# Patient Record
Sex: FEMALE | Race: BLACK OR AFRICAN AMERICAN | NOT HISPANIC OR LATINO | Employment: FULL TIME | URBAN - METROPOLITAN AREA
[De-identification: names, ages, dates, MRNs, and addresses within clinical notes are randomized per-mention and may not be internally consistent; named-entity substitution may affect disease eponyms.]

---

## 2017-03-29 ENCOUNTER — TELEPHONE (OUTPATIENT)
Dept: FAMILY MEDICINE | Facility: CLINIC | Age: 57
End: 2017-03-29

## 2017-03-29 NOTE — TELEPHONE ENCOUNTER
"Zuly Wheeler is a 56 year old female who calls with chest pain.     PRESENTING PROBLEM:  CP    NURSING ASSESSMENT:  Patient complains of chest pain.  Onset:  3/24/17  Pain is characterized as constant pressure with ntermittent burning (with after meals  Severity moderate and middle of chest  Located middle of chest   Radiates to left arm.  Duration constant.  Associated symptoms stress/anxiety and feeling tired, intermittent \"distortion\" w/vision, left arm and hand feels tingly and tight  Exacerbated by no known provoking events.  Relieved by none.  Cardiac risk factors: .  Associated Medical History:   Allergies:   Allergies   Allergen Reactions     Depakote      groggy     Lamotrigine      Intolerance, numb (Lamictal)     Olanzapine      Ineffective (only generic)     Seroquel [Quetiapine Fumarate] Visual Disturbance     Blurred vision; jumpy     Sulfa Drugs Hives     Trileptal Visual Disturbance     Blurred vision     Zoloft Other (See Comments)     jumpy     Citalopram Anxiety     Intolerance (Celexa)       MEDICATIONS:  Taking medication(s) as prescribed? N/A  Taking over the counter medication(s) ?N/A  Any medication side effects? Not Applicable    Any barriers to taking medication(s) as prescribed?  N/A  Medication(s) improving/managing symptoms?  N/A  Medication reconciliation completed: N/A    Last exam/Treatment:  12/7/16    NURSING PLAN: Nursing advice to patient to go to ED, someone else to drive or call 911    RECOMMENDED DISPOSITION:  To ED, another person to drive - or call 911  Will comply with recommendation: Yes  If further questions/concerns or if symptoms do not improve, worsen or new symptoms develop, call your PCP or [facility] as soon as possible.      Guideline used:  Telephone Triage Protocols for Nurses, Fourth Edition, Zohra Fitzpatrick RN    "

## 2017-03-29 NOTE — TELEPHONE ENCOUNTER
Reason for call: Patient states chest pressure,pain/left arm tingling/anxiety    Phone Number Pt can be reached at: Home number on file [phone] (home)  Best Time: Triaged Red Flag  Can we leave a detailed message on this number? YES

## 2017-04-28 ENCOUNTER — TELEPHONE (OUTPATIENT)
Dept: FAMILY MEDICINE | Facility: CLINIC | Age: 57
End: 2017-04-28

## 2017-04-28 NOTE — TELEPHONE ENCOUNTER
Zuly Wheeler is a 57 year old female who calls with bed bugs.    NURSING ASSESSMENT:  Description: Little itchy red bed bug bites on side of neck, back of neck, arms, wrists, hands and thumb. Skin intact.   Onset/duration: New bites on Tuesday.   Precip. factors: Stayed at a hotel in [facility]  Associated symptoms: None   Improves/worsens symptoms:  None   Pain scale (0-10)   0/10  LMP/preg/breast feeding:  NA   Last exam/Treatment:  12/7/16   Allergies:   Allergies   Allergen Reactions     Depakote      groggy     Lamotrigine      Intolerance, numb (Lamictal)     Olanzapine      Ineffective (only generic)     Seroquel [Quetiapine Fumarate] Visual Disturbance     Blurred vision; jumpy     Sulfa Drugs Hives     Trileptal Visual Disturbance     Blurred vision     Zoloft Other (See Comments)     jumpy     Citalopram Anxiety     Intolerance (Celexa)       NURSING PLAN: Nursing advice to patient given    RECOMMENDED DISPOSITION:  Home care advice -     Wash clothing in the hottest water, hire a professional to treat home if you find these, freeze items for several days that can not be washed, vacuum all things and all cracks.  Signs of bedbugs  Bites can be the first sign of a bedbug infestation. When inspecting for the bugs, look in crevices of mattresses and box springs, behind the headboard, and in and on objects near or under the bed. You may see the bugs themselves. Or, you may see tiny dark stains on fabric or carpets. Smears of blood on sheets and nightclothes upon awakening are another sign. In some cases, the bugs are so well hidden they can t be found unless items are taken apart.  Bedbug bites  Bedbugs look for food at night. They bite while people or animals are sleeping. The bites are most often painless. Many people never know they ve been bitten. However, some people develop an itchy red welt or swelling. And if a person has an allergic reaction, severe itching, blisters, or hives can develop. Bites are  often on areas that are exposed, such as the head, neck, arms, and hands. Bedbug bites are not dangerous and don t spread illness. But if the bite is scratched and the skin is broken and irritated, there is a chance that a skin infection can develop.  Treating bites  Bite symptoms usually go away on their own within a week or two. During this time, over-the-counter (OTC) hydrocortisone ointment or cream can help relieve itching and swelling. If itching is bad, an OTC oral antihistamine (such as Benadryl) can help. If an infection develops from scratching the bites, the health care provider can prescribe an antibiotic.  If you were bitten by bedbugs in your home, talk to a licensed pest-control professional or company. They can inspect your home and help you get rid of the bugs safely.  When to call your health care provider   If you have bites, call your health care provider if you develop any of the following:    A fever of 100.4 F (38 C) or higher    Signs of infection of the bites, such as increased swelling and pain, warmth, or oozing    Signs of allergic reaction, such as hives, spreading rash, throat itching or swelling, or wheezing   Avoiding bedbugs    Avoid buying used beds, but if you do buy used bed frames, mattresses, box springs, or other furniture, be sure to check them carefully for bedbugs before bringing them into your home.    If bed bugs are found or suspected in the bed, using bedbug excluding mattress and box spring encasement covers can seal them in where they will eventually die.    When traveling, remove linens from the top of the bed and inspect the mattress and headboard for signs of the bugs. Place luggage on a hard surface such as a table or on a luggage rack and not on the floor.    If you suspect you were exposed to bedbugs while traveling, wash all clothing in hot water directly upon your return. Washing alone will not kill the bugs; it must be in high temperatures, at least 113  F  (45  C) for one hour.     Never  items discarded on the street--such as bed frames, mattresses, box springs, or upholstered furniture--for use in your home. These items may carry bedbugs.    Will comply with recommendation: Yes  If further questions/concerns or if symptoms do not improve, worsen or new symptoms develop, call your PCP or [facility] as soon as possible.      Guideline used:  Telephone Triage Protocols for Nurses, Fifth Edition, Zohra Lawrence RN

## 2017-10-25 ENCOUNTER — OFFICE VISIT (OUTPATIENT)
Dept: FAMILY MEDICINE | Facility: CLINIC | Age: 57
End: 2017-10-25
Payer: COMMERCIAL

## 2017-10-25 VITALS
TEMPERATURE: 97.5 F | WEIGHT: 151 LBS | OXYGEN SATURATION: 100 % | RESPIRATION RATE: 16 BRPM | HEART RATE: 77 BPM | BODY MASS INDEX: 23.3 KG/M2 | DIASTOLIC BLOOD PRESSURE: 72 MMHG | SYSTOLIC BLOOD PRESSURE: 120 MMHG

## 2017-10-25 DIAGNOSIS — F34.1 DYSTHYMIC DISORDER: ICD-10-CM

## 2017-10-25 DIAGNOSIS — Z11.3 SCREEN FOR STD (SEXUALLY TRANSMITTED DISEASE): Primary | ICD-10-CM

## 2017-10-25 DIAGNOSIS — Z12.31 VISIT FOR SCREENING MAMMOGRAM: ICD-10-CM

## 2017-10-25 DIAGNOSIS — F31.76 BIPOLAR DISORDER, IN FULL REMISSION, MOST RECENT EPISODE DEPRESSED (H): ICD-10-CM

## 2017-10-25 LAB
ERYTHROCYTE [DISTWIDTH] IN BLOOD BY AUTOMATED COUNT: 12.8 % (ref 10–15)
HCT VFR BLD AUTO: 39 % (ref 35–47)
HGB BLD-MCNC: 12.9 G/DL (ref 11.7–15.7)
MCH RBC QN AUTO: 29.5 PG (ref 26.5–33)
MCHC RBC AUTO-ENTMCNC: 33.1 G/DL (ref 31.5–36.5)
MCV RBC AUTO: 89 FL (ref 78–100)
PLATELET # BLD AUTO: 258 10E9/L (ref 150–450)
RBC # BLD AUTO: 4.38 10E12/L (ref 3.8–5.2)
WBC # BLD AUTO: 5.4 10E9/L (ref 4–11)

## 2017-10-25 PROCEDURE — 86695 HERPES SIMPLEX TYPE 1 TEST: CPT | Performed by: FAMILY MEDICINE

## 2017-10-25 PROCEDURE — 87389 HIV-1 AG W/HIV-1&-2 AB AG IA: CPT | Performed by: FAMILY MEDICINE

## 2017-10-25 PROCEDURE — 84443 ASSAY THYROID STIM HORMONE: CPT | Performed by: FAMILY MEDICINE

## 2017-10-25 PROCEDURE — 86694 HERPES SIMPLEX NES ANTBDY: CPT | Performed by: FAMILY MEDICINE

## 2017-10-25 PROCEDURE — 36415 COLL VENOUS BLD VENIPUNCTURE: CPT | Performed by: FAMILY MEDICINE

## 2017-10-25 PROCEDURE — 87591 N.GONORRHOEAE DNA AMP PROB: CPT | Performed by: FAMILY MEDICINE

## 2017-10-25 PROCEDURE — 99214 OFFICE O/P EST MOD 30 MIN: CPT | Performed by: FAMILY MEDICINE

## 2017-10-25 PROCEDURE — 80053 COMPREHEN METABOLIC PANEL: CPT | Performed by: FAMILY MEDICINE

## 2017-10-25 PROCEDURE — 87491 CHLMYD TRACH DNA AMP PROBE: CPT | Performed by: FAMILY MEDICINE

## 2017-10-25 PROCEDURE — 85027 COMPLETE CBC AUTOMATED: CPT | Performed by: FAMILY MEDICINE

## 2017-10-25 PROCEDURE — 86780 TREPONEMA PALLIDUM: CPT | Performed by: FAMILY MEDICINE

## 2017-10-25 PROCEDURE — 86696 HERPES SIMPLEX TYPE 2 TEST: CPT | Performed by: FAMILY MEDICINE

## 2017-10-25 RX ORDER — LITHIUM CARBONATE 300 MG
300 TABLET ORAL DAILY
Qty: 90 TABLET | Refills: 1 | Status: SHIPPED | OUTPATIENT
Start: 2017-10-25 | End: 2018-04-27

## 2017-10-25 RX ORDER — OLANZAPINE 2.5 MG/1
2.5 TABLET, FILM COATED ORAL AT BEDTIME
Qty: 90 TABLET | Refills: 1 | Status: SHIPPED | OUTPATIENT
Start: 2017-10-25 | End: 2018-04-27

## 2017-10-25 ASSESSMENT — ANXIETY QUESTIONNAIRES
2. NOT BEING ABLE TO STOP OR CONTROL WORRYING: SEVERAL DAYS
3. WORRYING TOO MUCH ABOUT DIFFERENT THINGS: SEVERAL DAYS
GAD7 TOTAL SCORE: 2
GAD7 TOTAL SCORE: 2
1. FEELING NERVOUS, ANXIOUS, OR ON EDGE: NOT AT ALL
6. BECOMING EASILY ANNOYED OR IRRITABLE: NOT AT ALL
7. FEELING AFRAID AS IF SOMETHING AWFUL MIGHT HAPPEN: NOT AT ALL
5. BEING SO RESTLESS THAT IT IS HARD TO SIT STILL: NOT AT ALL
GAD7 TOTAL SCORE: 2
4. TROUBLE RELAXING: NOT AT ALL
7. FEELING AFRAID AS IF SOMETHING AWFUL MIGHT HAPPEN: NOT AT ALL

## 2017-10-25 ASSESSMENT — PATIENT HEALTH QUESTIONNAIRE - PHQ9
SUM OF ALL RESPONSES TO PHQ QUESTIONS 1-9: 1
SUM OF ALL RESPONSES TO PHQ QUESTIONS 1-9: 1
10. IF YOU CHECKED OFF ANY PROBLEMS, HOW DIFFICULT HAVE THESE PROBLEMS MADE IT FOR YOU TO DO YOUR WORK, TAKE CARE OF THINGS AT HOME, OR GET ALONG WITH OTHER PEOPLE: NOT DIFFICULT AT ALL

## 2017-10-25 NOTE — LETTER
November 4, 2017      Zluy Wheeler  [address]  [place] [address]        Dear ,    Dear Zuly,     It was nice to see you recently!  I wanted to let you know your recent test results - your results are negative for any active STIs but DO show that you have antibodies to both the HSV or Herpes Simplex Virus infections.  This means that you have been exposed to both of these viruses.     Pleas make an appointment to come in and let me know if you have any questions about this, or other concerns.     Best,   Shasta Pardo MD   Family Medicine   [facility]   [phone]     Resulted Orders   TSH WITH FREE T4 REFLEX   Result Value Ref Range    TSH 0.87 0.40 - 4.00 mU/L   CBC with platelets   Result Value Ref Range    WBC 5.4 4.0 - 11.0 10e9/L    RBC Count 4.38 3.8 - 5.2 10e12/L    Hemoglobin 12.9 11.7 - 15.7 g/dL    Hematocrit 39.0 35.0 - 47.0 %    MCV 89 78 - 100 fl    MCH 29.5 26.5 - 33.0 pg    MCHC 33.1 31.5 - 36.5 g/dL    RDW 12.8 10.0 - 15.0 %    Platelet Count 258 150 - 450 10e9/L   Comprehensive metabolic panel (BMP + Alb, Alk Phos, ALT, AST, Total. Bili, TP)   Result Value Ref Range    Sodium 140 133 - 144 mmol/L    Potassium 3.7 3.4 - 5.3 mmol/L    Chloride 104 94 - 109 mmol/L    Carbon Dioxide 26 20 - 32 mmol/L    Anion Gap 10 3 - 14 mmol/L    Glucose 85 70 - 99 mg/dL    Urea Nitrogen 11 7 - 30 mg/dL    Creatinine 0.76 0.52 - 1.04 mg/dL    GFR Estimate 79 >60 mL/min/1.7m2      Comment:      Non  GFR Calc    GFR Estimate If Black >90 >60 mL/min/1.7m2      Comment:       GFR Calc    Calcium 9.2 8.5 - 10.1 mg/dL    Bilirubin Total 0.3 0.2 - 1.3 mg/dL    Albumin 4.3 3.4 - 5.0 g/dL    Protein Total 7.9 6.8 - 8.8 g/dL    Alkaline Phosphatase 59 40 - 150 U/L    ALT 22 0 - 50 U/L    AST 13 0 - 45 U/L   NEISSERIA GONORRHOEA PCR   Result Value Ref Range    Specimen Descrip Urine     N Gonorrhea PCR Negative NEG^Negative       Comment:      Negative for N. gonorrhoeae rRNA by transcription mediated amplification.  A negative result by transcription mediated amplification does not preclude   the presence of N. gonorrhoeae infection because results are dependent on   proper and adequate collection, absence of inhibitors, and sufficient rRNA to   be detected.     CHLAMYDIA TRACHOMATIS PCR   Result Value Ref Range    Specimen Description Urine     Chlamydia Trachomatis PCR Negative NEG^Negative      Comment:      Negative for C. trachomatis rRNA by transcription mediated amplification.  A negative result by transcription mediated amplification does not preclude   the presence of C. trachomatis infection because results are dependent on   proper and adequate collection, absence of inhibitors, and sufficient rRNA to   be detected.     Anti Treponema   Result Value Ref Range    Treponema pallidum Antibody Negative NEG^Negative   HIV Antigen Antibody Combo   Result Value Ref Range    HIV Antigen Antibody Combo Nonreactive NR^Nonreactive          Comment:      HIV-1 p24 Ag & HIV-1/HIV-2 Ab Not Detected   Herpes Simplex Virus 1 and 2 IgG   Result Value Ref Range    Herpes Simplex Virus Type 1 IgG 7.5 (H) 0.0 - 0.8 AI      Comment:      Positive.  IgG antibody to HSV-1 detected.  Antibody index (AI) values reflect qualitative changes in antibody   concentration that cannot be directly associated with clinical condition or   disease state.      Herpes Simplex Virus Type 2 IgG >8.0 (H) 0.0 - 0.8 AI      Comment:      Positive.  IgG antibody to HSV-2 detected.  Antibody index (AI) values reflect qualitative changes in antibody   concentration that cannot be directly associated with clinical condition or   disease state.     Herpes: HSV IgM antibody   Result Value Ref Range    Herpes Simplex Virus IgM Antibody 0.41 0.00 - 0.89 Index Value      Comment:      No detectable antibody.  A negative test result does not rule out a primary or reactivated  infection.

## 2017-10-25 NOTE — LETTER
My Depression Action Plan  Name: Zuly Wheeler   Date of Birth 1960  Date: 10/25/2017    My doctor: Shasta Pardo   My clinic: [address]  [facility] [address] [phone]          GREEN    ZONE   Good Control    What it looks like:     Things are going generally well. You have normal up s and down s. You may even feel depressed from time to time, but bad moods usually last less than a day.   What you need to do:  1. Continue to care for yourself (see self care plan)  2. Check your depression survival kit and update it as needed  3. Follow your physician s recommendations including any medication.  4. Do not stop taking medication unless you consult with your physician first.           YELLOW         ZONE Getting Worse    What it looks like:     Depression is starting to interfere with your life.     It may be hard to get out of bed; you may be starting to isolate yourself from others.    Symptoms of depression are starting to last most all day and this has happened for several days.     You may have suicidal thoughts but they are not constant.   What you need to do:     1. Call your care team, your response to treatment will improve if you keep your care team informed of your progress. Yellow periods are signs an adjustment may need to be made.     2. Continue your self-care, even if you have to fake it!    3. Talk to someone in your support network    4. Open up your depression survival kit           RED    ZONE Medical Alert - Get Help    What it looks like:     Depression is seriously interfering with your life.     You may experience these or other symptoms: You can t get out of bed most days, can t work or engage in other necessary activities, you have trouble taking care of basic hygiene, or basic responsibilities, thoughts of suicide or death that will not go away, self-injurious behavior.     What you need  to do:  1. Call your care team and request a same-day appointment. If they are not available (weekends or after hours) call your local crisis line, emergency room or 911.      Electronically signed by: Shasta Pardo, October 25, 2017    Depression Self Care Plan / Survival Kit    Self-Care for Depression  Here s the deal. Your body and mind are really not as separate as most people think.  What you do and think affects how you feel and how you feel influences what you do and think. This means if you do things that people who feel good do, it will help you feel better.  Sometimes this is all it takes.  There is also a place for medication and therapy depending on how severe your depression is, so be sure to consult with your medical provider and/ or Behavioral Health Consultant if your symptoms are worsening or not improving.     In order to better manage my stress, I will:    Exercise  Get some form of exercise, every day. This will help reduce pain and release endorphins, the  feel good  chemicals in your brain. This is almost as good as taking antidepressants!  This is not the same as joining a gym and then never going! (they count on that by the way ) It can be as simple as just going for a walk or doing some gardening, anything that will get you moving.      Hygiene   Maintain good hygiene (Get out of bed in the morning, Make your bed, Brush your teeth, Take a shower, and Get dressed like you were going to work, even if you are unemployed).  If your clothes don't fit try to get ones that do.    Diet  I will strive to eat foods that are good for me, drink plenty of water, and avoid excessive sugar, caffeine, alcohol, and other mood-altering substances.  Some foods that are helpful in depression are: complex carbohydrates, B vitamins, flaxseed, fish or fish oil, fresh fruits and vegetables.    Psychotherapy  I agree to participate in Individual Therapy (if recommended).    Medication  If prescribed  medications, I agree to take them.  Missing doses can result in serious side effects.  I understand that drinking alcohol, or other illicit drug use, may cause potential side effects.  I will not stop my medication abruptly without first discussing it with my provider.    Staying Connected With Others  I will stay in touch with my friends, family members, and my primary care provider/team.    Use your imagination  Be creative.  We all have a creative side; it doesn t matter if it s oil painting, sand castles, or mud pies! This will also kick up the endorphins.    Witness Beauty  (AKA stop and smell the roses) Take a look outside, even in mid-winter. Notice colors, textures. Watch the squirrels and birds.     Service to others  Be of service to others.  There is always someone else in need.  By helping others we can  get out of ourselves  and remember the really important things.  This also provides opportunities for practicing all the other parts of the program.    Humor  Laugh and be silly!  Adjust your TV habits for less news and crime-drama and more comedy.    Control your stress  Try breathing deep, massage therapy, biofeedback, and meditation. Find time to relax each day.     My support system    Clinic Contact:  Phone number:    Contact 1:  Phone number:    Contact 2:  Phone number:    [employer]/:  Phone number:    Therapist:  Phone number:    [facility] crisis center:    Phone number:    Other community support:  Phone number:

## 2017-10-25 NOTE — MR AVS SNAPSHOT
"              After Visit Summary   10/25/2017    Zuly Wheeler    MRN: [id number]           Patient Information     Date Of Birth          1960        Visit Information        Provider Department      10/25/2017 1:00 PM Shasta Pardo MD [facility]        Today's Diagnoses     Screen for STD (sexually transmitted disease)    -  1    Bipolar disorder, in full remission, most recent episode depressed (H)        Dysthymic disorder        Visit for screening mammogram           Follow-ups after your visit        Future tests that were ordered for you today     Open Future Orders        Priority Expected Expires Ordered    MA SCREENING DIGITAL BILAT - Future  (s+30) Routine  10/25/2018 10/25/2017            Who to contact     If you have questions or need follow up information about today's clinic visit or your schedule please contact [facility] directly at [phone].  Normal or non-critical lab and imaging results will be communicated to you by MyChart, letter or phone within 4 business days after the clinic has received the results. If you do not hear from us within 7 days, please contact the clinic through MyChart or phone. If you have a critical or abnormal lab result, we will notify you by phone as soon as possible.  Submit refill requests through [employer] or call your pharmacy and they will forward the refill request to us. Please allow 3 business days for your refill to be completed.          Additional Information About Your Visit        MyChart Information     [employer] lets you send messages to your doctor, view your test results, renew your prescriptions, schedule appointments and more. To sign up, go to [email or web address] . Click on \"Log in\" on the left side of the screen, which will take you to the Welcome page. Then click on \"Sign up Now\" on the right side of the page.     You will be asked to enter the access code " listed below, as well as some personal information. Please follow the directions to create your username and password.     Your access code is: [email or web address]  Expires: 2018  2:01 PM     Your access code will  in 90 days. If you need help or a new code, please call your [facility] or [phone].        Care EveryWhere ID     This is your Care EveryWhere ID. This could be used by other organizations to access your [facility] medical records  [id number]        Your Vitals Were     Pulse Temperature Respirations Last Period Pulse Oximetry BMI (Body Mass Index)    77 97.5  F (36.4  C) (Tympanic) 16 2014 (Exact Date) 100% 23.3 kg/m2       Blood Pressure from Last 3 Encounters:   10/25/17 120/72   16 126/74   16 124/74    Weight from Last 3 Encounters:   10/25/17 151 lb (68.5 kg)   16 149 lb (67.6 kg)   16 149 lb (67.6 kg)              We Performed the Following     Anti Treponema     CBC with platelets     CHLAMYDIA TRACHOMATIS PCR     Comprehensive metabolic panel (BMP + Alb, Alk Phos, ALT, AST, Total. Bili, TP)     DEPRESSION ACTION PLAN (DAP)     Herpes Simplex Virus 1 and 2 IgG     Herpes: HSV IgM antibody     HIV Antigen Antibody Combo     NEISSERIA GONORRHOEA PCR     TSH WITH FREE T4 REFLEX          Today's Medication Changes          These changes are accurate as of: 10/25/17  2:01 PM.  If you have any questions, ask your nurse or doctor.               These medicines have changed or have updated prescriptions.        Dose/Directions    lithium 300 MG tablet   This may have changed:  Another medication with the same name was removed. Continue taking this medication, and follow the directions you see here.   Changed by:  Shasta Pardo MD        Dose:  300 mg   Take 1 tablet (300 mg) by mouth daily   Quantity:  90 tablet   Refills:  1         Stop taking these medicines if you haven't already. Please contact your care team if you have questions.     cetirizine 10 MG  tablet   Commonly known as:  zyrTEC   Stopped by:  Shasta Pardo MD           diphenhydrAMINE 25 MG tablet   Commonly known as:  BENADRYL   Stopped by:  Shasta Pardo MD           fluticasone 50 MCG/ACT spray   Commonly known as:  FLONASE   Stopped by:  Shasta Pardo MD           ibuprofen 600 MG tablet   Commonly known as:  ADVIL/MOTRIN   Stopped by:  Shasta Pardo MD                Where to get your medicines      [facility] [address] [facility] [address] [place] [address] AT [facility] & [place]  [address] [facility] [address] [facility] [address]     Phone:  [phone]     lithium 300 MG tablet    nefazodone 150 MG tablet    OLANZapine 2.5 MG tablet                Primary Care Provider Office Phone # Fax #    Shasta Pardo [phone]       [address] [address]        Equal Access to Services     MYLA DORADO AH: Hadii rivera ku hadasho Soomaali, waaxda luqadaha, qaybta kaalmada adeegyada, waxay adielin haykeyshan nadia carver . So [facility] [phone].    ATENCIÓN: Si jenniferla espalice, tiene a ayala disposición servicios gratuitos de asistencia lingüística. Llame al [phone].    We comply with applicable federal civil rights laws and [place] laws. We do not discriminate on the basis of race, color, national origin, age, disability, sex, sexual orientation, or gender identity.            Thank you!     Thank you for choosing [facility]  for your care. Our goal is always to provide you with excellent care. Hearing back from our patients is one way we can continue to improve our services. Please take a few minutes to complete the written survey that you may receive in the mail after your visit with us. Thank you!             Your Updated Medication List - Protect others around you: Learn how to safely use, store and throw away your medicines at www.disposemymeds.org.          This  list is accurate as of: 10/25/17  2:01 PM.  Always use your most recent med list.                   Brand Name Dispense Instructions for use Diagnosis    lithium 300 MG tablet     90 tablet    Take 1 tablet (300 mg) by mouth daily        nefazodone 150 MG tablet    SERZONE    90 tablet    Take 1 tablet (150 mg) by mouth daily    Dysthymic disorder       OLANZapine 2.5 MG tablet    zyPREXA    90 tablet    Take 1 tablet (2.5 mg) by mouth At Bedtime    Bipolar disorder, in full remission, most recent episode depressed (H)       vitamin D 1000 UNITS capsule      Take 1 capsule by mouth daily

## 2017-10-25 NOTE — NURSING NOTE
"Chief Complaint   Patient presents with     STD     Recheck Medication       Initial /72  Pulse 77  Temp 97.5  F (36.4  C) (Tympanic)  Resp 16  Wt 151 lb (68.5 kg)  LMP 01/30/2014 (Exact Date)  SpO2 100%  BMI 23.3 kg/m2 Estimated body mass index is 23.3 kg/(m^2) as calculated from the following:    Height as of 12/7/16: 5' 7.5\" (1.715 m).    Weight as of this encounter: 151 lb (68.5 kg).  Medication Reconciliation: complete   .Lavell MARTINEZ      "

## 2017-10-25 NOTE — PROGRESS NOTES
SUBJECTIVE:   Zuly Wheeler is a 57 year old female who presents to clinic today for the following health issues:      Medication Followup of ZYPREXA, SERZONE, lithium     Taking Medication as prescribed: yes    Side Effects:  None    Medication Helping Symptoms:  yes     PHQ-9 SCORE 4/14/2016 12/7/2016 10/25/2017   Total Score - - -   Total Score MyChart - - 1 (Minimal depression)   Total Score 0 4 1     57 year old here today for med refills.  Mental health stable for last several year, no concerns.  Willing to do lab tests today.    Pt IS quite concerned partner has std - wants to be checked. She doesn't have any symptoms.  They were tested before sexual activity; but not for herpes.  Now worried he had herpes because she heard from someone else that he had it.    Denies cold sores or personal history of herpes.      Problem list and histories reviewed & adjusted, as indicated.  Additional history: as documented    Patient Active Problem List   Diagnosis     Bipolar disorder, in full remission, most recent episode depressed (H)     Anxiety state     Dysthymic disorder     Chronic maxillary sinusitis     Hyperlipidemia with target LDL less than 130     Anemia Hgb= 11.8 in 11-13     Vitamin D deficiency disease     Family history of diabetes mellitus     ACP (advance care planning)     Severe episode of recurrent major depressive disorder (H)     Past Surgical History:   Procedure Laterality Date     DILATION AND CURETTAGE  12/6/2013    Procedure: DILATION AND CURETTAGE;  Dilation And Curettage;  Surgeon: Edel Chew MD;  Location: [facility]     EYE SURGERY  8/2013    cataract surgery     LAPAROSCOPIC HYSTERECTOMY SUPRACERVICAL, BILATERAL SALPINGO-OOPHORECTOMY, COMBINED  1/30/2014    Procedure: COMBINED LAPAROSCOPIC HYSTERECTOMY SUPRACERVICAL, SALPINGO-OOPHORECTOMY;  Laparoscopic Supracervical Hysterectomy With Bilateral Salingectomy;  Surgeon: Edel Chew MD;  Location: [facility] OR     TUBAL LIGATION   1990       Social History   Substance Use Topics     Smoking status: Former Smoker     Years: 30.00     Types: Cigarettes     Quit date: 11/21/1990     Smokeless tobacco: Never Used     Alcohol use No     Family History   Problem Relation Age of Onset     DIABETES Mother      Hypertension Mother      Psychotic Disorder Mother      Hearing Loss Father      Coronary Artery Disease No family hx of      Hyperlipidemia No family hx of      CEREBROVASCULAR DISEASE No family hx of      Breast Cancer No family hx of      Colon Cancer No family hx of      Prostate Cancer No family hx of      Other Cancer No family hx of      Depression No family hx of      Anxiety Disorder No family hx of      MENTAL ILLNESS No family hx of      Substance Abuse No family hx of      Anesthesia Reaction No family hx of      Asthma No family hx of      OSTEOPOROSIS No family hx of      Genetic Disorder No family hx of      Thyroid Disease No family hx of      Obesity No family hx of      Unknown/Adopted No family hx of              Reviewed and updated as needed this visit by clinical staffTobacco  Allergies  Meds  Problems  Med Hx  Surg Hx  Fam Hx  Soc Hx        Reviewed and updated as needed this visit by Provider  Allergies  Meds  Problems         ROS:  Constitutional, HEENT, cardiovascular, pulmonary, gi and gu systems are negative, except as otherwise noted.      OBJECTIVE:   /72  Pulse 77  Temp 97.5  F (36.4  C) (Tympanic)  Resp 16  Wt 151 lb (68.5 kg)  LMP 01/30/2014 (Exact Date)  SpO2 100%  BMI 23.3 kg/m2  Body mass index is 23.3 kg/(m^2).  GENERAL: healthy, alert and no distress  RESP: lungs clear to auscultation - no rales, rhonchi or wheezes  CV: regular rate and rhythm, normal S1 S2, no S3 or S4, no murmur, click or rub, no peripheral edema and peripheral pulses strong  MS: no gross musculoskeletal defects noted, no edema  SKIN: no suspicious lesions or rashes  NEURO: Normal strength and tone, mentation intact  and speech normal  PSYCH: mentation appears normal, affect normal/bright    Diagnostic Test Results:  Unresulted Labs Ordered in the Past 30 Days of this Admission     Date and Time Order Name Status Description    10/25/2017 1325 HSV IGM ANTIBODY In process     10/25/2017 1325 HERPES SIMPLEX VIRUS TYPE 1 AND 2 IGG In process     10/25/2017 1325 HIV ANTIGEN ANTIBODY COMBO In process     10/25/2017 1325 ANTI TREPONEMA In process     10/25/2017 1325 CHLAMYDIA TRACHOMATIS PCR In process     10/25/2017 1325 NEISSERIA GONORRHOEAE PCR In process     10/25/2017 1325 COMPREHENSIVE METABOLIC PANEL In process     10/25/2017 1325 CBC WITH PLATELETS In process     10/25/2017 1325 TSH WITH FREE T4 REFLEX In process             ASSESSMENT/PLAN:     Zuly was seen today for std and recheck medication.    Diagnoses and all orders for this visit:    Screen for STD (sexually transmitted disease)  Comments:  Will check today, including herpes.  Discussed complex testing and results of this.  Will call with results unless all negative, then will send letter  Orders:  -     NEISSERIA GONORRHOEA PCR  -     CHLAMYDIA TRACHOMATIS PCR  -     Anti Treponema  -     HIV Antigen Antibody Combo  -     Herpes Simplex Virus 1 and 2 IgG  -     Herpes: HSV IgM antibody    Bipolar disorder, in full remission, most recent episode depressed (H)  Comments:  Quite stable.  Doing well.  PHQ9=1.  No manic symptoms.  Refilled meds.  Updated labs today  Orders:  -     TSH WITH FREE T4 REFLEX  -     CBC with platelets  -     Comprehensive metabolic panel (BMP + Alb, Alk Phos, ALT, AST, Total. Bili, TP)  -     OLANZapine (ZYPREXA) 2.5 MG tablet; Take 1 tablet (2.5 mg) by mouth At Bedtime    Dysthymic disorder  Comments:  As above for BPD.  Orders:  -     nefazodone (SERZONE) 150 MG tablet; Take 1 tablet (150 mg) by mouth daily    Visit for screening mammogram  Comments:  Overdue for screening by a few months (last 2015).  Will do - ordered, she'll  schedule.  Orders:  -     MA SCREENING DIGITAL BILAT - Future  (s+30); Future    Other orders  -     Cancel: BASIC METABOLIC PANEL  -     lithium 300 MG tablet; Take 1 tablet (300 mg) by mouth daily  -     DEPRESSION ACTION PLAN (DAP)        FUTURE APPOINTMENTS:       - Follow-up for annual visit or as needed    Shasta Pardo MD  [facility]    Answers for HPI/ROS submitted by the patient on 10/25/2017   GABRIELE 7 TOTAL SCORE: 2  If you checked off any problems, how difficult have these problems made it for you to do your work, take care of things at home, or get along with other people?: Not difficult at all  PHQ9 TOTAL SCORE: 1

## 2017-10-26 LAB
ALBUMIN SERPL-MCNC: 4.3 G/DL (ref 3.4–5)
ALP SERPL-CCNC: 59 U/L (ref 40–150)
ALT SERPL W P-5'-P-CCNC: 22 U/L (ref 0–50)
ANION GAP SERPL CALCULATED.3IONS-SCNC: 10 MMOL/L (ref 3–14)
AST SERPL W P-5'-P-CCNC: 13 U/L (ref 0–45)
BILIRUB SERPL-MCNC: 0.3 MG/DL (ref 0.2–1.3)
BUN SERPL-MCNC: 11 MG/DL (ref 7–30)
C TRACH DNA SPEC QL NAA+PROBE: NEGATIVE
CALCIUM SERPL-MCNC: 9.2 MG/DL (ref 8.5–10.1)
CHLORIDE SERPL-SCNC: 104 MMOL/L (ref 94–109)
CO2 SERPL-SCNC: 26 MMOL/L (ref 20–32)
CREAT SERPL-MCNC: 0.76 MG/DL (ref 0.52–1.04)
GFR SERPL CREATININE-BSD FRML MDRD: 79 ML/MIN/1.7M2
GLUCOSE SERPL-MCNC: 85 MG/DL (ref 70–99)
HIV 1+2 AB+HIV1 P24 AG SERPL QL IA: NONREACTIVE
HSV1 IGG SERPL QL IA: 7.5 AI (ref 0–0.8)
HSV2 IGG SERPL QL IA: >8 AI (ref 0–0.8)
N GONORRHOEA DNA SPEC QL NAA+PROBE: NEGATIVE
POTASSIUM SERPL-SCNC: 3.7 MMOL/L (ref 3.4–5.3)
PROT SERPL-MCNC: 7.9 G/DL (ref 6.8–8.8)
SODIUM SERPL-SCNC: 140 MMOL/L (ref 133–144)
SPECIMEN SOURCE: NORMAL
SPECIMEN SOURCE: NORMAL
T PALLIDUM IGG+IGM SER QL: NEGATIVE
TSH SERPL DL<=0.005 MIU/L-ACNC: 0.87 MU/L (ref 0.4–4)

## 2017-10-26 ASSESSMENT — ANXIETY QUESTIONNAIRES: GAD7 TOTAL SCORE: 2

## 2017-10-31 LAB — HSV 1+2 IGM SER-IMP: 0.41 INDEX VALUE (ref 0–0.89)

## 2017-11-03 NOTE — PROGRESS NOTES
Hi Team 3:  Please sent a lab result with the following note.  Thank you!    Dear Zuly,    It was nice to see you recently!  I wanted to let you know your recent test results - your results are negative for any active STIs but DO show that you have antibodies to both the HSV or Herpes Simplex Virus infections.  This means that you have been exposed to both of these viruses.     Pleas make an appointment to come in and let me know if you have any questions about this, or other concerns.    Best,  Shasta Pardo MD  Family Medicine  [facility]  [phone]         Results for orders placed or performed in visit on 10/25/17  -TSH WITH FREE T4 REFLEX       Result                                            Value                         Ref Range                       TSH                                               0.87                          0.40 - 4.00 mU/L           -CBC with platelets       Result                                            Value                         Ref Range                       WBC                                               5.4                           4.0 - 11.0 10e9/L               RBC Count                                         4.38                          3.8 - 5.2 10e12/L               Hemoglobin                                        12.9                          11.7 - 15.7 g/dL                Hematocrit                                        39.0                          35.0 - 47.0 %                   MCV                                               89                            78 - 100 fl                     MCH                                               29.5                          26.5 - 33.0 pg                  MCHC                                              33.1                          31.5 - 36.5 g/dL                RDW                                               12.8                          10.0 - 15.0 %                    Platelet Count                                    258                           150 - 450 10e9/L           -Comprehensive metabolic panel (BMP + Alb, Alk Phos, ALT, AST, Total. Bili, TP)       Result                                            Value                         Ref Range                       Sodium                                            140                           133 - 144 mmol/L                Potassium                                         3.7                           3.4 - 5.3 mmol/L                Chloride                                          104                           94 - 109 mmol/L                 Carbon Dioxide                                    26                            20 - 32 mmol/L                  Anion Gap                                         10                            3 - 14 mmol/L                   Glucose                                           85                            70 - 99 mg/dL                   Urea Nitrogen                                     11                            7 - 30 mg/dL                    Creatinine                                        0.76                          0.52 - 1.04 mg/dL               GFR Estimate                                      79                            >60 mL/min/1.7m2                GFR Estimate If Black                             >90                           >60 mL/min/1.7m2                Calcium                                           9.2                           8.5 - 10.1 mg/dL                Bilirubin Total                                   0.3                           0.2 - 1.3 mg/dL                 Albumin                                           4.3                           3.4 - 5.0 g/dL                  Protein Total                                     7.9                           6.8 - 8.8 g/dL                  Alkaline Phosphatase                              59                             40 - 150 U/L                    ALT                                               22                            0 - 50 U/L                      AST                                               13                            0 - 45 U/L                 -Anti Treponema       Result                                            Value                         Ref Range                       Treponema pallidum Antibody                       Negative                      NEG^Negative               -HIV Antigen Antibody Combo       Result                                            Value                         Ref Range                       HIV Antigen Antibody Combo                        Nonreactive                   NR^Nonreactive             -Herpes Simplex Virus 1 and 2 IgG       Result                                            Value                         Ref Range                       Herpes Simplex Virus Type 1 IgG                   7.5 (H)                       0.0 - 0.8 AI                    Herpes Simplex Virus Type 2 IgG                   >8.0 (H)                      0.0 - 0.8 AI               -Herpes: HSV IgM antibody       Result                                            Value                         Ref Range                       Herpes Simplex Virus IgM Antibody                 0.41                          0.00 - 0.89 Index Value    -NEISSERIA GONORRHOEA PCR       Result                                            Value                         Ref Range                       Specimen Descrip                                  Urine                                                         N Gonorrhea PCR                                   Negative                      NEG^Negative               -CHLAMYDIA TRACHOMATIS PCR       Result                                            Value                         Ref Range                       Specimen Description                              Urine                                                          Chlamydia Trachomatis PCR                         Negative                      NEG^Negative

## 2017-11-14 ENCOUNTER — TELEPHONE (OUTPATIENT)
Dept: FAMILY MEDICINE | Facility: CLINIC | Age: 57
End: 2017-11-14

## 2017-11-14 NOTE — TELEPHONE ENCOUNTER
Reason for Call:  Request for results:    Name of test or procedure:    STD testing    Date of test of procedure:   About 3 weeks ago    Location of the test or procedure:   [facility]    OK to leave the result message on voice mail or with a family member? YES    Phone number Patient can be reached at:  Home number on file [phone] (home)    Additional comments:      Please call patient    Call taken on 11/14/2017 at 2:37 PM by JASPREET OMER

## 2017-11-29 ENCOUNTER — OFFICE VISIT (OUTPATIENT)
Dept: FAMILY MEDICINE | Facility: CLINIC | Age: 57
End: 2017-11-29
Payer: COMMERCIAL

## 2017-11-29 VITALS
DIASTOLIC BLOOD PRESSURE: 76 MMHG | TEMPERATURE: 97.4 F | HEART RATE: 74 BPM | OXYGEN SATURATION: 100 % | BODY MASS INDEX: 23.46 KG/M2 | SYSTOLIC BLOOD PRESSURE: 128 MMHG | WEIGHT: 152 LBS | RESPIRATION RATE: 16 BRPM

## 2017-11-29 DIAGNOSIS — Z23 NEED FOR PROPHYLACTIC VACCINATION AND INOCULATION AGAINST INFLUENZA: ICD-10-CM

## 2017-11-29 DIAGNOSIS — B00.9 HERPES SIMPLEX VIRUS INFECTION: Primary | ICD-10-CM

## 2017-11-29 DIAGNOSIS — Z12.31 VISIT FOR SCREENING MAMMOGRAM: ICD-10-CM

## 2017-11-29 DIAGNOSIS — R06.83 SNORING: ICD-10-CM

## 2017-11-29 DIAGNOSIS — Z12.11 SCREEN FOR COLON CANCER: ICD-10-CM

## 2017-11-29 PROCEDURE — 99214 OFFICE O/P EST MOD 30 MIN: CPT | Performed by: FAMILY MEDICINE

## 2017-11-29 NOTE — PATIENT INSTRUCTIONS
You need a mammogram!  Please call to schedule this.  [facility] Mammogram every Friday morning at our clinic [phone]  or  [facility] [place]   Appointment line [phone]  or  [facility] Appointment line [phone]    Herpes  If you have herpes, you re not alone. Millions of Americans have it. Herpes has no cure. But you can control it and learn how to protect yourself and others from outbreaks.  What is herpes?  Herpes is a chronic (lifelong) virus. It can cause sores and discomfort. You get it from contact with someone who carries the virus. If sores occur on the lips, you have oral herpes. If sores occur on the penis or around the vagina, you have genital herpes.  Herpes outbreaks    The first outbreak of herpes sores is usually the most severe. Then, the soldiers of the body s immune system, white blood cells, produce antibodies. These antibodies help neutralize the herpes virus and may help make future attacks less severe.    Some people have only one outbreak of sores. Some people have periods of frequent outbreaks (every few weeks). Outbreaks of herpes sores usually happen less often over time.    Herpes sores may appear without a cause. Outbreaks are more likely when the immune system is weak. Other viral infections (such as a cold) can cause outbreaks. Stress from a poor diet, fatigue, or emotional upset can lead to outbreaks of sores. Exposure to strong sunlight often causes herpes sores to reappear.   To help prevent outbreaks    To prevent oral herpes outbreaks, avoid overexposure to wind, sun, and extreme temperatures. Use sunscreen and lip balm on affected areas.    If you are having frequent outbreaks, ask your healthcare provider about medicines that can help prevent outbreaks.  How herpes spreads to others  Herpes can be spread during an outbreak. But even without sores present, you can still  shed  the virus and infect others. You can take steps to  prevent this.  To protect yourself and others    If you have an oral sore, avoid kissing and oral-genital contact.    If you have a genital sore, avoid intercourse. Also avoid oral-genital contact.    Wash your hands after touching a sore.    Use a condom each time you have sex. You can pass the virus even when sores aren t present. If you re unsure about the timing of certain kinds of physical contact, ask your health care provider.    Tell any new partners that you have herpes.    If you re a woman, have Pap tests as often as your healthcare provider recommends.    A woman can spread herpes to their  during the birth process, whether or not they have an active genital sore. If pregnant, don't forget to tell your healthcare provider early in the pregnancy.     In some cases, daily antiviral medicine (acyclovir, famcyclovir, or valavyclovir), in addition to consistent condom use, may reduce your chances of spreading herpes to an uninfected partner. Ask your healthcare provider if this medicine would be helpful for you.  Resources  [employer] STD Hotline  718.237.3465  www.ashastd.org  Centers for Disease Control and Prevention  896.708.5901  www.cdc.gov/std   Date Last Reviewed: 2016-2017 [employer]. [address] [place] [address], [place] [address]. All rights reserved. This information is not intended as a substitute for professional medical care. Always follow your healthcare professional's instructions.        Diagnosing Herpes  You will be asked about your health history. You may be asked about your eating and sleeping habits and sexual history. Mention if you have sores or if you have had any in the past. Also mention if you feel tingling or itching before an outbreak.  What a sore looks like        A herpes sore may first appear as a small white blister. The fluid inside the blister is filled with the herpes virus. At this stage the virus sheds easily. This  means it can be passed to other people.   A soft wet ulcer may form in place of the blister. The herpes virus is in the fluid of the open sore. As a result, the virus can still be spread to others.                 A soft crust forms as a new layer of skin grows. Fewer copies of the virus are present in the sore.   The skin surface is normal, but the virus remains in the body. Shedding is less likely, but it can still occur.      Testing for herpes  If herpes is suspected, tests such as these may be done to confirm the diagnosis:    Viral culture. A small amount of fluid is swabbed from the base of a blister. The fluid is grown in a special culture with healthy cells. If herpes is present, it will alter the look of the cells.    Fluorescent antibody test. Cells are taken from the base of a blister. They are stained and checked under a microscope. If herpes is present, the cells will change color.    Molecular amplification. A sample of fluid suspected of containing herpes virus is mixed with chemicals that allow pieces of the virus to multiply very quickly. These viral fragments can be detected very rapidly.    Other tests. If sores are not present, tests can be run on blood or cell samples. These tests show if you carry the herpes virus.   Date Last Reviewed: 1/1/2017 2000-2017 [employer]. [address] [place] [address], [place] [address]. All rights reserved. This information is not intended as a substitute for professional medical care. Always follow your healthcare professional's instructions.        Living with Herpes  To speed healing, take care of open herpes sores. To reduce outbreaks, take care of your health. And to keep from infecting others, learn how to avoid spreading the virus.     To ease symptoms    Start episodic treatment at the first sign of symptoms, such as itching or tingling.    Take ibuprofen or acetaminophen to limit any pain.    Sit in a warm or cool bath or use a moist compress  to lessen the itching of sores. For some women, genital outbreaks cause burning during urination. In such cases, urinating in a tub of warm water helps reduce burning.    Wear white cotton underwear and loose clothing during outbreaks. Don t wear nylon underwear or tight clothes. They can prevent sores from healing.     To speed healing    Wash sores with mild soap and water. Pat (don't rub) the sores completely dry.    Always wash your hands after touching a sore.    Don t bandage sores. Air helps them heal.    Avoid using any ointment unless it is prescribed. Applying the wrong jelly or cream may hold in moisture and slow healing.    Don t pick at the sores. This can slow healing, and might cause a sore to become infected.    If you wear contacts, wash your hands well before putting them in.     To reduce outbreaks    Eat a balanced diet. Your health care provider may suggest taking supplements. These help ensure that you get all the nutrients you need.    Get plenty of sleep. This helps your immune system work its best.    Limit stress and tension. Both can weaken the body s defenses.    Limit exposure to sun, wind, and extreme heat or cold. Wear sunscreen and lip balm to help prevent outbreaks.     To protect others    Tell your current sex partner and any future partners that you have herpes. If you don t know what to say, ask your healthcare provider for help.    Use a latex condom that covers the affected areas each time you have sex. This reduces the risk of passing herpes to your partner.    Avoid kissing when you have an oral sore.    Do not have intercourse when genital sores are present. Also keep in mind, herpes can be passed during oral sex and with anal contact.    Don t share towels, toothbrushes, lip balm, or lipstick when you have a sore.    If you have very frequent outbreaks, taking daily antiviral medicines can help reduce the likelihood of transmission to your partner.  Date Last Reviewed:  1/1/2017 2000-2017 [employer]. [address] [place] [address], [place] [address]. All rights reserved. This information is not intended as a substitute for professional medical care. Always follow your healthcare professional's instructions.

## 2017-11-29 NOTE — PROGRESS NOTES
SUBJECTIVE:   Zuly Wheeler is a 57 year old female who presents to clinic today for the following health issues:    Pt here to follow up on her apt from 10-25-17 STD screening    Had positive herpes tests (type 1 and 2) - never gets outbreaks vaginally, rectally or cold sores.  Had partner with positive test so she wanted to know - but now sad that she knows!  Has questions about this.    Also, tired all the time. Recently had normal CBC, CMP, TSH.  Snores a lot at night, loudly but had negative sleep apnea test (overnight sleep study) a few years ago.  Wonders about her adenoids?    Problem list and histories reviewed & adjusted, as indicated.  Additional history: as documented    Reviewed and updated as needed this visit by clinical staff  Tobacco  Allergies  Meds  Med Hx  Surg Hx  Fam Hx  Soc Hx      Reviewed and updated as needed this visit by Provider       ROS:  Constitutional, HEENT, cardiovascular, pulmonary, gi and gu systems are negative, except as otherwise noted.      OBJECTIVE:   /76  Pulse 74  Temp 97.4  F (36.3  C) (Tympanic)  Resp 16  Wt 152 lb (68.9 kg)  LMP 01/30/2014 (Exact Date)  SpO2 100%  BMI 23.46 kg/m2  Body mass index is 23.46 kg/(m^2).  GENERAL: healthy, alert and no distress  EYES: Eyes grossly normal to inspection, PERRL and conjunctivae and sclerae normal  HENT: ear canals and TM's normal, nose and mouth without ulcers or lesions  NECK: no adenopathy, no asymmetry, masses, or scars and thyroid normal to palpation  RESP: lungs clear to auscultation - no rales, rhonchi or wheezes  CV: regular rate and rhythm, normal S1 S2, no S3 or S4, no murmur, click or rub, no peripheral edema and peripheral pulses strong  ABDOMEN: soft, nontender, no hepatosplenomegaly, no masses and bowel sounds normal  MS: no gross musculoskeletal defects noted, no edema  PSYCH: mentation appears normal, affect normal/bright    Diagnostic Test Results:  Results for orders placed or  performed in visit on 10/25/17   TSH WITH FREE T4 REFLEX   Result Value Ref Range    TSH 0.87 0.40 - 4.00 mU/L   CBC with platelets   Result Value Ref Range    WBC 5.4 4.0 - 11.0 10e9/L    RBC Count 4.38 3.8 - 5.2 10e12/L    Hemoglobin 12.9 11.7 - 15.7 g/dL    Hematocrit 39.0 35.0 - 47.0 %    MCV 89 78 - 100 fl    MCH 29.5 26.5 - 33.0 pg    MCHC 33.1 31.5 - 36.5 g/dL    RDW 12.8 10.0 - 15.0 %    Platelet Count 258 150 - 450 10e9/L   Comprehensive metabolic panel (BMP + Alb, Alk Phos, ALT, AST, Total. Bili, TP)   Result Value Ref Range    Sodium 140 133 - 144 mmol/L    Potassium 3.7 3.4 - 5.3 mmol/L    Chloride 104 94 - 109 mmol/L    Carbon Dioxide 26 20 - 32 mmol/L    Anion Gap 10 3 - 14 mmol/L    Glucose 85 70 - 99 mg/dL    Urea Nitrogen 11 7 - 30 mg/dL    Creatinine 0.76 0.52 - 1.04 mg/dL    GFR Estimate 79 >60 mL/min/1.7m2    GFR Estimate If Black >90 >60 mL/min/1.7m2    Calcium 9.2 8.5 - 10.1 mg/dL    Bilirubin Total 0.3 0.2 - 1.3 mg/dL    Albumin 4.3 3.4 - 5.0 g/dL    Protein Total 7.9 6.8 - 8.8 g/dL    Alkaline Phosphatase 59 40 - 150 U/L    ALT 22 0 - 50 U/L    AST 13 0 - 45 U/L   Anti Treponema   Result Value Ref Range    Treponema pallidum Antibody Negative NEG^Negative   HIV Antigen Antibody Combo   Result Value Ref Range    HIV Antigen Antibody Combo Nonreactive NR^Nonreactive       Herpes Simplex Virus 1 and 2 IgG   Result Value Ref Range    Herpes Simplex Virus Type 1 IgG 7.5 (H) 0.0 - 0.8 AI    Herpes Simplex Virus Type 2 IgG >8.0 (H) 0.0 - 0.8 AI   Herpes: HSV IgM antibody   Result Value Ref Range    Herpes Simplex Virus IgM Antibody 0.41 0.00 - 0.89 Index Value   NEISSERIA GONORRHOEA PCR   Result Value Ref Range    Specimen Descrip Urine     N Gonorrhea PCR Negative NEG^Negative   CHLAMYDIA TRACHOMATIS PCR   Result Value Ref Range    Specimen Description Urine     Chlamydia Trachomatis PCR Negative NEG^Negative       ASSESSMENT/PLAN:     Zuly was seen today for results.    Diagnoses and all  orders for this visit:    Herpes simplex virus infection  Comments:  No known outbreaks or sores, but positive serology.  Discussed that this means she does have the herpes virus, and natural hx of this infection.  Gave pt info.    Snoring  Comments:  Had sleep apnea testing, was negative in 2012 per pt.  Wonders about enlarged adenoids?  Referred to ENT for eval.  Orders:  -     OTOLARYNGOLOGY REFERRAL    Visit for screening mammogram  -     MA SCREENING DIGITAL BILAT - Future  (s+30); Future    Need for prophylactic vaccination and inoculation against influenza    Screen for colon cancer  -     Fecal colorectal cancer screen (FIT); Future        Patient Instructions     You need a mammogram!  Please call to schedule this.  [facility] Mammogram every Friday morning at our clinic [phone]  or  [facility]   Appointment line [phone]  or  [facility] Appointment line [phone]    Herpes  If you have herpes, you re not alone. Millions of Americans have it. Herpes has no cure. But you can control it and learn how to protect yourself and others from outbreaks.  What is herpes?  Herpes is a chronic (lifelong) virus. It can cause sores and discomfort. You get it from contact with someone who carries the virus. If sores occur on the lips, you have oral herpes. If sores occur on the penis or around the vagina, you have genital herpes.  Herpes outbreaks    The first outbreak of herpes sores is usually the most severe. Then, the soldiers of the body s immune system, white blood cells, produce antibodies. These antibodies help neutralize the herpes virus and may help make future attacks less severe.    Some people have only one outbreak of sores. Some people have periods of frequent outbreaks (every few weeks). Outbreaks of herpes sores usually happen less often over time.    Herpes sores may appear without a cause. Outbreaks are more likely when the immune system is weak.  Other viral infections (such as a cold) can cause outbreaks. Stress from a poor diet, fatigue, or emotional upset can lead to outbreaks of sores. Exposure to strong sunlight often causes herpes sores to reappear.   To help prevent outbreaks    To prevent oral herpes outbreaks, avoid overexposure to wind, sun, and extreme temperatures. Use sunscreen and lip balm on affected areas.    If you are having frequent outbreaks, ask your healthcare provider about medicines that can help prevent outbreaks.  How herpes spreads to others  Herpes can be spread during an outbreak. But even without sores present, you can still  shed  the virus and infect others. You can take steps to prevent this.  To protect yourself and others    If you have an oral sore, avoid kissing and oral-genital contact.    If you have a genital sore, avoid intercourse. Also avoid oral-genital contact.    Wash your hands after touching a sore.    Use a condom each time you have sex. You can pass the virus even when sores aren t present. If you re unsure about the timing of certain kinds of physical contact, ask your health care provider.    Tell any new partners that you have herpes.    If you re a woman, have Pap tests as often as your healthcare provider recommends.    A woman can spread herpes to their  during the birth process, whether or not they have an active genital sore. If pregnant, don't forget to tell your healthcare provider early in the pregnancy.     In some cases, daily antiviral medicine (acyclovir, famcyclovir, or valavyclovir), in addition to consistent condom use, may reduce your chances of spreading herpes to an uninfected partner. Ask your healthcare provider if this medicine would be helpful for you.  Resources  [employer] STD Hotline  220.147.6308  www.ashastd.org  Centers for Disease Control and Prevention  146.624.4281  www.cdc.gov/std   Date Last Reviewed: 2016-2017 [employer]. [address] [place] [address]. All rights reserved. This information is not intended as a substitute for professional medical care. Always follow your healthcare professional's instructions.        Diagnosing Herpes  You will be asked about your health history. You may be asked about your eating and sleeping habits and sexual history. Mention if you have sores or if you have had any in the past. Also mention if you feel tingling or itching before an outbreak.  What a sore looks like        A herpes sore may first appear as a small white blister. The fluid inside the blister is filled with the herpes virus. At this stage the virus sheds easily. This means it can be passed to other people.   A soft wet ulcer may form in place of the blister. The herpes virus is in the fluid of the open sore. As a result, the virus can still be spread to others.                 A soft crust forms as a new layer of skin grows. Fewer copies of the virus are present in the sore.   The skin surface is normal, but the virus remains in the body. Shedding is less likely, but it can still occur.      Testing for herpes  If herpes is suspected, tests such as these may be done to confirm the diagnosis:    Viral culture. A small amount of fluid is swabbed from the base of a blister. The fluid is grown in a special culture with healthy cells. If herpes is present, it will alter the look of the cells.    Fluorescent antibody test. Cells are taken from the base of a blister. They are stained and checked under a microscope. If herpes is present, the cells will change color.    Molecular amplification. A sample of fluid suspected of containing herpes virus is mixed with chemicals that allow pieces of the virus to multiply very quickly. These viral fragments can be detected very rapidly.    Other tests. If sores are not present, tests can be run on blood or cell samples. These tests show if you carry the herpes virus.   Date  Last Reviewed: 1/1/2017 2000-2017 [employer]. [address] [place] [address], [place] [address]. All rights reserved. This information is not intended as a substitute for professional medical care. Always follow your healthcare professional's instructions.        Living with Herpes  To speed healing, take care of open herpes sores. To reduce outbreaks, take care of your health. And to keep from infecting others, learn how to avoid spreading the virus.     To ease symptoms    Start episodic treatment at the first sign of symptoms, such as itching or tingling.    Take ibuprofen or acetaminophen to limit any pain.    Sit in a warm or cool bath or use a moist compress to lessen the itching of sores. For some women, genital outbreaks cause burning during urination. In such cases, urinating in a tub of warm water helps reduce burning.    Wear white cotton underwear and loose clothing during outbreaks. Don t wear nylon underwear or tight clothes. They can prevent sores from healing.     To speed healing    Wash sores with mild soap and water. Pat (don't rub) the sores completely dry.    Always wash your hands after touching a sore.    Don t bandage sores. Air helps them heal.    Avoid using any ointment unless it is prescribed. Applying the wrong jelly or cream may hold in moisture and slow healing.    Don t pick at the sores. This can slow healing, and might cause a sore to become infected.    If you wear contacts, wash your hands well before putting them in.     To reduce outbreaks    Eat a balanced diet. Your health care provider may suggest taking supplements. These help ensure that you get all the nutrients you need.    Get plenty of sleep. This helps your immune system work its best.    Limit stress and tension. Both can weaken the body s defenses.    Limit exposure to sun, wind, and extreme heat or cold. Wear sunscreen and lip balm to help prevent outbreaks.     To protect others    Tell your current sex  partner and any future partners that you have herpes. If you don t know what to say, ask your healthcare provider for help.    Use a latex condom that covers the affected areas each time you have sex. This reduces the risk of passing herpes to your partner.    Avoid kissing when you have an oral sore.    Do not have intercourse when genital sores are present. Also keep in mind, herpes can be passed during oral sex and with anal contact.    Don t share towels, toothbrushes, lip balm, or lipstick when you have a sore.    If you have very frequent outbreaks, taking daily antiviral medicines can help reduce the likelihood of transmission to your partner.  Date Last Reviewed: 1/1/2017 2000-2017 [employer]. [address] [place] [address], [place] [address]. All rights reserved. This information is not intended as a substitute for professional medical care. Always follow your healthcare professional's instructions.            Shasta Pardo MD  [facility]

## 2017-11-29 NOTE — NURSING NOTE
"Chief Complaint   Patient presents with     Results       Initial /76  Pulse 74  Temp 97.4  F (36.3  C) (Tympanic)  Resp 16  Wt 152 lb (68.9 kg)  LMP 01/30/2014 (Exact Date)  SpO2 100%  BMI 23.46 kg/m2 Estimated body mass index is 23.46 kg/(m^2) as calculated from the following:    Height as of 12/7/16: 5' 7.5\" (1.715 m).    Weight as of this encounter: 152 lb (68.9 kg).  Medication Reconciliation: complete   .Lavell MARTINEZ      "

## 2017-11-29 NOTE — MR AVS SNAPSHOT
After Visit Summary   11/29/2017    Zuly Wheeler    MRN: [id number]           Patient Information     Date Of Birth          1960        Visit Information        Provider Department      11/29/2017 10:40 AM Shasta Pardo MD [facility]        Today's Diagnoses     Herpes simplex virus infection    -  1    Screen for colon cancer        Visit for screening mammogram        Need for prophylactic vaccination and inoculation against influenza          Care Instructions    You need a mammogram!  Please call to schedule this.  [facility] Mammogram every Friday morning at our clinic [phone]  or  [facility]   Appointment line [phone]  or  [facility] Appointment line [phone]    Herpes  If you have herpes, you re not alone. Millions of Americans have it. Herpes has no cure. But you can control it and learn how to protect yourself and others from outbreaks.  What is herpes?  Herpes is a chronic (lifelong) virus. It can cause sores and discomfort. You get it from contact with someone who carries the virus. If sores occur on the lips, you have oral herpes. If sores occur on the penis or around the vagina, you have genital herpes.  Herpes outbreaks    The first outbreak of herpes sores is usually the most severe. Then, the soldiers of the body s immune system, white blood cells, produce antibodies. These antibodies help neutralize the herpes virus and may help make future attacks less severe.    Some people have only one outbreak of sores. Some people have periods of frequent outbreaks (every few weeks). Outbreaks of herpes sores usually happen less often over time.    Herpes sores may appear without a cause. Outbreaks are more likely when the immune system is weak. Other viral infections (such as a cold) can cause outbreaks. Stress from a poor diet, fatigue, or emotional upset can lead to outbreaks of sores.  Exposure to strong sunlight often causes herpes sores to reappear.   To help prevent outbreaks    To prevent oral herpes outbreaks, avoid overexposure to wind, sun, and extreme temperatures. Use sunscreen and lip balm on affected areas.    If you are having frequent outbreaks, ask your healthcare provider about medicines that can help prevent outbreaks.  How herpes spreads to others  Herpes can be spread during an outbreak. But even without sores present, you can still  shed  the virus and infect others. You can take steps to prevent this.  To protect yourself and others    If you have an oral sore, avoid kissing and oral-genital contact.    If you have a genital sore, avoid intercourse. Also avoid oral-genital contact.    Wash your hands after touching a sore.    Use a condom each time you have sex. You can pass the virus even when sores aren t present. If you re unsure about the timing of certain kinds of physical contact, ask your health care provider.    Tell any new partners that you have herpes.    If you re a woman, have Pap tests as often as your healthcare provider recommends.    A woman can spread herpes to their  during the birth process, whether or not they have an active genital sore. If pregnant, don't forget to tell your healthcare provider early in the pregnancy.     In some cases, daily antiviral medicine (acyclovir, famcyclovir, or valavyclovir), in addition to consistent condom use, may reduce your chances of spreading herpes to an uninfected partner. Ask your healthcare provider if this medicine would be helpful for you.  Resources  [employer] STD Hotline  264.277.5689  www.ashastd.org  Centers for Disease Control and Prevention  853.579.2204  www.cdc.gov/std   Date Last Reviewed: 2016-2017 [employer]. [address] [place] [address], [place] [address]. All rights reserved. This information is not intended as a substitute for professional medical  care. Always follow your healthcare professional's instructions.        Diagnosing Herpes  You will be asked about your health history. You may be asked about your eating and sleeping habits and sexual history. Mention if you have sores or if you have had any in the past. Also mention if you feel tingling or itching before an outbreak.  What a sore looks like        A herpes sore may first appear as a small white blister. The fluid inside the blister is filled with the herpes virus. At this stage the virus sheds easily. This means it can be passed to other people.   A soft wet ulcer may form in place of the blister. The herpes virus is in the fluid of the open sore. As a result, the virus can still be spread to others.                 A soft crust forms as a new layer of skin grows. Fewer copies of the virus are present in the sore.   The skin surface is normal, but the virus remains in the body. Shedding is less likely, but it can still occur.      Testing for herpes  If herpes is suspected, tests such as these may be done to confirm the diagnosis:    Viral culture. A small amount of fluid is swabbed from the base of a blister. The fluid is grown in a special culture with healthy cells. If herpes is present, it will alter the look of the cells.    Fluorescent antibody test. Cells are taken from the base of a blister. They are stained and checked under a microscope. If herpes is present, the cells will change color.    Molecular amplification. A sample of fluid suspected of containing herpes virus is mixed with chemicals that allow pieces of the virus to multiply very quickly. These viral fragments can be detected very rapidly.    Other tests. If sores are not present, tests can be run on blood or cell samples. These tests show if you carry the herpes virus.   Date Last Reviewed: 1/1/2017 2000-2017 [employer]. [address] [place] [address], [place] [address]. All rights reserved. This information is not  intended as a substitute for professional medical care. Always follow your healthcare professional's instructions.        Living with Herpes  To speed healing, take care of open herpes sores. To reduce outbreaks, take care of your health. And to keep from infecting others, learn how to avoid spreading the virus.     To ease symptoms    Start episodic treatment at the first sign of symptoms, such as itching or tingling.    Take ibuprofen or acetaminophen to limit any pain.    Sit in a warm or cool bath or use a moist compress to lessen the itching of sores. For some women, genital outbreaks cause burning during urination. In such cases, urinating in a tub of warm water helps reduce burning.    Wear white cotton underwear and loose clothing during outbreaks. Don t wear nylon underwear or tight clothes. They can prevent sores from healing.     To speed healing    Wash sores with mild soap and water. Pat (don't rub) the sores completely dry.    Always wash your hands after touching a sore.    Don t bandage sores. Air helps them heal.    Avoid using any ointment unless it is prescribed. Applying the wrong jelly or cream may hold in moisture and slow healing.    Don t pick at the sores. This can slow healing, and might cause a sore to become infected.    If you wear contacts, wash your hands well before putting them in.     To reduce outbreaks    Eat a balanced diet. Your health care provider may suggest taking supplements. These help ensure that you get all the nutrients you need.    Get plenty of sleep. This helps your immune system work its best.    Limit stress and tension. Both can weaken the body s defenses.    Limit exposure to sun, wind, and extreme heat or cold. Wear sunscreen and lip balm to help prevent outbreaks.     To protect others    Tell your current sex partner and any future partners that you have herpes. If you don t know what to say, ask your healthcare provider for help.    Use a latex condom that  covers the affected areas each time you have sex. This reduces the risk of passing herpes to your partner.    Avoid kissing when you have an oral sore.    Do not have intercourse when genital sores are present. Also keep in mind, herpes can be passed during oral sex and with anal contact.    Don t share towels, toothbrushes, lip balm, or lipstick when you have a sore.    If you have very frequent outbreaks, taking daily antiviral medicines can help reduce the likelihood of transmission to your partner.  Date Last Reviewed: 1/1/2017 2000-2017 [employer]. [address] [place] [address]. All rights reserved. This information is not intended as a substitute for professional medical care. Always follow your healthcare professional's instructions.                Follow-ups after your visit        Future tests that were ordered for you today     Open Future Orders        Priority Expected Expires Ordered    MA SCREENING DIGITAL BILAT - Future  (s+30) Routine  11/29/2018 11/29/2017    Fecal colorectal cancer screen (FIT) Routine 12/20/2017 2/21/2018 11/29/2017            Who to contact     If you have questions or need follow up information about today's clinic visit or your schedule please contact [facility] directly at [phone].  Normal or non-critical lab and imaging results will be communicated to you by [employer], letter or phone within 4 business days after the clinic has received the results. If you do not hear from us within 7 days, please contact the clinic through [employer] or phone. If you have a critical or abnormal lab result, we will notify you by phone as soon as possible.  Submit refill requests through [employer] or call your pharmacy and they will forward the refill request to us. Please allow 3 business days for your refill to be completed.          Additional Information About Your Visit        [employer] Information     [employer] lets you send  "messages to your doctor, view your test results, renew your prescriptions, schedule appointments and more. To sign up, go to [email or web address] . Click on \"Log in\" on the left side of the screen, which will take you to the Welcome page. Then click on \"Sign up Now\" on the right side of the page.     You will be asked to enter the access code listed below, as well as some personal information. Please follow the directions to create your username and password.     Your access code is: [email or web address]  Expires: 2018  1:01 PM     Your access code will  in 90 days. If you need help or a new code, please call your [facility] clinic or [phone].        Care EveryWhere ID     This is your Care EveryWhere ID. This could be used by other organizations to access your [facility] medical records  [id number]        Your Vitals Were     Pulse Temperature Respirations Last Period Pulse Oximetry BMI (Body Mass Index)    74 97.4  F (36.3  C) (Tympanic) 16 2014 (Exact Date) 100% 23.46 kg/m2       Blood Pressure from Last 3 Encounters:   17 128/76   10/25/17 120/72   16 126/74    Weight from Last 3 Encounters:   17 152 lb (68.9 kg)   10/25/17 151 lb (68.5 kg)   16 149 lb (67.6 kg)               Primary Care Provider Office Phone # Fax #    Shasta Pardo [phone] [address] [facility] [address]        Equal Access to Services     [facility] Hospital[facility] : Hadii aad ku hadasho Sosage, waaxda luqadaha, qaybta kaalmada parker, abrahan moreno. So [facility] [phone].    ATENCIÓN: Si habla español, tiene a ayala disposición servicios gratuitos de asistencia lingüística. Llame al [phone].    We comply with applicable federal civil rights laws and [place] laws. We do not discriminate on the basis of race, color, national origin, age, disability, sex, sexual orientation, or gender identity.            Thank you!     Thank you for choosing " [facility]  for your care. Our goal is always to provide you with excellent care. Hearing back from our patients is one way we can continue to improve our services. Please take a few minutes to complete the written survey that you may receive in the mail after your visit with us. Thank you!             Your Updated Medication List - Protect others around you: Learn how to safely use, store and throw away your medicines at www.disposemymeds.org.          This list is accurate as of: 11/29/17 11:03 AM.  Always use your most recent med list.                   Brand Name Dispense Instructions for use Diagnosis    lithium 300 MG tablet     90 tablet    Take 1 tablet (300 mg) by mouth daily        nefazodone 150 MG tablet    SERZONE    90 tablet    Take 1 tablet (150 mg) by mouth daily    Dysthymic disorder       OLANZapine 2.5 MG tablet    zyPREXA    90 tablet    Take 1 tablet (2.5 mg) by mouth At Bedtime    Bipolar disorder, in full remission, most recent episode depressed (H)       vitamin D 1000 UNITS capsule      Take 1 capsule by mouth daily

## 2018-02-02 ENCOUNTER — OFFICE VISIT (OUTPATIENT)
Dept: FAMILY MEDICINE | Facility: CLINIC | Age: 58
End: 2018-02-02
Payer: COMMERCIAL

## 2018-02-02 VITALS
WEIGHT: 151 LBS | TEMPERATURE: 97.2 F | OXYGEN SATURATION: 98 % | HEART RATE: 80 BPM | SYSTOLIC BLOOD PRESSURE: 130 MMHG | BODY MASS INDEX: 22.88 KG/M2 | HEIGHT: 68 IN | DIASTOLIC BLOOD PRESSURE: 76 MMHG | RESPIRATION RATE: 13 BRPM

## 2018-02-02 DIAGNOSIS — N76.0 ACUTE VAGINITIS: ICD-10-CM

## 2018-02-02 DIAGNOSIS — Z12.31 VISIT FOR SCREENING MAMMOGRAM: ICD-10-CM

## 2018-02-02 DIAGNOSIS — Z12.11 SPECIAL SCREENING FOR MALIGNANT NEOPLASMS, COLON: ICD-10-CM

## 2018-02-02 DIAGNOSIS — J01.90 ACUTE SINUSITIS, RECURRENCE NOT SPECIFIED, UNSPECIFIED LOCATION: Primary | ICD-10-CM

## 2018-02-02 PROCEDURE — 99213 OFFICE O/P EST LOW 20 MIN: CPT | Performed by: FAMILY MEDICINE

## 2018-02-02 RX ORDER — FLUTICASONE PROPIONATE 50 MCG
1-2 SPRAY, SUSPENSION (ML) NASAL PRN
Refills: 0 | COMMUNITY
Start: 2018-01-13 | End: 2018-04-27

## 2018-02-02 RX ORDER — FLUCONAZOLE 150 MG/1
150 TABLET ORAL ONCE
Qty: 1 TABLET | Refills: 0 | Status: SHIPPED | OUTPATIENT
Start: 2018-02-02 | End: 2018-02-02

## 2018-02-02 RX ORDER — CEFDINIR 300 MG/1
300 CAPSULE ORAL 2 TIMES DAILY
Qty: 20 CAPSULE | Refills: 0 | Status: SHIPPED | OUTPATIENT
Start: 2018-02-02 | End: 2019-02-11

## 2018-02-02 NOTE — MR AVS SNAPSHOT
After Visit Summary   2/2/2018    Zuly Wheeler    MRN: [id number]           Patient Information     Date Of Birth          1960        Visit Information        Provider Department      2/2/2018 8:20 AM Vianey Vogel MD [facility]        Today's Diagnoses     Special screening for malignant neoplasms, colon    -  1    Acute sinusitis, recurrence not specified, unspecified location        Acute vaginitis        Visit for screening mammogram          Care Instructions    [facility]    If you have any questions regarding to your visit please contact your care team:       Team Red:   Clinic Hours Telephone Number   Dr. Yanni Noland, NP   7am-7pm  Monday - Thursday   7am-5pm  Fridays  [phone]  (Appointment scheduling available 24/7)    Questions about your visit?   Team Line  [phone]   [facility] [place] [facility] [place] [facility] 11am-9pm Monday-Friday Saturday-Sunday- 9am-5pm   [place] - 5pm-9pm Monday-Friday Saturday-Sunday- 9am-5pm  [phone] - [facility]  [phone] - [place]       What options do I have for visits at the clinic other than the traditional office visit?  To expand how we care for you, many of our providers are utilizing electronic visits (e-visits) and telephone visits, when medically appropriate, for interactions with their patients rather than a visit in the clinic.   We also offer nurse visits for many medical concerns. Just like any other service, we will bill your insurance company for this type of visit based on time spent on the phone with your provider. Not all insurance companies cover these visits. Please check with your medical insurance if this type of visit is covered. You will be responsible for any charges that are not paid by your insurance.      E-visits via [employer]:  generally incur a $35.00 fee.  Telephone visits:  Time spent on the phone: *charged  "based on time that is spent on the phone in increments of 10 minutes. Estimated cost:   5-10 mins $30.00   11-20 mins. $59.00   21-30 mins. $85.00     Use [employer] (secure email communication and access to your chart) to send your primary care provider a message or make an appointment. Ask someone on your Team how to sign up for [employer].  For a Price Quote for your services, please call our [employer] Line at [phone].      As always, Thank you for trusting us with your health care needs!  Ricarda HIGGINS MA            Follow-ups after your visit        Future tests that were ordered for you today     Open Future Orders        Priority Expected Expires Ordered    *MA Screening Digital Bilateral Routine  2/2/2019 2/2/2018    Fecal colorectal cancer screen (FIT) Routine 2/23/2018 4/27/2018 2/2/2018            Who to contact     If you have questions or need follow up information about today's clinic visit or your schedule please contact [facility] directly at [phone].  Normal or non-critical lab and imaging results will be communicated to you by [employer], letter or phone within 4 business days after the clinic has received the results. If you do not hear from us within 7 days, please contact the clinic through [employer] or phone. If you have a critical or abnormal lab result, we will notify you by phone as soon as possible.  Submit refill requests through [employer] or call your pharmacy and they will forward the refill request to us. Please allow 3 business days for your refill to be completed.          Additional Information About Your Visit        [employer] Information     [employer] lets you send messages to your doctor, view your test results, renew your prescriptions, schedule appointments and more. To sign up, go to [email or web address] . Click on \"Log in\" on the left side of the screen, which will take you to the Welcome page. Then click on \"Sign up Now\" on the right side of the page.     You " "will be asked to enter the access code listed below, as well as some personal information. Please follow the directions to create your username and password.     Your access code is: [email or web address]  Expires: 5/3/2018  9:01 AM     Your access code will  in 90 days. If you need help or a new code, please call your [facility] clinic or [phone].        Care EveryWhere ID     This is your Care EveryWhere ID. This could be used by other organizations to access your [facility] medical records  [id number]        Your Vitals Were     Pulse Temperature Respirations Height Last Period Pulse Oximetry    80 97.2  F (36.2  C) (Oral) 13 5' 7.5\" (1.715 m) 2014 (Exact Date) 98%    Breastfeeding? BMI (Body Mass Index)                No 23.3 kg/m2           Blood Pressure from Last 3 Encounters:   18 130/76   17 128/76   10/25/17 120/72    Weight from Last 3 Encounters:   18 151 lb (68.5 kg)   17 152 lb (68.9 kg)   10/25/17 151 lb (68.5 kg)                 Today's Medication Changes          These changes are accurate as of 18  9:01 AM.  If you have any questions, ask your nurse or doctor.               Start taking these medicines.        Dose/Directions    cefdinir 300 MG capsule   Commonly known as:  OMNICEF   Used for:  Acute vaginitis   Started by:  Vianey Vogel MD        Dose:  300 mg   Take 1 capsule (300 mg) by mouth 2 times daily for 10 days   Quantity:  20 capsule   Refills:  0       fluconazole 150 MG tablet   Commonly known as:  DIFLUCAN   Used for:  Acute sinusitis, recurrence not specified, unspecified location   Started by:  Vianey Vogel MD        Dose:  150 mg   Take 1 tablet (150 mg) by mouth once for 1 dose   Quantity:  1 tablet   Refills:  0            Where to get your medicines      These medications were sent to [facility]ir - [address]  [address], [place] [address]     Phone:  [phone]     cefdinir 300 MG " capsule    fluconazole 150 MG tablet                Primary Care Provider Office Phone # Fax #    Shasta Noreen Pardo [phone]       [address] [place] [address]        Equal Access to Services     MYLA DORADO : Hadii aad ku hadkavita Fulton, waajayda luqadaha, qaybta kaalmada parker, abrahan piña laRickrosalinda moreno. So [facility] [phone].    ATENCIÓN: Si habla español, tiene a ayala disposición servicios gratuitos de asistencia lingüística. Llame al [phone].    We comply with applicable federal civil rights laws and [place] laws. We do not discriminate on the basis of race, color, national origin, age, disability, sex, sexual orientation, or gender identity.            Thank you!     Thank you for choosing [facility]  for your care. Our goal is always to provide you with excellent care. Hearing back from our patients is one way we can continue to improve our services. Please take a few minutes to complete the written survey that you may receive in the mail after your visit with us. Thank you!             Your Updated Medication List - Protect others around you: Learn how to safely use, store and throw away your medicines at www.disposemymeds.org.          This list is accurate as of 2/2/18  9:01 AM.  Always use your most recent med list.                   Brand Name Dispense Instructions for use Diagnosis    cefdinir 300 MG capsule    OMNICEF    20 capsule    Take 1 capsule (300 mg) by mouth 2 times daily for 10 days    Acute vaginitis       fluconazole 150 MG tablet    DIFLUCAN    1 tablet    Take 1 tablet (150 mg) by mouth once for 1 dose    Acute sinusitis, recurrence not specified, unspecified location       fluticasone 50 MCG/ACT spray    FLONASE     Spray 1-2 sprays in nostril as needed One puff in to each nostril    Special screening for malignant neoplasms, colon       lithium 300 MG tablet     90 tablet    Take 1 tablet (300 mg) by mouth daily         nefazodone 150 MG tablet    SERZONE    90 tablet    Take 1 tablet (150 mg) by mouth daily    Dysthymic disorder       OLANZapine 2.5 MG tablet    zyPREXA    90 tablet    Take 1 tablet (2.5 mg) by mouth At Bedtime    Bipolar disorder, in full remission, most recent episode depressed (H)       vitamin D 1000 UNITS capsule      Take 1 capsule by mouth daily

## 2018-02-02 NOTE — PROGRESS NOTES
SUBJECTIVE:   Zuly Wheeler is a 57 year old female who presents to clinic today for the following health issues:    RESPIRATORY SYMPTOMS    Duration: x worsened within 2 days ago, was diagnosed with a sinus infection a couple weeks ago and was treated     Pt was Given antibiotic for sinus Infection-felt better    Description  nasal congestion, facial pain/pressure, cough, ear pain both, headache     Severity: moderate    Accompanying signs and symptoms: None    History (predisposing factors):  none    Precipitating or alleviating factors: Flonase, and Augmentin     Therapies tried and outcome:  none        Problem list and histories reviewed & adjusted, as indicated.  Additional history: as documented    Patient Active Problem List   Diagnosis     Bipolar disorder, in full remission, most recent episode depressed (H)     Anxiety state     Dysthymic disorder     Chronic maxillary sinusitis     Hyperlipidemia with target LDL less than 130     Anemia Hgb= 11.8 in 11-13     Vitamin D deficiency disease     Family history of diabetes mellitus     ACP (advance care planning)     Severe episode of recurrent major depressive disorder (H)     Snoring     Herpes simplex virus infection     Past Surgical History:   Procedure Laterality Date     DILATION AND CURETTAGE  12/6/2013    Procedure: DILATION AND CURETTAGE;  Dilation And Curettage;  Surgeon: Edel Chew MD;  [facility]     EYE SURGERY  8/2013    cataract surgery     LAPAROSCOPIC HYSTERECTOMY SUPRACERVICAL, BILATERAL SALPINGO-OOPHORECTOMY, COMBINED  1/30/2014    Procedure: COMBINED LAPAROSCOPIC HYSTERECTOMY SUPRACERVICAL, SALPINGO-OOPHORECTOMY;  Laparoscopic Supracervical Hysterectomy With Bilateral Salingectomy;  Surgeon: Edel Chew MD;  [facility]: [facility]     TUBAL LIGATION  1990       Social History   Substance Use Topics     Smoking status: Former Smoker     Years: 30.00     Types: Cigarettes     Quit date: 11/21/1990     Smokeless tobacco:  Never Used     Alcohol use No     Family History   Problem Relation Age of Onset     DIABETES Mother      Hypertension Mother      Psychotic Disorder Mother      Hearing Loss Father      Coronary Artery Disease No family hx of      Hyperlipidemia No family hx of      CEREBROVASCULAR DISEASE No family hx of      Breast Cancer No family hx of      Colon Cancer No family hx of      Prostate Cancer No family hx of      Other Cancer No family hx of      Depression No family hx of      Anxiety Disorder No family hx of      MENTAL ILLNESS No family hx of      Substance Abuse No family hx of      Anesthesia Reaction No family hx of      Asthma No family hx of      OSTEOPOROSIS No family hx of      Genetic Disorder No family hx of      Thyroid Disease No family hx of      Obesity No family hx of      Unknown/Adopted No family hx of          Current Outpatient Prescriptions   Medication Sig Dispense Refill     fluticasone (FLONASE) 50 MCG/ACT spray Spray 1-2 sprays in nostril as needed One puff in to each nostril  0     cefdinir (OMNICEF) 300 MG capsule Take 1 capsule (300 mg) by mouth 2 times daily for 10 days 20 capsule 0     OLANZapine (ZYPREXA) 2.5 MG tablet Take 1 tablet (2.5 mg) by mouth At Bedtime 90 tablet 1     lithium 300 MG tablet Take 1 tablet (300 mg) by mouth daily 90 tablet 1     nefazodone (SERZONE) 150 MG tablet Take 1 tablet (150 mg) by mouth daily 90 tablet 1     Cholecalciferol (VITAMIN D) 1000 UNITS capsule Take 1 capsule by mouth daily       Allergies   Allergen Reactions     Depakote      groggy     Lamotrigine      Intolerance, numb (Lamictal)     Olanzapine Other (See Comments)     Only generic/ineffective  Ineffective (only generic)     Seroquel [Quetiapine Fumarate] Visual Disturbance     Blurred vision; jumpy     Sulfa Drugs Hives     Trileptal Visual Disturbance     Blurred vision     Zoloft Other (See Comments)     jumpy     Citalopram Anxiety     Intolerance (Celexa)     Recent Labs   Lab Test   "10/25/17   1330  07/15/16   1244  06/01/16   1556  11/12/15   0738  12/04/13   1607   08/20/12   1950   03/14/12   2110 03/14/12   A1C   --    --    --    --   5.5   --    --    --    --    --    LDL   --    --    --   86   --    --    --    --   77  77   HDL   --    --    --   58   --    --    --    --   63*  63   TRIG   --    --    --   78   --    --    --    --    --    --    ALT  22  17   --    --    --    --   22.0   < >  31.0  31   CR  0.76  0.62  0.70  0.70   --    < >   --    < >   --   0.7   GFRESTIMATED  79  >90  Non  GFR Calc    87  87   --    < >   --    --    --    --    GFRESTBLACK  >90  >90  African American GFR Calc    >90  >90  African American GFR Calc     --    < >   --    --    --    --    POTASSIUM  3.7  3.4  3.9  4.1   --    < >   --    < >   --   3.8   TSH  0.87   --   1.22   --    --    < >   --    --    --   2.16    < > = values in this interval not displayed.      BP Readings from Last 3 Encounters:   02/02/18 130/76   11/29/17 128/76   10/25/17 120/72    Wt Readings from Last 3 Encounters:   02/02/18 151 lb (68.5 kg)   11/29/17 152 lb (68.9 kg)   10/25/17 151 lb (68.5 kg)                  Labs reviewed in EPIC    Reviewed and updated as needed this visit by clinical staff  Tobacco  Allergies  Meds  Med Hx  Surg Hx  Fam Hx  Soc Hx      Reviewed and updated as needed this visit by Provider       ROS:  C: NEGATIVE for fever, chills, change in weight  INTEGUMENTARY/SKIN: NEGATIVE for worrisome rashes, moles or lesions  ENT/MOUTH: nasal congestion  R: NEGATIVE for significant cough or SOB  CV: NEGATIVE for chest pain, palpitations or peripheral edema  GI: NEGATIVE for nausea, abdominal pain, heartburn, or change in bowel habits  MUSCULOSKELETAL: NEGATIVE for significant arthralgias or myalgia    OBJECTIVE:     /76 (BP Location: Left arm, Patient Position: Chair, Cuff Size: Adult Regular)  Pulse 80  Temp 97.2  F (36.2  C) (Oral)  Resp 13  Ht 5' 7.5\" (1.715 m)  Wt " 151 lb (68.5 kg)  LMP 01/30/2014 (Exact Date)  SpO2 98%  Breastfeeding? No  BMI 23.3 kg/m2  Body mass index is 23.3 kg/(m^2).  GENERAL: healthy, alert and no distress  EYES: Eyes grossly normal to inspection, PERRL and conjunctivae and sclerae normal  HENT: ear canals and TM's normal, nose and mouth without ulcers or lesions  NECK: no adenopathy, no asymmetry, masses, or scars and thyroid normal to palpation  RESP: lungs clear to auscultation - no rales, rhonchi or wheezes  CV: regular rate and rhythm, normal S1 S2, no S3 or S4, no murmur, click or rub, no peripheral edema and peripheral pulses strong  ABDOMEN: soft, nontender, no hepatosplenomegaly, no masses and bowel sounds normal  MS: no gross musculoskeletal defects noted, no edema    Diagnostic Test Results:  none     ASSESSMENT/PLAN:     1. Acute sinusitis, recurrence not specified, unspecified location  SEE [facility] orders  The potential side effects of this medication have been discussed with the patient.  Call if any significant problems with these are experienced.  Follow up 1 week if not better/sooner if worse    - fluticasone (FLONASE) 50 MCG/ACT spray; Spray 1-2 sprays in nostril as needed One puff in to each nostril; Refill: 0  - cefdinir (OMNICEF) 300 MG capsule; Take 1 capsule (300 mg) by mouth 2 times daily for 10 days  Dispense: 20 capsule; Refill: 0    2. Acute vaginitis  Take if needed-Pt wanted something in case she gets a yeast Infection  - fluconazole (DIFLUCAN) 150 MG tablet; Take 1 tablet (150 mg) by mouth once for 1 dose  Dispense: 1 tablet; Refill: 0    3. Visit for screening mammogram  Advised   - *MA Screening Digital Bilateral; Future    4. Special screening for malignant neoplasms, colon  Advised colonoscopy-she declined  - Fecal colorectal cancer screen (FIT); Future  Follow up 1 week if not better/sooner if worse    Vianey Vogel MD  [facility]

## 2018-02-02 NOTE — NURSING NOTE
"Chief Complaint   Patient presents with     URI     Right ear pain, was diagnosed with a sinus infection a couple days ago.        Initial /76 (BP Location: Left arm, Patient Position: Chair, Cuff Size: Adult Regular)  Pulse 80  Temp 97.2  F (36.2  C) (Oral)  Resp 13  Ht 5' 7.5\" (1.715 m)  Wt 151 lb (68.5 kg)  LMP 01/30/2014 (Exact Date)  SpO2 98%  Breastfeeding? No  BMI 23.3 kg/m2 Estimated body mass index is 23.3 kg/(m^2) as calculated from the following:    Height as of this encounter: 5' 7.5\" (1.715 m).    Weight as of this encounter: 151 lb (68.5 kg).  Medication Reconciliation: complete     Greg Moeller      "

## 2018-02-02 NOTE — PATIENT INSTRUCTIONS
[facility]    If you have any questions regarding to your visit please contact your care team:       Team Red:   Clinic Hours Telephone Number   Dr. Yanni Noland, NP   7am-7pm  Monday - Thursday   7am-5pm  Fridays  [phone]  (Appointment scheduling available 24/7)    Questions about your visit?   Team Line  [phone]   Urgent Care - [place] - 11am-9pm Monday-Friday Saturday-Sunday- 9am-5pm   [place] - 5pm-9pm Monday-Friday Saturday-Sunday- 9am-5pm  [phone] - [place]   [phone] - [place]       What options do I have for visits at the clinic other than the traditional office visit?  To expand how we care for you, many of our providers are utilizing electronic visits (e-visits) and telephone visits, when medically appropriate, for interactions with their patients rather than a visit in the clinic.   We also offer nurse visits for many medical concerns. Just like any other service, we will bill your insurance company for this type of visit based on time spent on the phone with your provider. Not all insurance companies cover these visits. Please check with your medical insurance if this type of visit is covered. You will be responsible for any charges that are not paid by your insurance.      E-visits via [employer]:  generally incur a $35.00 fee.  Telephone visits:  Time spent on the phone: *charged based on time that is spent on the phone in increments of 10 minutes. Estimated cost:   5-10 mins $30.00   11-20 mins. $59.00   21-30 mins. $85.00     Use Go[employer] (secure email communication and access to your chart) to send your primary care provider a message or make an appointment. Ask someone on your Team how to sign up for [employer].  For a Price Quote for your services, please call our Consumer Price Line at [phone].      As always, Thank you for trusting us with your health care needs!  Ricarda HIGGINS  MA

## 2018-03-07 ENCOUNTER — OFFICE VISIT (OUTPATIENT)
Dept: FAMILY MEDICINE | Facility: CLINIC | Age: 58
End: 2018-03-07
Payer: COMMERCIAL

## 2018-03-07 VITALS
OXYGEN SATURATION: 100 % | WEIGHT: 157 LBS | BODY MASS INDEX: 24.23 KG/M2 | RESPIRATION RATE: 16 BRPM | DIASTOLIC BLOOD PRESSURE: 76 MMHG | TEMPERATURE: 97.1 F | HEART RATE: 72 BPM | SYSTOLIC BLOOD PRESSURE: 114 MMHG

## 2018-03-07 DIAGNOSIS — Z12.11 SCREEN FOR COLON CANCER: ICD-10-CM

## 2018-03-07 DIAGNOSIS — Z12.31 VISIT FOR SCREENING MAMMOGRAM: ICD-10-CM

## 2018-03-07 DIAGNOSIS — M54.50 ACUTE RIGHT-SIDED LOW BACK PAIN WITHOUT SCIATICA: Primary | ICD-10-CM

## 2018-03-07 PROCEDURE — 99213 OFFICE O/P EST LOW 20 MIN: CPT | Performed by: FAMILY MEDICINE

## 2018-03-07 RX ORDER — IBUPROFEN 600 MG/1
600 TABLET, FILM COATED ORAL EVERY 8 HOURS PRN
Qty: 15 TABLET | Refills: 1 | Status: SHIPPED | OUTPATIENT
Start: 2018-03-07 | End: 2018-04-27

## 2018-03-07 RX ORDER — METHOCARBAMOL 750 MG/1
750 TABLET, FILM COATED ORAL 3 TIMES DAILY PRN
Qty: 30 TABLET | Refills: 0 | Status: SHIPPED | OUTPATIENT
Start: 2018-03-07 | End: 2018-04-27

## 2018-03-07 ASSESSMENT — PAIN SCALES - GENERAL: PAINLEVEL: SEVERE PAIN (7)

## 2018-03-07 NOTE — PATIENT INSTRUCTIONS
Low Back Pain Exercise          Standing hamstring stretch: Put the heel of one leg on a stool about 15 inches high. Keep your leg straight. Lean forward, bending at the hips until you feel a mild stretch in the back of your thigh. Make sure you do not roll your shoulders or bend at the waist when doing this. You want to stretch your leg, not your lower back. Hold the stretch for 15 to 30 seconds. Repeat with each leg 3 times.   Cat and camel: Get down on your hands and knees. Let your stomach sag, allowing your back to curve downward. Hold this position for 5 seconds. Then arch your back and hold for 5 seconds. Do 3 sets of 10.   Quadruped arm and leg raise: Get down on your hands and knees. Pull in your belly button and tighten your abdominal muscles to stiffen your spine. While keeping your abdominals tight, raise one arm and the opposite leg away from you. Hold this position for 5 seconds. Lower your arm and leg slowly and change sides. Do this 10 times on each side.   Pelvic tilt: Lie on your back with your knees bent and your feet flat on the floor. Tighten your abdominal muscles and push your lower back into the floor. Hold this position for 5 seconds, then relax. Do 3 sets of 10.   Partial curl: Lie on your back with your knees bent and your feet flat on the floor. Tighten your stomach muscles. Tuck your chin to your chest. With your hands stretched out in front of you, curl your upper body forward until your shoulders clear the floor. Hold this position for 3 seconds. Don't hold your breath. It helps to breathe out as you lift your shoulders up. Relax back to the floor. Repeat 10 times. Build to 3 sets of 10. To challenge yourself, clasp your hands behind your head and keep your elbows out to the side.   Gluteal stretch: Lie on your back with both knees bent. Rest the ankle of one leg over the knee of your other leg. Grasp the thigh of the bottom leg and pull toward your chest. You will feel a stretch  along the buttocks and possibly along the outside of your hip. Hold the stretch for 15 to 30 seconds. Repeat 3 times with each leg.   Extension exercise:   0. Lie face down on the floor for 5 minutes. If this hurts too much, lie face down with a pillow under your stomach. This should relieve your leg or back pain. When you can lie on your stomach for 5 minutes without a pillow, you can continue with Part B of this exercise.   0. After lying on your stomach for 5 minutes, prop yourself up on your elbows for another 5 minutes. If you can do this without having more leg or buttock pain, you can start doing part C of this exercise.   0. Lie on your stomach with your hands under your shoulders. Then press down on your hands and extend your elbows while keeping your hips flat on the floor. Hold for 1 second and lower yourself to the floor. Do 3 to 5 sets of 10 repetitions. Rest for 1 minute between sets. You should have no pain in your legs when you do this, but it is normal to feel some pain in your lower back.   Do this exercise several times a day.   Side plank: Lie on your side with your legs, hips, and shoulders in a straight line. Prop yourself up onto your forearm so your elbow is directly under your shoulder. Lift your hips off the floor and balance on your forearm and the outside of your foot. Try to hold this position for 15 seconds, then slowly lower your hip to the ground. Switch sides and repeat. Work up to holding for 1 minute or longer. This exercise can be made easier by starting with your knees and hips flexed toward your chest.   Published by [employer].  This content is reviewed periodically and is subject to change as new health information becomes available. The information is intended to inform and educate and is not a replacement for medical evaluation, advice, diagnosis or treatment by a healthcare professional.   Written by Martha Jefferson, MS, PT, and Mary Stovall PT, [facility], [facility] [place], for [employer]    ? 2010 [employer] and/or its affiliates. All Rights Reserved.         Copyright   [employer] 2011            [facility]    If you have any questions regarding to your visit please contact your care team:       Team Purple:   Clinic Hours Telephone Number   Dr. Maxine Sharma   7am-7pm  Monday - Thursday   7am-5pm  Fridays  [phone]  (Appointment scheduling available 24/7)    Questions about your Visit?   Team Line:  [phone]   Urgent Care - [place] - 11am-9pm Monday-Friday Saturday-Sunday- 9am-5pm   [place] - 5pm-9pm Monday-Friday Saturday-Sunday- 9am-5pm  [phone] - [facility]  [phone] - [place]       What options do I have for visits at the clinic other than the traditional office visit?  To expand how we care for you, many of our providers are utilizing electronic visits (e-visits) and telephone visits, when medically appropriate, for interactions with their patients rather than a visit in the clinic.   We also offer nurse visits for many medical concerns. Just like any other service, we will bill your insurance company for this type of visit based on time spent on the phone with your provider. Not all insurance companies cover these visits. Please check with your medical insurance if this type of visit is covered. You will be responsible for any charges that are not paid by your insurance.      E-visits via [employer]  generally incur a $35.00 fee.  Telephone visits:  Time spent on the phone: *charged based on time that is spent on the phone in increments of 10 minutes. Estimated cost:   5-10 mins $30.00   11-20 mins. $59.00   21-30 mins. $85.00     Use [employer] (secure email communication and access to your chart) to send your primary care provider a message or make an appointment. Ask someone on your Team how to sign up for [employer].  For a Price Quote for your services,  please call our Consumer Price Line at [phone].  As always, Thank you for trusting us with your health care needs!    Nissa Arias MA

## 2018-03-07 NOTE — PROGRESS NOTES
SUBJECTIVE:   Zuly Wheeler is a 57 year old female who presents to clinic today for the following health issues:    Back Pain       Duration: 4 days        Specific cause: unsure work up Sunday Morning with pain    Description:   Location of pain: low back right  Character of pain: sharp  Pain radiation:none  New numbness or weakness in legs, not attributed to pain:  no     Intensity: Currently 7/10, At its worst 10/10    History:   Pain interferes with job: No  History of back problems: 4 years ago had a fall with back pain, but improved after seening a chiropractor  Any previous MRI or X-rays: 4 years ago  Sees a specialist for back pain:  No  Therapies tried without relief: none    Alleviating factors:   Improved by: NSAIDs      Precipitating factors:  Worsened by: leaning back, leaning to the right.    Functional and Psychosocial Screen (Tyrese STarT Back):      Not done      Accompanying Signs & Symptoms:  Risk of Fracture:  None  Risk of Cauda Equina:  None  Risk of Infection:  None  Risk of Cancer:  None  Risk of Ankylosing Spondylitis:  Onset at age <35, male, AND morning back stiffness. no     HPI:  Woke up with the back pain on Sunday  More on the Rt side  Worse; Getting up catches; out of chair or bed. Also with moving  Better with staying still. Does not radiates.  Fell 4 yrs ago and landed on her behind, did PT and this resolved    Problem list and histories reviewed & adjusted, as indicated.  Additional history: as documented    Patient Active Problem List   Diagnosis     Bipolar disorder, in full remission, most recent episode depressed (H)     Anxiety state     Dysthymic disorder     Chronic maxillary sinusitis     Hyperlipidemia with target LDL less than 130     Anemia Hgb= 11.8 in 11-13     Vitamin D deficiency disease     Family history of diabetes mellitus     ACP (advance care planning)     Severe episode of recurrent major depressive disorder (H)     Snoring     Herpes simplex virus  infection     Acute right-sided low back pain without sciatica     Past Surgical History:   Procedure Laterality Date     DILATION AND CURETTAGE  12/6/2013    Procedure: DILATION AND CURETTAGE;  Dilation And Curettage;  Surgeon: Edel Chew MD;  [facility]     EYE SURGERY  8/2013    cataract surgery     LAPAROSCOPIC HYSTERECTOMY SUPRACERVICAL, BILATERAL SALPINGO-OOPHORECTOMY, COMBINED  1/30/2014    Procedure: COMBINED LAPAROSCOPIC HYSTERECTOMY SUPRACERVICAL, SALPINGO-OOPHORECTOMY;  Laparoscopic Supracervical Hysterectomy With Bilateral Salingectomy;  Surgeon: Edel Chew MD;  Location: [facility] OR     TUBAL LIGATION  1990       Social History   Substance Use Topics     Smoking status: Former Smoker     Years: 30.00     Types: Cigarettes     Quit date: 11/21/1990     Smokeless tobacco: Never Used     Alcohol use No     Family History   Problem Relation Age of Onset     DIABETES Mother      Hypertension Mother      Psychotic Disorder Mother      Hearing Loss Father      Coronary Artery Disease No family hx of      Hyperlipidemia No family hx of      CEREBROVASCULAR DISEASE No family hx of      Breast Cancer No family hx of      Colon Cancer No family hx of      Prostate Cancer No family hx of      Other Cancer No family hx of      Depression No family hx of      Anxiety Disorder No family hx of      MENTAL ILLNESS No family hx of      Substance Abuse No family hx of      Anesthesia Reaction No family hx of      Asthma No family hx of      OSTEOPOROSIS No family hx of      Genetic Disorder No family hx of      Thyroid Disease No family hx of      Obesity No family hx of      Unknown/Adopted No family hx of          Reviewed and updated as needed this visit by clinical staff    ROS:  Constitutional, HEENT, cardiovascular, pulmonary, gi and gu systems are negative, except as otherwise noted.    OBJECTIVE:     /76  Pulse 72  Temp 97.1  F (36.2  C) (Oral)  Resp 16  Wt 157 lb (71.2 kg)  LMP  01/30/2014 (Exact Date)  SpO2 100%  BMI 24.23 kg/m2  Body mass index is 24.23 kg/(m^2).  GENERAL: healthy, alert and no distress  NECK: no adenopathy and thyroid normal to palpation  RESP: lungs clear to auscultation - no rales, rhonchi or wheezes  CV: regular rate and rhythm, no murmur, click or rub  MS: no gross musculoskeletal defects noted, no edema  Comprehensive back pain exam:  Tenderness of lower lumbar and Rt posterior iliac area, Pain limits the following motions: Flexion, extension and rotation, Lower extremity strength functional and equal on both sides, Lower extremity reflexes within normal limits bilaterally, Lower extremity sensation normal and equal on both sides and Straight leg raise negative bilaterally    Diagnostic Test Results:  none     ASSESSMENT/PLAN:     (M54.5) Acute right-sided low back pain without sciatica  (primary encounter diagnosis)  Comment: Consistent with muscle strain with stiffness. Discussed  - Ice/heat to back 2-3 times per day  - Analgesics such as Tylenol and/or ibuprofen and muscle relaxer.  - Back exercises, instruction sheet given  - Will consider PT if not improving    Plan: ibuprofen (ADVIL/MOTRIN) 600 MG tablet,         methocarbamol (ROBAXIN) 750 MG tablet    (Z12.11) Screen for colon cancer  Plan: Fecal colorectal cancer screen (FIT)    (Z12.31) Visit for screening mammogram  Plan: MA SCREENING DIGITAL BILAT - Future  (s+30)    Call or return to clinic prn if these symptoms worsen or fail to improve as anticipated in 1 week.    Joseluis Sweeney MD  [facility]

## 2018-03-07 NOTE — MR AVS SNAPSHOT
After Visit Summary   3/7/2018    Zuly Wheeler    MRN: [id number]           Patient Information     Date Of Birth          1960        Visit Information        Provider Department      3/7/2018 2:00 PM Joseluis Sweeney MD [facility]        Today's Diagnoses     Acute right-sided low back pain without sciatica    -  1    Screen for colon cancer        Visit for screening mammogram          Care Instructions        Low Back Pain Exercise          Standing hamstring stretch: Put the heel of one leg on a stool about 15 inches high. Keep your leg straight. Lean forward, bending at the hips until you feel a mild stretch in the back of your thigh. Make sure you do not roll your shoulders or bend at the waist when doing this. You want to stretch your leg, not your lower back. Hold the stretch for 15 to 30 seconds. Repeat with each leg 3 times.   Cat and camel: Get down on your hands and knees. Let your stomach sag, allowing your back to curve downward. Hold this position for 5 seconds. Then arch your back and hold for 5 seconds. Do 3 sets of 10.   Quadruped arm and leg raise: Get down on your hands and knees. Pull in your belly button and tighten your abdominal muscles to stiffen your spine. While keeping your abdominals tight, raise one arm and the opposite leg away from you. Hold this position for 5 seconds. Lower your arm and leg slowly and change sides. Do this 10 times on each side.   Pelvic tilt: Lie on your back with your knees bent and your feet flat on the floor. Tighten your abdominal muscles and push your lower back into the floor. Hold this position for 5 seconds, then relax. Do 3 sets of 10.   Partial curl: Lie on your back with your knees bent and your feet flat on the floor. Tighten your stomach muscles. Tuck your chin to your chest. With your hands stretched out in front of you, curl your upper body forward until your shoulders clear the floor. Hold this position  for 3 seconds. Don't hold your breath. It helps to breathe out as you lift your shoulders up. Relax back to the floor. Repeat 10 times. Build to 3 sets of 10. To challenge yourself, clasp your hands behind your head and keep your elbows out to the side.   Gluteal stretch: Lie on your back with both knees bent. Rest the ankle of one leg over the knee of your other leg. Grasp the thigh of the bottom leg and pull toward your chest. You will feel a stretch along the buttocks and possibly along the outside of your hip. Hold the stretch for 15 to 30 seconds. Repeat 3 times with each leg.   Extension exercise:   0. Lie face down on the floor for 5 minutes. If this hurts too much, lie face down with a pillow under your stomach. This should relieve your leg or back pain. When you can lie on your stomach for 5 minutes without a pillow, you can continue with Part B of this exercise.   0. After lying on your stomach for 5 minutes, prop yourself up on your elbows for another 5 minutes. If you can do this without having more leg or buttock pain, you can start doing part C of this exercise.   0. Lie on your stomach with your hands under your shoulders. Then press down on your hands and extend your elbows while keeping your hips flat on the floor. Hold for 1 second and lower yourself to the floor. Do 3 to 5 sets of 10 repetitions. Rest for 1 minute between sets. You should have no pain in your legs when you do this, but it is normal to feel some pain in your lower back.   Do this exercise several times a day.   Side plank: Lie on your side with your legs, hips, and shoulders in a straight line. Prop yourself up onto your forearm so your elbow is directly under your shoulder. Lift your hips off the floor and balance on your forearm and the outside of your foot. Try to hold this position for 15 seconds, then slowly lower your hip to the ground. Switch sides and repeat. Work up to holding for 1 minute or longer. This exercise can be  made easier by starting with your knees and hips flexed toward your chest.   Published by [employer].  This content is reviewed periodically and is subject to change as new health information becomes available. The information is intended to inform and educate and is not a replacement for medical evaluation, advice, diagnosis or treatment by a healthcare professional.   Written by Martha Jefferson, MS, PT, and Mary Stovall, PT, [facility], OCS, for [facility]   ? 2010 [facility] and/or its affiliates. All Rights Reserved.         Copyright   Clinical Reference Systems 2011            [facility]    If you have any questions regarding to your visit please contact your care team:       Team Purple:   Clinic Hours Telephone Number   Dr. Maxine Sharma   7am-7pm  Monday - Thursday   7am-5pm  Fridays  [phone]  (Appointment scheduling available 24/7)    Questions about your Visit?   Team Line:  [phone]   Urgent Care - [place] [facility] and [place] - 11am-9pm Monday-Friday Saturday-Sunday- 9am-5pm   [place] - 5pm-9pm Monday-Friday Saturday-Sunday- 9am-5pm  [phone] - [place]   [phone] - [place]       What options do I have for visits at the clinic other than the traditional office visit?  To expand how we care for you, many of our providers are utilizing electronic visits (e-visits) and telephone visits, when medically appropriate, for interactions with their patients rather than a visit in the clinic.   We also offer nurse visits for many medical concerns. Just like any other service, we will bill your insurance company for this type of visit based on time spent on the phone with your provider. Not all insurance companies cover these visits. Please check with your medical insurance if this type of visit is covered. You will be responsible for any charges that are not paid by your insurance.      E-visits via [employer]:   "generally incur a $35.00 fee.  Telephone visits:  Time spent on the phone: *charged based on time that is spent on the phone in increments of 10 minutes. Estimated cost:   5-10 mins $30.00   11-20 mins. $59.00   21-30 mins. $85.00     Use [employer] (secure email communication and access to your chart) to send your primary care provider a message or make an appointment. Ask someone on your Team how to sign up for [facility].  For a Price Quote for your services, please call our [place] [place] Line at [phone].  As always, Thank you for trusting us with your health care needs!    Nissa Arias MA            Follow-ups after your visit        Future tests that were ordered for you today     Open Future Orders        Priority Expected Expires Ordered    MA SCREENING DIGITAL BILAT - Future  (s+30) Routine  3/7/2019 3/7/2018    Fecal colorectal cancer screen (FIT) Routine 3/28/2018 5/30/2018 3/7/2018            Who to contact     If you have questions or need follow up information about today's clinic visit or your schedule please contact [facility] directly at [phone].  Normal or non-critical lab and imaging results will be communicated to you by MyChart, letter or phone within 4 business days after the clinic has received the results. If you do not hear from us within 7 days, please contact the clinic through [employer] or phone. If you have a critical or abnormal lab result, we will notify you by phone as soon as possible.  Submit refill requests through [employer] or call your pharmacy and they will forward the refill request to us. Please allow 3 business days for your refill to be completed.          Additional Information About Your Visit        [employer] Information     [employer] lets you send messages to your doctor, view your test results, renew your prescriptions, schedule appointments and more. To sign up, go to [email or web address] . Click on \"Log in\" on the left side of the screen, which will " "take you to the Welcome page. Then click on \"Sign up Now\" on the right side of the page.     You will be asked to enter the access code listed below, as well as some personal information. Please follow the directions to create your username and password.     Your access code is: [email or web address]  Expires: 5/3/2018  9:01 AM     Your access code will  in 90 days. If you need help or a new code, please call your [place] clinic or [phone].        Care EveryWhere ID     This is your Care EveryWhere ID. This could be used by other organizations to access your [place] medical records  [id number]        Your Vitals Were     Pulse Temperature Respirations Last Period Pulse Oximetry BMI (Body Mass Index)    72 97.1  F (36.2  C) (Oral) 16 2014 (Exact Date) 100% 24.23 kg/m2       Blood Pressure from Last 3 Encounters:   18 114/76   18 130/76   17 128/76    Weight from Last 3 Encounters:   18 157 lb (71.2 kg)   18 151 lb (68.5 kg)   17 152 lb (68.9 kg)                 Today's Medication Changes          These changes are accurate as of 3/7/18  2:53 PM.  If you have any questions, ask your nurse or doctor.               Start taking these medicines.        Dose/Directions    ibuprofen 600 MG tablet   Commonly known as:  ADVIL/MOTRIN   Used for:  Acute right-sided low back pain without sciatica   Started by:  Joseluis Sweeney MD        Dose:  600 mg   Take 1 tablet (600 mg) by mouth every 8 hours as needed for moderate pain   Quantity:  15 tablet   Refills:  1       methocarbamol 750 MG tablet   Commonly known as:  ROBAXIN   Used for:  Acute right-sided low back pain without sciatica   Started by:  Joseluis Sweeney MD        Dose:  750 mg   Take 1 tablet (750 mg) by mouth 3 times daily as needed for muscle spasms   Quantity:  30 tablet   Refills:  0            Where to get your medicines      [id number] [place] [address] - " [address] AT [facility] & [place]  [address] [address]     Phone:  [phone]     ibuprofen 600 MG tablet    methocarbamol 750 MG tablet                Primary Care Provider Office Phone # Fax #    Shasta Noreen Pardo [phone]       [address] [facility] [facility] [address]        Equal Access to Services     MYLA DORADO : Hadii aad ku hadasho Soomaali, waaxda luqadaha, qaybta kaalmada adeegyada, waxay idiin hayaan adeeg kharash la'aan ah. So [facility] [phone].    ATENCIÓN: Si habla espalice, tiene a ayala disposición servicios gratuitos de asistencia lingüística. [facility] [phone].    We comply with applicable federal civil rights laws and [place] laws. We do not discriminate on the basis of race, color, national origin, age, disability, sex, sexual orientation, or gender identity.            Thank you!     Thank you for choosing [facility]  for your care. Our goal is always to provide you with excellent care. Hearing back from our patients is one way we can continue to improve our services. Please take a few minutes to complete the written survey that you may receive in the mail after your visit with us. Thank you!             Your Updated Medication List - Protect others around you: Learn how to safely use, store and throw away your medicines at www.disposemymeds.org.          This list is accurate as of 3/7/18  2:53 PM.  Always use your most recent med list.                   Brand Name Dispense Instructions for use Diagnosis    fluticasone 50 MCG/ACT spray    FLONASE     Spray 1-2 sprays in nostril as needed One puff in to each nostril    Acute sinusitis, recurrence not specified, unspecified location       ibuprofen 600 MG tablet    ADVIL/MOTRIN    15 tablet    Take 1 tablet (600 mg) by mouth every 8 hours as needed for moderate pain    Acute right-sided low back pain without sciatica       lithium 300 MG tablet     90 tablet     Take 1 tablet (300 mg) by mouth daily        methocarbamol 750 MG tablet    ROBAXIN    30 tablet    Take 1 tablet (750 mg) by mouth 3 times daily as needed for muscle spasms    Acute right-sided low back pain without sciatica       nefazodone 150 MG tablet    SERZONE    90 tablet    Take 1 tablet (150 mg) by mouth daily    Dysthymic disorder       OLANZapine 2.5 MG tablet    zyPREXA    90 tablet    Take 1 tablet (2.5 mg) by mouth At Bedtime    Bipolar disorder, in full remission, most recent episode depressed (H)       vitamin D 1000 UNITS capsule      Take 1 capsule by mouth daily

## 2018-03-16 DIAGNOSIS — Z12.11 SCREEN FOR COLON CANCER: ICD-10-CM

## 2018-03-16 LAB — HEMOCCULT STL QL IA: NEGATIVE

## 2018-03-16 PROCEDURE — 82274 ASSAY TEST FOR BLOOD FECAL: CPT | Performed by: FAMILY MEDICINE

## 2018-04-27 ENCOUNTER — OFFICE VISIT (OUTPATIENT)
Dept: FAMILY MEDICINE | Facility: CLINIC | Age: 58
End: 2018-04-27
Payer: COMMERCIAL

## 2018-04-27 VITALS
SYSTOLIC BLOOD PRESSURE: 130 MMHG | HEART RATE: 73 BPM | WEIGHT: 147 LBS | BODY MASS INDEX: 22.68 KG/M2 | DIASTOLIC BLOOD PRESSURE: 74 MMHG | OXYGEN SATURATION: 100 % | TEMPERATURE: 97.1 F

## 2018-04-27 DIAGNOSIS — F33.42 RECURRENT MAJOR DEPRESSIVE DISORDER, IN FULL REMISSION (H): ICD-10-CM

## 2018-04-27 DIAGNOSIS — F31.76 BIPOLAR DISORDER, IN FULL REMISSION, MOST RECENT EPISODE DEPRESSED (H): Primary | ICD-10-CM

## 2018-04-27 DIAGNOSIS — F34.1 DYSTHYMIC DISORDER: ICD-10-CM

## 2018-04-27 LAB
BASOPHILS # BLD AUTO: 0 10E9/L (ref 0–0.2)
BASOPHILS NFR BLD AUTO: 0.5 %
DIFFERENTIAL METHOD BLD: ABNORMAL
EOSINOPHIL # BLD AUTO: 0.2 10E9/L (ref 0–0.7)
EOSINOPHIL NFR BLD AUTO: 4.4 %
ERYTHROCYTE [DISTWIDTH] IN BLOOD BY AUTOMATED COUNT: 12.7 % (ref 10–15)
HCT VFR BLD AUTO: 34.8 % (ref 35–47)
HGB BLD-MCNC: 11.4 G/DL (ref 11.7–15.7)
LITHIUM SERPL-SCNC: 0.21 MMOL/L (ref 0.6–1.2)
LYMPHOCYTES # BLD AUTO: 1.5 10E9/L (ref 0.8–5.3)
LYMPHOCYTES NFR BLD AUTO: 40.5 %
MCH RBC QN AUTO: 29.2 PG (ref 26.5–33)
MCHC RBC AUTO-ENTMCNC: 32.8 G/DL (ref 31.5–36.5)
MCV RBC AUTO: 89 FL (ref 78–100)
MONOCYTES # BLD AUTO: 0.3 10E9/L (ref 0–1.3)
MONOCYTES NFR BLD AUTO: 7.4 %
NEUTROPHILS # BLD AUTO: 1.7 10E9/L (ref 1.6–8.3)
NEUTROPHILS NFR BLD AUTO: 47.2 %
PLATELET # BLD AUTO: 246 10E9/L (ref 150–450)
RBC # BLD AUTO: 3.9 10E12/L (ref 3.8–5.2)
WBC # BLD AUTO: 3.7 10E9/L (ref 4–11)

## 2018-04-27 PROCEDURE — 84443 ASSAY THYROID STIM HORMONE: CPT | Performed by: FAMILY MEDICINE

## 2018-04-27 PROCEDURE — 36415 COLL VENOUS BLD VENIPUNCTURE: CPT | Performed by: FAMILY MEDICINE

## 2018-04-27 PROCEDURE — 85025 COMPLETE CBC W/AUTO DIFF WBC: CPT | Performed by: FAMILY MEDICINE

## 2018-04-27 PROCEDURE — 99213 OFFICE O/P EST LOW 20 MIN: CPT | Performed by: FAMILY MEDICINE

## 2018-04-27 PROCEDURE — 80061 LIPID PANEL: CPT | Performed by: FAMILY MEDICINE

## 2018-04-27 PROCEDURE — 80178 ASSAY OF LITHIUM: CPT | Performed by: FAMILY MEDICINE

## 2018-04-27 PROCEDURE — 80053 COMPREHEN METABOLIC PANEL: CPT | Performed by: FAMILY MEDICINE

## 2018-04-27 RX ORDER — OLANZAPINE 2.5 MG/1
2.5 TABLET, FILM COATED ORAL AT BEDTIME
Qty: 90 TABLET | Refills: 1 | Status: SHIPPED | OUTPATIENT
Start: 2018-04-27 | End: 2019-01-05

## 2018-04-27 RX ORDER — LITHIUM CARBONATE 300 MG
300 TABLET ORAL DAILY
Qty: 90 TABLET | Refills: 1 | Status: SHIPPED | OUTPATIENT
Start: 2018-04-27 | End: 2019-01-09

## 2018-04-27 NOTE — NURSING NOTE
"Chief Complaint   Patient presents with     Labs Only     Recheck Medication       Initial /74 (Cuff Size: Adult Regular)  Pulse 73  Temp 97.1  F (36.2  C) (Tympanic)  Wt 147 lb (66.7 kg)  LMP 01/30/2014 (Exact Date)  SpO2 100%  BMI 22.68 kg/m2 Estimated body mass index is 22.68 kg/(m^2) as calculated from the following:    Height as of 2/2/18: 5' 7.5\" (1.715 m).    Weight as of this encounter: 147 lb (66.7 kg).  Medication Reconciliation: complete   .Lavell MARTINEZ      "

## 2018-04-27 NOTE — PROGRESS NOTES
"  SUBJECTIVE:   Zuly Wheeler is a 58 year old female who presents to clinic today for the following health issues:      Medication Followup of lithium    Taking Medication as prescribed: yes    Side Effects:  None    Medication Helping Symptoms:  yes     58 year old with bipolar disorder, stable and well controlled on medication regimen of nefazodone, lithium, zyprexa.  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      Been on for 2-3 years, works really well.    No side effects.    In the past tried- was suicidal in past, panic attacks has made her passed out    Had uncontrolled depression and anxiety for 30 years before that was never controlled until this combination.    Problem list and histories reviewed & adjusted, as indicated.  Additional history: as documented    Reviewed and updated as needed this visit by clinical staff  Tobacco  Allergies  Meds  Problems  Med Hx  Surg Hx  Fam Hx  Soc Hx        Reviewed and updated as needed this visit by Provider  Meds  Problems         ROS:  Constitutional, HEENT, cardiovascular, pulmonary, gi and gu systems are negative, except as otherwise noted.    OBJECTIVE:     /74 (Cuff Size: Adult Regular)  Pulse 73  Temp 97.1  F (36.2  C) (Tympanic)  Wt 147 lb (66.7 kg)  LMP 01/30/2014 (Exact Date)  SpO2 100%  BMI 22.68 kg/m2  Body mass index is 22.68 kg/(m^2).  GENERAL: healthy, alert and no distress  MENTAL STATUS EXAM:   Zuly is casually dressed and well groomed, sitting comfortably during encounter.  she is alert, awake, and in no acute distress. Eye contact is good. Speech is normal. Psychomotor activity is within normal limits and there are no abnormal involuntary movements demonstrated. Mood is \"good\" and affect is euthymic with good range and reactivity. [place] is generally positive and hopeful despite recent stressors. Thought process is linear, logical, and goal directed. Thought " content is free of suicidal or homicidal ideation, hallucinations or other symptoms of psychosis. Insight and judgement are fair to good. she is generally oriented. No difficulty with memory, attention, or cognition. Associations intact, gait and station within normal limits.    PHQ9=0    ASSESSMENT/PLAN:     Zuly was seen today for labs only and recheck medication.    Diagnoses and all orders for this visit:    Bipolar disorder, in full remission, most recent episode depressed (H)  Comments:  Quite stable.  Doing well.  PHQ9=0.  No manic symptoms.  Refilled meds.  Updated labs today  Orders:  -     OLANZapine (ZYPREXA) 2.5 MG tablet; Take 1 tablet (2.5 mg) by mouth At Bedtime    Dysthymic disorder  Comments:  As above for BPD.  Orders:  -     nefazodone (SERZONE) 150 MG tablet; Take 1 tablet (150 mg) by mouth daily    Recurrent major depressive disorder, in full remission (H)  Comments:  Doing very well; PHQ9=0    Other orders  -     Lipid panel reflex to direct LDL Fasting  -     TSH with free T4 reflex  -     CBC with platelets and differential  -     Comprehensive metabolic panel (BMP + Alb, Alk Phos, ALT, AST, Total. Bili, TP)  -     Lithium level  -     lithium 300 MG tablet; Take 1 tablet (300 mg) by mouth daily      Will get mammo - will schedule today.  Follow up for yearly complete physical exam, sooner if any concerns.      Shasta Pardo MD  [facility]

## 2018-04-27 NOTE — MR AVS SNAPSHOT
"              After Visit Summary   2018    Zuly Wheeler    MRN: [id number]           Patient Information     Date Of Birth          1960        Visit Information        Provider Department      2018 10:40 AM Shasta Pardo MD [facility]        Today's Diagnoses     Bipolar disorder, in full remission, most recent episode depressed (H)    -  1    Dysthymic disorder        Recurrent major depressive disorder, in full remission (H)           Follow-ups after your visit        Who to contact     If you have questions or need follow up information about today's clinic visit or your schedule please contact [facility] directly at [phone].  Normal or non-critical lab and imaging results will be communicated to you by MyChart, letter or phone within 4 business days after the clinic has received the results. If you do not hear from us within 7 days, please contact the clinic through MyChart or phone. If you have a critical or abnormal lab result, we will notify you by phone as soon as possible.  Submit refill requests through [employer] or call your pharmacy and they will forward the refill request to us. Please allow 3 business days for your refill to be completed.          Additional Information About Your Visit        MyChart Information     [employer] lets you send messages to your doctor, view your test results, renew your prescriptions, schedule appointments and more. To sign up, go to [email or web address] . Click on \"Log in\" on the left side of the screen, which will take you to the Welcome page. Then click on \"Sign up Now\" on the right side of the page.     You will be asked to enter the access code listed below, as well as some personal information. Please follow the directions to create your username and password.     Your access code is: [email or web address]  Expires: 5/3/2018 10:01 AM     Your access code will  in 90 " days. If you need help or a new code, please call your [place] clinic or [phone].        Care EveryWhere ID     This is your Care EveryWhere ID. This could be used by other organizations to access your [place] medical records  [id number]        Your Vitals Were     Pulse Temperature Last Period Pulse Oximetry BMI (Body Mass Index)       73 97.1  F (36.2  C) (Tympanic) 01/30/2014 (Exact Date) 100% 22.68 kg/m2        Blood Pressure from Last 3 Encounters:   04/27/18 130/74   03/07/18 114/76   02/02/18 130/76    Weight from Last 3 Encounters:   04/27/18 147 lb (66.7 kg)   03/07/18 157 lb (71.2 kg)   02/02/18 151 lb (68.5 kg)              We Performed the Following     CBC with platelets and differential     Comprehensive metabolic panel (BMP + Alb, Alk Phos, ALT, AST, Total. Bili, TP)     Lipid panel reflex to direct LDL Fasting     Lithium level     TSH with free T4 reflex          Today's Medication Changes          These changes are accurate as of 4/27/18 11:23 AM.  If you have any questions, ask your nurse or doctor.               These medicines have changed or have updated prescriptions.        Dose/Directions    lithium 300 MG tablet   This may have changed:  See the new instructions.   Changed by:  Shasta Pardo MD        Dose:  300 mg   Take 1 tablet (300 mg) by mouth daily   Quantity:  90 tablet   Refills:  1       nefazodone 150 MG tablet   Commonly known as:  SERZONE   This may have changed:  Another medication with the same name was removed. Continue taking this medication, and follow the directions you see here.   Used for:  Dysthymic disorder   Changed by:  Shasta Pardo MD        Dose:  150 mg   Take 1 tablet (150 mg) by mouth daily   Quantity:  90 tablet   Refills:  1       OLANZapine 2.5 MG tablet   Commonly known as:  zyPREXA   This may have changed:  Another medication with the same name was removed. Continue taking this medication, and follow the directions you see here.   Used  for:  Bipolar disorder, in full remission, most recent episode depressed (H)   Changed by:  Shasta Pardo MD        Dose:  2.5 mg   Take 1 tablet (2.5 mg) by mouth At Bedtime   Quantity:  90 tablet   Refills:  1         Stop taking these medicines if you haven't already. Please contact your care team if you have questions.     fluticasone 50 MCG/ACT spray   Commonly known as:  FLONASE   Stopped by:  Shasta Pardo MD           ibuprofen 600 MG tablet   Commonly known as:  ADVIL/MOTRIN   Stopped by:  Shasta Pardo MD           methocarbamol 750 MG tablet   Commonly known as:  ROBAXIN   Stopped by:  Shasta Pardo MD           vitamin D 1000 units capsule   Stopped by:  Shasta Pardo MD                Where to get your medicines      These medications were sent to [facility]pharmacy #[id number] - [place], [place] - [address] [place] [address] AT CORNER OF [address] [place] [address]     Phone:  [phone]     lithium 300 MG tablet    nefazodone 150 MG tablet    OLANZapine 2.5 MG tablet                Primary Care Provider Office Phone # Fax #    Shasta Pardo [phone]       [address] [place] [address]        Equal Access to Services     SHARRON DORADO : Hadii rivera fabian hadasho Soselmaali, waaxda luqadaha, qaybta kaalmada adeegyada, abrahan moreno. So [facility] [phone].    ATENCIÓN: Si habla español, tiene a ayala disposición servicios gratuitos de asistencia lingüística. Sofi al [phone].    We comply with applicable federal civil rights laws and [place] laws. We do not discriminate on the basis of race, color, national origin, age, disability, sex, sexual orientation, or gender identity.            Thank you!     Thank you for choosing [facility]  for your care. Our goal is always to provide you with excellent care. Hearing back from our patients is one way we can continue to improve our  services. Please take a few minutes to complete the written survey that you may receive in the mail after your visit with us. Thank you!             Your Updated Medication List - Protect others around you: Learn how to safely use, store and throw away your medicines at www.disposemymeds.org.          This list is accurate as of 4/27/18 11:23 AM.  Always use your most recent med list.                   Brand Name Dispense Instructions for use Diagnosis    lithium 300 MG tablet     90 tablet    Take 1 tablet (300 mg) by mouth daily        nefazodone 150 MG tablet    SERZONE    90 tablet    Take 1 tablet (150 mg) by mouth daily    Dysthymic disorder       OLANZapine 2.5 MG tablet    zyPREXA    90 tablet    Take 1 tablet (2.5 mg) by mouth At Bedtime    Bipolar disorder, in full remission, most recent episode depressed (H)

## 2018-04-28 LAB
ALBUMIN SERPL-MCNC: 4 G/DL (ref 3.4–5)
ALP SERPL-CCNC: 49 U/L (ref 40–150)
ALT SERPL W P-5'-P-CCNC: 19 U/L (ref 0–50)
ANION GAP SERPL CALCULATED.3IONS-SCNC: 6 MMOL/L (ref 3–14)
AST SERPL W P-5'-P-CCNC: 13 U/L (ref 0–45)
BILIRUB SERPL-MCNC: 0.4 MG/DL (ref 0.2–1.3)
BUN SERPL-MCNC: 12 MG/DL (ref 7–30)
CALCIUM SERPL-MCNC: 9.3 MG/DL (ref 8.5–10.1)
CHLORIDE SERPL-SCNC: 110 MMOL/L (ref 94–109)
CHOLEST SERPL-MCNC: 153 MG/DL
CO2 SERPL-SCNC: 28 MMOL/L (ref 20–32)
CREAT SERPL-MCNC: 0.61 MG/DL (ref 0.52–1.04)
GFR SERPL CREATININE-BSD FRML MDRD: >90 ML/MIN/1.7M2
GLUCOSE SERPL-MCNC: 81 MG/DL (ref 70–99)
HDLC SERPL-MCNC: 56 MG/DL
LDLC SERPL CALC-MCNC: 85 MG/DL
NONHDLC SERPL-MCNC: 97 MG/DL
POTASSIUM SERPL-SCNC: 4.2 MMOL/L (ref 3.4–5.3)
PROT SERPL-MCNC: 7.5 G/DL (ref 6.8–8.8)
SODIUM SERPL-SCNC: 144 MMOL/L (ref 133–144)
TRIGL SERPL-MCNC: 61 MG/DL
TSH SERPL DL<=0.005 MIU/L-ACNC: 0.79 MU/L (ref 0.4–4)

## 2018-04-28 ASSESSMENT — PATIENT HEALTH QUESTIONNAIRE - PHQ9: SUM OF ALL RESPONSES TO PHQ QUESTIONS 1-9: 0

## 2018-04-30 NOTE — PROGRESS NOTES
Hi Team 3:  Please sent a lab result with the following note.  Thank you!    Dear Zuly,    It was nice to see you recently!  I wanted to let you know your recent test results - details of the results can be found below. Briefly:    1. Your results are essentially normal, although show a little dip in your blood counts (WBC, Hgb).  I'd like you to come back to recheck this lab in 1-2 months; if it remains low we should look into causes for this.  The rest of your tests are normal.    Let me know if you have any questions about this, or other concerns.    Best,  Shasta Pardo MD  Family Medicine  [facility]  [phone]         Results for orders placed or performed in visit on 04/27/18  -Lipid panel reflex to direct LDL Fasting       Result                                            Value                         Ref Range                       Cholesterol                                       153                           <200 mg/dL                      Triglycerides                                     61                            <150 mg/dL                      HDL Cholesterol                                   56                            >49 mg/dL                       LDL Cholesterol Calculated                        85                            <100 mg/dL                      Non HDL Cholesterol                               97                            <130 mg/dL                 -TSH with free T4 reflex       Result                                            Value                         Ref Range                       TSH                                               0.79                          0.40 - 4.00 mU/L           -CBC with platelets and differential       Result                                            Value                         Ref Range                       WBC                                               3.7 (L)                       4.0 - 11.0  10e9/L               RBC Count                                         3.90                          3.8 - 5.2 10e12/L               Hemoglobin                                        11.4 (L)                      11.7 - 15.7 g/dL                Hematocrit                                        34.8 (L)                      35.0 - 47.0 %                   MCV                                               89                            78 - 100 fl                     MCH                                               29.2                          26.5 - 33.0 pg                  MCHC                                              32.8                          31.5 - 36.5 g/dL                RDW                                               12.7                          10.0 - 15.0 %                   Platelet Count                                    246                           150 - 450 10e9/L                Diff Method                                       Automated Method                                              % Neutrophils                                     47.2                          %                               % Lymphocytes                                     40.5                          %                               % Monocytes                                       7.4                           %                               % Eosinophils                                     4.4                           %                               % Basophils                                       0.5                           %                               Absolute Neutrophil                               1.7                           1.6 - 8.3 10e9/L                Absolute Lymphocytes                              1.5                           0.8 - 5.3 10e9/L                Absolute Monocytes                                0.3                           0.0 - 1.3 10e9/L                Absolute Eosinophils                               0.2                           0.0 - 0.7 10e9/L                Absolute Basophils                                0.0                           0.0 - 0.2 10e9/L           -Comprehensive metabolic panel (BMP + Alb, Alk Phos, ALT, AST, Total. Bili, TP)       Result                                            Value                         Ref Range                       Sodium                                            144                           133 - 144 mmol/L                Potassium                                         4.2                           3.4 - 5.3 mmol/L                Chloride                                          110 (H)                       94 - 109 mmol/L                 Carbon Dioxide                                    28                            20 - 32 mmol/L                  Anion Gap                                         6                             3 - 14 mmol/L                   Glucose                                           81                            70 - 99 mg/dL                   Urea Nitrogen                                     12                            7 - 30 mg/dL                    Creatinine                                        0.61                          0.52 - 1.04 mg/dL               GFR Estimate                                      >90                           >60 mL/min/1.7m2                GFR Estimate If Black                             >90                           >60 mL/min/1.7m2                Calcium                                           9.3                           8.5 - 10.1 mg/dL                Bilirubin Total                                   0.4                           0.2 - 1.3 mg/dL                 Albumin                                           4.0                           3.4 - 5.0 g/dL                  Protein Total                                     7.5                           6.8 - 8.8 g/dL                   Alkaline Phosphatase                              49                            40 - 150 U/L                    ALT                                               19                            0 - 50 U/L                      AST                                               13                            0 - 45 U/L                 -Lithium level       Result                                            Value                         Ref Range                       Lithium Level                                     0.21 (L)                      0.60 - 1.20 mmol/L

## 2018-05-15 ENCOUNTER — TELEPHONE (OUTPATIENT)
Dept: FAMILY MEDICINE | Facility: CLINIC | Age: 58
End: 2018-05-15

## 2018-05-15 NOTE — TELEPHONE ENCOUNTER
Reason for Call:  Request for results:  Name of test or procedure: lab  Date of test of procedure: 04/30/2018  Location of the test or procedure: [place]  OK to leave the result message on voice mail or with a family member? YES  Phone number Patient can be reached at:  Home number on file [phone] (home)  Additional comments: please call patient, has question on lab letter  Call taken on 5/15/2018 at 8:38 AM by SINDY MENJIVAR

## 2018-05-22 ENCOUNTER — RADIANT APPOINTMENT (OUTPATIENT)
Dept: MAMMOGRAPHY | Facility: CLINIC | Age: 58
End: 2018-05-22
Payer: COMMERCIAL

## 2018-05-22 DIAGNOSIS — Z12.31 VISIT FOR SCREENING MAMMOGRAM: ICD-10-CM

## 2018-05-22 PROCEDURE — 77067 SCR MAMMO BI INCL CAD: CPT | Mod: TC

## 2018-08-28 ENCOUNTER — TELEPHONE (OUTPATIENT)
Dept: FAMILY MEDICINE | Facility: CLINIC | Age: 58
End: 2018-08-28

## 2018-08-28 DIAGNOSIS — F34.1 DYSTHYMIC DISORDER: ICD-10-CM

## 2018-08-28 NOTE — TELEPHONE ENCOUNTER
Prior Authorization Retail Medication Request    Medication/Dose: nefazodone (SERZONE) 150 MG tablet  ICD code (if different than what is on RX):     Previously Tried and Failed:     Rationale:       Insurance Name:  [employer]   Insurance ID:  [id number]      Pharmacy Information (if different than what is on RX)  [facility]  Phone:  [phone]

## 2018-08-29 NOTE — TELEPHONE ENCOUNTER
Central Prior Authorization Team   Phone: [phone]      PA Initiation    Medication: nefazodone (SERZONE) 150 MG tablet-Initiated  Insurance Company: [employer] - Phone [phone] Fax [phone]  Pharmacy Filling the Rx: [id number] - [place], [place] - [address] [place] [address] AT CORNER OF [address] [place] [address]  Filling Pharmacy Phone: [phone]  Filling Pharmacy Fax:    Start Date: 8/29/2018

## 2018-08-31 NOTE — TELEPHONE ENCOUNTER
Prior Authorization Approval    Authorization Effective Date: 7/30/2018  Authorization Expiration Date: 8/29/2019  Medication: nefazodone (SERZONE) 150 MG tablet-APPROVED  Approved Dose/Quantity:   Reference #:     Insurance Company: [employer] - Phone [phone] Fax [phone]  Expected CoPay:       CoPay Card Available:      Foundation Assistance Needed:    Which Pharmacy is filling the prescription (Not needed for infusion/clinic administered): [id number] - [address] AT CORNER OF [address] [place] [address]  Pharmacy Notified: Yes  Patient Notified: No    Pharmacy will notify patient when medication is ready.

## 2018-11-15 ENCOUNTER — OFFICE VISIT (OUTPATIENT)
Dept: FAMILY MEDICINE | Facility: CLINIC | Age: 58
End: 2018-11-15
Payer: COMMERCIAL

## 2018-11-15 VITALS
RESPIRATION RATE: 16 BRPM | OXYGEN SATURATION: 99 % | WEIGHT: 148 LBS | BODY MASS INDEX: 22.84 KG/M2 | TEMPERATURE: 97.2 F | HEART RATE: 66 BPM | SYSTOLIC BLOOD PRESSURE: 118 MMHG | DIASTOLIC BLOOD PRESSURE: 80 MMHG

## 2018-11-15 DIAGNOSIS — Z01.818 PREOP GENERAL PHYSICAL EXAM: Primary | ICD-10-CM

## 2018-11-15 DIAGNOSIS — H54.7 VISION PROBLEM: ICD-10-CM

## 2018-11-15 PROCEDURE — 99214 OFFICE O/P EST MOD 30 MIN: CPT | Performed by: FAMILY MEDICINE

## 2018-11-15 ASSESSMENT — PATIENT HEALTH QUESTIONNAIRE - PHQ9
SUM OF ALL RESPONSES TO PHQ QUESTIONS 1-9: 0
SUM OF ALL RESPONSES TO PHQ QUESTIONS 1-9: 0
10. IF YOU CHECKED OFF ANY PROBLEMS, HOW DIFFICULT HAVE THESE PROBLEMS MADE IT FOR YOU TO DO YOUR WORK, TAKE CARE OF THINGS AT HOME, OR GET ALONG WITH OTHER PEOPLE: NOT DIFFICULT AT ALL

## 2018-11-15 ASSESSMENT — ANXIETY QUESTIONNAIRES
2. NOT BEING ABLE TO STOP OR CONTROL WORRYING: NOT AT ALL
7. FEELING AFRAID AS IF SOMETHING AWFUL MIGHT HAPPEN: NOT AT ALL
3. WORRYING TOO MUCH ABOUT DIFFERENT THINGS: NOT AT ALL
6. BECOMING EASILY ANNOYED OR IRRITABLE: NOT AT ALL
1. FEELING NERVOUS, ANXIOUS, OR ON EDGE: NOT AT ALL
GAD7 TOTAL SCORE: 0
4. TROUBLE RELAXING: NOT AT ALL
GAD7 TOTAL SCORE: 0
7. FEELING AFRAID AS IF SOMETHING AWFUL MIGHT HAPPEN: NOT AT ALL
GAD7 TOTAL SCORE: 0
5. BEING SO RESTLESS THAT IT IS HARD TO SIT STILL: NOT AT ALL

## 2018-11-15 NOTE — PROGRESS NOTES
[address]  [address]  [facility] [address]  [phone]  Dept: [phone]    PRE-OP EVALUATION:  Today's date: 11/15/2018    Zuly Wheeler (: 1960) presents for pre-operative evaluation assessment as requested by Dr. Keri Mcgrath.  She requires evaluation and anesthesia risk assessment prior to undergoing surgery/procedure for treatment of vision blurring, light streaking .    Fax number for surgical facility: [phone]  Primary Physician: Shasta Pardo  Type of Anesthesia Anticipated: to be determined    Patient has a Health Care Directive or Living Will:  NO    Preop Questions 11/15/2018   Who is doing your surgery? Dr Keri Mcgrath   What are you having done? YAG Capsulotomy   Date of Surgery/Procedure: 2018   1.  Do you have a history of Heart attack, stroke, stent, coronary bypass surgery, or other heart surgery? No   2.  Do you ever have any pain or discomfort in your chest? No   3.  Do you have a history of  Heart Failure? No   4.   Are you troubled by shortness of breath when:  walking on a level surface, or up a slight hill, or at night? No   5.  Do you currently have a cold, bronchitis or other respiratory infection? No   6.  Do you have a cough, shortness of breath, or wheezing? YES - cough    7.  Do you sometimes get pains in the calves of your legs when you walk? No   8. Do you or anyone in your family have previous history of blood clots? No   9.  Do you or does anyone in your family have a serious bleeding problem such as prolonged bleeding following surgeries or cuts? No   10. Have you ever had problems with anemia or been told to take iron pills? No   11. Have you had any abnormal blood loss such as black, tarry or bloody stools, or abnormal vaginal bleeding? No   12. Have you ever had a blood transfusion? No   13. Have you or any of your relatives ever had problems with anesthesia? No   14. Do you have  sleep apnea, excessive snoring or daytime drowsiness? YES - snoring   15. Do you have any prosthetic heart valves? No   16. Do you have prosthetic joints? No   17. Is there any chance that you may be pregnant? No       Answers for HPI/ROS submitted by the patient on 11/15/2018   If you checked off any problems, how difficult have these problems made it for you to do your work, take care of things at home, or get along with other people?: Not difficult at all  PHQ9 TOTAL SCORE: 0  GABRIELE 7 TOTAL SCORE: 0    HPI:     HPI related to upcoming procedure: Bilateral vision disturbance, blurring with light streaking from bright lights, worse at night.  Has been getting worse in past year      See problem list for active medical problems.  Problems all longstanding and stable, except as noted/documented.  See ROS for pertinent symptoms related to these conditions.                                                                                                                                                          .    MEDICAL HISTORY:     Patient Active Problem List    Diagnosis Date Noted     Acute right-sided low back pain without sciatica 03/07/2018     Priority: Medium     Snoring 11/29/2017     Priority: Medium     Had sleep apnea testing, was negative in 2012 per pt.       Herpes simplex virus infection 11/29/2017     Priority: Medium     No known outbreaks or sores, but positive serology.  Discussed that this means she does have the herpes virus, and natural hx of this infection.  Gave pt info.       ACP (advance care planning) 11/04/2015     Priority: Medium     Advance Care Planning 11/4/2015: ACP Review and Resources Provided:  Reviewed chart for advance care plan.  Zuly Guerrero has no plan or code status on file. Discussed available resources and provided with information. . Added by Verito Inman             Hyperlipidemia with target LDL less than 130 12/04/2013     Priority: Medium     Diagnosis updated by  automated process. Provider to review and confirm.       Anemia Hgb= 11.8 in 11-13 12/04/2013     Priority: Medium     Vitamin D deficiency disease 12/04/2013     Priority: Medium     Family history of diabetes mellitus 12/04/2013     Priority: Medium     Bipolar disorder, in full remission, most recent episode depressed (H) 09/22/2012     Priority: Medium     Diagnosed        Anxiety state 09/22/2012     Priority: Medium     Dysthymic disorder 09/22/2012     Priority: Medium     Chronic maxillary sinusitis 09/22/2012     Priority: Medium     1995, due to tobacco abuse       Recurrent major depressive disorder, in full remission (H) 11/05/2010     Priority: Medium      Past Medical History:   Diagnosis Date     Anxiety and depression      Bipolar 1 disorder (H)      Past Surgical History:   Procedure Laterality Date     DILATION AND CURETTAGE  12/6/2013    Procedure: DILATION AND CURETTAGE;  Dilation And Curettage;  Surgeon: Edel Chew MD;  Location: [facility] OR     EYE SURGERY  8/2013    cataract surgery both eyes done     LAPAROSCOPIC HYSTERECTOMY SUPRACERVICAL, BILATERAL SALPINGO-OOPHORECTOMY, COMBINED  1/30/2014    Procedure: COMBINED LAPAROSCOPIC HYSTERECTOMY SUPRACERVICAL, SALPINGO-OOPHORECTOMY;  Laparoscopic Supracervical Hysterectomy With Bilateral Salingectomy;  Surgeon: Edel Chew MD;  Location: [facility] OR     TUBAL LIGATION  1990     Current Outpatient Prescriptions   Medication Sig Dispense Refill     lithium 300 MG tablet Take 1 tablet (300 mg) by mouth daily 90 tablet 1     nefazodone (SERZONE) 150 MG tablet Take 1 tablet (150 mg) by mouth daily 90 tablet 1     OLANZapine (ZYPREXA) 2.5 MG tablet Take 1 tablet (2.5 mg) by mouth At Bedtime 90 tablet 1     OTC products: None, except as noted above    Allergies   Allergen Reactions     Depakote      groggy     Lamotrigine      Intolerance, numb (Lamictal)     Olanzapine Other (See Comments)     Only generic/ineffective  Ineffective (only generic)      Seroquel [Quetiapine Fumarate] Visual Disturbance     Blurred vision; jumpy     Sulfa Drugs Hives     Trileptal Visual Disturbance     Blurred vision     Zoloft Other (See Comments)     jumpy     Citalopram Anxiety     Intolerance (Celexa)      Latex Allergy: NO    Social History   Substance Use Topics     Smoking status: Former Smoker     Years: 30.00     Types: Cigarettes     Quit date: 11/21/1990     Smokeless tobacco: Never Used     Alcohol use No     History   Drug Use No       REVIEW OF SYSTEMS:   CONSTITUTIONAL: NEGATIVE for fever, chills, change in weight  INTEGUMENTARY/SKIN: NEGATIVE for worrisome rashes, moles or lesions  EYES: blurred vision bilateral  ENT/MOUTH: NEGATIVE for ear, mouth and throat problems  RESP: NEGATIVE for significant cough or SOB  BREAST: NEGATIVE for masses, tenderness or discharge  CV: NEGATIVE for chest pain, palpitations or peripheral edema  GI: NEGATIVE for nausea, abdominal pain, heartburn, or change in bowel habits  : NEGATIVE for frequency, dysuria, or hematuria  MUSCULOSKELETAL: NEGATIVE for significant arthralgias or myalgia  NEURO: NEGATIVE for weakness, dizziness or paresthesias  ENDOCRINE: NEGATIVE for temperature intolerance, skin/hair changes  HEME: NEGATIVE for bleeding problems  PSYCHIATRIC: NEGATIVE for changes in mood or affect    EXAM:   /80 (BP Location: Left arm, Patient Position: Sitting, Cuff Size: Adult Regular)  Pulse 66  Temp 97.2  F (36.2  C) (Tympanic)  Resp 16  Wt 148 lb (67.1 kg)  LMP 01/30/2014 (Exact Date)  SpO2 99%  BMI 22.84 kg/m2    GENERAL APPEARANCE: healthy, alert and no distress     EYES: EOMI, PERRL     HENT: ear canals and TM's normal and nose and mouth without ulcers or lesions     NECK: no adenopathy, no asymmetry, masses, or scars and thyroid normal to palpation     RESP: lungs clear to auscultation - no rales, rhonchi or wheezes     CV: regular rates and rhythm, normal S1 S2, no S3 or S4 and no murmur, click or rub      ABDOMEN:  soft, nontender, no HSM or masses and bowel sounds normal     MS: extremities normal- no gross deformities noted, no evidence of inflammation in joints, FROM in all extremities.     SKIN: no suspicious lesions or rashes     NEURO: Normal strength and tone, sensory exam grossly normal, mentation intact and speech normal     PSYCH: mentation appears normal. and affect normal/bright     LYMPHATICS: No cervical adenopathy    DIAGNOSTICS:   No labs or EKG required for low risk surgery (cataract, skin procedure, breast biopsy, etc)    Recent Labs   Lab Test  04/27/18   1118  10/25/17   1330   12/04/13   1607   HGB  11.4*  12.9   < >   --    PLT  246  258   < >   --    NA  144  140   < >   --    POTASSIUM  4.2  3.7   < >   --    CR  0.61  0.76   < >   --    A1C   --    --    --   5.5    < > = values in this interval not displayed.        IMPRESSION:   Reason for surgery/procedure: Vision disturbance, blurring streaking with lights    The proposed surgical procedure is considered LOW risk.    REVISED CARDIAC RISK INDEX  The patient has the following serious cardiovascular risks for perioperative complications such as (MI, PE, VFib and 3  AV Block):  No serious cardiac risks  INTERPRETATION: 0 risks: Class I (very low risk - 0.4% complication rate)    The patient has the following additional risks for perioperative complications:  No identified additional risks      ICD-10-CM    1. Preop general physical exam Z01.818    2. Vision problem H54.7        RECOMMENDATIONS:         --Patient is to take all scheduled medications on the day of surgery EXCEPT for modifications listed below.    APPROVAL GIVEN to proceed with proposed procedure, without further diagnostic evaluation       Signed Electronically by: Dada Sarabia MD    Copy of this evaluation report is provided to requesting physician.    [employer] Preop Guidelines    Revised Cardiac Risk Index

## 2018-11-15 NOTE — LETTER
My Depression Action Plan  Name: Zuly Wheeler   Date of Birth 1960  Date: 11/15/2018    My doctor: Shasta Pardo   My clinic: [address]  [facility] [address] [phone]          GREEN    ZONE   Good Control    What it looks like:     Things are going generally well. You have normal up s and down s. You may even feel depressed from time to time, but bad moods usually last less than a day.   What you need to do:  1. Continue to care for yourself (see self care plan)  2. Check your depression survival kit and update it as needed  3. Follow your physician s recommendations including any medication.  4. Do not stop taking medication unless you consult with your physician first.           YELLOW         ZONE Getting Worse    What it looks like:     Depression is starting to interfere with your life.     It may be hard to get out of bed; you may be starting to isolate yourself from others.    Symptoms of depression are starting to last most all day and this has happened for several days.     You may have suicidal thoughts but they are not constant.   What you need to do:     1. Call your care team, your response to treatment will improve if you keep your care team informed of your progress. Yellow periods are signs an adjustment may need to be made.     2. Continue your self-care, even if you have to fake it!    3. Talk to someone in your support network    4. Open up your depression survival kit           RED    ZONE Medical Alert - Get Help    What it looks like:     Depression is seriously interfering with your life.     You may experience these or other symptoms: You can t get out of bed most days, can t work or engage in other necessary activities, you have trouble taking care of basic hygiene, or basic responsibilities, thoughts of suicide or death that will not go away, self-injurious behavior.     What you need  to do:  1. Call your care team and request a same-day appointment. If they are not available (weekends or after hours) call your local crisis line, emergency room or 911.            Depression Self Care Plan / Survival Kit    Self-Care for Depression  Here s the deal. Your body and mind are really not as separate as most people think.  What you do and think affects how you feel and how you feel influences what you do and think. This means if you do things that people who feel good do, it will help you feel better.  Sometimes this is all it takes.  There is also a place for medication and therapy depending on how severe your depression is, so be sure to consult with your medical provider and/ or Behavioral Health Consultant if your symptoms are worsening or not improving.     In order to better manage my stress, I will:    Exercise  Get some form of exercise, every day. This will help reduce pain and release endorphins, the  feel good  chemicals in your brain. This is almost as good as taking antidepressants!  This is not the same as joining a gym and then never going! (they count on that by the way ) It can be as simple as just going for a walk or doing some gardening, anything that will get you moving.      Hygiene   Maintain good hygiene (Get out of bed in the morning, Make your bed, Brush your teeth, Take a shower, and Get dressed like you were going to work, even if you are unemployed).  If your clothes don't fit try to get ones that do.    Diet  I will strive to eat foods that are good for me, drink plenty of water, and avoid excessive sugar, caffeine, alcohol, and other mood-altering substances.  Some foods that are helpful in depression are: complex carbohydrates, B vitamins, flaxseed, fish or fish oil, fresh fruits and vegetables.    Psychotherapy  I agree to participate in Individual Therapy (if recommended).    Medication  If prescribed medications, I agree to take them.  Missing doses can result in  serious side effects.  I understand that drinking alcohol, or other illicit drug use, may cause potential side effects.  I will not stop my medication abruptly without first discussing it with my provider.    Staying Connected With Others  I will stay in touch with my friends, family members, and my primary care provider/team.    Use your imagination  Be creative.  We all have a creative side; it doesn t matter if it s oil painting, sand castles, or mud pies! This will also kick up the endorphins.    Witness Beauty  (AKA stop and smell the roses) Take a look outside, even in mid-winter. Notice colors, textures. Watch the squirrels and birds.     Service to others  Be of service to others.  There is always someone else in need.  By helping others we can  get out of ourselves  and remember the really important things.  This also provides opportunities for practicing all the other parts of the program.    Humor  Laugh and be silly!  Adjust your TV habits for less news and crime-drama and more comedy.    Control your stress  Try breathing deep, massage therapy, biofeedback, and meditation. Find time to relax each day.     My support system    Clinic Contact:  Phone number:    Contact 1:  Phone number:    Contact 2:  Phone number:    [employer]/:  Phone number:    Therapist:  Phone number:    Local crisis center:    Phone number:    Other community support:  Phone number:

## 2018-11-15 NOTE — MR AVS SNAPSHOT
After Visit Summary   11/15/2018    Zuly Wheeler    MRN: [id number]           Patient Information     Date Of Birth          1960        Visit Information        Provider Department      11/15/2018 4:30 PM Dada Sarabia MD [facility]        Today's Diagnoses     Preop general physical exam    -  1    Vision problem          Care Instructions      Before Your Surgery      Call your surgeon if there is any change in your health. This includes signs of a cold or flu (such as a sore throat, runny nose, cough, rash or fever).    Do not smoke, drink alcohol or take over the counter medicine (unless your surgeon or primary care doctor tells you to) for the 24 hours before and after surgery.    If you take prescribed drugs: Follow your doctor s orders about which medicines to take and which to stop until after surgery.    Eating and drinking prior to surgery: follow the instructions from your surgeon    Take a shower or bath the night before surgery. Use the soap your surgeon gave you to gently clean your skin. If you do not have soap from your surgeon, use your regular soap. Do not shave or scrub the surgery site.  Wear clean pajamas and have clean sheets on your bed.           Follow-ups after your visit        Who to contact     If you have questions or need follow up information about today's clinic visit or your schedule please contact [facility] directly at [phone].  Normal or non-critical lab and imaging results will be communicated to you by MyChart, letter or phone within 4 business days after the clinic has received the results. If you do not hear from us within 7 days, please contact the clinic through MyChart or phone. If you have a critical or abnormal lab result, we will notify you by phone as soon as possible.  Submit refill requests through [employer]BL[employer]OM or call your pharmacy and they will forward the refill  request to us. Please allow 3 business days for your refill to be completed.          Additional Information About Your Visit        Care EveryWhere ID     This is your Care EveryWhere ID. This could be used by other organizations to access your [place] medical records  [id number]        Your Vitals Were     Pulse Temperature Respirations Last Period Pulse Oximetry BMI (Body Mass Index)    66 97.2  F (36.2  C) (Tympanic) 16 01/30/2014 (Exact Date) 99% 22.84 kg/m2       Blood Pressure from Last 3 Encounters:   11/15/18 118/80   04/27/18 130/74   03/07/18 114/76    Weight from Last 3 Encounters:   11/15/18 148 lb (67.1 kg)   04/27/18 147 lb (66.7 kg)   03/07/18 157 lb (71.2 kg)              We Performed the Following     DEPRESSION ACTION PLAN (DAP)        Primary Care Provider Office Phone # Fax #    Shasta Pardo [phone] [address] [address]        Equal Access to Services     MYLA DORADO : Hadii rivera ku hadasho Soomaali, waaxda luqadaha, qaybta kaalmada adeegyada, waxay adielin hayrosalinda carver . So [facility] [phone].    ATENCIÓN: Si habla español, tiene a ayala disposición servicios gratuitos de asistencia lingüística. [facility] [phone].    We comply with applicable federal civil rights laws and [place] laws. We do not discriminate on the basis of race, color, national origin, age, disability, sex, sexual orientation, or gender identity.            Thank you!     Thank you for choosing [facility]  for your care. Our goal is always to provide you with excellent care. Hearing back from our patients is one way we can continue to improve our services. Please take a few minutes to complete the written survey that you may receive in the mail after your visit with us. Thank you!             Your Updated Medication List - Protect others around you: Learn how to safely use, store and throw away your medicines at  www.disposemymeds.org.          This list is accurate as of 11/15/18  4:58 PM.  Always use your most recent med list.                   Brand Name Dispense Instructions for use Diagnosis    lithium 300 MG tablet     90 tablet    Take 1 tablet (300 mg) by mouth daily        nefazodone 150 MG tablet    SERZONE    90 tablet    Take 1 tablet (150 mg) by mouth daily    Dysthymic disorder       OLANZapine 2.5 MG tablet    zyPREXA    90 tablet    Take 1 tablet (2.5 mg) by mouth At Bedtime    Bipolar disorder, in full remission, most recent episode depressed (H)

## 2018-11-16 ASSESSMENT — ANXIETY QUESTIONNAIRES: GAD7 TOTAL SCORE: 0

## 2018-11-16 ASSESSMENT — PATIENT HEALTH QUESTIONNAIRE - PHQ9: SUM OF ALL RESPONSES TO PHQ QUESTIONS 1-9: 0

## 2018-11-27 ENCOUNTER — TELEPHONE (OUTPATIENT)
Dept: FAMILY MEDICINE | Facility: CLINIC | Age: 58
End: 2018-11-27

## 2018-11-27 NOTE — TELEPHONE ENCOUNTER
Pt states that she has been having pain from her neck thru her stomach for 1 1/2 weeks. She states that it rotates from neck thru chest down to stomach and back. Feels like a burning pain at times. Pain level about 7. No SOB or Nausea or vomiting,

## 2019-01-05 DIAGNOSIS — F34.1 DYSTHYMIC DISORDER: ICD-10-CM

## 2019-01-05 DIAGNOSIS — F31.76 BIPOLAR DISORDER, IN FULL REMISSION, MOST RECENT EPISODE DEPRESSED (H): ICD-10-CM

## 2019-01-07 NOTE — TELEPHONE ENCOUNTER
"Requested Prescriptions   Pending Prescriptions Disp Refills     nefazodone (SERZONE) 150 MG tablet [Pharmacy Med Name: NEFAZODONE  MG TABLET]  Last Written Prescription Date:  4/27/2018  Last Fill Quantity: 90 tablet,  # refills: 1   Last office visit: 11/15/2018 with prescribing provider:  Cornell   Future Office Visit:     90 tablet 1     Sig: TAKE 1 TABLET (150 MG) BY MOUTH DAILY    Serotonin Modulators Passed - 1/5/2019  3:52 PM       Passed - Normal ALT in past 12 months    Recent Labs   Lab Test 04/27/18  1118   ALT 19            Passed - Normal AST in past 12 months    Recent Labs   Lab Test 04/27/18  1118   AST 13            Passed - Recent (12 mo) or future (30 days) visit within the authorizing provider's specialty    Patient had office visit in the last 12 months or has a visit in the next 30 days with authorizing provider or within the authorizing provider's specialty.  See \"Patient Info\" tab in inbasket, or \"Choose Columns\" in Meds & Orders section of the refill encounter.             Passed - Medication is active on med list       Passed - Patient is age 18 or older       Passed - No active pregnancy on record       Passed - No positive pregnancy test in past 12 months        OLANZapine (ZYPREXA) 2.5 MG tablet [Pharmacy Med Name: OLANZAPINE 2.5 MG TABLET]  Last Written Prescription Date:  4/27/2018  Last Fill Quantity: 90 tablet,  # refills: 1   Last office visit: 11/15/2018 with prescribing provider:  Cornell   Future Office Visit:     90 tablet 1     Sig: TAKE 1 TABLET (2.5 MG) BY MOUTH AT BEDTIME    Antipsychotic Medications Passed - 1/5/2019  3:52 PM       Passed - Blood pressure under 140/90 in past 12 months    BP Readings from Last 3 Encounters:   11/15/18 118/80   04/27/18 130/74   03/07/18 114/76                Passed - Patient is 12 years of age or older       Passed - Lipid panel on file within the past 12 months    Recent Labs   Lab Test 04/27/18  1118 11/12/15  0738   CHOL 153 160 " "  TRIG 61 78   HDL 56 58   LDL 85 86   NHDL 97  --    VLDL  --  16   CHOLHDLRATIO  --  2.8              Passed - CBC on file in past 12 months    Recent Labs   Lab Test 04/27/18  1118   WBC 3.7*   RBC 3.90   HGB 11.4*   HCT 34.8*                   Passed - Heart Rate on file within past 12 months    Pulse Readings from Last 3 Encounters:   11/15/18 66   04/27/18 73   03/07/18 72              Passed - A1c or Glucose on file in past 12 months    Recent Labs   Lab Test 04/27/18  1118  12/04/13  1607   GLC 81   < >  --    A1C  --   --  5.5    < > = values in this interval not displayed.       Please review patients last 3 weights. If a weight gain of >10 lbs exists, you may refill the prescription once after instructing the patient to schedule an appointment within the next 30 days.    Wt Readings from Last 3 Encounters:   11/15/18 67.1 kg (148 lb)   04/27/18 66.7 kg (147 lb)   03/07/18 71.2 kg (157 lb)            Passed - Medication is active on med list       Passed - Patient is not pregnant       Passed - No positve pregnancy test on file in past 12 months       Passed - Recent (6 mo) or future (30 days) visit within the authorizing provider's specialty    Patient had office visit in the last 6 months or has a visit in the next 30 days with authorizing provider or within the authorizing provider's specialty.  See \"Patient Info\" tab in inbasket, or \"Choose Columns\" in Meds & Orders section of the refill encounter.               "

## 2019-01-09 ENCOUNTER — OFFICE VISIT (OUTPATIENT)
Dept: FAMILY MEDICINE | Facility: CLINIC | Age: 59
End: 2019-01-09
Payer: COMMERCIAL

## 2019-01-09 VITALS
OXYGEN SATURATION: 98 % | DIASTOLIC BLOOD PRESSURE: 70 MMHG | BODY MASS INDEX: 22.68 KG/M2 | TEMPERATURE: 96.9 F | WEIGHT: 147 LBS | SYSTOLIC BLOOD PRESSURE: 124 MMHG | HEART RATE: 72 BPM

## 2019-01-09 DIAGNOSIS — F34.1 DYSTHYMIC DISORDER: ICD-10-CM

## 2019-01-09 DIAGNOSIS — F31.76 BIPOLAR DISORDER, IN FULL REMISSION, MOST RECENT EPISODE DEPRESSED (H): ICD-10-CM

## 2019-01-09 DIAGNOSIS — Z12.11 SPECIAL SCREENING FOR MALIGNANT NEOPLASMS, COLON: ICD-10-CM

## 2019-01-09 DIAGNOSIS — J01.90 ACUTE SINUSITIS WITH SYMPTOMS > 10 DAYS: Primary | ICD-10-CM

## 2019-01-09 LAB
ERYTHROCYTE [DISTWIDTH] IN BLOOD BY AUTOMATED COUNT: 12.5 % (ref 10–15)
HBA1C MFR BLD: 5.6 % (ref 0–5.6)
HCT VFR BLD AUTO: 38.4 % (ref 35–47)
HGB BLD-MCNC: 12.4 G/DL (ref 11.7–15.7)
LITHIUM SERPL-SCNC: <0.2 MMOL/L (ref 0.6–1.2)
MCH RBC QN AUTO: 28.4 PG (ref 26.5–33)
MCHC RBC AUTO-ENTMCNC: 32.3 G/DL (ref 31.5–36.5)
MCV RBC AUTO: 88 FL (ref 78–100)
PLATELET # BLD AUTO: 274 10E9/L (ref 150–450)
RBC # BLD AUTO: 4.37 10E12/L (ref 3.8–5.2)
WBC # BLD AUTO: 5.7 10E9/L (ref 4–11)

## 2019-01-09 PROCEDURE — 85027 COMPLETE CBC AUTOMATED: CPT | Performed by: FAMILY MEDICINE

## 2019-01-09 PROCEDURE — 80061 LIPID PANEL: CPT | Performed by: FAMILY MEDICINE

## 2019-01-09 PROCEDURE — 80178 ASSAY OF LITHIUM: CPT | Performed by: FAMILY MEDICINE

## 2019-01-09 PROCEDURE — 80053 COMPREHEN METABOLIC PANEL: CPT | Performed by: FAMILY MEDICINE

## 2019-01-09 PROCEDURE — 84443 ASSAY THYROID STIM HORMONE: CPT | Performed by: FAMILY MEDICINE

## 2019-01-09 PROCEDURE — 99214 OFFICE O/P EST MOD 30 MIN: CPT | Performed by: FAMILY MEDICINE

## 2019-01-09 PROCEDURE — 83036 HEMOGLOBIN GLYCOSYLATED A1C: CPT | Performed by: FAMILY MEDICINE

## 2019-01-09 PROCEDURE — 36415 COLL VENOUS BLD VENIPUNCTURE: CPT | Performed by: FAMILY MEDICINE

## 2019-01-09 RX ORDER — OLANZAPINE 2.5 MG/1
2.5 TABLET, FILM COATED ORAL AT BEDTIME
Qty: 90 TABLET | Refills: 0 | Status: SHIPPED | OUTPATIENT
Start: 2019-01-09 | End: 2019-01-09

## 2019-01-09 RX ORDER — OLANZAPINE 2.5 MG/1
2.5 TABLET, FILM COATED ORAL AT BEDTIME
Qty: 90 TABLET | Refills: 3 | Status: SHIPPED | OUTPATIENT
Start: 2019-01-09 | End: 2019-08-05

## 2019-01-09 RX ORDER — LITHIUM CARBONATE 300 MG
300 TABLET ORAL DAILY
Qty: 90 TABLET | Refills: 3 | Status: SHIPPED | OUTPATIENT
Start: 2019-01-09 | End: 2019-08-05

## 2019-01-09 NOTE — PROGRESS NOTES
"  SUBJECTIVE:   Zuly Wheeler is a 58 year old female who presents to clinic today for the following health issues:      RESPIRATORY SYMPTOMS      Duration: 1+ week    Description  nasal congestion, facial pain/pressure, cough, nausea and phlem, diarrhea, light headed    Severity: moderate    Accompanying signs and symptoms: facial pain    History (predisposing factors):  Sinus issues    Precipitating or alleviating factors: None    Therapies tried and outcome:  none    SUBJECTIVE:   Zuly Wheeler is a 58 year old female who is a primary patient of Shasta Pardo, here today with complaints of dry cough, nasal congestion, post nasal drip, facial pressure and right sinus pain.   Of all these symptoms the most troublesome is  sinus pain. Patient denies earache, nausea, vomiting, diarrhea, chest tightness and wheezing orSOB.  Denies fevers.  Onset of symptoms was 10 days ago.   Patient has a history of chronic sinus infections.  Patient has been trying Flonase, time, Sudafed, Afrin at home, with no improvement.    Additionally, she has stable bipolar and dysthymic disorder and doing well.  She does need refills and is due for her yearly labs.  Been feeling well, mental health \"really good\" per patient.    Patient is up to date on preventative screening (mammo, pap, colon) except shingrix and influenza vaccines.  However she declines both of these today.     OBJECTIVE:   GENERAL: She appears ill but not toxic, alert, pleasant and in NAD.   Blood pressure 124/70, pulse 72, temperature 96.9  F (36.1  C), temperature source Tympanic, weight 66.7 kg (147 lb), last menstrual period 01/30/2014, SpO2 98 %, not currently breastfeeding.  Vital signs are stable as above.   EARS: TMs normal bilaterally.    MOUTH: Oropharynx clear without erythema or exudate.  NECK: Soft, supple, with no cervical lymphadenopathy.  NOSE: Congested with mild rhinorrhea.  Nasal mucosa boggy.  HEAD: Maxillary sinus tenderness " bilaterally.  CV: Regular rate and rhythm with no murmur appreciated.  LUNGS: Good air movement.  Lungs clear to auscultation bilaterally without wheeze, crackle or rale.    ASSESSMENT/PLAN:   Zuly was seen today for uri.    Diagnoses and all orders for this visit:    Acute sinusitis with symptoms > 10 days  -     amoxicillin-clavulanate (AUGMENTIN) 875-125 MG tablet; Take 1 tablet by mouth 2 times daily for 7 days    Dysthymic disorder  Comments:  As above for BPD.  Orders:  -     nefazodone (SERZONE) 150 MG tablet; Take 1 tablet (150 mg) by mouth daily    Bipolar disorder, in full remission, most recent episode depressed (H)  Comments:  Quite stable.  Doing well.  PHQ9=0.  No manic symptoms.  Refilled meds.  Updated labs today  Orders:  -     lithium (ESKALITH) 300 MG tablet; Take 1 tablet (300 mg) by mouth daily  -     OLANZapine (ZYPREXA) 2.5 MG tablet; Take 1 tablet (2.5 mg) by mouth At Bedtime  -     CBC with platelets  -     Comprehensive metabolic panel  -     Lipid panel reflex to direct LDL Fasting  -     Lithium level  -     Hemoglobin A1c  -     TSH with free T4 reflex    Special screening for malignant neoplasms, colon  -     Fecal colorectal cancer screen (FIT); Future      For sinus:    Discussed bacterial sinus infection versus congestion causing sinus headache; think that her symptoms at this point fall into the former category.    Continue flonase, rest, advil, HEAT TO SINUSES.    Nasal saline spray, blow nose afterwards, Neti pot or SinuFlo Sinus Rinse, warm washcloth to affected side and prn afrin for LIMITED TIME ONLY all discussed    Discussed the treatment of URI and recommended increased fluids, humidity, Tylenol, time and TLC.      If symptoms worsen or do not improve return to clinic.      For Prev Care:    Declines Flu & Shingrix.    up to date on mammo.    Will order FIT as due again in a month.        Dr. Shasta Pardo MD  [facility]  [facility]  [phone]

## 2019-01-09 NOTE — TELEPHONE ENCOUNTER
Sent in 90 day supply with not to recheck labs, last CBC was off was supposed to recheck. Has appt today with PCP and noted in appt notes to recheck labs

## 2019-01-09 NOTE — LETTER
January 20, 2019      Zuly Wheeler  [address]  [place] [address]        Dear ,    We are writing to inform you of your test results.    Your test results fall within the expected range(s) or remain unchanged from previous results.  Please continue with current treatment plan.    Resulted Orders   CBC with platelets   Result Value Ref Range    WBC 5.7 4.0 - 11.0 10e9/L    RBC Count 4.37 3.8 - 5.2 10e12/L    Hemoglobin 12.4 11.7 - 15.7 g/dL    Hematocrit 38.4 35.0 - 47.0 %    MCV 88 78 - 100 fl    MCH 28.4 26.5 - 33.0 pg    MCHC 32.3 31.5 - 36.5 g/dL    RDW 12.5 10.0 - 15.0 %    Platelet Count 274 150 - 450 10e9/L   Comprehensive metabolic panel   Result Value Ref Range    Sodium 140 133 - 144 mmol/L    Potassium 3.8 3.4 - 5.3 mmol/L    Chloride 107 94 - 109 mmol/L    Carbon Dioxide 26 20 - 32 mmol/L    Anion Gap 7 3 - 14 mmol/L    Glucose 78 70 - 99 mg/dL    Urea Nitrogen 12 7 - 30 mg/dL    Creatinine 0.73 0.52 - 1.04 mg/dL    GFR Estimate >90 >60 mL/min/[1.73_m2]      Comment:      Non  GFR Calc  Starting 12/18/2018, serum creatinine based estimated GFR (eGFR) will be   calculated using the Chronic Kidney Disease Epidemiology Collaboration   (CKD-EPI) equation.      GFR Estimate If Black >90 >60 mL/min/[1.73_m2]      Comment:       GFR Calc  Starting 12/18/2018, serum creatinine based estimated GFR (eGFR) will be   calculated using the Chronic Kidney Disease Epidemiology Collaboration   (CKD-EPI) equation.      Calcium 9.2 8.5 - 10.1 mg/dL    Bilirubin Total 0.4 0.2 - 1.3 mg/dL    Albumin 4.1 3.4 - 5.0 g/dL    Protein Total 7.2 6.8 - 8.8 g/dL    Alkaline Phosphatase 63 40 - 150 U/L    ALT 15 0 - 50 U/L    AST 7 0 - 45 U/L   Lipid panel reflex to direct LDL Fasting   Result Value Ref Range    Cholesterol 193 <200 mg/dL    Triglycerides 96 <150 mg/dL    HDL Cholesterol 59 >49 mg/dL    LDL Cholesterol Calculated 115 (H) <100 mg/dL      Comment:      Above desirable:   100-129 mg/dl  Borderline High:  130-159 mg/dL  High:             160-189 mg/dL  Very high:       >189 mg/dl      Non HDL Cholesterol 134 (H) <130 mg/dL      Comment:      Above Desirable:  130-159 mg/dl  Borderline high:  160-189 mg/dl  High:             190-219 mg/dl  Very high:       >219 mg/dl     Lithium level   Result Value Ref Range    Lithium Level <0.20 (L) 0.60 - 1.20 mmol/L   Hemoglobin A1c   Result Value Ref Range    Hemoglobin A1C 5.6 0 - 5.6 %      Comment:      Normal <5.7% Prediabetes 5.7-6.4%  Diabetes 6.5% or higher - adopted from ADA   consensus guidelines.     TSH with free T4 reflex   Result Value Ref Range    TSH 0.43 0.40 - 4.00 mU/L       If you have any questions or concerns, please call the clinic at the number listed above.       Sincerely,        Shasta Pardo MD

## 2019-01-10 LAB
ALBUMIN SERPL-MCNC: 4.1 G/DL (ref 3.4–5)
ALP SERPL-CCNC: 63 U/L (ref 40–150)
ALT SERPL W P-5'-P-CCNC: 15 U/L (ref 0–50)
ANION GAP SERPL CALCULATED.3IONS-SCNC: 7 MMOL/L (ref 3–14)
AST SERPL W P-5'-P-CCNC: 7 U/L (ref 0–45)
BILIRUB SERPL-MCNC: 0.4 MG/DL (ref 0.2–1.3)
BUN SERPL-MCNC: 12 MG/DL (ref 7–30)
CALCIUM SERPL-MCNC: 9.2 MG/DL (ref 8.5–10.1)
CHLORIDE SERPL-SCNC: 107 MMOL/L (ref 94–109)
CHOLEST SERPL-MCNC: 193 MG/DL
CO2 SERPL-SCNC: 26 MMOL/L (ref 20–32)
CREAT SERPL-MCNC: 0.73 MG/DL (ref 0.52–1.04)
GFR SERPL CREATININE-BSD FRML MDRD: >90 ML/MIN/{1.73_M2}
GLUCOSE SERPL-MCNC: 78 MG/DL (ref 70–99)
HDLC SERPL-MCNC: 59 MG/DL
LDLC SERPL CALC-MCNC: 115 MG/DL
NONHDLC SERPL-MCNC: 134 MG/DL
POTASSIUM SERPL-SCNC: 3.8 MMOL/L (ref 3.4–5.3)
PROT SERPL-MCNC: 7.2 G/DL (ref 6.8–8.8)
SODIUM SERPL-SCNC: 140 MMOL/L (ref 133–144)
TRIGL SERPL-MCNC: 96 MG/DL
TSH SERPL DL<=0.005 MIU/L-ACNC: 0.43 MU/L (ref 0.4–4)

## 2019-01-19 PROCEDURE — 82274 ASSAY TEST FOR BLOOD FECAL: CPT | Performed by: FAMILY MEDICINE

## 2019-01-20 NOTE — RESULT ENCOUNTER NOTE
Letter sent with normal result note below:  Dear Zuly,  These results are in an acceptable range.  I recommend no change to your plan of care.  Please contact the clinic with any questions.  Sincerely,  Dr. Shasta Pardo MD  [facility]  [phone]

## 2019-01-21 DIAGNOSIS — Z12.11 SPECIAL SCREENING FOR MALIGNANT NEOPLASMS, COLON: ICD-10-CM

## 2019-01-21 LAB — HEMOCCULT STL QL IA: NEGATIVE

## 2019-01-21 NOTE — LETTER
January 22, 2019      Zulydiana Wheeler  [address]  [place] [address]        Dear ,    We are writing to inform you of your test results.    Your test results fall within the expected range(s) or remain unchanged from previous results.  I continue to recommend that you check this yearly.    Resulted Orders   Fecal colorectal cancer screen (FIT)   Result Value Ref Range    Occult Blood Scn FIT Negative NEG^Negative       If you have any questions or concerns, please call the clinic at the number listed above.       Sincerely,         LAB

## 2019-01-22 NOTE — RESULT ENCOUNTER NOTE
Letter sent with normal result note below:  Dear Zuly,  These results are in an acceptable range.  I recommend no change to your plan of care.  Please contact the clinic with any questions.  Sincerely,  Dr. Shasta Pardo MD  [facility]  [phone]

## 2019-02-11 ENCOUNTER — NURSE TRIAGE (OUTPATIENT)
Dept: NURSING | Facility: CLINIC | Age: 59
End: 2019-02-11

## 2019-02-11 ENCOUNTER — HOSPITAL ENCOUNTER (EMERGENCY)
Facility: CLINIC | Age: 59
Discharge: HOME | End: 2019-02-11
Attending: EMERGENCY MEDICINE | Admitting: EMERGENCY MEDICINE
Payer: COMMERCIAL

## 2019-02-11 VITALS
TEMPERATURE: 97 F | BODY MASS INDEX: 22.84 KG/M2 | SYSTOLIC BLOOD PRESSURE: 159 MMHG | WEIGHT: 148 LBS | RESPIRATION RATE: 16 BRPM | DIASTOLIC BLOOD PRESSURE: 83 MMHG | OXYGEN SATURATION: 97 %

## 2019-02-11 DIAGNOSIS — J32.9 SINUSITIS, UNSPECIFIED CHRONICITY, UNSPECIFIED LOCATION: ICD-10-CM

## 2019-02-11 LAB
ANION GAP SERPL CALCULATED.3IONS-SCNC: 7 MMOL/L (ref 3–14)
BUN SERPL-MCNC: 11 MG/DL (ref 7–30)
CALCIUM SERPL-MCNC: 8.5 MG/DL (ref 8.5–10.1)
CHLORIDE SERPL-SCNC: 105 MMOL/L (ref 94–109)
CO2 SERPL-SCNC: 28 MMOL/L (ref 20–32)
CREAT SERPL-MCNC: 0.88 MG/DL (ref 0.52–1.04)
GFR SERPL CREATININE-BSD FRML MDRD: 72 ML/MIN/{1.73_M2}
GLUCOSE SERPL-MCNC: 99 MG/DL (ref 70–99)
POTASSIUM SERPL-SCNC: 3.6 MMOL/L (ref 3.4–5.3)
SODIUM SERPL-SCNC: 140 MMOL/L (ref 133–144)

## 2019-02-11 PROCEDURE — 80048 BASIC METABOLIC PNL TOTAL CA: CPT | Performed by: EMERGENCY MEDICINE

## 2019-02-11 PROCEDURE — 99284 EMERGENCY DEPT VISIT MOD MDM: CPT | Mod: Z6 | Performed by: EMERGENCY MEDICINE

## 2019-02-11 PROCEDURE — 99283 EMERGENCY DEPT VISIT LOW MDM: CPT | Performed by: EMERGENCY MEDICINE

## 2019-02-11 ASSESSMENT — ENCOUNTER SYMPTOMS
NUMBNESS: 1
LIGHT-HEADEDNESS: 1
FEVER: 0
COUGH: 1

## 2019-02-11 NOTE — ED AVS SNAPSHOT
[facility], [place], Emergency Department  [address]  [facility] [address]  Phone:  [phone]  Fax:  [phone]                                    Zuly Wheeler   MRN: [id number]    Department:  [facility], Emergency Department   Date of Visit:  2/11/2019           After Visit Summary Signature Page    I have received my discharge instructions, and my questions have been answered. I have discussed any challenges I see with this plan with the nurse or doctor.    ..........................................................................................................................................  Patient/Patient Representative Signature      ..........................................................................................................................................  Patient Representative Print Name and Relationship to Patient    ..................................................               ................................................  Date                                   Time    ..........................................................................................................................................  Reviewed by Signature/Title    ...................................................              ..............................................  Date                                               Time          22EPIC Rev 08/18

## 2019-02-12 NOTE — TELEPHONE ENCOUNTER
"\"I've had a sinus infection and I just used a nasal spray and sudafed\".  Caller is reporting that her forehead, cheeks, lips are numb and she is lightheaded.    Reason for Disposition    Sounds like a life-threatening emergency to the triager    Additional Information    Negative: Unresponsive, passed out or very weak    Negative: [1] Life-threatening reaction in the past to similar substance (e.g., food, insect bite/sting, medication, etc.) AND [2] < 2 hours since exposure    Negative: Wheezing, stridor, hoarseness, or difficulty breathing    Negative: [1] Tightness in the chest or throat AND [2] begins within 2 hours of exposure to allergic substance    Negative: Difficulty swallowing, drooling or slurred speech    Negative: Difficult to awaken or acting confused (disoriented, slurred speech)    Negative: Other symptom of severe allergic reaction (Exception: Hives or facial swelling alone)    Protocols used: ANAPHYLAXIS-ADULT-    Pati Cronin RN  [facility]      "

## 2019-02-12 NOTE — DISCHARGE INSTRUCTIONS
Please make an appointment to follow up with Your Primary Care Provider as needed.    Avoid oral Sudafed.  For nasal congestion use Afrin nasal spray, 2 sprays in each nostril twice per day for no longer than 3 days in a row.    Augmentin as directed.    Return to the emergency department for any problems.

## 2019-02-12 NOTE — ED PROVIDER NOTES
History     Chief Complaint   Patient presents with     Numbness     face numbness     HPI  Zuly Wheeler is a 58 year old female with a history of anxiety, depression, and bipolar 1 depression who presents for evaluation of facial numbness. Patient states she feels she has a sinus infection -- noting congestion and cough after her good friend had a similar illness; however, last night, developed bilateral facial numbness. She reports return of numbness this morning and states it has been constant. Patient denies history of similar symptoms and states she was urged to come here after calling a nurse line. In addition to her numbness, patient also complains of lightheadedness today. She denies fevers and states she's been taking Sudafed.  She denies double vision, difficulty speaking, difficulty swallowing, numbness or weakness of arms or legs, difficulty ambulating, nausea or vomiting, or dizziness.  She denies significant headache or neck pain.    Past Medical History:   Diagnosis Date     Anxiety and depression      Bipolar 1 disorder (H)        Past Surgical History:   Procedure Laterality Date     DILATION AND CURETTAGE  12/6/2013    Procedure: DILATION AND CURETTAGE;  Dilation And Curettage;  Surgeon: Edel Chew MD;  [facility]     EYE SURGERY  8/2013    cataract surgery both eyes done     LAPAROSCOPIC HYSTERECTOMY SUPRACERVICAL, BILATERAL SALPINGO-OOPHORECTOMY, COMBINED  1/30/2014    Procedure: COMBINED LAPAROSCOPIC HYSTERECTOMY SUPRACERVICAL, SALPINGO-OOPHORECTOMY;  Laparoscopic Supracervical Hysterectomy With Bilateral Salingectomy;  Surgeon: Edel Chew MD;  [facility]     TUBAL LIGATION  1990       Family History   Problem Relation Age of Onset     Diabetes Mother      Hypertension Mother      Psychotic Disorder Mother      Hearing Loss Father      Coronary Artery Disease No family hx of      Hyperlipidemia No family hx of      Cerebrovascular Disease No family hx of       Breast Cancer No family hx of      Colon Cancer No family hx of      Prostate Cancer No family hx of      Other Cancer No family hx of      Depression No family hx of      Anxiety Disorder No family hx of      Mental Illness No family hx of      Substance Abuse No family hx of      Anesthesia Reaction No family hx of      Asthma No family hx of      Osteoporosis No family hx of      Genetic Disorder No family hx of      Thyroid Disease No family hx of      Obesity No family hx of      Unknown/Adopted No family hx of        Social History     Tobacco Use     Smoking status: Former Smoker     Years: 30.00     Types: Cigarettes     Last attempt to quit: 1990     Years since quittin.2     Smokeless tobacco: Never Used   Substance Use Topics     Alcohol use: No     Alcohol/week: 0.0 oz       No current facility-administered medications for this encounter.      Current Outpatient Medications   Medication     amoxicillin-clavulanate (AUGMENTIN) 875-125 MG tablet     lithium (ESKALITH) 300 MG tablet     nefazodone (SERZONE) 150 MG tablet     OLANZapine (ZYPREXA) 2.5 MG tablet        Allergies   Allergen Reactions     Depakote      groggy     Lamotrigine      Intolerance, numb (Lamictal)     Olanzapine Other (See Comments)     Only generic/ineffective  Ineffective (only generic)     Seroquel [Quetiapine Fumarate] Visual Disturbance     Blurred vision; jumpy     Sulfa Drugs Hives     Trileptal Visual Disturbance     Blurred vision     Zoloft Other (See Comments)     jumpy     Citalopram Anxiety     Intolerance (Celexa)     I have reviewed the Medications, Allergies, Past Medical and Surgical History, and Social History in the Epic system.    Review of Systems   Constitutional: Negative for fever.   HENT: Positive for congestion.    Respiratory: Positive for cough.    Neurological: Positive for light-headedness and numbness (bilateral in face).   All other systems reviewed and are negative.      Physical Exam   BP:  159/83  Heart Rate: 79  Temp: 97  F (36.1  C)  Resp: 16  Weight: 67.1 kg (148 lb)  SpO2: 97 %      Physical Exam   Constitutional: She is oriented to person, place, and time. Vital signs are normal. She appears well-developed and well-nourished.  Non-toxic appearance. She does not appear ill. No distress.   HENT:   Head: Normocephalic and atraumatic.   Mouth/Throat: Oropharynx is clear and moist. No oropharyngeal exudate.   Eyes: Conjunctivae and EOM are normal. Pupils are equal, round, and reactive to light. No scleral icterus.   Neck: Normal range of motion. Neck supple. No JVD present. No tracheal deviation present. No thyromegaly present.   Cardiovascular: Normal rate, regular rhythm, normal heart sounds and intact distal pulses. Exam reveals no gallop and no friction rub.   No murmur heard.  Pulmonary/Chest: Effort normal and breath sounds normal. No respiratory distress.   Musculoskeletal: Normal range of motion. She exhibits no edema or tenderness.   Lymphadenopathy:     She has no cervical adenopathy.   Neurological: She is alert and oriented to person, place, and time. She has normal strength. No cranial nerve deficit or sensory deficit.   No pronator drift bilaterally.  Normal rapid alternating movements bilaterally.  Normal finger to nose bilaterally.  No aphasia or dysarthria noted.  Gait is normal and steady.   Skin: Skin is warm and dry. No rash noted. No erythema. No pallor.   Psychiatric: She has a normal mood and affect. Her behavior is normal.   Nursing note and vitals reviewed.      ED Course        Procedures       8:13 PM  The patient was seen and examined by Dr. Lance in Room 5.     Results for orders placed or performed during the hospital encounter of 02/11/19   Basic metabolic panel   Result Value Ref Range    Sodium 140 133 - 144 mmol/L    Potassium 3.6 3.4 - 5.3 mmol/L    Chloride 105 94 - 109 mmol/L    Carbon Dioxide 28 20 - 32 mmol/L    Anion Gap 7 3 - 14 mmol/L    Glucose 99 70 - 99  mg/dL    Urea Nitrogen 11 7 - 30 mg/dL    Creatinine 0.88 0.52 - 1.04 mg/dL    GFR Estimate 72 >60 mL/min/[1.73_m2]    GFR Estimate If Black 84 >60 mL/min/[1.73_m2]    Calcium 8.5 8.5 - 10.1 mg/dL            Assessments & Plan (with Medical Decision Making)     This patient presented to the emergency department complaining of bilateral face numbness.  Sensation is intact on her face, is subjectively decreased bilaterally.  She has otherwise normal neurologic exam all decreasing suspicion for stroke or central neurologic process.  No significant electrolyte abnormalities noted on basic metabolic panel.  I suspect this may be related to her sinus infection over the medications she has been taking for this condition.  She was cautioned to avoid oral decongestants or combination cold medications.  She felt improved at time of discharge and was discharged with instructions to return for any worsening of symptoms or development of other symptoms.    I have reviewed the nursing notes.    I have reviewed the findings, diagnosis, plan and need for follow up with the patient.       Medication List      Started    amoxicillin-clavulanate 875-125 MG tablet  Commonly known as:  AUGMENTIN  1 tablet, Oral, 2 TIMES DAILY            Final diagnoses:   Sinusitis, unspecified chronicity, unspecified location   I, Leo Cuenca, am serving as a trained medical scribe to document services personally performed by Oli Lance MD, based on the provider's statements to me.   IOli MD, was physically present and have reviewed and verified the accuracy of this note documented by Leo Cuenca.    2/11/2019   [facility], [place], EMERGENCY DEPARTMENT     Chucky Lance MD  02/13/19 [id number]

## 2019-02-12 NOTE — ED TRIAGE NOTES
Congested for few days.  Lightheaded yesterday and today,  with numbness entire face.  Has productive cough of yellow sputum.  Head/Sinus pressure.

## 2019-04-25 ENCOUNTER — TELEPHONE (OUTPATIENT)
Dept: FAMILY MEDICINE | Facility: CLINIC | Age: 59
End: 2019-04-25

## 2019-04-25 NOTE — TELEPHONE ENCOUNTER
Zuly Wheeler is a 59 year old female who calls with itching.    NURSING ASSESSMENT:  Description:  Itchy skin   Onset/duration:  Saturday- has since gotten worse. Started on the legs and is now also itching behind armpits and her back.   Precip. factors:  Sat in grass   Associated symptoms:  None  -Patient denies hives, rash, difficulty breathing, itchy throat.   Improves/worsens symptoms:  Benadryl, hydrocortisone cream- both not very beneficial   Pain scale (0-10)   0/10  LMP/preg/breast feeding:  na  Last exam/Treatment:  01/09/19  Allergies:   Allergies   Allergen Reactions     Depakote      groggy     Lamotrigine      Intolerance, numb (Lamictal)     Olanzapine Other (See Comments)     Only generic/ineffective  Ineffective (only generic)     Seroquel [Quetiapine Fumarate] Visual Disturbance     Blurred vision; jumpy     Sulfa Drugs Hives     Trileptal Visual Disturbance     Blurred vision     Zoloft Other (See Comments)     jumpy     Citalopram Anxiety     Intolerance (Celexa)     Patient scheduled an office visit to discuss the possibility of getting a prescription antihistamine.

## 2019-04-26 ENCOUNTER — OFFICE VISIT (OUTPATIENT)
Dept: FAMILY MEDICINE | Facility: CLINIC | Age: 59
End: 2019-04-26
Payer: COMMERCIAL

## 2019-04-26 VITALS
SYSTOLIC BLOOD PRESSURE: 120 MMHG | BODY MASS INDEX: 21.98 KG/M2 | HEART RATE: 76 BPM | OXYGEN SATURATION: 100 % | HEIGHT: 68 IN | WEIGHT: 145 LBS | DIASTOLIC BLOOD PRESSURE: 66 MMHG | RESPIRATION RATE: 16 BRPM

## 2019-04-26 DIAGNOSIS — L30.9 DERMATITIS: Primary | ICD-10-CM

## 2019-04-26 PROCEDURE — 99214 OFFICE O/P EST MOD 30 MIN: CPT | Performed by: PHYSICIAN ASSISTANT

## 2019-04-26 RX ORDER — PREDNISONE 20 MG/1
20 TABLET ORAL DAILY
Qty: 5 TABLET | Refills: 0 | Status: SHIPPED | OUTPATIENT
Start: 2019-04-26 | End: 2019-08-05

## 2019-04-26 RX ORDER — TRIAMCINOLONE ACETONIDE 1 MG/G
CREAM TOPICAL 3 TIMES DAILY
Qty: 80 G | Refills: 0 | Status: SHIPPED | OUTPATIENT
Start: 2019-04-26 | End: 2020-03-18

## 2019-04-26 RX ORDER — HYDROXYZINE HYDROCHLORIDE 25 MG/1
25 TABLET, FILM COATED ORAL 3 TIMES DAILY PRN
Qty: 30 TABLET | Refills: 0 | Status: SHIPPED | OUTPATIENT
Start: 2019-04-26 | End: 2019-08-05

## 2019-04-26 ASSESSMENT — ANXIETY QUESTIONNAIRES
7. FEELING AFRAID AS IF SOMETHING AWFUL MIGHT HAPPEN: NOT AT ALL
4. TROUBLE RELAXING: NOT AT ALL
1. FEELING NERVOUS, ANXIOUS, OR ON EDGE: NOT AT ALL
7. FEELING AFRAID AS IF SOMETHING AWFUL MIGHT HAPPEN: NOT AT ALL
3. WORRYING TOO MUCH ABOUT DIFFERENT THINGS: NOT AT ALL
2. NOT BEING ABLE TO STOP OR CONTROL WORRYING: NOT AT ALL
GAD7 TOTAL SCORE: 0
GAD7 TOTAL SCORE: 0
6. BECOMING EASILY ANNOYED OR IRRITABLE: NOT AT ALL
5. BEING SO RESTLESS THAT IT IS HARD TO SIT STILL: NOT AT ALL
GAD7 TOTAL SCORE: 0

## 2019-04-26 ASSESSMENT — PATIENT HEALTH QUESTIONNAIRE - PHQ9
SUM OF ALL RESPONSES TO PHQ QUESTIONS 1-9: 0
10. IF YOU CHECKED OFF ANY PROBLEMS, HOW DIFFICULT HAVE THESE PROBLEMS MADE IT FOR YOU TO DO YOUR WORK, TAKE CARE OF THINGS AT HOME, OR GET ALONG WITH OTHER PEOPLE: NOT DIFFICULT AT ALL
SUM OF ALL RESPONSES TO PHQ QUESTIONS 1-9: 0

## 2019-04-26 ASSESSMENT — MIFFLIN-ST. JEOR: SCORE: 1273.28

## 2019-04-26 NOTE — PROGRESS NOTES
SUBJECTIVE:   Zuly Wheeler is a 59 year old female who presents to clinic today for the following   health issues:      Rash      Duration: almost a week    Description  Location: all over body, starting on legs and now moving up body  Itching: severe    Intensity:  moderate    Accompanying signs and symptoms: None    History (similar episodes/previous evaluation): pt was in [place] when this started. Pt was on the beach and sat in some grass.  Pt ate some sea food and had some coconut drinks.  Pt did get some sunburn but that is not where she is itching    Precipitating or alleviating factors:  New exposures:  None, foods - sea food and coconut and grasses  Recent travel: YES- pt was in [place]     Therapies tried and outcome: Benadryl/diphenhydramine -  not effective and other allergy medication, oatmeal bath, and baking powder      HPI additional notes:    Chief Complaint   Patient presents with     Derm Problem     Zuly presents today with itching and rash x1 week. Started while vacationing in [place], spent time in sand and grass. Also ate a lot of shellfish, shrimp, and coconut, but no hx sensitivities to these items. Developed itchy, scaly rash on bilat legs, now spread to abdomen and back. Tried OTC Claritin, Benadryl, hydrocortisone cream, and oatmeal bath w/o relief.     ROS:    A Comprehensive greater than 10 system review of systems was carried out.    Pertinent positives and negatives are noted above.  Otherwise negative for contributory information.      Chart Review:  History   Smoking Status     Former Smoker     Years: 30.00     Types: Cigarettes     Quit date: 11/21/1990   Smokeless Tobacco     Never Used       Patient Active Problem List   Diagnosis     Bipolar disorder, in full remission, most recent episode depressed (H)     Anxiety state     Dysthymic disorder     Chronic maxillary sinusitis     Hyperlipidemia with target LDL less than 130     Anemia Hgb= 11.8 in 11-13     Vitamin D  "deficiency disease     Family history of diabetes mellitus     ACP (advance care planning)     Recurrent major depressive disorder, in full remission (H)     Snoring     Herpes simplex virus infection     Acute right-sided low back pain without sciatica     Past Surgical History:   Procedure Laterality Date     DILATION AND CURETTAGE  12/6/2013    Procedure: DILATION AND CURETTAGE;  Dilation And Curettage;  Surgeon: Edel Chew MD;  [facility]     EYE SURGERY  8/2013    cataract surgery both eyes done     LAPAROSCOPIC HYSTERECTOMY SUPRACERVICAL, BILATERAL SALPINGO-OOPHORECTOMY, COMBINED  1/30/2014    Procedure: COMBINED LAPAROSCOPIC HYSTERECTOMY SUPRACERVICAL, SALPINGO-OOPHORECTOMY;  Laparoscopic Supracervical Hysterectomy With Bilateral Salingectomy;  Surgeon: Edel Chew MD;  [facility]     TUBAL LIGATION  1990     Problem list, Medication list, Allergies, Medical/Social/Surg/Family hx reviewed in EPIC, updated as appropriate.   OBJECTIVE:                                                    /66   Pulse 76   Resp 16   Ht 1.715 m (5' 7.5\")   Wt 65.8 kg (145 lb)   LMP 01/30/2014 (Exact Date)   SpO2 100%   BMI 22.38 kg/m    Body mass index is 22.38 kg/m .  GENERAL:  WDWN, no acute distress  PSYCH: pleasant, cooperative  EYES: no discharge, no injection  HENT:  Normocephalic. Moist mucus membranes.  NECK:  Supple, symmetric  EXTREMITIES:  No gross deformities, moves all 4 limbs spontaneously  SKIN: scattered excoriated, pink patches with mild scale, slightly raised  NEUROLOGIC: alert, sensation grossly intact.    Diagnostic test results: none     ASSESSMENT/PLAN:                                                          ICD-10-CM    1. Dermatitis L30.9 predniSONE (DELTASONE) 20 MG tablet     hydrOXYzine (ATARAX) 25 MG tablet     triamcinolone (KENALOG) 0.1 % external cream     Rash consistent with dermatitis, unknown etiology (food, contact, insect, etc) but doesn't change treatment. " Take entire course of prednisone as prescribed. Use Atarax TID prn itching. Apply Kenalog BID-TID prn itching, rash. Avoid scratching as this makes symptoms worse. Continue OTC moisturizer and oatmeal bath to minimize itching and hydrate skin.    Please see patient instructions for treatment details.  25 minutes total spent during this visit, >50% spent in counseling and coordination of patient care.    Follow up in 7-10 days if not improving as anticipated, sooner PRN.    Arabella Otto PA-C  [facility]

## 2019-04-27 ASSESSMENT — ANXIETY QUESTIONNAIRES: GAD7 TOTAL SCORE: 0

## 2019-06-19 ENCOUNTER — PRE VISIT (OUTPATIENT)
Dept: SLEEP MEDICINE | Facility: CLINIC | Age: 59
End: 2019-06-19

## 2019-06-19 NOTE — TELEPHONE ENCOUNTER
"  1.  Reason for the visit:  Consult snoring  2.  Referring provider and clinic name:  Self  3.  Previous Sleep Doctor or Pulmonlogist (clinic name)?  Previous sleep study done  4.  Records, Procedures, Imaging, and Labs (see below)  NAVI needed        All NOTES from previous office visits that pertain to why they are being seen in the Sleep Center    Previous Sleep Studies, Chest CT, Echos and reports that pertain to why they are seeing Sleep Center    All Sleep records that have been done in the last 2 years that pertain to why they are seeing Sleep Center            Are they being seen for continuation of care for Cpap/Bipap/Avap/Trilogy/Dental Device? None    If yes to above Who and Where was Device issued/currently getting supplies from? na    Are you currently on \"Supplemental Oxygen\" during the day or night?   na                                                                                                                                                      Please remind pt to bring Cpap machine and ask to arrive 15 minutes early to appointment due traffic and congestion                                                 5. Pt Sleep Center Packet received Message left asking pt to arrive 30 minutes early to appointment if no packet received.        Yes: \"please make sure that you bring this to your appointment completed, either the doctor will not see you until this completed or you may be asked to reschedule your appointment.\"     No: mail or email to the pt and explain, \"please make sure that you bring this to your appointment completed, either the doctor will not see you until this completed or you may be asked to reschedule your appointment.\"     ~If pt coming early to fill packet out, ask that they come 30 minutes prior to their appointment~     6. Has the pt's medication list been updated and preferred pharmacy added?     7. Has the allergy list been reviewed?    \"Thank you for choosing [facility] " "Center and we look forward to seeing you at your upcoming appointment\"     "

## 2019-08-05 ENCOUNTER — OFFICE VISIT (OUTPATIENT)
Dept: FAMILY MEDICINE | Facility: CLINIC | Age: 59
End: 2019-08-05
Payer: COMMERCIAL

## 2019-08-05 VITALS
RESPIRATION RATE: 16 BRPM | DIASTOLIC BLOOD PRESSURE: 76 MMHG | BODY MASS INDEX: 22.22 KG/M2 | OXYGEN SATURATION: 100 % | HEART RATE: 68 BPM | SYSTOLIC BLOOD PRESSURE: 130 MMHG | WEIGHT: 144 LBS

## 2019-08-05 DIAGNOSIS — F31.76 BIPOLAR DISORDER, IN FULL REMISSION, MOST RECENT EPISODE DEPRESSED (H): Primary | ICD-10-CM

## 2019-08-05 DIAGNOSIS — R68.2 DRY MOUTH: ICD-10-CM

## 2019-08-05 DIAGNOSIS — F34.1 DYSTHYMIC DISORDER: ICD-10-CM

## 2019-08-05 LAB — LITHIUM SERPL-SCNC: <0.2 MMOL/L (ref 0.6–1.2)

## 2019-08-05 PROCEDURE — 80178 ASSAY OF LITHIUM: CPT | Performed by: PHYSICIAN ASSISTANT

## 2019-08-05 PROCEDURE — 99214 OFFICE O/P EST MOD 30 MIN: CPT | Performed by: PHYSICIAN ASSISTANT

## 2019-08-05 PROCEDURE — 36415 COLL VENOUS BLD VENIPUNCTURE: CPT | Performed by: PHYSICIAN ASSISTANT

## 2019-08-05 RX ORDER — OLANZAPINE 2.5 MG/1
2.5 TABLET, FILM COATED ORAL AT BEDTIME
Qty: 90 TABLET | Refills: 3 | Status: SHIPPED | OUTPATIENT
Start: 2019-08-05 | End: 2020-07-22

## 2019-08-05 RX ORDER — LITHIUM CARBONATE 300 MG
300 TABLET ORAL DAILY
Qty: 90 TABLET | Refills: 3 | Status: SHIPPED | OUTPATIENT
Start: 2019-08-05 | End: 2020-07-22

## 2019-08-05 NOTE — LETTER
August 5, 2019      Uzly F Wheeler  [address]  [place] [address]        Dear ,    We are writing to inform you of your test results.    - Your lithium level is stable.    Results for orders placed or performed in visit on 08/05/19   Lithium level   Result Value Ref Range    Lithium Level <0.20 (L) 0.60 - 1.20 mmol/L       Thank you for choosing [facility].  We appreciate the opportunity to serve you and look forward to supporting your healthcare needs in the future.    If you have any questions or concerns, please call me or my staff at [phone].      Sincerely,        Arabella Otto PA-C

## 2019-08-05 NOTE — PROGRESS NOTES
Subjective     Zuly Wheeler is a 59 year old female who presents to clinic today for the following health issues:    HPI   Medication check      Duration:     Description (location/character/radiation): pt is here for refills of all medications, blood work.  Pt also complains of dry mouth lately    Intensity:  moderate    Accompanying signs and symptoms: none    History (similar episodes/previous evaluation): None    Precipitating or alleviating factors: None    Therapies tried and outcome: None     - Hx snoring, just started using CPAP 3 days ago. Historically has dry mouth in AM from snoring, but goes away. Dry mouth not going away  - Has been eating more shrimp lately, wonders if this is contributing. Usually eats breaded and fried, but did note issue with scratchy throat after eating raw shrimp a few weeks ago  - Lithium level has always been below therapeutic range, due for recheck, may cause dry mouth if level not at baseline  - no oral sores or irritation, just dry mouth.    Patient Active Problem List   Diagnosis     Bipolar disorder, in full remission, most recent episode depressed (H)     Anxiety state     Dysthymic disorder     Chronic maxillary sinusitis     Hyperlipidemia with target LDL less than 130     Anemia Hgb= 11.8 in 11-13     Vitamin D deficiency disease     Family history of diabetes mellitus     ACP (advance care planning)     Snoring     Herpes simplex virus infection     Acute right-sided low back pain without sciatica     Past Surgical History:   Procedure Laterality Date     DILATION AND CURETTAGE  12/6/2013    Procedure: DILATION AND CURETTAGE;  Dilation And Curettage;  Surgeon: Edel Chew MD;  [facility]     EYE SURGERY  8/2013    cataract surgery both eyes done     LAPAROSCOPIC HYSTERECTOMY SUPRACERVICAL, BILATERAL SALPINGO-OOPHORECTOMY, COMBINED  1/30/2014    Procedure: COMBINED LAPAROSCOPIC HYSTERECTOMY SUPRACERVICAL, SALPINGO-OOPHORECTOMY;  Laparoscopic  Supracervical Hysterectomy With Bilateral Salingectomy;  Surgeon: Edel Chew MD;  [facility]     TUBAL LIGATION         Social History     Tobacco Use     Smoking status: Former Smoker     Years: 30.00     Types: Cigarettes     Last attempt to quit: 1990     Years since quittin.7     Smokeless tobacco: Never Used   Substance Use Topics     Alcohol use: No     Alcohol/week: 0.0 oz     Family History   Problem Relation Age of Onset     Diabetes Mother      Hypertension Mother      Psychotic Disorder Mother      Hearing Loss Father      Coronary Artery Disease No family hx of      Hyperlipidemia No family hx of      Cerebrovascular Disease No family hx of      Breast Cancer No family hx of      Colon Cancer No family hx of      Prostate Cancer No family hx of      Other Cancer No family hx of      Depression No family hx of      Anxiety Disorder No family hx of      Mental Illness No family hx of      Substance Abuse No family hx of      Anesthesia Reaction No family hx of      Asthma No family hx of      Osteoporosis No family hx of      Genetic Disorder No family hx of      Thyroid Disease No family hx of      Obesity No family hx of      Unknown/Adopted No family hx of          Current Outpatient Medications   Medication Sig Dispense Refill     lithium (ESKALITH) 300 MG tablet Take 1 tablet (300 mg) by mouth daily 90 tablet 3     nefazodone (SERZONE) 150 MG tablet Take 1 tablet (150 mg) by mouth daily 90 tablet 3     OLANZapine (ZYPREXA) 2.5 MG tablet Take 1 tablet (2.5 mg) by mouth At Bedtime 90 tablet 3     triamcinolone (KENALOG) 0.1 % external cream Apply topically 3 times daily (Patient not taking: Reported on 2019) 80 g 0       Reviewed and updated as needed this visit by Provider  Tobacco  Allergies  Meds  Problems  Med Hx  Surg Hx  Fam Hx         Review of Systems   ROS COMP: Constitutional, HEENT, cardiovascular, pulmonary, GI, , musculoskeletal, neuro, skin,  endocrine and psych systems are negative, except as otherwise noted.      Objective    /76   Pulse 68   Resp 16   Wt 65.3 kg (144 lb)   LMP 01/30/2014 (Exact Date)   SpO2 100%   BMI 22.22 kg/m    Body mass index is 22.22 kg/m .  Physical Exam   GENERAL:  WDWN, no acute distress  PSYCH: pleasant, cooperative  EYES: no discharge, no injection  HENT:  Normocephalic. Moist mucus membranes. Ear canals clear, TMs pearly gray w/o effusion. Oropharynx pink, uvula midline. Nasal mucosa pink, non-edematous, no rhinorrhea.  NECK:  Supple, symmetric  LUNGS:  Clear to auscultation bilaterally without rhonchi, rales, or wheeze. Chest rise symmetric and no tenderness to palpation.  HEART:  Regular rate & rhythm. No murmur, gallop, or rub.  EXTREMITIES:  No gross deformities, moves all 4 limbs spontaneously  SKIN:  Warm and dry, no rash or suspicious lesions    NEUROLOGIC: alert, sensation grossly intact.    Diagnostic Test Results:  none         Assessment & Plan       ICD-10-CM    1. Bipolar disorder, in full remission, most recent episode depressed (H) F31.76 lithium (ESKALITH) 300 MG tablet     OLANZapine (ZYPREXA) 2.5 MG tablet     Lithium level   2. Dysthymic disorder F34.1 nefazodone (SERZONE) 150 MG tablet   3. Dry mouth R68.2      - Patient is tolerating current medication without any major side effects of concerns and current dose seems reasonable too.  Current medication regime is effective. Continue current treatment without any changes. Refills as above  - Lithium level pending, will adjust med accordingly once result received  - Unclear etiology of dry mouth, but sounds like may have mild shrimp allergy based on history. Recommend avoiding shrimp for next 2 weeks and monitoring sx.      Return in about 5 months (around 1/5/2020) for Annual Exam.    Arabella Otto PA-C  [facility]

## 2019-11-27 ENCOUNTER — OFFICE VISIT (OUTPATIENT)
Dept: FAMILY MEDICINE | Facility: CLINIC | Age: 59
End: 2019-11-27
Payer: COMMERCIAL

## 2019-11-27 ENCOUNTER — NURSE TRIAGE (OUTPATIENT)
Dept: NURSING | Facility: CLINIC | Age: 59
End: 2019-11-27

## 2019-11-27 VITALS
HEART RATE: 74 BPM | DIASTOLIC BLOOD PRESSURE: 86 MMHG | OXYGEN SATURATION: 99 % | WEIGHT: 148 LBS | TEMPERATURE: 97.7 F | SYSTOLIC BLOOD PRESSURE: 124 MMHG | BODY MASS INDEX: 22.84 KG/M2 | RESPIRATION RATE: 16 BRPM

## 2019-11-27 DIAGNOSIS — R22.0 SWELLING OF BOTH LIPS: Primary | ICD-10-CM

## 2019-11-27 DIAGNOSIS — J06.9 VIRAL URI: ICD-10-CM

## 2019-11-27 PROCEDURE — 99214 OFFICE O/P EST MOD 30 MIN: CPT | Performed by: PHYSICIAN ASSISTANT

## 2019-11-27 RX ORDER — CETIRIZINE HYDROCHLORIDE 10 MG/1
10 TABLET ORAL 2 TIMES DAILY
Qty: 30 TABLET | Refills: 0 | Status: SHIPPED | OUTPATIENT
Start: 2019-11-27 | End: 2020-03-18

## 2019-11-27 NOTE — PROGRESS NOTES
"Subjective     Zuly Wheeler is a 59 year old female who presents to clinic today for the following health issues:    HPI   RESPIRATORY SYMPTOMS      Duration: over a week    Description  nasal congestion and facial pain/pressure    Severity: moderate    Accompanying signs and symptoms: None    History (predisposing factors):  none    Precipitating or alleviating factors: None    Therapies tried and outcome:  sudafed    Mouth issue      Duration: one day    Description (location/character/radiation): pt has swollen lips and mouth feels sore    Intensity:  moderate    Accompanying signs and symptoms: none    History (similar episodes/previous evaluation): None    Precipitating or alleviating factors: None    Therapies tried and outcome: None     - C/o URI sx x1 week, relief with OTC Sudafed  - Developed mild tongue soreness and \"thickening\" x2d, feels better today  - Woke with swollen lips. No new exposures, medications, or foods.  - No f/c/s, dysphagia, SOB or dyspnea, throat closing sensation.    Patient Active Problem List   Diagnosis     Bipolar disorder, in full remission, most recent episode depressed (H)     Anxiety state     Dysthymic disorder     Chronic maxillary sinusitis     Hyperlipidemia with target LDL less than 130     Anemia Hgb= 11.8 in 11-13     Vitamin D deficiency disease     Family history of diabetes mellitus     ACP (advance care planning)     Snoring     Herpes simplex virus infection     Acute right-sided low back pain without sciatica     Past Surgical History:   Procedure Laterality Date     DILATION AND CURETTAGE  12/6/2013    Procedure: DILATION AND CURETTAGE;  Dilation And Curettage;  Surgeon: Edel Chew MD;  Location: [facility]     EYE SURGERY  8/2013    cataract surgery both eyes done     LAPAROSCOPIC HYSTERECTOMY SUPRACERVICAL, BILATERAL SALPINGO-OOPHORECTOMY, COMBINED  1/30/2014    Procedure: COMBINED LAPAROSCOPIC HYSTERECTOMY SUPRACERVICAL, SALPINGO-OOPHORECTOMY;  " Laparoscopic Supracervical Hysterectomy With Bilateral Salingectomy;  Surgeon: Edel Chew MD;  Location: [facility] OR     TUBAL LIGATION         Social History     Tobacco Use     Smoking status: Former Smoker     Years: 30.00     Types: Cigarettes     Last attempt to quit: 1990     Years since quittin.0     Smokeless tobacco: Never Used   Substance Use Topics     Alcohol use: No     Alcohol/week: 0.0 standard drinks     Family History   Problem Relation Age of Onset     Diabetes Mother      Hypertension Mother      Psychotic Disorder Mother      Hearing Loss Father      Coronary Artery Disease No family hx of      Hyperlipidemia No family hx of      Cerebrovascular Disease No family hx of      Breast Cancer No family hx of      Colon Cancer No family hx of      Prostate Cancer No family hx of      Other Cancer No family hx of      Depression No family hx of      Anxiety Disorder No family hx of      Mental Illness No family hx of      Substance Abuse No family hx of      Anesthesia Reaction No family hx of      Asthma No family hx of      Osteoporosis No family hx of      Genetic Disorder No family hx of      Thyroid Disease No family hx of      Obesity No family hx of      Unknown/Adopted No family hx of          Current Outpatient Medications   Medication Sig Dispense Refill     cetirizine (ZYRTEC) 10 MG tablet Take 1 tablet (10 mg) by mouth 2 times daily 30 tablet 0     lithium (ESKALITH) 300 MG tablet Take 1 tablet (300 mg) by mouth daily 90 tablet 3     nefazodone (SERZONE) 150 MG tablet Take 1 tablet (150 mg) by mouth daily 90 tablet 3     OLANZapine (ZYPREXA) 2.5 MG tablet Take 1 tablet (2.5 mg) by mouth At Bedtime 90 tablet 3     triamcinolone (KENALOG) 0.1 % external cream Apply topically 3 times daily (Patient not taking: Reported on 2019) 80 g 0       Reviewed and updated as needed this visit by Provider  Tobacco  Allergies  Meds  Problems  Med Hx  Surg Hx  Fam Hx          Review of Systems   ROS COMP: Constitutional, HEENT, cardiovascular, pulmonary, GI, , musculoskeletal, neuro, skin, endocrine and psych systems are negative, except as otherwise noted.      Objective    /86   Pulse 74   Temp 97.7  F (36.5  C)   Resp 16   Wt 67.1 kg (148 lb)   LMP 01/30/2014 (Exact Date)   SpO2 99%   BMI 22.84 kg/m    Body mass index is 22.84 kg/m .  Physical Exam   GENERAL:  WDWN, no acute distress  PSYCH: pleasant, cooperative  EYES: no discharge, no injection  HENT:  Normocephalic. Moist mucus membranes. TMs pearly gray w/o effusion. Both lips visibly swollen, symmetric, no sores. Oropharynx pink w/o tonsillar hypertrophy or exudate, uvula midline. Tongue normal.  NECK:  Supple, symmetric, no KANA  LUNGS:  Clear to auscultation bilaterally without rhonchi, rales, or wheeze. Chest rise symmetric and no tenderness to palpation.  HEART:  Regular rate & rhythm. No murmur, gallop, or rub.  EXTREMITIES:  No gross deformities, moves all 4 limbs spontaneously  SKIN:  Warm and dry, no rash or suspicious lesions    NEUROLOGIC: alert, sensation grossly intact.    Diagnostic Test Results:  none         Assessment & Plan       ICD-10-CM    1. Swelling of both lips R22.0 cetirizine (ZYRTEC) 10 MG tablet   2. Viral URI J06.9      - Unclear etiology of lip swelling, no airway compromise, will treat symptomatically. Patient with intolerable drowsiness to Benadryl, so recommend BID Zyrtec until resolved. Soft mechanical diet as tolerated, use straw for liquids. Discussed red flag sx that warrant prompt evaluation at ED.  - Viral URI, self-limiting condition, continue supportive cares prn. Tongue soreness likely related to this, Tylenol prn pain, alternate hot/cool liquids.    Return in about 2 days (around 11/29/2019), or if symptoms worsen or fail to improve.    Arabella Otto PA-C  [facility]

## 2019-11-27 NOTE — TELEPHONE ENCOUNTER
"\"My tongue and my lips are swollen.\" Symptoms starting in past 2 days. Patient reporting front of tongue is sore with mild swelling. Reporting lip swelling has increased this morning. Afebrile. Denies difficulty breathing. Denies history of allergic reactions.    Per guidelines Emergency Room advised.    Patient has appointment for 810 a.m.. Stating she plans to keep appointment as scheduled.    Coco Markham RN  [facility]        Reason for Disposition    Pain in tongue, mouth, or tooth    Additional Information    Negative: Started suddenly after sting from bee, wasp, or yellow jacket    Negative: Started suddenly after taking a medicine or allergic food (e.g., nuts)    Negative: Wheezing, stridor, hoarseness, or difficulty breathing    Negative: Facial swelling    Negative: Neck swelling    Negative: Can't swallow normal secretions (e.g., drooling or spitting)    Negative: Taking an ACE Inhibitor medication  (e.g., benazepril/LOTENSIN, captopril/CAPOTEN, enalapril/VASOTEC, lisinopril/ZESTRIL)    Negative: Sounds like a life-threatening emergency to the triager    Negative: Followed a tongue injury    Protocols used: TONGUE SWELLING-A-AH      "

## 2019-12-12 ENCOUNTER — TELEPHONE (OUTPATIENT)
Dept: FAMILY MEDICINE | Facility: CLINIC | Age: 59
End: 2019-12-12

## 2019-12-12 NOTE — TELEPHONE ENCOUNTER
Reason for Call:  Other call back  Detailed comments: patient has question on past records  Phone Number Patient can be reached at: Home number on file [phone] (home)  Best Time: any  Can we leave a detailed message on this number? YES  Call taken on 12/12/2019 at 4:35 PM by SINDY MENJIVAR

## 2019-12-13 NOTE — TELEPHONE ENCOUNTER
Pt reports she is applying for disability and would need dates and doctors names of providers she has seen for anxiety. She may need OV notes. Advised she contact medical records with request. [facility] number was provided [phone].

## 2020-03-06 ENCOUNTER — TELEPHONE (OUTPATIENT)
Dept: FAMILY MEDICINE | Facility: CLINIC | Age: 60
End: 2020-03-06

## 2020-03-06 DIAGNOSIS — F34.1 DYSTHYMIC DISORDER: ICD-10-CM

## 2020-03-06 RX ORDER — NEFAZODONE HYDROCHLORIDE 50 MG/1
150 TABLET ORAL DAILY
Qty: 90 TABLET | Refills: 1 | Status: SHIPPED | OUTPATIENT
Start: 2020-03-06 | End: 2020-12-02 | Stop reason: DRUGHIGH

## 2020-03-06 NOTE — TELEPHONE ENCOUNTER
Due for office visit, only 1 month supply submitted.  No additional refills until seen in clinic.    Please call and inform pt to schedule a/an an annual exam. (PCP listed as [facility], will need to schedule at  or establish care with myself at ).

## 2020-03-06 NOTE — TELEPHONE ENCOUNTER
RN called to pt to update on provider note.    Pt states she can not make an appointment at this time. States she will call back.    No further action needed at this time.

## 2020-03-06 NOTE — TELEPHONE ENCOUNTER
nefazodone (SERZONE) 150 MG tablet  The 150 mg is no longer available, backordered w/ no release date.  Ca we do 100's or 200's ?  Thanks

## 2020-03-17 ENCOUNTER — TELEPHONE (OUTPATIENT)
Dept: FAMILY MEDICINE | Facility: CLINIC | Age: 60
End: 2020-03-17

## 2020-03-17 NOTE — TELEPHONE ENCOUNTER
Reason for Call:  Other prescription  Detailed comments: patient is requesting an antibiotic medication   Phone Number Patient can be reached at: Home number on file [phone] (home)  Best Time: any  Can we leave a detailed message on this number? YES  Call taken on 3/17/2020 at 9:27 AM by SINDY MENJIVAR

## 2020-03-17 NOTE — TELEPHONE ENCOUNTER
Patient Contact    Patient states she has a sinus infection that has remained for about 3 weeks. States it happens every year when the weather changes. States she is supposed to talk with Arabella on Thursday but would prefer to get it going now. Per chart review, appointment is actually on Wednesday. Advised patient that she will need to talk with provider prior to rx for antibiotic. She is still wondering if she can get abx today to start ahead of time, but provider is out of office. Recommended she wait for appointment tomorrow. Advised on when to seek emergency care.

## 2020-03-18 ENCOUNTER — VIRTUAL VISIT (OUTPATIENT)
Dept: FAMILY MEDICINE | Facility: CLINIC | Age: 60
End: 2020-03-18
Payer: COMMERCIAL

## 2020-03-18 DIAGNOSIS — B37.9 ANTIBIOTIC-INDUCED YEAST INFECTION: ICD-10-CM

## 2020-03-18 DIAGNOSIS — T36.95XA ANTIBIOTIC-INDUCED YEAST INFECTION: ICD-10-CM

## 2020-03-18 DIAGNOSIS — J01.01 ACUTE RECURRENT MAXILLARY SINUSITIS: Primary | ICD-10-CM

## 2020-03-18 PROCEDURE — 99441 ZZC PHYSICIAN TELEPHONE EVALUATION 5-10 MIN: CPT | Performed by: PHYSICIAN ASSISTANT

## 2020-03-18 RX ORDER — FLUCONAZOLE 150 MG/1
150 TABLET ORAL ONCE
Qty: 1 TABLET | Refills: 0 | Status: SHIPPED | OUTPATIENT
Start: 2020-03-18 | End: 2020-06-24

## 2020-03-18 NOTE — PROGRESS NOTES
"Zuly Wheeler is a 59 year old female who is being evaluated via a billable telephone visit.      The patient has been notified of following:     \"This telephone visit will be conducted via a call between you and your physician/provider. We have found that certain health care needs can be provided without the need for a physical exam.  This service lets us provide the care you need with a short phone conversation.  If a prescription is necessary we can send it directly to your pharmacy.  If lab work is needed we can place an order for that and you can then stop by our lab to have the test done at a later time.    If during the course of the call the physician/provider feels a telephone visit is not appropriate, you will not be charged for this service.\"       Zuly Wheeler complains of    Chief Complaint   Patient presents with     Sinus Problem     gets this a couple times a year     Recheck Medication       I have reviewed and updated the patient's Past Medical History, Social History, Family History and Medication List.    ALLERGIES  Depakote; Lamotrigine; Olanzapine; Seroquel [quetiapine fumarate]; Sulfa drugs; Trileptal; Zoloft; and Citalopram    OSMANY Rothman CMA   (MA signature)    Additional provider notes:     Acute Illness   Acute illness concerns: sinus infection  Onset: x4-5 weeks    Fever: no    Chills/Sweats: no    Headache (location?): YES- frontal, maxillary    Sinus Pressure:YES- post-nasal drainage, facial pain and mucopurulent discharge    Conjunctivitis:  no    Ear Pain: no    Rhinorrhea: no    Congestion: YES    Sore Throat: no     Cough: YES-non-productive, mild    Wheeze: no    Decreased Appetite: no    Nausea: no    Vomiting: no    Diarrhea:  no    Dysuria/Freq.: no    Fatigue/Achiness: no    Sick/Strep Exposure: no     Therapies Tried and outcome: OTC sinus medication with some relief.          Assessment/Plan:  Acute recurrent maxillary sinusitis  (primary encounter " diagnosis)  Antibiotic-induced yeast infection    - Complete entire course of antibx as prescribed, even if sx improve.  - Get plenty of rest and fluids  - Use steam heat and/or OTC Mucinex to help with secretions  - OTC pain reliever prn HA, fever.  - Take Diflucan prn yeast vaginitis sx.    I have reviewed the note as documented above.  This accurately captures the substance of my conversation with the patient.      Phone call contact time  Call Started at 1709  Call Ended at 1715    Arabella Otto PA-C

## 2020-05-27 ENCOUNTER — VIRTUAL VISIT (OUTPATIENT)
Dept: FAMILY MEDICINE | Facility: CLINIC | Age: 60
End: 2020-05-27
Payer: COMMERCIAL

## 2020-05-27 DIAGNOSIS — F31.76 BIPOLAR DISORDER, IN FULL REMISSION, MOST RECENT EPISODE DEPRESSED (H): ICD-10-CM

## 2020-05-27 DIAGNOSIS — R21 RASH: Primary | ICD-10-CM

## 2020-05-27 PROCEDURE — 99213 OFFICE O/P EST LOW 20 MIN: CPT | Mod: 95 | Performed by: NURSE PRACTITIONER

## 2020-05-27 RX ORDER — NYSTATIN 100000 U/G
CREAM TOPICAL 2 TIMES DAILY
Qty: 60 G | Refills: 1 | Status: SHIPPED | OUTPATIENT
Start: 2020-05-27 | End: 2020-12-02

## 2020-05-27 RX ORDER — HYDROXYZINE HYDROCHLORIDE 25 MG/1
25 TABLET, FILM COATED ORAL 3 TIMES DAILY PRN
Qty: 60 TABLET | Refills: 0 | Status: SHIPPED | OUTPATIENT
Start: 2020-05-27 | End: 2021-01-13

## 2020-05-27 NOTE — PROGRESS NOTES
"Zuly Wheeler is a 60 year old female who is being evaluated via a billable telephone visit.      The patient has been notified of following:     \"This telephone visit will be conducted via a call between you and your physician/provider. We have found that certain health care needs can be provided without the need for a physical exam.  This service lets us provide the care you need with a short phone conversation.  If a prescription is necessary we can send it directly to your pharmacy.  If lab work is needed we can place an order for that and you can then stop by our lab to have the test done at a later time.    Telephone visits are billed at different rates depending on your insurance coverage. During this emergency period, for some insurers they may be billed the same as an in-person visit.  Please reach out to your insurance provider with any questions.    If during the course of the call the physician/provider feels a telephone visit is not appropriate, you will not be charged for this service.\"    Patient has given verbal consent for Telephone visit?  Yes    What phone number would you like to be contacted at? [phone]    How would you like to obtain your AVS? Mail a copy    Subjective     Zuly Wheeler is a 60 year old female who presents via phone visit today for the following health issues:    HPI  Chief Complaint   Patient presents with     Derm Problem     middle of breast around to back side of her left side x weeks     Derm Problem     breast       Rash between her breasts under her bra line.  Symptoms started approximately 2 weeks ago and it is getting worse with time.  This is itchy.  \"I have scratched it so much I think I have a scab between my breasts.\"  Dry area with no excessive sweating.  No other rashes or skin issues today.  No new lotions, soaps, products.   No known exposures/contagious skin contacts.      Bipolar disorder:  Doing well overall, but she recently had a " relationship break up.  She denies any acute problems today.     Patient Active Problem List   Diagnosis     Bipolar disorder, in full remission, most recent episode depressed (H)     Anxiety state     Dysthymic disorder     Chronic maxillary sinusitis     Hyperlipidemia with target LDL less than 130     Anemia Hgb= 11.8 in      Vitamin D deficiency disease     Family history of diabetes mellitus     ACP (advance care planning)     Snoring     Herpes simplex virus infection     Acute right-sided low back pain without sciatica     Past Surgical History:   Procedure Laterality Date     DILATION AND CURETTAGE  2013    Procedure: DILATION AND CURETTAGE;  Dilation And Curettage;  Surgeon: Edel Chew MD;  Location: [facility]     EYE SURGERY  2013    cataract surgery both eyes done     LAPAROSCOPIC HYSTERECTOMY SUPRACERVICAL, BILATERAL SALPINGO-OOPHORECTOMY, COMBINED  2014    Procedure: COMBINED LAPAROSCOPIC HYSTERECTOMY SUPRACERVICAL, SALPINGO-OOPHORECTOMY;  Laparoscopic Supracervical Hysterectomy With Bilateral Salingectomy;  Surgeon: Edel Chew MD;  Location: [facility] OR     TUBAL LIGATION         Social History     Tobacco Use     Smoking status: Former Smoker     Years: 30.00     Types: Cigarettes     Last attempt to quit: 1990     Years since quittin.5     Smokeless tobacco: Never Used   Substance Use Topics     Alcohol use: No     Alcohol/week: 0.0 standard drinks     Family History   Problem Relation Age of Onset     Diabetes Mother      Hypertension Mother      Psychotic Disorder Mother      Hearing Loss Father      Coronary Artery Disease No family hx of      Hyperlipidemia No family hx of      Cerebrovascular Disease No family hx of      Breast Cancer No family hx of      Colon Cancer No family hx of      Prostate Cancer No family hx of      Other Cancer No family hx of      Depression No family hx of      Anxiety Disorder No family hx of      Mental Illness No family hx of       Substance Abuse No family hx of      Anesthesia Reaction No family hx of      Asthma No family hx of      Osteoporosis No family hx of      Genetic Disorder No family hx of      Thyroid Disease No family hx of      Obesity No family hx of      Unknown/Adopted No family hx of          Current Outpatient Medications   Medication Sig Dispense Refill     hydrOXYzine (ATARAX) 25 MG tablet Take 1 tablet (25 mg) by mouth 3 times daily as needed for itching 60 tablet 0     lithium (ESKALITH) 300 MG tablet Take 1 tablet (300 mg) by mouth daily 90 tablet 3     nefazodone (SERZONE) 50 MG tablet Take 3 tablets (150 mg) by mouth daily 90 tablet 1     nystatin (MYCOSTATIN) 272421 UNIT/GM external cream Apply topically 2 times daily 60 g 1     OLANZapine (ZYPREXA) 2.5 MG tablet Take 1 tablet (2.5 mg) by mouth At Bedtime 90 tablet 3     Allergies   Allergen Reactions     Depakote      groggy     Lamotrigine      Intolerance, numb (Lamictal)     Olanzapine Other (See Comments)     Only generic/ineffective  Ineffective (only generic)     Seroquel [Quetiapine Fumarate] Visual Disturbance     Blurred vision; jumpy     Sulfa Drugs Hives     Trileptal Visual Disturbance     Blurred vision     Zoloft Other (See Comments)     jumpy     Citalopram Anxiety     Intolerance (Celexa)     Recent Labs   Lab Test 02/11/19  2031 01/09/19  0936 04/27/18  1118 10/25/17  1330  11/12/15  0738 12/04/13  1607   A1C  --  5.6  --   --   --   --  5.5   LDL  --  115* 85  --   --  86  --    HDL  --  59 56  --   --  58  --    TRIG  --  96 61  --   --  78  --    ALT  --  15 19 22   < >  --   --    CR 0.88 0.73 0.61 0.76   < > 0.70  --    GFRESTIMATED 72 >90 >90 79   < > 87  --    GFRESTBLACK 84 >90 >90 >90   < > >90  African American GFR Calc    --    POTASSIUM 3.6 3.8 4.2 3.7   < > 4.1  --    TSH  --  0.43 0.79 0.87   < >  --   --     < > = values in this interval not displayed.      BP Readings from Last 3 Encounters:   11/27/19 124/86   08/05/19 130/76    04/26/19 120/66    Wt Readings from Last 3 Encounters:   11/27/19 67.1 kg (148 lb)   08/05/19 65.3 kg (144 lb)   04/26/19 65.8 kg (145 lb)           Reviewed and updated as needed this visit by Provider  Tobacco  Allergies  Meds  Problems  Med Hx  Surg Hx  Fam Hx         Review of Systems   Constitutional, HEENT, cardiovascular, pulmonary, GI, , musculoskeletal, neuro, skin, endocrine and psych systems are negative, except as otherwise noted.       Objective   Reported vitals:  LMP 01/30/2014 (Exact Date)    healthy, alert and no distress  PSYCH: Alert and oriented times 3; coherent speech, normal   rate and volume, able to articulate logical thoughts, able   to abstract reason, no tangential thoughts, no hallucinations   or delusions  Her affect is normal  RESP: No cough, no audible wheezing, able to talk in full sentences  Remainder of exam unable to be completed due to telephone visits    Diagnostic Test Results:  Labs reviewed in Epic        Assessment/Plan:  (R21) Rash  (primary encounter diagnosis)  Comment: acute  Plan: nystatin (MYCOSTATIN) 899600 UNIT/GM external         cream, hydrOXYzine (ATARAX) 25 MG tablet        I talked with Zuly at length about her rash and treatment.  She will use the nystatin cream bid.  I did tell her to apply the nystatin, let it dry.  She can then apply an over the counter hydrocortison cream if needed for itching.  Atarax for itching as well.  Follow up if any worsening or if symptoms are not improved within approximately one week.    (F31.76) Bipolar disorder, in full remission, most recent episode depressed (H)  Comment: chronic   Plan: continue mental health management as planned.       Return in about 5 days (around 6/1/2020), or if symptoms worsen or fail to improve.      Phone call duration:  12 minutes    ANDREA Ashton CNP

## 2020-06-24 ENCOUNTER — NURSE TRIAGE (OUTPATIENT)
Dept: NURSING | Facility: CLINIC | Age: 60
End: 2020-06-24

## 2020-06-24 ENCOUNTER — VIRTUAL VISIT (OUTPATIENT)
Dept: FAMILY MEDICINE | Facility: CLINIC | Age: 60
End: 2020-06-24
Payer: COMMERCIAL

## 2020-06-24 DIAGNOSIS — R05.9 COUGH: ICD-10-CM

## 2020-06-24 DIAGNOSIS — F31.76 BIPOLAR DISORDER, IN FULL REMISSION, MOST RECENT EPISODE DEPRESSED (H): Primary | ICD-10-CM

## 2020-06-24 DIAGNOSIS — R11.0 NAUSEA: ICD-10-CM

## 2020-06-24 DIAGNOSIS — R42 LIGHTHEADEDNESS: ICD-10-CM

## 2020-06-24 PROCEDURE — 96127 BRIEF EMOTIONAL/BEHAV ASSMT: CPT | Mod: 59 | Performed by: FAMILY MEDICINE

## 2020-06-24 PROCEDURE — 99214 OFFICE O/P EST MOD 30 MIN: CPT | Mod: 95 | Performed by: FAMILY MEDICINE

## 2020-06-24 ASSESSMENT — ANXIETY QUESTIONNAIRES
7. FEELING AFRAID AS IF SOMETHING AWFUL MIGHT HAPPEN: SEVERAL DAYS
GAD7 TOTAL SCORE: 9
3. WORRYING TOO MUCH ABOUT DIFFERENT THINGS: NEARLY EVERY DAY
1. FEELING NERVOUS, ANXIOUS, OR ON EDGE: SEVERAL DAYS
6. BECOMING EASILY ANNOYED OR IRRITABLE: SEVERAL DAYS
5. BEING SO RESTLESS THAT IT IS HARD TO SIT STILL: NOT AT ALL
2. NOT BEING ABLE TO STOP OR CONTROL WORRYING: NEARLY EVERY DAY
4. TROUBLE RELAXING: NOT AT ALL
IF YOU CHECKED OFF ANY PROBLEMS ON THIS QUESTIONNAIRE, HOW DIFFICULT HAVE THESE PROBLEMS MADE IT FOR YOU TO DO YOUR WORK, TAKE CARE OF THINGS AT HOME, OR GET ALONG WITH OTHER PEOPLE: EXTREMELY DIFFICULT

## 2020-06-24 ASSESSMENT — PATIENT HEALTH QUESTIONNAIRE - PHQ9: SUM OF ALL RESPONSES TO PHQ QUESTIONS 1-9: 19

## 2020-06-24 NOTE — TELEPHONE ENCOUNTER
Zuly has been feeling light headed and has diarrhea and feels drained.  Denies fever and shortness of breath.  Cough is present sinus issues.  Symptoms have been present for one week.      COVID 19 Nurse Triage Plan/Patient Instructions    Please be aware that novel coronavirus (COVID-19) may be circulating in the community. If you develop symptoms such as fever, cough, or SOB or if you have concerns about the presence of another infection including coronavirus (COVID-19), please contact your health care provider or visit [email or web address].     Disposition/Instructions    Patient to schedule a Virtual Visit with provider. Reference Visit Selection Guide.    Thank you for taking steps to prevent the spread of this virus.  o Limit your contact with others.  o Wear a simple mask to cover your cough.  o Wash your hands well and often.    Resources    [facility]: About COVID-19: [email or web address]    CDC: What to Do If You're Sick: www.cdc.gov/coronavirus/2019-ncov/about/steps-when-sick.html    CDC: Ending Home Isolation: www.cdc.gov/coronavirus/2019-ncov/hcp/disposition-in-home-patients.html     CDC: Caring for Someone: www.cdc.gov/coronavirus/2019-ncov/if-you-are-sick/care-for-someone.html     [facility]: Interim Guidance for Hospital Discharge to Home: www.health.[facility].mn.us/diseases/coronavirus/hcp/hospdischarge.pdf    [facility] clinical trials (COVID-19 research studies): clinicalaffairs.[facility]-clinical-trials     Below are the COVID-19 hotlines at the [facility] of Health ([facility]). Interpreters are available.   o For health questions: Call 642-250-2720 or 1-401.642.5156 (7 a.m. to 7 p.m.)  o For questions about schools and childcare: Call 627-205-5505 or [phone] (7 a.m. to 7 p.m.)                       Additional Information    Negative: Severe difficulty breathing (e.g., struggling for each breath, speaks in single words)    Negative: [1] Difficulty breathing or  "swallowing AND [2] started suddenly after medicine, an allergic food or bee sting    Negative: Shock suspected (e.g., cold/pale/clammy skin, too weak to stand, low BP, rapid pulse)    Negative: Difficult to awaken or acting confused (e.g., disoriented, slurred speech)    Negative: [1] Weakness (i.e., paralysis, loss of muscle strength) of the face, arm or leg on one side of the body AND [2] sudden onset AND [3] present now    Negative: [1] Numbness (i.e., loss of sensation) of the face, arm or leg on one side of the body AND [2] sudden onset AND [3] present now    Negative: [1] Loss of speech or garbled speech AND [2] sudden onset AND [3] present now    Negative: Overdose (accidental or intentional) of medications    Negative: [1] Fainted > 15 minutes ago AND [2] still feels too weak or dizzy to stand    Negative: Heart beating < 50 beats per minute OR > 140 beats per minute    Negative: Sounds like a life-threatening emergency to the triager    Negative: Difficulty breathing    Negative: SEVERE dizziness (e.g., unable to stand, requires support to walk, feels like passing out now)    Negative: Extra heart beats OR irregular heart beating  (i.e., \"palpitations\")    Negative: [1] Drinking very little AND [2] dehydration suspected (e.g., no urine > 12 hours, very dry mouth, very lightheaded)    Negative: Patient sounds very sick or weak to the triager    Negative: [1] Dizziness caused by heat exposure, sudden standing, or poor fluid intake AND [2] no improvement after 2 hours of rest and fluids    Negative: [1] Fever > 103 F (39.4 C) AND [2] not able to get the fever down using Fever Care Advice    Negative: [1] Fever > 101 F (38.3 C) AND [2] age > 60    Negative: [1] Fever > 100.0 F (37.8 C) AND [2] bedridden (e.g., nursing home patient, CVA, chronic illness, recovering from surgery)    Negative: [1] Fever > 100.0 F (37.8 C) AND [2] diabetes mellitus or weak immune system (e.g., HIV positive, cancer chemo, " splenectomy, organ transplant, chronic steroids)    Negative: [1] MODERATE dizziness (e.g., interferes with normal activities) AND [2] has NOT been evaluated by physician for this  (Exception: dizziness caused by heat exposure, sudden standing, or poor fluid intake)    Negative: Fever present > 3 days (72 hours)    Negative: Taking a medicine that could cause dizziness (e.g., blood pressure medications, diuretics)    [1] MODERATE dizziness (e.g., interferes with normal activities) AND [2] has been evaluated by physician for this    Protocols used: DIZZINESS - [id number]

## 2020-06-24 NOTE — PATIENT INSTRUCTIONS
"Flonase 2 sprays in each nostril to help with nasal congestion.       Discharge Instructions for COVID-19 Patients  You have--or may have--COVID-19. Please follow the instructions listed below.   If you have a weakened immune system, discuss with your doctor any other actions you need to take.  How can I protect others?  If you have symptoms (fever, cough, body aches or trouble breathing):    Stay home and away from others (self-isolate) until:  ? At least 10 days have passed since your symptoms started. And   ? You've had no fever--and no medicine that reduces fever--for 3 full days (72 hours). And   ? Your other symptoms have resolved (gotten better).  If you don't show symptoms, but testing showed that you have COVID-19:    Stay home and away from others (self-isolate) until at least 10 days have passed since the date of your first positive COVID-19 test.  During this time    Stay in your own room, even for meals. Use your own bathroom if you can.    Stay away from others in your home. No hugging, kissing or shaking hands. No visitors.    Don't go to work, school or anywhere else.    Clean \"high touch\" surfaces often (doorknobs, counters, handles). Use household cleaning spray or wipes. You'll find a full list of  on the EPA website: www.epa.gov/pesticide-registration/list-n-disinfectants-use-against-sars-cov-2.    Cover your mouth and nose with a mask, tissue or wash cloth to avoid spreading germs.    Wash your hands and face often. Use soap and water.    Caregivers in these groups are at risk for severe illness due to COVID-19:  ? People 65 years and older  ? People who live in a nursing home or long-term care facility  ? People with chronic disease (lung, heart, cancer, diabetes, kidney, liver, immunologic)  ? People who have a weakened immune system, including those who:    Are in cancer treatment    Take medicine that weakens the immune system, such as corticosteroids    Had a bone marrow or organ " transplant    Have an immune deficiency    Have poorly controlled HIV or AIDS    Are obese (body mass index of 40 or higher)    Smoke regularly    Caregivers should wear gloves while washing dishes, handling laundry and cleaning bedrooms and bathrooms.    Use caution when washing and drying laundry: Don't shake dirty laundry and use the warmest water setting that you can.    For more tips on managing your health at home, go to www.cdc.gov/coronavirus/2019-ncov/downloads/10Things.pdf.  How can I take care of myself at home?  1. Get lots of rest. Drink extra fluids (unless a doctor has told you not to).  2. Take Tylenol (acetaminophen) for fever or pain. If you have liver or kidney problems, ask your family doctor if it's okay to take Tylenol.     Adults can take either:  ? 650 mg (two 325 mg pills) every 4 to 6 hours, or   ? 1,000 mg (two 500 mg pills) every 8 hours as needed.  ? Note: Don't take more than 3,000 mg in one day. Acetaminophen is found in many medicines (both prescribed and over-the-counter medicines). Read all labels to be sure you don't take too much.   For children, check the Tylenol bottle for the right dose. The dose is based on the child's age or weight.  3. If you have other health problems (like cancer, heart failure, an organ transplant or severe kidney disease): Call your specialty clinic if you don't feel better in the next 2 days.  4. Know when to call 911. Emergency warning signs include:  ? Trouble breathing or shortness of breath  ? Pain or pressure in the chest that doesn't go away  ? Feeling confused like you haven't felt before, or not being able to wake up  ? Bluish-colored lips or face  5. Your doctor may have prescribed a blood thinner medicine. Follow their instructions.  Where can I get more information?    [facility] - About COVID-19:   [email or web address]    CDC - What to Do If You're Sick: www.cdc.gov/coronavirus/2019-ncov/about/steps-when-sick.html    CDC -  Ending Home Isolation: www.cdc.gov/coronavirus/2019-ncov/hcp/disposition-in-home-patients.html    CDC - Caring for Someone: www.cdc.gov/coronavirus/2019-ncov/if-you-are-sick/care-for-someone.html    [facility] - Interim Guidance for Hospital Discharge to Home: www.health.[facility].mn./diseases/coronavirus/hcp/hospdischarge.pdf    [facility] clinical trials (COVID-19 research studies): clinicalaffairs.[facility]-clinical-trials    Below are the COVID-19 hotlines at the [place] [facility] ([facility]). Interpreters are available.  ? For health questions: Call 184-636-6257 or 1-444.537.6832 (7 a.m. to 7 p.m.)  ? For questions about schools and childcare: Call 069-247-8903 or 1-725.766.6639 (7 a.m. to 7 p.m.)    For informational purposes only. Not to replace the advice of your health care provider. Clinically reviewed by the Infection Prevention Team.Copyright   2020 [employer]. All rights reserved. [employer] 822170 - 06/20.

## 2020-06-24 NOTE — PROGRESS NOTES
"Zuly Wheeler is a 60 year old female who is being evaluated via a billable telephone visit.      The patient has been notified of following:     \"This telephone visit will be conducted via a call between you and your physician/provider. We have found that certain health care needs can be provided without the need for a physical exam.  This service lets us provide the care you need with a short phone conversation.  If a prescription is necessary we can send it directly to your pharmacy.  If lab work is needed we can place an order for that and you can then stop by our lab to have the test done at a later time.    Telephone visits are billed at different rates depending on your insurance coverage. During this emergency period, for some insurers they may be billed the same as an in-person visit.  Please reach out to your insurance provider with any questions.    If during the course of the call the physician/provider feels a telephone visit is not appropriate, you will not be charged for this service.\"    Patient has given verbal consent for Telephone visit?  Yes    What phone number would you like to be contacted at? [phone]    How would you like to obtain your AVS? Mail a copy    Subjective     Zuly Wheeler is a 60 year old female who presents via phone visit today for the following health issues:    HPI    One week ago she started experiencing lightheadiness and fatigue. Each day symptoms progressed. Friday she had loose stools for three days which then resolved. She has an intermittent cough 2/2 PND.   She has mild pressure in sinuses, nasal congestion and mild rhinorrhea.  Couple of days ago she had a headache which resolved. She endorses intermittent nausea.   Two days ago she woke up with blurry vision and symptoms resolved within couple of minutes.   No fever, chills, ear pain, sore throat, shortness of breath, chest pain, abdominal pain, vomiting, dysuria or hearing changes.She is staying " hydrated. She does wear glasses and got prescription around 4-5 months ago.   H/o anxiety, depression - She had a recent break up around one month. She is having a tough time with it. She has a therapist which she talks to. She is not sure if her medication is working due to worsening anxiety and depression. She has passive SI, no active thoughts. She has thoughts that she would be better off dead. No weapons at home. Therapist is aware of passive SI. She is mainly in bed.   Negative FIT 01/2019.   No active bleeding.   She is unable to function due to symptoms. When she is driving she feels panicky.   She is eating and staying hydrated.     Reviewed and updated as needed this visit by Provider         Review of Systems   Constitutional, HEENT, cardiovascular, pulmonary, gi and gu systems are negative, except as otherwise noted.       Objective   Reported vitals:  [facility] 01/30/2014 (Exact Date)    healthy, alert and no distress  PSYCH: at times crying, Alert and oriented times 3; coherent speech, normal   rate and volume, able to articulate logical thoughts, able   to abstract reason, no tangential thoughts, no hallucinations   or delusions  Her affect is normal  RESP: No cough, no audible wheezing, able to talk in full sentences  Remainder of exam unable to be completed due to telephone visits    Diagnostic Test Results:  none         Assessment/Plan:    1. Bipolar disorder, in full remission, most recent episode depressed (H)  - Worsening symptoms due to new stressors. We discussed that she would benefit from establishing care with psychologist for medication adjustment. Pt agreeable with plan.   - MENTAL HEALTH REFERRAL  - Adult; Psychiatry; Psychiatry; Other: [facility] [phone]; We will contact you to schedule the appointment or please call with any questions    2. Cough  - discussed isolation precautions while test is pending  - Symptomatic COVID-19 Virus (Coronavirus) by PCR; Future    3. Nausea  -  Symptomatic COVID-19 Virus (Coronavirus) by PCR; Future    4. Lightheadedness  - advised to push fluids, eat three meals/day and good sleep hygiene  - recommended below evaluation; tx as indicated  - CBC with platelets; Future  - Comprehensive metabolic panel (BMP + Alb, Alk Phos, ALT, AST, Total. Bili, TP); Future  - **TSH with free T4 reflex FUTURE anytime; Future  - Follow if symptoms worsen or fail to improve.    Phone call duration: 34 minutes    Antonio Tomas MD

## 2020-06-25 ASSESSMENT — ANXIETY QUESTIONNAIRES: GAD7 TOTAL SCORE: 9

## 2020-06-29 ENCOUNTER — TRANSFERRED RECORDS (OUTPATIENT)
Dept: HEALTH INFORMATION MANAGEMENT | Facility: CLINIC | Age: 60
End: 2020-06-29

## 2020-07-22 ENCOUNTER — VIRTUAL VISIT (OUTPATIENT)
Dept: FAMILY MEDICINE | Facility: CLINIC | Age: 60
End: 2020-07-22
Payer: COMMERCIAL

## 2020-07-22 DIAGNOSIS — Z13.220 LIPID SCREENING: ICD-10-CM

## 2020-07-22 DIAGNOSIS — F34.1 DYSTHYMIC DISORDER: ICD-10-CM

## 2020-07-22 DIAGNOSIS — Z12.11 COLON CANCER SCREENING: ICD-10-CM

## 2020-07-22 DIAGNOSIS — F41.1 ANXIETY STATE: ICD-10-CM

## 2020-07-22 DIAGNOSIS — F31.76 BIPOLAR DISORDER, IN FULL REMISSION, MOST RECENT EPISODE DEPRESSED (H): Primary | ICD-10-CM

## 2020-07-22 DIAGNOSIS — F31.76 BIPOLAR DISORDER, IN FULL REMISSION, MOST RECENT EPISODE DEPRESSED (H): ICD-10-CM

## 2020-07-22 PROCEDURE — 99214 OFFICE O/P EST MOD 30 MIN: CPT | Mod: 95 | Performed by: NURSE PRACTITIONER

## 2020-07-22 RX ORDER — LITHIUM CARBONATE 300 MG
300 TABLET ORAL DAILY
Qty: 90 TABLET | Refills: 3 | Status: SHIPPED | OUTPATIENT
Start: 2020-07-22 | End: 2020-12-21 | Stop reason: DRUGHIGH

## 2020-07-22 RX ORDER — OLANZAPINE 2.5 MG/1
2.5 TABLET, FILM COATED ORAL AT BEDTIME
Qty: 90 TABLET | Refills: 3 | Status: SHIPPED | OUTPATIENT
Start: 2020-07-22 | End: 2021-06-16

## 2020-07-22 NOTE — PROGRESS NOTES
"Zuly Wheeler is a 60 year old female who is being evaluated via a billable telephone visit.      The patient has been notified of following:     \"This telephone visit will be conducted via a call between you and your physician/provider. We have found that certain health care needs can be provided without the need for a physical exam.  This service lets us provide the care you need with a short phone conversation.  If a prescription is necessary we can send it directly to your pharmacy.  If lab work is needed we can place an order for that and you can then stop by our lab to have the test done at a later time.    Telephone visits are billed at different rates depending on your insurance coverage. During this emergency period, for some insurers they may be billed the same as an in-person visit.  Please reach out to your insurance provider with any questions.    If during the course of the call the physician/provider feels a telephone visit is not appropriate, you will not be charged for this service.\"    Patient has given verbal consent for Telephone visit?  Yes    What phone number would you like to be contacted at? [phone]    How would you like to obtain your AVS? Mail a copy     Patti Mccullough MA      Subjective     Zuly Wheeler is a 60 year old female who presents via phone visit today for the following health issues:    HPI    Medication Followup of Nerfazadone (Serzone)     Taking Medication as prescribed: yes    Side Effects:  None    Medication Helping Symptoms:  Yes         Diagnosis bipolar disorder, anxiety and depression.  Stable symptoms despite recent breakup with fiance.  Had increased depression symptoms and \"down\" after break-up.  With support from family and time, feels things are \"leveling out.\"  Hasn't had increased anxiety from pandemic.    Sees therapist once a week and this is very helpful.    Had mild illness 2 weeks ago, tested negative for COVID and symptoms have " resolved.    Works at [employer] and [email or web address] as .  These jobs are in-person.  Thinks this has been helpful for mood to keep her busy.    I have reviewed and updated the patient's Past Medical History, Social History, Family History and Medication List      Reviewed and updated as needed this visit by Provider  Tobacco  Med Hx  Fam Hx  Soc Hx        Review of Systems   Constitutional, HEENT, cardiovascular, pulmonary, gi and gu systems are negative, except as otherwise noted.       Objective   Reported vitals:  LMP 01/30/2014 (Exact Date)    healthy, alert and no distress  PSYCH: Alert and oriented times 3; coherent speech, normal   rate and volume, able to articulate logical thoughts, able   to abstract reason, no tangential thoughts, no hallucinations   or delusions  Her affect is normal and pleasant  RESP: No cough, no audible wheezing, able to talk in full sentences  Remainder of exam unable to be completed due to telephone visits    Diagnostic Test Results:  Labs reviewed in Epic  pending        Assessment/Plan:    1. Bipolar disorder, in full remission, most recent episode depressed (H)    - nefazodone (SERZONE) 150 MG tablet; Take 1 tablet (150 mg) by mouth daily  Dispense: 90 tablet; Refill: 3  - Lithium level; Future  - lithium (ESKALITH) 300 MG tablet; Take 1 tablet (300 mg) by mouth daily  Dispense: 90 tablet; Refill: 3  - OLANZapine (ZYPREXA) 2.5 MG tablet; Take 1 tablet (2.5 mg) by mouth At Bedtime  Dispense: 90 tablet; Refill: 3  Due for lithium level    2. Anxiety state    - nefazodone (SERZONE) 150 MG tablet; Take 1 tablet (150 mg) by mouth daily  Dispense: 90 tablet; Refill: 3    3. Dysthymic disorder      4. Bipolar disorder, in full remission, most recent episode depressed (H)    - nefazodone (SERZONE) 150 MG tablet; Take 1 tablet (150 mg) by mouth daily  Dispense: 90 tablet; Refill: 3  - Lithium level; Future  - lithium (ESKALITH) 300 MG tablet; Take 1 tablet (300 mg) by  mouth daily  Dispense: 90 tablet; Refill: 3  - OLANZapine (ZYPREXA) 2.5 MG tablet; Take 1 tablet (2.5 mg) by mouth At Bedtime  Dispense: 90 tablet; Refill: 3    5. Colon cancer screening  - Fecal colorectal cancer screen (FIT); Future    6. Lipid screening  - Lipid panel reflex to direct LDL Fasting; Future      Return in about 2 months (around 9/22/2020) for Physical Exam, pap, mammogram.    End:  4:16 PM   Start:  3:58 PM    Phone call duration:  18 minutes    ANDREA Sawant CNP

## 2020-07-28 DIAGNOSIS — F31.76 BIPOLAR DISORDER, IN FULL REMISSION, MOST RECENT EPISODE DEPRESSED (H): ICD-10-CM

## 2020-07-28 DIAGNOSIS — Z13.220 LIPID SCREENING: ICD-10-CM

## 2020-07-28 DIAGNOSIS — R42 LIGHTHEADEDNESS: ICD-10-CM

## 2020-07-28 LAB
ALBUMIN SERPL-MCNC: 3.9 G/DL (ref 3.4–5)
ALP SERPL-CCNC: 64 U/L (ref 40–150)
ALT SERPL W P-5'-P-CCNC: 18 U/L (ref 0–50)
ANION GAP SERPL CALCULATED.3IONS-SCNC: 6 MMOL/L (ref 3–14)
AST SERPL W P-5'-P-CCNC: 11 U/L (ref 0–45)
BILIRUB SERPL-MCNC: 0.6 MG/DL (ref 0.2–1.3)
BUN SERPL-MCNC: 10 MG/DL (ref 7–30)
CALCIUM SERPL-MCNC: 9.1 MG/DL (ref 8.5–10.1)
CHLORIDE SERPL-SCNC: 107 MMOL/L (ref 94–109)
CHOLEST SERPL-MCNC: 176 MG/DL
CO2 SERPL-SCNC: 29 MMOL/L (ref 20–32)
CREAT SERPL-MCNC: 0.81 MG/DL (ref 0.52–1.04)
ERYTHROCYTE [DISTWIDTH] IN BLOOD BY AUTOMATED COUNT: 12.7 % (ref 10–15)
GFR SERPL CREATININE-BSD FRML MDRD: 78 ML/MIN/{1.73_M2}
GLUCOSE SERPL-MCNC: 84 MG/DL (ref 70–99)
HCT VFR BLD AUTO: 38 % (ref 35–47)
HDLC SERPL-MCNC: 68 MG/DL
HGB BLD-MCNC: 12.3 G/DL (ref 11.7–15.7)
LDLC SERPL CALC-MCNC: 94 MG/DL
LITHIUM SERPL-SCNC: 0.31 MMOL/L (ref 0.6–1.2)
MCH RBC QN AUTO: 28.7 PG (ref 26.5–33)
MCHC RBC AUTO-ENTMCNC: 32.4 G/DL (ref 31.5–36.5)
MCV RBC AUTO: 89 FL (ref 78–100)
NONHDLC SERPL-MCNC: 108 MG/DL
PLATELET # BLD AUTO: 232 10E9/L (ref 150–450)
POTASSIUM SERPL-SCNC: 3.6 MMOL/L (ref 3.4–5.3)
PROT SERPL-MCNC: 7.5 G/DL (ref 6.8–8.8)
RBC # BLD AUTO: 4.28 10E12/L (ref 3.8–5.2)
SODIUM SERPL-SCNC: 142 MMOL/L (ref 133–144)
TRIGL SERPL-MCNC: 69 MG/DL
TSH SERPL DL<=0.005 MIU/L-ACNC: 1.38 MU/L (ref 0.4–4)
WBC # BLD AUTO: 3.8 10E9/L (ref 4–11)

## 2020-07-28 PROCEDURE — 80061 LIPID PANEL: CPT | Performed by: NURSE PRACTITIONER

## 2020-07-28 PROCEDURE — 80053 COMPREHEN METABOLIC PANEL: CPT | Performed by: NURSE PRACTITIONER

## 2020-07-28 PROCEDURE — 85027 COMPLETE CBC AUTOMATED: CPT | Performed by: NURSE PRACTITIONER

## 2020-07-28 PROCEDURE — 80178 ASSAY OF LITHIUM: CPT | Performed by: NURSE PRACTITIONER

## 2020-07-28 PROCEDURE — 84443 ASSAY THYROID STIM HORMONE: CPT | Performed by: NURSE PRACTITIONER

## 2020-07-28 PROCEDURE — 36415 COLL VENOUS BLD VENIPUNCTURE: CPT | Performed by: NURSE PRACTITIONER

## 2020-07-28 NOTE — LETTER
July 29, 2020      Zuly Wheeler  [address] [address]        Dear ,    We are writing to inform you of your test results.    Here are your results for your recent labs.   Your thyroid labs were normal.   Your electrolytes and kidney function were normal.   Your white count (infectious marker) was slightly low. I would recommend rechecking your labs in four to six weeks. Please schedule a lab only visit.   Please call or message me if you have questions or concerns.     Resulted Orders   **TSH with free T4 reflex FUTURE anytime   Result Value Ref Range    TSH 1.38 0.40 - 4.00 mU/L   Comprehensive metabolic panel (BMP + Alb, Alk Phos, ALT, AST, Total. Bili, TP)   Result Value Ref Range    Sodium 142 133 - 144 mmol/L    Potassium 3.6 3.4 - 5.3 mmol/L    Chloride 107 94 - 109 mmol/L    Carbon Dioxide 29 20 - 32 mmol/L    Anion Gap 6 3 - 14 mmol/L    Glucose 84 70 - 99 mg/dL    Urea Nitrogen 10 7 - 30 mg/dL    Creatinine 0.81 0.52 - 1.04 mg/dL    GFR Estimate 78 >60 mL/min/[1.73_m2]      Comment:      Non  GFR Calc  Starting 12/18/2018, serum creatinine based estimated GFR (eGFR) will be   calculated using the Chronic Kidney Disease Epidemiology Collaboration   (CKD-EPI) equation.      GFR Estimate If Black >90 >60 mL/min/[1.73_m2]      Comment:       GFR Calc  Starting 12/18/2018, serum creatinine based estimated GFR (eGFR) will be   calculated using the Chronic Kidney Disease Epidemiology Collaboration   (CKD-EPI) equation.      Calcium 9.1 8.5 - 10.1 mg/dL    Bilirubin Total 0.6 0.2 - 1.3 mg/dL    Albumin 3.9 3.4 - 5.0 g/dL    Protein Total 7.5 6.8 - 8.8 g/dL    Alkaline Phosphatase 64 40 - 150 U/L    ALT 18 0 - 50 U/L    AST 11 0 - 45 U/L   CBC with platelets   Result Value Ref Range    WBC 3.8 (L) 4.0 - 11.0 10e9/L    RBC Count 4.28 3.8 - 5.2 10e12/L    Hemoglobin 12.3 11.7 - 15.7 g/dL    Hematocrit 38.0 35.0 - 47.0 %    MCV 89 78 - 100 fl    MCH 28.7  26.5 - 33.0 pg    MCHC 32.4 31.5 - 36.5 g/dL    RDW 12.7 10.0 - 15.0 %    Platelet Count 232 150 - 450 10e9/L       If you have any questions or concerns, please call the clinic at the number listed above.       Sincerely,    Antonio Tomas MD/nr

## 2020-07-29 DIAGNOSIS — Z12.11 COLON CANCER SCREENING: ICD-10-CM

## 2020-07-29 PROCEDURE — 82274 ASSAY TEST FOR BLOOD FECAL: CPT | Performed by: NURSE PRACTITIONER

## 2020-07-29 NOTE — RESULT ENCOUNTER NOTE
Please send the letter to the patient with the following.       Here are your results for your recent labs.   Your thyroid labs were normal.   Your electrolytes and kidney function were normal.  Your white count (infectious marker) was slightly low. I would recommend rechecking your labs in four to six weeks. Please schedule a lab only visit.   Please call or message me if you have questions or concerns.

## 2020-07-29 NOTE — LETTER
September 18, 2020      Zuly Wheeler  [address] [address]        Dear ,    We are writing to inform you of your test results.    The results of your recent lab tests were within normal limits. Enclosed is a copy of these results.  If you have any further questions or problems, please contact our office at [phone].    Resulted Orders   Fecal colorectal cancer screen (FIT)   Result Value Ref Range    Occult Blood Scn FIT Negative NEG^Negative       If you have any questions or concerns, please call the clinic at the number listed above.       Sincerely,        [facility]

## 2020-08-01 ENCOUNTER — TELEPHONE (OUTPATIENT)
Dept: FAMILY MEDICINE | Facility: CLINIC | Age: 60
End: 2020-08-01

## 2020-08-01 DIAGNOSIS — D72.818 OTHER DECREASED WHITE BLOOD CELL (WBC) COUNT: Primary | ICD-10-CM

## 2020-08-02 NOTE — TELEPHONE ENCOUNTER
Could you please let patient know that labs look normal.  Her lithium level is low, but it looks like this has been the norm for her over the past few checks.  Does she recall anything about her lithium level and what it is expected to be?  Her white count was a little low as well so we should recheck that in 4-6 weeks.  JOANIE Gifford, JOCYP

## 2020-08-03 NOTE — TELEPHONE ENCOUNTER
Spoke with the patient who reports that her lithium level has been low, but her symptoms are controlled with this dose and level of lithium.    Unsure as to why her WBC may be low; will come in sometime in the next 6 weeks for recheck.  No further questions. Evy Foster RN August 3, 2020 11:27 AM

## 2020-08-08 LAB — HEMOCCULT STL QL IA: NEGATIVE

## 2020-09-16 NOTE — RESULT ENCOUNTER NOTE
"Please notify patient of normal lab results.  Thank you.    Please add below note.  \"The results of your recent lab tests were within normal limits. Enclosed is a copy of these results.  If you have any further questions or problems, please contact our o  ffice at [phone].\"  "

## 2020-11-24 ENCOUNTER — NURSE TRIAGE (OUTPATIENT)
Dept: NURSING | Facility: CLINIC | Age: 60
End: 2020-11-24

## 2020-11-24 ENCOUNTER — TELEPHONE (OUTPATIENT)
Dept: FAMILY MEDICINE | Facility: CLINIC | Age: 60
End: 2020-11-24

## 2020-11-24 DIAGNOSIS — F31.76 BIPOLAR DISORDER, IN FULL REMISSION, MOST RECENT EPISODE DEPRESSED (H): Primary | ICD-10-CM

## 2020-11-24 NOTE — TELEPHONE ENCOUNTER
Called about her request to have medication (Serzone) changed to a different formulary due to non-availability at her pharmacy ([facility]).  This triage nurse read the note in patient's chart dated 11/24 at 1.08 pm by her provider to try another pharmacy, did not want to change psychiatry medications.  Lyric Chavez, ELI      Additional Information    Negative: Drug overdose and triager unable to answer question    Negative: Caller requesting information unrelated to medicine    Negative: Caller requesting a prescription for Strep throat and has a positive culture result    Negative: Rash while taking a medication or within 3 days of stopping it    Negative: Immunization reaction suspected    Negative: Asthma and having symptoms of asthma (cough, wheezing, etc.)    Negative: Breastfeeding questions about mother's medicines and diet    Negative: MORE THAN A DOUBLE DOSE of a prescription or over-the-counter (OTC) drug    Negative: DOUBLE DOSE (an extra dose or lesser amount) of over-the-counter (OTC) drug and any symptoms (e.g., dizziness, nausea, pain, sleepiness)    Negative: DOUBLE DOSE (an extra dose or lesser amount) of prescription drug and any symptoms (e.g., dizziness, nausea, pain, sleepiness)    Negative: Took another person's prescription drug    Negative: DOUBLE DOSE (an extra dose or lesser amount) of prescription drug and NO symptoms (Exception: a double dose of antibiotics)    Negative: Diabetes drug error or overdose (e.g., took wrong type of insulin or took extra dose)    Negative: Request for URGENT new prescription or refill of 'essential' medication (i.e., likelihood of harm to patient if not taken) and triager unable to fill per department policy    Negative: Prescription not at pharmacy and was prescribed today by PCP    Negative: Pharmacy calling with prescription questions and triager unable to answer question    Negative: Caller has urgent medication question about med that PCP prescribed and  triager unable to answer question    Negative: Caller has NON-URGENT medication question about med that PCP prescribed and triager unable to answer question    Negative: Caller requesting a NON-URGENT new prescription or refill and triager unable to refill per department policy    Negative: DOUBLE DOSE (an extra dose or lesser amount) of over-the-counter (OTC) drug and NO symptoms    Negative: DOUBLE DOSE (an extra dose or lesser amount) of antibiotic drug and NO symptoms    Caller has medication question only, adult not sick, and triager answers question    Protocols used: MEDICATION QUESTION CALL-A-OH

## 2020-11-24 NOTE — TELEPHONE ENCOUNTER
Corinna,     Pt checked with [facility] and [facility] (have #30 only), Nurse called [facility] and they do not have any stock and will not be able to order.   Pt will check [facility] as well.     Spoke with Pt about consult with psychiatry. Would be a good idea to have a visit to discuss an alternative medication to Serzone since it will eventually become unavailable.     Patient verbalized understanding and agree's with plan.         Gigi Betancur RN   [facility]

## 2020-11-24 NOTE — TELEPHONE ENCOUNTER
I have put in an order also for psych MTM and collaborative care which may be quicker and we may not need ongoing psychiatry.  Please let patient know that she will get multiple phone calls - three services.  I want her to make appts with each at this time and we will sort out what the future needs are when we have an immediate plan.  JOSE Fonseca

## 2020-11-24 NOTE — TELEPHONE ENCOUNTER
Pt notified of notes per GAGE Gifford.   She will wait for call or has numbers to schedule if needed.     Gigi ALMONTE [facility]

## 2020-11-24 NOTE — TELEPHONE ENCOUNTER
nefazodone (SERZONE) 50 MG tablet  Per [facility] pharmacy: All strengths unavailable. Can we do something else?

## 2020-11-24 NOTE — TELEPHONE ENCOUNTER
We will need to try another pharmacy.  I cannot change her psychiatry medications.  What other pharmacy can she use other than [facility]?  JOSE Fonseca

## 2020-11-27 ENCOUNTER — TELEPHONE (OUTPATIENT)
Dept: FAMILY MEDICINE | Facility: CLINIC | Age: 60
End: 2020-11-27

## 2020-11-27 NOTE — TELEPHONE ENCOUNTER
MTM referral from: [facility] visit (referral by provider)    MTM referral outreach attempt #2 on November 27, 2020 at 12:55 PM      Outcome: Patient not reachable after several attempts, will route to MTM Pharmacist/Provider as an FYI. Thank you for the referral.    Adrian Vance, MTM coordinator

## 2020-11-30 NOTE — TELEPHONE ENCOUNTER
MTM scheduling has not been able to reach the patient.  It is critical that the patient schedules a MTM visit as I cannot make changes to her psychiatry medications and it will be months until she sees the psychiatrist.  Please call her and emphasize that she needs now to call the MTM  number and get an appointment JOSE Cadena

## 2020-11-30 NOTE — TELEPHONE ENCOUNTER
Corinna,    She has appt scheduled on 12/2 for MTM    Thanks  Katie Hernandez RN   [facility]

## 2020-12-02 ENCOUNTER — TELEPHONE (OUTPATIENT)
Dept: PSYCHIATRY | Facility: CLINIC | Age: 60
End: 2020-12-02

## 2020-12-02 ENCOUNTER — VIRTUAL VISIT (OUTPATIENT)
Dept: PHARMACY | Facility: CLINIC | Age: 60
End: 2020-12-02
Payer: COMMERCIAL

## 2020-12-02 DIAGNOSIS — F31.76 BIPOLAR DISORDER, IN FULL REMISSION, MOST RECENT EPISODE DEPRESSED (H): Primary | ICD-10-CM

## 2020-12-02 DIAGNOSIS — Z78.9 TAKES DIETARY SUPPLEMENTS: ICD-10-CM

## 2020-12-02 PROCEDURE — 99607 MTMS BY PHARM ADDL 15 MIN: CPT | Mod: TEL | Performed by: PHARMACIST

## 2020-12-02 PROCEDURE — 99605 MTMS BY PHARM NP 15 MIN: CPT | Mod: TEL | Performed by: PHARMACIST

## 2020-12-02 RX ORDER — BIOTIN 1 MG
1000 TABLET ORAL DAILY
COMMUNITY
End: 2021-06-19

## 2020-12-02 NOTE — Clinical Note
Jono Kirk-  You have an intake scheduled with this patient on 2/17/21.  She has very misaligned med list with her reported clinical history, that I can gather. Very low lithium and olanzapine with nefazodone; report of Bipolar dx but no recall of any (hypo)christy.  I'm working with PCP to try to improve her depression prior to her visit with you for some diagnostic clarity.  Is there anyway you have time to see her prior to 2/17?    Thanks!  Anushka

## 2020-12-02 NOTE — TELEPHONE ENCOUNTER
[facility]      Patient Name:  Zuly Wheeler  /Age:  1960 (60 year old)      Intervention: Called patient to follow up on Mental Health Referral placed by ANDREA Yeung CNP, on 20. Due to estimated wait time of 6-8 months, patient was informed this clinic is unable to accept new patients at this time. Patient was given the phone number for [facility] to look into sooner scheduling options at other [place] locations.    Status of Referral: Referral removed from work queue.    Plan: No further action needed at this time.      Angelina Matthews,     [facility]

## 2020-12-02 NOTE — Clinical Note
Jono Weinstein,    This is puzzling clinical picture, with very little records and no patient recall of any christy in her past, though she has been on multiple mood stabilizers. I would ideally like to discontinue olanzapine, but think that we should focus on improving depression first. I'm hesitant to change her antidepressant before having a therapeutic lithium level just in case of Bipolar, though I'm not even sure she needs lithium. So- what do you think about increasing lithium a bit? I'd like to go up to 600mg, but with her previous tolerability concerns, increasing by 150mg might be best (even though anticipated Li level is not likely be therapeutic).  Let me know your thoughts!  Thank you!!  Anushka

## 2020-12-02 NOTE — PROGRESS NOTES
"MTM ENCOUNTER  SUBJECTIVE/OBJECTIVE:                           Zuly Wheeler is a 60 year old female called for an initial visit. She was referred to me from Corinna Gifford.      Reason for visit: \"I feel like I'm just existing.\"    Allergies/ADRs: Reviewed in chart  Tobacco: She reports that she quit smoking about 30 years ago. Her smoking use included cigarettes. She quit after 30.00 years of use. She has never used smokeless tobacco.  Alcohol: not currently using  Past Medical History: Reviewed in chart      Medication Adherence/Access: no issues reported; takes all meds at night    Mental Health: Patient reported that someone has told her she has \"depression, anxiety, and mild Bipolar\".  Reviewed christy symptoms, including decreased need for sleep, racing thoughts, increased energy and goal directed behavior, increased risk taking behavior/spending, and pt denied every having these symptoms.  She does sometimes have racing thoughts, but notes they are more related to anxiety.  She has a cousin with Bipolar Disorder and noted that she has not ever had similar symptoms.Per [facility] psych intake note in chart from 6/30/2020, \"Pt notes that she was told she has Bipolar since she 'goes very low,' referring to depression.\"  Pt has been on the following medications \"for many, many years.\" At the referenced [facility] visit, nefazodone was increased from 150mg to 200mg to target worsening depression after recent breakup of romantic relationship, but felt that it made her feel more anxious and edgy, so dose was reduced back to 150mg. (No other records available).    Pt is currently prescribed lithium IR 300mg at bedtime, nefazodone 150mg at bedtime, olanzapine 2.5mg at bedtime, and hydroxyzine 25mg TID prn (not taking).  She noted that she recently was able to  a 90 day supply of nefazodone just a couple days ago, so has enough through end of February.  She is aware that nefazodone will no longer be " "manufactured.    Pt reported that a significant romantic relationship ended about 7 months ago, which worsened her depression, though she is starting to feel better about it. She is currently working part time, but is looking for different work and has been interviewing, which has been somewhat stressful.  Pt discussed a somewhat cyclical aspect to her mood, in that she will have a \"good mood\" for no longer than one month before she begins to feel \"too tired\" and more depressed, which usually lasts 1-2 weeks. She feels that more severe depressed moods usually last longer, but mild depressed mood may just last for a few days. Sometimes the lower moods are tolerable and \"more easy to get through,\" but she feels that she has been having depressed mood more often than \"normal good mood.\" She does notice a seasonal component to more depressed moods and sometimes has \"less will to live,\" but denied any SI during those times. She sometimes feels \"panicky\" or afraid to drive about once weekly, where she feels heart palpitations, but does not feel that are \"full panic attacks.\"  She recalls having a panic attack with loss of consciousness in her 30s after buspirone was discontinued. She feels that she has more side effects than normal, which she thinks might be why her current meds are prescribed at low doses.  She does report falling asleep easily, only waking to urinate is able to quickly fall back too sleep. She feels rested upon waking.  She endorsed having dry mouth over the last two months, but has had no med changes during that time. Denied any tremor, dyskinesia.  She sees therapist once weekly and has learned breathing techniques, positive self talk, vision board, and journaling.  She finds that efficacy of these modalities depends on the day.  Patient noted that her overall mood is described as \"nervous, down, depressed, anxious, edgy, lonely, racing thoughts, worried, panicky.\"    Per chart:  3/2013-11/2015- " "Lithium 300mg BID, but did not recall details (\"jittery\" per chart)    7/28/20: Li 0.31mmol/L    Lab Results   Component Value Date    TSH 1.38 07/28/2020     Lab Results   Component Value Date    A1C 5.6 01/09/2019    A1C 5.5 12/04/2013     Lab Results   Component Value Date    CHOL 176 07/28/2020     Lab Results   Component Value Date    HDL 68 07/28/2020     Lab Results   Component Value Date    LDL 94 07/28/2020     Lab Results   Component Value Date    TRIG 69 07/28/2020     Lab Results   Component Value Date    CHOLHDLRATIO 2.8 11/12/2015     Allergies in Epic are descriptive of side effects, though patient did not recall specific details of these trials.     Allergies   Allergen Reactions     Depakote      groggy     Lamotrigine      Intolerance, numb (Lamictal)     Olanzapine Other (See Comments)     Only generic/ineffective  Ineffective (only generic)     Seroquel [Quetiapine Fumarate] Visual Disturbance     Blurred vision; jumpy     Sulfa Drugs Hives     Trileptal Visual Disturbance     Blurred vision     Zoloft Other (See Comments)     jumpy     Citalopram Anxiety     Intolerance (Celexa)       Supplements: Pt takes biotin 1000mcg once daily, cyanocobalamin 1000mcg once daily, calcium + D 500/1000mg once daily, and Vitamin D 400 units once daily.  Denied any side effects.  Pt reported having realized that she has two Vitamin D supplements while reviewing during this visit. Pt wonders if she could take a multivitamin instead of calcium and vitamin D.    ASSESSMENT:                              Medication Adherence: No issues identified    Mental Health: Diagnostic clarity will be helpful at upcoming psychiatry intake with Dr. Yelena Macario ([facility]) on 2/17/21 to more appropriately treat her condition and symptoms. Pt remains on very low dose lithium and olanzapine and benefit from either is unclear.  As nefazodone is no longer available, will need to find alternate treatment prior to " "exhausting supply at end of February. Nefazodone has a unique mechanism of action, blocking serotonin and norepinephrine reuptake in addition to blocking 5HT2 and alpha 1 receptors.  Somewhat similar mechanisms may be found in mirtazapine, trazodone, or vilazodone, though per [facility] records since 2011, patient has been on her current medication regimen since that time.  Pt may derive value from a more tolerable/affordable option such as SSRI or SNRI, pending diagnostic clarity, as mentioned above.    Though a low dose, olanzapine may contribute to akathisia/anxiety and its indication is unclear. Lithium is also unlikely to be providing benefit at such low dose and serum level, though previous records indicate pt being \"jittery\" at higher doses. Metabolic monitoring (lipids, glucose) up to date (7/2020) and WNL. Lithium level has been below typical therapeutic range of 0.6-1.2mmol/L since first reported in EHR in 2011 (highest 0.4mmol/L 7/2013).    As patient has tried multiple mood stabilizers in the past (Depakote, Lamictal, Trileptal) that seems to indicate previous mood disorder diagnosis, I'm hesitant to change her antidepressant right now without adequate mood stabilization from lithium due to potential for worsening symptoms. Consider first increasing lithium dose to 450mg at bedtime with close follow up. May then adjust lithium as needed, followed by switching antidepressant. Consider discontinuing olanzapine in the future. Discussed need for lithium level monitoring with dose adjustments, which patient thought would be doable.    Supplements: Reviewed that multivitamins vary in their amounts of each ingredient, so she should check the labels.  Discussed dietary intake recommendations of calcium 1200mg/Vitamin 600 units daily for her age group, from either dietary or supplement sources. Pt acknowledged understanding and plans to get multivitamin when she runs out of calcium/D.    PLAN:                        "     1. Consider increasing lithium from 300mg to 450mg at bedtime.  2. Pt will switch calcium+D to multivitamin at next refill.    I spent 60 minutes with this patient today. A copy of the visit note was provided to the patient's primary care and upcoming psychiatry provider.    Will follow up in 1-2 weeks after dose change.    The patient was given a summary of these recommendations.     Irwin NormanD  Medication Therapy Management Pharmacist  [facility]  Phone: [phone]    Patient consented to a telehealth visit: yes  Telemedicine Visit Details  Type of service:  Telephone visit  Start Time: 1:30pm  End Time: 2:30pm  Originating Location (patient location): [place]  Distant Location (provider location):  [facility]  Mode of Communication:  Telephone

## 2020-12-02 NOTE — Clinical Note
Thanks so much! She is going to increase lithium to 450mg starting tonight and get blood draw next week (I placed all necessary orders).  Ok to continue dose titration as tolerated?  I put in a community mental health referral, as Dr. Macario had indicated that the community options seemed to have sooner availability.    -Anushka

## 2020-12-10 RX ORDER — LITHIUM CARBONATE 150 MG/1
150 CAPSULE ORAL AT BEDTIME
Qty: 30 CAPSULE | Refills: 0 | Status: SHIPPED | OUTPATIENT
Start: 2020-12-10 | End: 2020-12-21 | Stop reason: DRUGHIGH

## 2020-12-10 RX ORDER — LITHIUM CARBONATE 150 MG/1
150 CAPSULE ORAL AT BEDTIME
Qty: 30 CAPSULE | Refills: 0 | Status: SHIPPED | OUTPATIENT
Start: 2020-12-10 | End: 2020-12-10

## 2020-12-10 NOTE — PROGRESS NOTES
Discussed with PCP, who is in agreement with lithium dose increase from 300mg to 450mg at bedtime.  Spoke with patient, who is still agreeable to the above change.  Discussed potential side effects and lithium level monitoring.  Pt able to get blood draw on Friday, 12/18 at 8am (12 hours after dose) and will call [facility] to schedule draw.  Follow up appointment scheduled with this provider for 12/21/20.    Discussed psychiatry referral and patient interested in exploring community options for potential sooner appointment than is available within [facility] (2/17/21- currently scheduled). Sooner appointment would be helpful for diagnostic clarity and to help guide next steps in terms of antidepressant/mood stabilizer management prior to exhausting nefazodone supply at end of February 2021.

## 2020-12-18 DIAGNOSIS — F31.76 BIPOLAR DISORDER, IN FULL REMISSION, MOST RECENT EPISODE DEPRESSED (H): ICD-10-CM

## 2020-12-18 LAB — LITHIUM SERPL-SCNC: 0.45 MMOL/L (ref 0.6–1.2)

## 2020-12-18 PROCEDURE — 80178 ASSAY OF LITHIUM: CPT | Performed by: NURSE PRACTITIONER

## 2020-12-18 PROCEDURE — 36415 COLL VENOUS BLD VENIPUNCTURE: CPT | Performed by: NURSE PRACTITIONER

## 2020-12-21 ENCOUNTER — VIRTUAL VISIT (OUTPATIENT)
Dept: PHARMACY | Facility: CLINIC | Age: 60
End: 2020-12-21
Payer: COMMERCIAL

## 2020-12-21 DIAGNOSIS — F31.76 BIPOLAR DISORDER, IN FULL REMISSION, MOST RECENT EPISODE DEPRESSED (H): Primary | ICD-10-CM

## 2020-12-21 PROCEDURE — 99606 MTMS BY PHARM EST 15 MIN: CPT | Mod: TEL | Performed by: PHARMACIST

## 2020-12-21 PROCEDURE — 99607 MTMS BY PHARM ADDL 15 MIN: CPT | Mod: TEL | Performed by: PHARMACIST

## 2020-12-21 RX ORDER — LITHIUM CARBONATE 300 MG
600 TABLET ORAL AT BEDTIME
Qty: 60 TABLET | Refills: 0 | Status: SHIPPED | OUTPATIENT
Start: 2020-12-21 | End: 2021-01-13 | Stop reason: DRUGHIGH

## 2020-12-21 NOTE — PROGRESS NOTES
"MTM ENCOUNTER  SUBJECTIVE/OBJECTIVE:                           Zuly Wheeler is a 60 year old female called for a follow-up visit. She was referred to me from Darby Gifford.  Today's visit is a follow-up MTM visit from 12/2/20.     Reason for visit: lithium check    Allergies/ADRs: Reviewed in chart  Tobacco: She reports that she quit smoking about 30 years ago. Her smoking use included cigarettes. She quit after 30.00 years of use. She has never used smokeless tobacco.  Alcohol: not currently using  Past Medical History: Reviewed in chart    Medication Adherence/Access: no issues reported    Mental Health: On 12/10/20, patient increased lithium IR from 300mg to 450mg at bedtime and has serum level checked on 12/18/20 at 8am, 12 hours after her dose. Level returned as 0.45mmol/L, as expected based on previous value.    Today, patient reported feeling \"emotional numbness,\" described as \"a drab feeling.\"  She noted that soon after increasing lithium, she noticed that she felt less panicky, but then started feeling \"more and more drab.\"  Pt did note that she has felt more tired, but the \"numbness\" feels separate from that. She noted that she is still feeling like she is \"just existing,\" as reported at first visit and added that her \"nothingness feels deeper.\" She is physically going to work and is around others, but doesn't feel like having conversations with them.  She does feel more tired when she wakes up as compared to previously, but is sleeping well overnight.  She had one instance of stomach upset last night after she took the medication, but has otherwise tolerated the dose well.  She stated that she still feels some anxiety, but that the dose increase took away some \"edginess\" and, when it does occur, it doesn't escalate as it did prior to the dose increase. Denied SI today.  She reported having been hospitalized overnight in the 1990s after cutting her wrists, then was discharged to an outpatient " "program the next day  11/4/2010: Notes in Care Everywhere indicate a possible 72 hour hold at [facility] due to depression    Pt noted that she did receive a call related to the community psychiatry referral, though wait list was 6-8 months long so she declined and has kept her appointment with Dr. Macario on 2/17/21.    Per chart:  3/2013-11/2015- Lithium 300mg BID, but did not recall details (\"jittery\" per chart); also reported feeling \"jittery\" from increased nefazodone    12/18/20: 0.45mmol/L (450mg steady state trough)  7/28/20: Li 0.31mmol/L (300mg steady state trough)      ASSESSMENT:                              Medication Adherence: No issues identified    Mental Health: As depression v. Bipolar disorder diagnosis has not been confirmed, safest option is to increase lithium dose, as level remains subtherapeutic.  As patient's anxiety has already improved, may continue to see improvement with higher dose. Discussed patient concerns regarding perceived worsening mood and mutually decided that increasing dose to achieve a therapeutic level would be a reasonable step to determine any additional benefit.  Since both lithium levels were directly correlated with dose in a linear fashion, will forego blood level monitoring with this dose change, expected to achieve ~0.6mmol/L concentration.  If further dose increases occur, will check level after that time.    As noted previously, would prefer to have improved mood stabilization prior to running out of nefazodone or switching to a new antidepressant.  Short term goal is to achieve more stable mood and improve anxiety prior to psychiatry visit on 2/17/21, at which time further future planning can be done. Long term goals include diagnostic clarification, and depending upon that outcome, discontinuation of olanzapine and antidepressant adjustment.     PLAN:                            1. Increase lithium IR from 450mg to 600mg at bedtime. Pt thought she would start " later this week. Rx sent.    I spent 30 minutes with this patient today. All changes were made via verbal approval with Darby Gifford. A copy of the visit note was provided to the patient's primary care provider.    Will follow up in 3 weeks to allow for ~ 2 weeks at steady state concentration.    The patient was given a summary of these recommendations.     Anushka Zhu, PharmD  Medication Therapy Management Pharmacist  [facility]  Phone: [phone]    Patient consented to a telehealth visit: yes  Telemedicine Visit Details  Type of service:  Telephone visit  Start Time: 8:30am  End Time: 9:00am  Originating Location (patient location): Home  Distant Location (provider location):  [facility]  Mode of Communication:  Telephone

## 2020-12-21 NOTE — Clinical Note
We increased to 600mg today.  She was a bit hesitant, but agreeable to starting later this week.  I'll then see her 1/13 when she's had about 2 weeks at steady state.  Thank you!

## 2020-12-30 ENCOUNTER — TELEPHONE (OUTPATIENT)
Dept: PHARMACY | Facility: CLINIC | Age: 60
End: 2020-12-30

## 2020-12-30 NOTE — TELEPHONE ENCOUNTER
"Received call from patient that pharmacy had indicated they could get more nefazodone 150mg.  Placed call to [facility] ([phone]) indicated that they still have #60 nefazodone 150mg and #700 nefazodone 100mg in stock.  Pharmacist is holding the 150mg tablets for this patient, which can be refilled on 1/30/21.  With that refill, patient will have enough medication through April, which will provide more security in a taper to a new medication.    Called patient with update and she marked the refill date in her calendar.  She noted that, instead of increasing lithium to 600mg at last visit, she decreased it to 300mg.  Pt stated that she is \"feeeling better emotionally\" and is feeling more \"lively.\"  Advised that she continue with 300mg lithium dose.  Will follow up at next scheduled visit on 1/13/21.    "

## 2021-01-04 DIAGNOSIS — F41.1 ANXIETY STATE: ICD-10-CM

## 2021-01-04 DIAGNOSIS — F31.76 BIPOLAR DISORDER, IN FULL REMISSION, MOST RECENT EPISODE DEPRESSED (H): ICD-10-CM

## 2021-01-05 NOTE — TELEPHONE ENCOUNTER
"Requested Prescriptions   Pending Prescriptions Disp Refills     nefazodone (SERZONE) 150 MG tablet 90 tablet 3     Sig: Take 1 tablet (150 mg) by mouth daily       Serotonin Modulators Passed - 1/4/2021 11:35 AM        Passed - Normal ALT in past 12 months     Recent Labs   Lab Test 07/28/20 0927   ALT 18             Passed - Normal AST in past 12 months     Recent Labs   Lab Test 07/28/20 0927   AST 11             Passed - Recent (12 mo) or future (30 days) visit within the authorizing provider's specialty     Patient has had an office visit with the authorizing provider or a provider within the authorizing providers department within the previous 12 mos or has a future within next 30 days. See \"Patient Info\" tab in inbasket, or \"Choose Columns\" in Meds & Orders section of the refill encounter.              Passed - Medication is active on med list        Passed - Patient is age 18 or older        Passed - No active pregnancy on record        Passed - No positive pregnancy test in past 12 months           Refused Prescriptions:                       Disp   Refills    nefazodone (SERZONE) 150 MG tablet         0.1 ta*0        Sig: Take 1 tablet (150 mg) by mouth daily  Refused By: BROOKLYN BUNDY  Reason for Refusal: Duplicate    [facility] [place] [place] [facility] [address]. - refills at this location contact pharmacy if patient wants to transfer  "

## 2021-01-13 ENCOUNTER — VIRTUAL VISIT (OUTPATIENT)
Dept: PHARMACY | Facility: CLINIC | Age: 61
End: 2021-01-13
Payer: COMMERCIAL

## 2021-01-13 DIAGNOSIS — Z78.9 TAKES DIETARY SUPPLEMENTS: ICD-10-CM

## 2021-01-13 DIAGNOSIS — F31.76 BIPOLAR DISORDER, IN FULL REMISSION, MOST RECENT EPISODE DEPRESSED (H): Primary | ICD-10-CM

## 2021-01-13 PROCEDURE — 99606 MTMS BY PHARM EST 15 MIN: CPT | Mod: TEL | Performed by: PHARMACIST

## 2021-01-13 RX ORDER — MULTIVIT WITH MINERALS/LUTEIN
250 TABLET ORAL DAILY
COMMUNITY
End: 2021-06-19

## 2021-01-13 RX ORDER — LITHIUM CARBONATE 300 MG
300 TABLET ORAL EVERY EVENING
COMMUNITY
End: 2021-06-16 | Stop reason: ALTCHOICE

## 2021-01-13 RX ORDER — FAMOTIDINE 20 MG
1000 TABLET ORAL DAILY
COMMUNITY
End: 2021-06-19

## 2021-01-13 RX ORDER — VITAMIN E 268 MG
400 CAPSULE ORAL DAILY
COMMUNITY
End: 2021-06-19

## 2021-01-13 NOTE — PROGRESS NOTES
"Medication Therapy Management (MTM) Encounter    ASSESSMENT:                            Medication Adherence/Access: No issues identified    Mental Health: As symptoms are generally stable and consistent, and patient comfortable with current treatment, continue current medication for next month until psych appointment. Pt will obtain nefazodone refills per previous note.  Supplements: Stable. Continue current medications.    PLAN:                            1. Continue current medications.  2. Pt will  nefazodone refill [facility] after 1/30/21.  3. See Dr. Macario on 2/17/21.    Follow-up: pending plan from psych appt (will check in after that time)    SUBJECTIVE/OBJECTIVE:                          Zuly Wheeler is a 60 year old female called for a follow-up visit. This is the first visit of 2021. She was referred to me from Darby Gifford.   Today's visit is a follow-up MTM visit from 12/21/20.     Reason for visit: med check in     Allergies/ADRs: Reviewed in chart  Tobacco: She reports that she quit smoking about 30 years ago. Her smoking use included cigarettes. She quit after 30.00 years of use. She has never used smokeless tobacco.  Alcohol: not currently using  Past Medical History: Reviewed in chart    Medication Adherence/Access: no issues reported    Mental Health: Pt currently taking lithium IR 300mg, nefazodone 150mg, and olanzapine 2.5mg every evening.  At last visit, patient planned to increase lithium IR from 450mg to 600mg in effort to target depression improvement, but instead decided to reduce back to 300mg daily due to feeling \"emotionally numb.\"  Today, she is \"having more feeling\" with the reduced dose, but might feel a little more \"on edge.\"  Overall she stated that things \"are feeling ok,\" but would like to feel more upbeat. She would like to do things after work, but is not interested in doing so and has low energy on some days. She is comfortable with current medications " while awaiting psych consult to help direct diagnosis and appropriate med adjustments. She is looking for another job, which has been challenging and feels it is also affecting her mood. Denied any SI and feels safe. Denied other medication side effects.  Pt still has psych appointment on 2/17/21 with Dr. Macario and indicated that she is still awaiting hearing from another clinic about possible intake appointment.  Reviewed recent refill request for nefazodone at [facility], which seems to have been in error and possibly automatically sent by pharmacy. Pt confirmed that she still has ~6 weeks supply and will be getting additional refills at [facility] at the end of January (see last visit notes for details).    12/18/20: 0.45mmol/L (450mg steady state trough)  7/28/20: Li 0.31mmol/L (300mg steady state trough)    Supplement: Pt is taking biotin 1000mcg daily, B12 1000mcg daily, Vitamin C 250mg daily, Vitamin D 1000 units daily, and Vitamin E 400 units once daily.  Pt denied side effects and would like to continue taking each of these supplements.    ----------------    I spent 40 minutes with this patient today. A copy of the visit note was provided to the patient's primary care provider.    The patient was given a summary of these recommendations.     Anushka Zhu, PharmD  Medication Therapy Management Pharmacist  [facility]  Phone: [phone]    Telemedicine Visit Details  Type of service:  Telephone visit  Start Time: 12:33pm  End Time: 1:13pm  Originating Location (patient location): Home  Distant Location (provider location):  [facility]

## 2021-02-01 ENCOUNTER — TELEPHONE (OUTPATIENT)
Dept: FAMILY MEDICINE | Facility: CLINIC | Age: 61
End: 2021-02-01

## 2021-02-01 DIAGNOSIS — F31.76 BIPOLAR DISORDER, IN FULL REMISSION, MOST RECENT EPISODE DEPRESSED (H): ICD-10-CM

## 2021-02-01 DIAGNOSIS — F41.1 ANXIETY STATE: ICD-10-CM

## 2021-02-01 NOTE — TELEPHONE ENCOUNTER
Per pharmacy:   nefazodone (SERZONE) 150 MG tablet is no longer available. Please send a new Rx to replace.  Thanks

## 2021-02-03 NOTE — TELEPHONE ENCOUNTER
Nefazodone has been discontinued and patient is working with [facility] to take lithium.  JOSE Fonseca

## 2021-02-05 ENCOUNTER — VIRTUAL VISIT (OUTPATIENT)
Dept: FAMILY MEDICINE | Facility: CLINIC | Age: 61
End: 2021-02-05
Payer: COMMERCIAL

## 2021-02-05 DIAGNOSIS — R51.9 ACUTE NONINTRACTABLE HEADACHE, UNSPECIFIED HEADACHE TYPE: ICD-10-CM

## 2021-02-05 DIAGNOSIS — R51.9 ACUTE NONINTRACTABLE HEADACHE, UNSPECIFIED HEADACHE TYPE: Primary | ICD-10-CM

## 2021-02-05 DIAGNOSIS — R09.81 NASAL CONGESTION: ICD-10-CM

## 2021-02-05 PROCEDURE — U0005 INFEC AGEN DETEC AMPLI PROBE: HCPCS | Performed by: PHYSICIAN ASSISTANT

## 2021-02-05 PROCEDURE — 99213 OFFICE O/P EST LOW 20 MIN: CPT | Mod: 95 | Performed by: PHYSICIAN ASSISTANT

## 2021-02-05 PROCEDURE — U0003 INFECTIOUS AGENT DETECTION BY NUCLEIC ACID (DNA OR RNA); SEVERE ACUTE RESPIRATORY SYNDROME CORONAVIRUS 2 (SARS-COV-2) (CORONAVIRUS DISEASE [COVID-19]), AMPLIFIED PROBE TECHNIQUE, MAKING USE OF HIGH THROUGHPUT TECHNOLOGIES AS DESCRIBED BY CMS-2020-01-R: HCPCS | Performed by: PHYSICIAN ASSISTANT

## 2021-02-05 NOTE — PROGRESS NOTES
"Zuly is a 60 year old who is being evaluated via a billable telephone visit.      What phone number would you like to be contacted at? [phone]  How would you like to obtain your AVS? Mail a copy  Assessment & Plan       ICD-10-CM    1. Acute nonintractable headache, unspecified headache type  R51.9 Symptomatic COVID-19 Virus (Coronavirus) by PCR   2. Nasal congestion  R09.81 Symptomatic COVID-19 Virus (Coronavirus) by PCR              Patient Instructions   Discharge Instructions for COVID-19 Patients  You have--or may have--COVID-19. Please follow the instructions listed below.   If you have a weakened immune system, discuss with your doctor any other actions you need to take.  How can I protect others?  If you have symptoms (fever, cough, body aches or trouble breathing):    Stay home and away from others (self-isolate) until:  ? Your other symptoms have resolved (gotten better). And   ? You've had no fever--and no medicine that reduces fever--for 1 full day (24 hours). And   ? At least 10 days have passed since your symptoms started. (You may need to wait 20 days. Follow the advice of your care team.)  If you don't show symptoms, but testing showed that you have COVID-19:    Stay home and away from others (self-isolate) until at least 10 days have passed since the date of your first positive COVID-19 test.  During this time    Stay in your own room, even for meals. Use your own bathroom if you can.    Stay away from others in your home. No hugging, kissing or shaking hands. No visitors.    Don't go to work, school or anywhere else.    Clean \"high touch\" surfaces often (doorknobs, counters, handles). Use household cleaning spray or wipes.    You'll find a full list of  on the EPA website: www.epa.gov/pesticide-registration/list-n-disinfectants-use-against-sars-cov-2.    Cover your mouth and nose with a mask or other face covering to avoid spreading germs.    Wash your hands and face often. Use " soap and water.    Caregivers in these groups are at risk for severe illness due to COVID-19:  ? People 65 years and older  ? People who live in a nursing home or long-term care facility  ? People with chronic disease (lung, heart, cancer, diabetes, kidney, liver, immunologic)  ? People who have a weakened immune system, including those who:    Are in cancer treatment    Take medicine that weakens the immune system, such as corticosteroids    Had a bone marrow or organ transplant    Have an immune deficiency    Have poorly controlled HIV or AIDS    Are obese (body mass index of 40 or higher)    Smoke regularly    Caregivers should wear gloves while washing dishes, handling laundry and cleaning bedrooms and bathrooms.    Use caution when washing and drying laundry: Don't shake dirty laundry and use the warmest water setting that you can.    For more tips on managing your health at home, go to www.cdc.gov/coronavirus/2019-ncov/downloads/10Things.pdf.  How can I take care of myself at home?  1. Get lots of rest. Drink extra fluids (unless a doctor has told you not to).  2. Take Tylenol (acetaminophen) for fever or pain. If you have liver or kidney problems, ask your family doctor if it's okay to take Tylenol.   Adults can take either:   ? 650 mg (two 325 mg pills) every 4 to 6 hours, or   ? 1,000 mg (two 500 mg pills) every 8 hours as needed.  ? Note: Don't take more than 3,000 mg in one day. Acetaminophen is found in many medicines (both prescribed and over-the-counter medicines). Read all labels to be sure you don't take too much.   For children, check the Tylenol bottle for the right dose. The dose is based on the child's age or weight.  3. If you have other health problems (like cancer, heart failure, an organ transplant or severe kidney disease): Call your specialty clinic if you don't feel better in the next 2 days.  4. Know when to call 911. Emergency warning signs include:  ? Trouble breathing or shortness of  breath  ? Pain or pressure in the chest that doesn't go away  ? Feeling confused like you haven't felt before, or not being able to wake up  ? Bluish-colored lips or face  5. Your doctor may have prescribed a blood thinner medicine. Follow their instructions.  Where can I get more information?    [facility] - About COVID-19:   [email or web address]    CDC - What to Do If You're Sick: www.cdc.gov/coronavirus/2019-ncov/about/steps-when-sick.html    CDC - Ending Home Isolation: www.cdc.gov/coronavirus/2019-ncov/hcp/disposition-in-home-patients.html    [facility] - Caring for Someone: www.cdc.gov/coronavirus/2019-ncov/if-you-are-sick/care-for-someone.html    [facility] - Interim Guidance for Hospital Discharge to Home: www.health.[facility].[place]./diseases/coronavirus/hcp/hospdischarge.pdf    Below are the COVID-19 hotlines at the [place] Department of Health ([facility]). Interpreters are available.  ? For health questions: Call 030-470-1252 or 1-661.363.2852 (7 a.m. to 7 p.m.)  ? For questions about schools and childcare: Call 792-201-5273 or 1-462.461.6223 (7 a.m. to 7 p.m.)    For informational purposes only. Not to replace the advice of your health care provider. Clinically reviewed by Dr. Sha Bello.   Copyright   2020 [employer]. All rights reserved. [employer] 323612 - REV 01/05/21.        No follow-ups on file.    Hailey Lanier PA-C  [facility] YAMILET Caruso is a 60 year old who presents to clinic today for the following health issues     HPI         Concern for COVID-19  About how many days ago did these symptoms start? Over a week   Is this your first visit for this illness? No - has been seen in the past for this reason .  How would you describe your symptoms since your last visit? My symptoms have worsened  In the 14 days before your symptoms started, have you had close contact with someone with COVID-19 (Coronavirus)? No  Do you have a fever or  chills? I do not know- does not feel feverish today   Are you having new or worsening difficulty breathing? No  Do you have new or worsening cough? Yes, it's a dry cough.   Have you had any new or unexplained body aches? YES    Have you experienced any of the following NEW symptoms?    Headache: YES    Sore throat: No    Loss of taste or smell: No    Chest pain: No    Diarrhea: No    Rash: No  What treatments have you tried? Tylenol for the headaches and sudafed   Who do you live with? Her brother .  Are you, or a household member, a healthcare worker or a ? No  Do you live in a nursing home, group home, or shelter? No  Do you have a way to get food/medications if quarantined? Yes, I have a friend or family member who can help me.    Reports for about a week her head has been borthing her. A cough has accumulated over the last week  Has more congestion and pressure in her head  Pain in her right shoulder  All symptoms are not getting better    She is a  and sees people face to face  None of the people she's worked with has had it    Review of Systems   CONSTITUTIONAL: NEGATIVE for fever, chills, change in weight  ENT/MOUTH: NEGATIVE for hoarseness, rhinorrhea-purulent and tooth pain  RESP:NEGATIVE for hemoptysis and wheezing  CV: NEGATIVE for chest pain, palpitations or peripheral edema  GI: NEGATIVE for nausea, abdominal pain, heartburn, or change in bowel habits  PSYCHIATRIC: NEGATIVE for changes in mood or affect      Objective           Vitals:  No vitals were obtained today due to virtual visit.    Physical Exam   alert and no distress, sounds stuffy and is clearing her throat often  PSYCH: Alert and oriented times 3; coherent speech, normal   rate and volume, able to articulate logical thoughts, able   to abstract reason, no tangential thoughts, no hallucinations   or delusions  Her affect is normal  RESP: No cough, no audible wheezing, able to talk in full sentences  Remainder of exam  unable to be completed due to telephone visits                Phone call duration: 11 minutes

## 2021-02-05 NOTE — PATIENT INSTRUCTIONS
"Discharge Instructions for COVID-19 Patients  You have--or may have--COVID-19. Please follow the instructions listed below.   If you have a weakened immune system, discuss with your doctor any other actions you need to take.  How can I protect others?  If you have symptoms (fever, cough, body aches or trouble breathing):    Stay home and away from others (self-isolate) until:  ? Your other symptoms have resolved (gotten better). And   ? You've had no fever--and no medicine that reduces fever--for 1 full day (24 hours). And   ? At least 10 days have passed since your symptoms started. (You may need to wait 20 days. Follow the advice of your care team.)  If you don't show symptoms, but testing showed that you have COVID-19:    Stay home and away from others (self-isolate) until at least 10 days have passed since the date of your first positive COVID-19 test.  During this time    Stay in your own room, even for meals. Use your own bathroom if you can.    Stay away from others in your home. No hugging, kissing or shaking hands. No visitors.    Don't go to work, school or anywhere else.    Clean \"high touch\" surfaces often (doorknobs, counters, handles). Use household cleaning spray or wipes.    You'll find a full list of  on the EPA website: www.epa.gov/pesticide-registration/list-n-disinfectants-use-against-sars-cov-2.    Cover your mouth and nose with a mask or other face covering to avoid spreading germs.    Wash your hands and face often. Use soap and water.    Caregivers in these groups are at risk for severe illness due to COVID-19:  ? People 65 years and older  ? People who live in a nursing home or long-term care facility  ? People with chronic disease (lung, heart, cancer, diabetes, kidney, liver, immunologic)  ? People who have a weakened immune system, including those who:    Are in cancer treatment    Take medicine that weakens the immune system, such as corticosteroids    Had a bone marrow or organ " transplant    Have an immune deficiency    Have poorly controlled HIV or AIDS    Are obese (body mass index of 40 or higher)    Smoke regularly    Caregivers should wear gloves while washing dishes, handling laundry and cleaning bedrooms and bathrooms.    Use caution when washing and drying laundry: Don't shake dirty laundry and use the warmest water setting that you can.    For more tips on managing your health at home, go to www.cdc.gov/coronavirus/2019-ncov/downloads/10Things.pdf.  How can I take care of myself at home?  1. Get lots of rest. Drink extra fluids (unless a doctor has told you not to).  2. Take Tylenol (acetaminophen) for fever or pain. If you have liver or kidney problems, ask your family doctor if it's okay to take Tylenol.   Adults can take either:   ? 650 mg (two 325 mg pills) every 4 to 6 hours, or   ? 1,000 mg (two 500 mg pills) every 8 hours as needed.  ? Note: Don't take more than 3,000 mg in one day. Acetaminophen is found in many medicines (both prescribed and over-the-counter medicines). Read all labels to be sure you don't take too much.   For children, check the Tylenol bottle for the right dose. The dose is based on the child's age or weight.  3. If you have other health problems (like cancer, heart failure, an organ transplant or severe kidney disease): Call your specialty clinic if you don't feel better in the next 2 days.  4. Know when to call 911. Emergency warning signs include:  ? Trouble breathing or shortness of breath  ? Pain or pressure in the chest that doesn't go away  ? Feeling confused like you haven't felt before, or not being able to wake up  ? Bluish-colored lips or face  5. Your doctor may have prescribed a blood thinner medicine. Follow their instructions.  Where can I get more information?     [facility] - About COVID-19:   [email or web address]    CDC - What to Do If You're Sick:  www.cdc.gov/coronavirus/2019-ncov/about/steps-when-sick.html    CDC - Ending Home Isolation: www.cdc.gov/coronavirus/2019-ncov/hcp/disposition-in-home-patients.html    CDC - Caring for Someone: www.cdc.gov/coronavirus/2019-ncov/if-you-are-sick/care-for-someone.html    [facility] - Interim Guidance for Hospital Discharge to Home: www.health.[facility].[place]./diseases/coronavirus/hcp/hospdischarge.pdf    Below are the COVID-19 hotlines at the [place] Department of Health ([facility]). Interpreters are available.  ? For health questions: Call 749-801-0183 or 1-383.453.6330 (7 a.m. to 7 p.m.)  ? For questions about schools and childcare: Call 956-491-0150 or 1-723.447.6632 (7 a.m. to 7 p.m.)    For informational purposes only. Not to replace the advice of your health care provider. Clinically reviewed by Dr. Sha Bello.   Copyright   2020 [facility]. All rights reserved. [employer] [id number] - REV 01/05/21.

## 2021-02-06 LAB
SARS-COV-2 RNA RESP QL NAA+PROBE: NOT DETECTED
SPECIMEN SOURCE: NORMAL

## 2021-02-17 ENCOUNTER — VIRTUAL VISIT (OUTPATIENT)
Dept: PSYCHOLOGY | Facility: CLINIC | Age: 61
End: 2021-02-17
Attending: NURSE PRACTITIONER
Payer: COMMERCIAL

## 2021-02-17 ENCOUNTER — VIRTUAL VISIT (OUTPATIENT)
Dept: PSYCHOLOGY | Facility: CLINIC | Age: 61
End: 2021-02-17
Payer: COMMERCIAL

## 2021-02-17 DIAGNOSIS — F41.1 ANXIETY STATE: Primary | ICD-10-CM

## 2021-02-17 DIAGNOSIS — F34.0 CYCLOTHYMIA: ICD-10-CM

## 2021-02-17 DIAGNOSIS — F41.9 ANXIETY: ICD-10-CM

## 2021-02-17 DIAGNOSIS — F34.1 DYSTHYMIC DISORDER: ICD-10-CM

## 2021-02-17 DIAGNOSIS — F34.0 CYCLOTHYMIA: Primary | ICD-10-CM

## 2021-02-17 PROCEDURE — 90791 PSYCH DIAGNOSTIC EVALUATION: CPT | Mod: 95 | Performed by: PSYCHOLOGIST

## 2021-02-17 PROCEDURE — 99204 OFFICE O/P NEW MOD 45 MIN: CPT | Mod: 95 | Performed by: PSYCHIATRY & NEUROLOGY

## 2021-02-17 RX ORDER — BUPROPION HYDROCHLORIDE 100 MG/1
100 TABLET, EXTENDED RELEASE ORAL DAILY
Qty: 30 TABLET | Refills: 1 | Status: SHIPPED | OUTPATIENT
Start: 2021-02-17 | End: 2021-02-23 | Stop reason: ALTCHOICE

## 2021-02-17 SDOH — SOCIAL STABILITY: SOCIAL NETWORK: IN A TYPICAL WEEK, HOW MANY TIMES DO YOU TALK ON THE PHONE WITH FAMILY, FRIENDS, OR NEIGHBORS?: NOT ASKED

## 2021-02-17 SDOH — ECONOMIC STABILITY: INCOME INSECURITY: HOW HARD IS IT FOR YOU TO PAY FOR THE VERY BASICS LIKE FOOD, HOUSING, MEDICAL CARE, AND HEATING?: NOT HARD AT ALL

## 2021-02-17 SDOH — HEALTH STABILITY: MENTAL HEALTH: HOW MANY STANDARD DRINKS CONTAINING ALCOHOL DO YOU HAVE ON A TYPICAL DAY?: NOT ASKED

## 2021-02-17 SDOH — ECONOMIC STABILITY: FOOD INSECURITY: WITHIN THE PAST 12 MONTHS, YOU WORRIED THAT YOUR FOOD WOULD RUN OUT BEFORE YOU GOT MONEY TO BUY MORE.: NEVER TRUE

## 2021-02-17 SDOH — HEALTH STABILITY: MENTAL HEALTH: HOW OFTEN DO YOU HAVE 6 OR MORE DRINKS ON ONE OCCASION?: NEVER

## 2021-02-17 SDOH — SOCIAL STABILITY: SOCIAL NETWORK
DO YOU BELONG TO ANY CLUBS OR ORGANIZATIONS SUCH AS CHURCH GROUPS UNIONS, FRATERNAL OR ATHLETIC GROUPS, OR SCHOOL GROUPS?: NOT ASKED

## 2021-02-17 SDOH — SOCIAL STABILITY: SOCIAL INSECURITY: WITHIN THE LAST YEAR, HAVE YOU BEEN HUMILIATED OR EMOTIONALLY ABUSED IN OTHER WAYS BY YOUR PARTNER OR EX-PARTNER?: NO

## 2021-02-17 SDOH — HEALTH STABILITY: MENTAL HEALTH: HOW OFTEN DO YOU HAVE A DRINK CONTAINING ALCOHOL?: NEVER

## 2021-02-17 SDOH — HEALTH STABILITY: MENTAL HEALTH
STRESS IS WHEN SOMEONE FEELS TENSE, NERVOUS, ANXIOUS, OR CAN'T SLEEP AT NIGHT BECAUSE THEIR MIND IS TROUBLED. HOW STRESSED ARE YOU?: RATHER MUCH

## 2021-02-17 SDOH — HEALTH STABILITY: PHYSICAL HEALTH: ON AVERAGE, HOW MANY MINUTES DO YOU ENGAGE IN EXERCISE AT THIS LEVEL?: 0 MIN

## 2021-02-17 SDOH — SOCIAL STABILITY: SOCIAL NETWORK: HOW OFTEN DO YOU ATTENT MEETINGS OF THE CLUB OR ORGANIZATION YOU BELONG TO?: NOT ASKED

## 2021-02-17 SDOH — SOCIAL STABILITY: SOCIAL NETWORK: HOW OFTEN DO YOU GET TOGETHER WITH FRIENDS OR RELATIVES?: NOT ASKED

## 2021-02-17 SDOH — HEALTH STABILITY: PHYSICAL HEALTH: ON AVERAGE, HOW MANY DAYS PER WEEK DO YOU ENGAGE IN MODERATE TO STRENUOUS EXERCISE (LIKE A BRISK WALK)?: 0 DAYS

## 2021-02-17 SDOH — SOCIAL STABILITY: SOCIAL INSECURITY
WITHIN THE LAST YEAR, HAVE YOU BEEN KICKED, HIT, SLAPPED, OR OTHERWISE PHYSICALLY HURT BY YOUR PARTNER OR EX-PARTNER?: NO

## 2021-02-17 SDOH — ECONOMIC STABILITY: FOOD INSECURITY: WITHIN THE PAST 12 MONTHS, THE FOOD YOU BOUGHT JUST DIDN'T LAST AND YOU DIDN'T HAVE MONEY TO GET MORE.: NEVER TRUE

## 2021-02-17 SDOH — SOCIAL STABILITY: SOCIAL NETWORK: HOW OFTEN DO YOU ATTEND CHURCH OR RELIGIOUS SERVICES?: NOT ASKED

## 2021-02-17 SDOH — ECONOMIC STABILITY: TRANSPORTATION INSECURITY
IN THE PAST 12 MONTHS, HAS LACK OF TRANSPORTATION KEPT YOU FROM MEETINGS, WORK, OR FROM GETTING THINGS NEEDED FOR DAILY LIVING?: NO

## 2021-02-17 SDOH — SOCIAL STABILITY: SOCIAL NETWORK: ARE YOU MARRIED, WIDOWED, DIVORCED, SEPARATED, NEVER MARRIED, OR LIVING WITH A PARTNER?: DIVORCED

## 2021-02-17 SDOH — SOCIAL STABILITY: SOCIAL INSECURITY: WITHIN THE LAST YEAR, HAVE YOU BEEN AFRAID OF YOUR PARTNER OR EX-PARTNER?: NO

## 2021-02-17 SDOH — SOCIAL STABILITY: SOCIAL INSECURITY
WITHIN THE LAST YEAR, HAVE TO BEEN RAPED OR FORCED TO HAVE ANY KIND OF SEXUAL ACTIVITY BY YOUR PARTNER OR EX-PARTNER?: NO

## 2021-02-17 SDOH — ECONOMIC STABILITY: TRANSPORTATION INSECURITY
IN THE PAST 12 MONTHS, HAS THE LACK OF TRANSPORTATION KEPT YOU FROM MEDICAL APPOINTMENTS OR FROM GETTING MEDICATIONS?: NO

## 2021-02-17 ASSESSMENT — COLUMBIA-SUICIDE SEVERITY RATING SCALE - C-SSRS
TOTAL  NUMBER OF ACTUAL ATTEMPTS LIFETIME: 1
TOTAL  NUMBER OF ABORTED OR SELF INTERRUPTED ATTEMPTS PAST LIFETIME: NO
3. HAVE YOU BEEN THINKING ABOUT HOW YOU MIGHT KILL YOURSELF?: NO
ATTEMPT LIFETIME: YES
5. HAVE YOU STARTED TO WORK OUT OR WORKED OUT THE DETAILS OF HOW TO KILL YOURSELF? DO YOU INTEND TO CARRY OUT THIS PLAN?: YES
TOTAL  NUMBER OF INTERRUPTED ATTEMPTS LIFETIME: NO
ATTEMPT PAST THREE MONTHS: NO
6. HAVE YOU EVER DONE ANYTHING, STARTED TO DO ANYTHING, OR PREPARED TO DO ANYTHING TO END YOUR LIFE?: NO
TOTAL  NUMBER OF ABORTED OR SELF INTERRUPTED ATTEMPTS PAST 3 MONTHS: NO
TOTAL  NUMBER OF INTERRUPTED ATTEMPTS PAST 3 MONTHS: NO
1. IN THE PAST MONTH, HAVE YOU WISHED YOU WERE DEAD OR WISHED YOU COULD GO TO SLEEP AND NOT WAKE UP?: NO
6. HAVE YOU EVER DONE ANYTHING, STARTED TO DO ANYTHING, OR PREPARED TO DO ANYTHING TO END YOUR LIFE?: NO
1. IN THE PAST MONTH, HAVE YOU WISHED YOU WERE DEAD OR WISHED YOU COULD GO TO SLEEP AND NOT WAKE UP?: YES
2. HAVE YOU ACTUALLY HAD ANY THOUGHTS OF KILLING YOURSELF?: NO
4. HAVE YOU HAD THESE THOUGHTS AND HAD SOME INTENTION OF ACTING ON THEM?: NO
4. HAVE YOU HAD THESE THOUGHTS AND HAD SOME INTENTION OF ACTING ON THEM?: NO
5. HAVE YOU STARTED TO WORK OUT OR WORKED OUT THE DETAILS OF HOW TO KILL YOURSELF? DO YOU INTEND TO CARRY OUT THIS PLAN?: NO
2. HAVE YOU ACTUALLY HAD ANY THOUGHTS OF KILLING YOURSELF LIFETIME?: NO

## 2021-02-17 NOTE — Clinical Note
Please call this patient to get them scheduled for a follow-up visit in 4 weeks. Please schedule with me and the [facility]. Thanks!

## 2021-02-17 NOTE — PATIENT INSTRUCTIONS
Name: Zuly Wheeler  YOB: 1960  Date: February 17, 2021   My primary care provider: Shasta Pardo  My primary care clinic: [facility]  My prescriber: Dr. Macario  Other care team support:  Therapist at [facility] in [place]   My Triggers:  Work  difficulties does not like her job     Additional People, Places, and Things that I can access for support: brother, therapist, PCP         What is important to me and makes life worth living: children, brothers .         GREEN    Good Control  1. I feel good  2. No suicidal thoughts   3. Can work, sleep and play      Action Steps  1. Self-care: balanced meals, exercising, sleep practices, etc.  2. Take your medications as prescribed.  3. Continue meetings with therapist and prescriber.  4.  Do the healthy things that I enjoy.           YELLOW  Getting Worse  I have ANY of these:  1. I do not feel good  2. Difficulty Concentrating  3. Sleep is changing  4. Increase/Change in my thoughts to hurt self and/or others, but I can still manage and not act on it.   5. Not taking care of self.  6. Staying on the couch most of the day             Action Steps (in addition to the above):  1. Inform your therapist and psychiatric prescriber/PCP.  2. Keep taking your medications as prescribed.    3. Turn to people you can ask for help.  4. Use internal coping strategies -see below.  5. Create safe environment: notify friends/family of increase in symptoms           RED  Get Help  If I have ANY of these:  1. Current and uncontrollable thoughts and/or behaviors to hurt self and/or others.    Actions to manage my safety  1. Contact your emergency person [place]  2. Call my crisis team- [facility] [phone] [facility] Emergencies  3. Or Call 911 or go to the emergency room right away  4.  Text HOME to [phone]        My Internal Coping Strategies include the following:  take a bath, belly breathing and use my coping  skills    [End for Brief Safety Plan]     Safety Concerns  How To Identify Situations That Make Your Mental Health Worse:  Triggers are things that make your mental health worse.  Look at the list below to help you find your triggers and what you can do about them.     1. Identify Early Warning Signs:    Sometimes symptoms return, even when people do their best to stay well. Symptoms can develop over a short period of time with little or no warning, but most of the time they emerge gradually over several weeks.  Early warning signs are changes that people experience when a relapse is starting. Some early warning signs are common and others are not as common.   Common Early Warning Signs:    Feeling tense or nervous, Trouble sleeping -either too much or too little sleep, Feeling depressed or low, Feeling irritable and Feeling like not being around other people     2. Identify action steps to take when warning signs are noticed:    Taking Action- It is important to take action if you are experiencing early warning signs of a relapse.  The faster you act, the more likely it is that you can avoid a full relapse.  It is helpful to identify several specific ways to cope with symptoms.      The following is my list of symptoms and coping strategies that I can use when they are present:    Symptom Coping Strategies   Anxiety -Talk with someone in your support system and let him or her know how you are feeling.  -Use relaxation techniques such as deep breathing or imagery.  -Use positive affirmations to counteract negative self-talk such as  I am learning to let go of worry.    Depression - Schedule your day; include activities you have to do and activities you enjoy doing.  - Get some exercise - walk, run, bike, or swim.  - Give yourself credit for even the smallest things you get done.   Sleep Difficulties   - Go to sleep at the same time every day.  - Do something relaxing before bed, such as drinking herbal tea or  listening to music.  - Avoid having discussions about upsetting topics before going to bed.   Delusions   - Distract yourself from the disturbing thought by doing something that requires your attention such as a puzzle.  - Check out your beliefs by talking to someone you trust.    Hallucinations   - Use headphones to listen to music.  - Tell voices to  stop  or say to yourself,  I am safe.   - Ignore the hallucinations as much as possible; focus on other things.   Concentration Difficulties - Minimize distractions so there is only one thing for you to focus on at a time.    - Ask the person you are having a conversation with to slow down or repeat things you are unsure of.
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
## 2021-02-17 NOTE — PROGRESS NOTES
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 helpful for her depression while we continue her on lithium for mood stabilization and olanzapine for mood stabilization/anxiety.  Could always consider trying carbamazepine as an alternative mood stabilizer.  [employer] testing is no longer covered for Medicare/Medicaid patients.  Could look into submitting testing for CYP2D6 pathway to see if she is sensitive to various medications due to the being an intermediate or poor metabolizer.  Could also trial other SSRIs/SNRIs.  She has had some efficacy with buspirone in the past as well.  She was encouraged to continue with individual psychotherapy for additional support and development of nonpharmacologic coping skills and strategies.    Patient on lower dose lithium therapy with last TSH summer 2020 within normal limits.  Creatinine within normal limits summer 2020 as well.  Last lithium level 0.45 in December 2020.    Medication side effects and alternatives reviewed. Health promotion activities recommended and reviewed today. All questions addressed. Education and counseling completed regarding risks and benefits of medications and  psychotherapy options. Recommend therapy for additional support.     Treatment Plan:    Continue lithium 300 mg at bedtime for mood    Continue olanzapine 2.5 mg at bedtime for mood/sleep/anxiety    Continue nefazodone 150 mg at bedtime for mood, anxiety    Start Wellbutrin  mg daily to augment nefazodone (and perhaps take its place if well-tolerated and effective)    Referral placed for medication therapy management appointment to check in regarding how Wellbutrin SR therapy is going.    Continue therapy as planned.    Continue all other medications as reviewed per electronic medical record today.     Safety plan reviewed. To the Emergency Department as needed or call after hours crisis line at [phone] or [phone]. [place] Crisis Text [place]: Text [place] to 654765  or  Suicide LifeLine Chat: suicidepreDivesquare.org/chat    Schedule an appointment with me in 4 weeks or sooner as needed.  Call [facility] at [phone] to schedule.    Follow up with primary care provider as planned or sooner if needed for acute medical concerns.    Call the psychiatric nurse line with medication questions or concerns at [phone].    [employer] may be used to communicate with your provider, but this is not intended to be used for emergencies.    Patient Education:  Serotonin syndrome (SS) has occurred with serotonergic antidepressants (eg, SSRIs, SNRIs), particularly when used in combination with other serotonergic agents (eg, triptans, TCAs, fentanyl, lithium, tramadol, buspirone, Aba's wort, tryptophan) or agents that impair metabolism of serotonin (eg, MAO inhibitors intended to treat psychiatric disorders, other MAO inhibitors [ie, linezolid and intravenous methylene blue]). Antipsychotic Agents may also enhance the serotonergic effect of Serotonin Modulators.Signs and symptoms include: agitation or restlessness, confusion, rapid heart rate and high blood pressure, dilated pupils, loss of  muscle coordination or twitching muscles, heavy sweating, diarrhea, headaches, shivering and/or goose bumps.  Patient was educated on signs and symptoms of serotonin syndrome.  Symptoms typically occur within several hours of taking a new drug or increasing the dose.  Patient was notified to report symptoms immediately.    Community Resources:    National Suicide Prevention Lifeline: 550.127.3149 (TTY: 958.993.2953). Call anytime for help.  (www.suicidepreventionlifeline.org)  National [place] on Mental Illness (www.gosia.org): 636.153.4599 or 578-627-4407.   Mental Health Association (www.mentalhealth.org): 418.159.9097 or 120-262-1731.  [place] Crisis Text [place]: Text [place] to 173804  Suicide LifeLine Chat: suicideBellco.org/chat    Administrative Billing:   Phone Call/Video Duration: 24 Minutes  Start: 4:03p  Stop: 4:27p    Complexity of Care: Patient with multiple psychiatric diagnoses and multiple medication changes adding to complexity of care.    Patient Status:  Patient will continue to be seen for ongoing consultation and stabilization.    Signed:   Yelena Macario DO  [facility]

## 2021-02-17 NOTE — Clinical Note
Saw patient for intake last week.  I will be working closely with Pharm.D. on some medication transitions/changes.  Patient will continue to follow-up with me for now.  Let me know if you have any questions or concerns.

## 2021-02-23 ENCOUNTER — MYC MEDICAL ADVICE (OUTPATIENT)
Dept: PSYCHOLOGY | Facility: CLINIC | Age: 61
End: 2021-02-23

## 2021-02-23 ENCOUNTER — VIRTUAL VISIT (OUTPATIENT)
Dept: PHARMACY | Facility: CLINIC | Age: 61
End: 2021-02-23
Attending: PSYCHIATRY & NEUROLOGY
Payer: COMMERCIAL

## 2021-02-23 DIAGNOSIS — F34.0 CYCLOTHYMIC DISORDER: Primary | ICD-10-CM

## 2021-02-23 DIAGNOSIS — F34.0 CYCLOTHYMIA: Primary | ICD-10-CM

## 2021-02-23 PROCEDURE — 99607 MTMS BY PHARM ADDL 15 MIN: CPT | Mod: TEL | Performed by: PHARMACIST

## 2021-02-23 PROCEDURE — 99606 MTMS BY PHARM EST 15 MIN: CPT | Mod: TEL | Performed by: PHARMACIST

## 2021-02-23 RX ORDER — VILAZODONE HYDROCHLORIDE 10 MG/1
10 TABLET ORAL DAILY
Qty: 30 TABLET | Refills: 0 | Status: SHIPPED | OUTPATIENT
Start: 2021-02-23 | End: 2021-03-11

## 2021-02-23 NOTE — PROGRESS NOTES
"Medication Therapy Management (MTM) Encounter    ASSESSMENT:                            Medication Adherence/Access: No issues identified    Mental Health: Pt is not tolerating bupropion well, so advised her to stop taking the medication.  Per Dr. Macario note, Viibryd was the alternate option considered at that visit.  Rx to be sent and PA to be pursued.  Will follow up with patient upon response from insurance.      PLAN:                            1. Stop taking bupropion.  2. Rx for Viibryd sent by Dr. Macario    Follow-up: 2-4 weeks after starting Viibryd    SUBJECTIVE/OBJECTIVE:                          Zuly Wheeler is a 60 year old female called for a follow-up visit. Today's visit is a follow-up MTM visit from 1/13/21.  Patient recently had intake visit with Dr. Macario, who requested follow up between visits, though patient requested earlier appointment, so was called today.     Reason for visit: side effects from recently started bupropion    Allergies/ADRs: Reviewed in chart  Tobacco: She reports that she quit smoking about 30 years ago. Her smoking use included cigarettes. She quit after 30.00 years of use. She has never used smokeless tobacco.  Alcohol: not currently using  Past Medical History: Reviewed in chart    Medication Adherence/Access: no issues reported    Mental Health: Pt had psychiatry intake appointment with Dr. Yelena Macario on 2/17/21 and requested this appointment to review medication concerns.  She is currently prescribed bupropion SR 100mg once daily, which was started at that visit, olanzapine 2.5mg at bedtime, and nefazodone 150mg at bedtime.  She has about 2 months of nefazodone remaining (mid-end April).  Pt reported that she started taking the bupropion on 2/19 and felt ok that day. She started feeling dizzy and noticed increased urination the following day, both of which seem to be worsened each day.  She noted that she feels \"off\" when walking and when still, she feels like " "she's moving, \"like I'm high.\"  She denied outright blurry vision, but has noticed some difficulty with visual focus in certain situations.  She was able to work yesterday, but felt uncomfortable driving home due to dizziness.  Pt described having new onset shakiness in her hands. She had disturbing nightmare on each of 2/21 and 2/22, which is unusual for her. This morning, she felt nauseous, but did not vomit, and decided not to take the medication.  She did feel a bit more upbeat on the first two days of medication, but the experienced side effects on the next few days of side effects caused her to feel \"down.\"  Today, having not taken bupropion, she does have some headache, but it not feeling dizzy or nauseous.    She recalled mention of trying a different medication and wonders if that could be pursued.  ----------------      I spent 30 minutes with this patient today. A copy of the visit note was provided to the patient's referring provider.    The patient was sent via [employer] a summary of these recommendations.     Anushka Zhu PharmD  Medication Therapy Management Pharmacist  [facility]  Phone: [phone]    Telemedicine Visit Details  Type of service:  Telephone visit  Start Time: 1:02pm  End Time: 1:32  Originating Location (patient location): [place]  Distant Location (provider location):  [facility]      Medication Therapy Recommendations  Cyclothymic disorder    Rationale: Undesirable effect - Adverse medication event - Safety   Recommendation: Discontinue Medication - switch bupropion to Viibryd   Status: Accepted with Changes per Provider               "

## 2021-02-23 NOTE — Clinical Note
Patient called to schedule early.  She did not tolerate 4 days of bupropion and decided not to take it today- is feeling better. See subjective.  I advised that she continue to hold it and I would discuss with you for an alternate plan. She mentioned that someone told her there was another medication to try, but it was not covered by her insurance.  Do you know if this was Viibryd?  If so, would it be worth trying to pursue a PA?  Interested in hearing your other thoughts and I can call her with next steps.  Thanks!

## 2021-02-23 NOTE — PATIENT INSTRUCTIONS
Treatment Plan:    Continue lithium 300 mg at bedtime for mood    Continue olanzapine 2.5 mg at bedtime for mood/sleep/anxiety    Continue nefazodone 150 mg at bedtime for mood, anxiety    Start Wellbutrin  mg daily to augment nefazodone (and perhaps take its place if well-tolerated and effective)    Referral placed for medication therapy management appointment to check in regarding how Wellbutrin SR therapy is going.    Continue therapy as planned.    Continue all other medications as reviewed per electronic medical record today.     Safety plan reviewed. To the Emergency Department as needed or call after hours crisis line at [phone] or [phone]. [place] Crisis Text Line: Text [place] to 853900  or  Suicide LifeLine Chat: [email or web address] [email or web address].org/chat    Schedule an appointment with me in 4 weeks or sooner as needed.  Call [facility] at [phone] to schedule.    Follow up with primary care provider as planned or sooner if needed for acute medical concerns.    Call the psychiatric nurse line with medication questions or concerns at [phone].    [employer] may be used to communicate with your provider, but this is not intended to be used for emergencies.    Patient Education:  Serotonin syndrome (SS) has occurred with serotonergic antidepressants (eg, SSRIs, SNRIs), particularly when used in combination with other serotonergic agents (eg, triptans, TCAs, fentanyl, lithium, tramadol, buspirone, Aba's wort, tryptophan) or agents that impair metabolism of serotonin (eg, MAO inhibitors intended to treat psychiatric disorders, other MAO inhibitors [ie, linezolid and intravenous methylene blue]). Antipsychotic Agents may also enhance the serotonergic effect of Serotonin Modulators.Signs and symptoms include: agitation or restlessness, confusion, rapid heart rate and high blood pressure, dilated pupils, loss of muscle coordination or twitching muscles, heavy sweating, diarrhea,  headaches, shivering and/or goose bumps.  Patient was educated on signs and symptoms of serotonin syndrome.  Symptoms typically occur within several hours of taking a new drug or increasing the dose.  Patient was notified to report symptoms immediately.    Community Resources:    National Suicide Prevention Lifeline: 615.863.2799 (TTY: 665.870.8408). Call anytime for help.  (www.suicidepreventionlifeline.org)  National [place] on Mental Illness (www.gosia.org): 136.868.7945 or 919-589-6349.   Mental Health Association (www.mentalhealth.org): 572.964.2079 or 944-895-3393.  [place] Crisis Text [place]: Text [place] to 038893  Suicide LifeLine Chat: suicideGarlikline.org/chat

## 2021-02-25 NOTE — PATIENT INSTRUCTIONS
Recommendations from today's MTM visit:                                                    Today we reviewed what your medicines are for, how to know if they are working, that your medicines are safe and how to make your medicine regimen as easy as possible.    1. Stop taking bupropion.  2. New prescription for Viibryd was sent to pharmacy and clinic will submit prior authorization    It was great to speak with you today.  I value your experience and would be very thankful for your time with providing feedback on our clinic survey. You may receive a survey via email or text message in the next few days.     Next MTM visit: I will give you a call when I see updates on the PA    To schedule another MTM appointment, please call the clinic directly or you may call the MTM scheduling line at [phone] or toll-free at [phone].     My Clinical Pharmacist's contact information:                                                      It was a pleasure talking with you today!  Please feel free to contact me with any questions or concerns you have.      Anushka Zhu PharmD  Medication Therapy Management Pharmacist  [facility]  Phone: [phone]

## 2021-03-08 ENCOUNTER — TRANSFERRED RECORDS (OUTPATIENT)
Dept: HEALTH INFORMATION MANAGEMENT | Facility: CLINIC | Age: 61
End: 2021-03-08

## 2021-03-10 ENCOUNTER — TELEPHONE (OUTPATIENT)
Dept: PHARMACY | Facility: CLINIC | Age: 61
End: 2021-03-10

## 2021-03-10 NOTE — TELEPHONE ENCOUNTER
Received voicemail from patient that she just started taking Viibryd yesterday and has taken two doses.  She noted that she is not feeling any better and wonders if there is anything she can do.  Pt stated that she also has an appointment at [facility], where she used to receive care, and is likely to return to that clinic.    Returned call to patient and LVM.  Will try again later.    Anushka Zhu, PharmD  Medication Therapy Management Pharmacist  [facility]  Phone: [phone]

## 2021-03-11 ENCOUNTER — VIRTUAL VISIT (OUTPATIENT)
Dept: PHARMACY | Facility: CLINIC | Age: 61
End: 2021-03-11
Payer: COMMERCIAL

## 2021-03-11 DIAGNOSIS — F34.0 CYCLOTHYMIC DISORDER: Primary | ICD-10-CM

## 2021-03-11 PROCEDURE — 99606 MTMS BY PHARM EST 15 MIN: CPT | Mod: TEL | Performed by: PHARMACIST

## 2021-03-11 RX ORDER — BUPROPION HYDROCHLORIDE 75 MG/1
75 TABLET ORAL DAILY
COMMUNITY
End: 2021-03-18

## 2021-03-11 NOTE — PROGRESS NOTES
Medication Therapy Management (MTM) Encounter    ASSESSMENT:                            Medication Adherence/Access: No issues identified    Mental Health: Continue current medications as directed by new psychiatry provider. Encouraged patient to reach out with questions or concerns and to follow up with that provider should she start to experience more significant dizziness or nightmares.  PA for Viibryd was already approved, so she should be able start that at anytime, should she choose to do so, in working with new provider.  Cancelled upcoming psychiatry appointment with Dr. Macario per patient request.      PLAN:                            1. Continue current medication.    Follow-up: as needed. Writer will reach out to patient in ~3 months for check in    SUBJECTIVE/OBJECTIVE:                          Zuly Wheeler is a 60 year old female called for a follow-up visit. Today's visit is a follow-up MTM visit from 2/21/21.     Reason for visit: med updates    Allergies/ADRs: Reviewed in chart  Tobacco: She reports that she quit smoking about 30 years ago. Her smoking use included cigarettes. She quit after 30.00 years of use. She has never used smokeless tobacco.  Alcohol: not currently using  Past Medical History: Reviewed in chart    Medication Adherence/Access: no issues reported    Mental Health: Patient reported that she was recently in touch with previous psychiatrist at [facility] (Princess Dyson) and plans to resume psychiatry care with that provider.   Since last visit, pt decided not to start taking Viibryd due to being worried about side effects. In communication with provider Kiel, she has restarted bupropion IR 75mg once daily in the morning and reduced nefazodone to 75mg once daily.  Pt reported that provider also suggested she restart buspirone, but took dose for 2 days and felt dizzy, so is not ready to make this change. She continues taking lithium IR 300mg QPM and olanzapine 2.5mg at  "bedtime.  Pt reported that she's felt \"a little fuzzy-headed\" since restarting bupropion (stopped after 3-5 days on 2/24 due dizziness and nightmares), but feels its more tolerable than previous trial.  She has follow up at [facility] in 2 weeks.  ----------------      I spent 15 minutes with this patient today. A copy of the visit note was provided to the patient's psychiatry provider.    The patient was sent via [employer] a summary of these recommendations.     Anushka hZu, PharmD  Medication Therapy Management Pharmacist  [facility]  Phone: [phone]    Telemedicine Visit Details  Type of service:  Telephone visit  Start Time: 2:28pm  End Time: 2:43pm  Originating Location (patient location): [place]  Distant Location (provider location):  [facility]      "

## 2021-03-11 NOTE — PATIENT INSTRUCTIONS
Recommendations from today's MTM visit:                                                    MTM (medication therapy management) is a service provided by a clinical pharmacist designed to help you get the most of out of your medicines.   Today we reviewed what your medicines are for, how to know if they are working, that your medicines are safe and how to make your medicine regimen as easy as possible.    1. Continue current medications as prescribed by Shara.  2. Please reach out with any questions or concerns.    It was great to speak with you today.  I value your experience and would be very thankful for your time with providing feedback on our clinic survey. You may receive a survey via email or text message in the next few days.     Next MTM visit: as needed; I will check in with you in about 3 months    To schedule another MTM appointment, please call the clinic directly or you may call the MTM scheduling line at [phone] or toll-free at [phone].     My Clinical Pharmacist's contact information:                                                      It was a pleasure talking with you today!  Please feel free to contact me with any questions or concerns you have.      Anushka Zhu, PharmD  Medication Therapy Management Pharmacist  [facility]  Phone: [phone]

## 2021-03-11 NOTE — Clinical Note
Migue- pt has decided to restart care with previous provider. Interestingly, she was nervous about Viibryd and is back on bupropion. She stated that she is appreciative of the care, but had ongoing relationship with this other person prior to their ELAN and would like to resume.   -Anushka

## 2021-03-13 ENCOUNTER — HEALTH MAINTENANCE LETTER (OUTPATIENT)
Age: 61
End: 2021-03-13

## 2021-03-18 ENCOUNTER — TELEPHONE (OUTPATIENT)
Dept: PHARMACY | Facility: CLINIC | Age: 61
End: 2021-03-18

## 2021-03-18 ENCOUNTER — VIRTUAL VISIT (OUTPATIENT)
Dept: PHARMACY | Facility: CLINIC | Age: 61
End: 2021-03-18
Payer: COMMERCIAL

## 2021-03-18 DIAGNOSIS — F34.0 CYCLOTHYMIC DISORDER: Primary | ICD-10-CM

## 2021-03-18 PROCEDURE — 99607 MTMS BY PHARM ADDL 15 MIN: CPT | Mod: TEL | Performed by: PHARMACIST

## 2021-03-18 PROCEDURE — 99606 MTMS BY PHARM EST 15 MIN: CPT | Mod: TEL | Performed by: PHARMACIST

## 2021-03-18 NOTE — Clinical Note
Are you willing to get this patient back on your schedule?  She was very regretful of cancelling in the first place (was scheduled with you for later this month).  She is going to start Viibryd this weekend and I thought follow up would be reasonable in about 3 weeks.  Let me know or please forward to your  to give her a call.  Thank you!!

## 2021-03-18 NOTE — PROGRESS NOTES
Medication Therapy Management (MTM) Encounter    ASSESSMENT:                            Medication Adherence/Access: No issues identified    Cyclothymic Disorder: Reviewed that symptoms are likely not related to COVID vaccine, since it was already three weeks ago.  Recommended that she call [facility] to update new provider of symptoms.  Agreed that she can continue to stay off bupropion, as lightheadedness improved after previous discontinuation.  Also suggested that patient increase nefazodone to currently prescribed 75mg dose to avoid any withdrawal-type symptoms that might be contributing. Pt agreed.  Pt will call [facility] and continue with next week's appointment.      PLAN:                            1. Recommended that patient continue off bupropion since she has had lightheadedness with both recent trials.  2. Recommend increasing nefazodone back to 75mg   3. Follow up with [facility] provider via phone call and previously scheduled appointment next week    Follow-up: after psych visit    SUBJECTIVE/OBJECTIVE:                          Zuly Wheeler is a 60 year old female called for a follow-up visit. She was referred to me from PCP/[facility].  Today's visit is a follow-up MTM visit from 3/11/21.     Reason for visit: discuss side effects    Allergies/ADRs: Reviewed in chart  Tobacco: She reports that she quit smoking about 30 years ago. Her smoking use included cigarettes. She quit after 30.00 years of use. She has never used smokeless tobacco.  Alcohol: not currently using  Past Medical History: Reviewed in chart    Medication Adherence/Access: no issues reported    Cyclothymic Disorder: Pt recently restarted taking bupropion IR 75mg once daily per [facility] provider and continues taking nefazodone 75mg once daily (slow taper), olanzapine 2.5mg daily, and lithium IR 300mg daily (see recent note from 3/11/21).  Today, patient reported that she has again been feeling very lightheaded, to the point of  not feeling safe to drive to work today. She wonders if it could be related to getting the first COVID vaccine about 3 weeks ago. A few days ago, she decided to reduce nefazodone further to 37.5mg and reduce bupropion to 37.5mg to see if symptoms improved, then stopped taking bupropion altogether yesterday.  She does not otherwise feel ill.  Difficult to tell if mood is at all changes since she has not been feeling well.  Patient wonders what she should do with meds.    ----------------    I spent 15 minutes with this patient today. A copy of the visit note was provided to the patient's psychiatry provider.    The patient was sent via [employer] a summary of these recommendations.     Anushka Zhu, PharmD  Medication Therapy Management Pharmacist  [facility]  Phone: [phone]    Telemedicine Visit Details  Type of service:  Telephone visit  Start Time: 8:20am  End Time: 8:35am  Originating Location (patient location): Home  Distant Location (provider location):  [facility]      Medication Therapy Recommendations  Cyclothymic disorder    Current Medication: nefazodone (SERZONE) 150 MG tablet   Rationale: Incorrect administration - Dosage too low - Effectiveness   Recommendation: Increase Dose - increase nefazodone back to 75mg daily, per current Rx   Status: Patient Agreed - Adherence/Education          Rationale: Undesirable effect - Adverse medication event - Safety   Recommendation: Discontinue Medication - stop taking bupropion   Status: Patient Agreed - Adherence/Education

## 2021-03-18 NOTE — PATIENT INSTRUCTIONS
Recommendations from today's MTM visit:                                                    MTM (medication therapy management) is a service provided by a clinical pharmacist designed to help you get the most of out of your medicines.   Today we reviewed what your medicines are for, how to know if they are working, that your medicines are safe and how to make your medicine regimen as easy as possible.      1. Recommended that patient continue off bupropion since she has had lightheadedness with both recent trials.  2. Recommend increasing nefazodone back to 75mg   3. Follow up with [facility] provider via phone call and previously scheduled appointment next week    It was great to speak with you today.  I value your experience and would be very thankful for your time with providing feedback on our clinic survey. You may receive a survey via email or text message in the next few days.     Next MTM visit: after psych visit, as needed    To schedule another MTM appointment, please call the clinic directly or you may call the MTM scheduling line at [phone] or toll-free at [phone].     My Clinical Pharmacist's contact information:                                                      It was a pleasure talking with you today!  Please feel free to contact me with any questions or concerns you have.      Anushka Zhu, PharmD  Medication Therapy Management Pharmacist  [facility]  Phone: [phone]

## 2021-03-19 NOTE — PROGRESS NOTES
Pt called with additional update that she continues to be having some lightheadedness, but does feel a little better today.  She was able to go to work today.  She called her [facility] clinic yesterday to report side effects and receive guidance, but has still not gotten a return call.  Pt expressed frustration that she had decided to switch clinics, as she reported getting great care with [facility], and is hopeful that she can reschedule follow up with Dr. Macario.  She has stopped taking bupropion and increased nefazodone back to 75mg.  She is planning on starting Viibryd on Sunday, 3/21 and would like to follow up with Dr. Macario.  Recommend follow up visit in about 3 weeks.  Message sent to Dr. Macario and told patient she is likely to receive call next week for scheduling.    Anushka Zhu, PharmD  Medication Therapy Management Pharmacist  [facility]  Phone: [phone]

## 2021-03-22 ENCOUNTER — MYC MEDICAL ADVICE (OUTPATIENT)
Dept: PHARMACY | Facility: CLINIC | Age: 61
End: 2021-03-22

## 2021-03-22 NOTE — TELEPHONE ENCOUNTER
Spoke with patient who reported that she did not receive Dr. Macario's message. Walked her through access to [employer] messages and it appears there is some error with [employer], as there was no information.  Resent message from Dr. Macario in case of error, which she has not also received.  Reviewed message and provided # to call for scheduling, as she would like to continue with Dr. Macario.  Also provided [employer] customer service for problem solving.    Anushka Zhu, PharmD  Medication Therapy Management Pharmacist  [facility]  Phone: [phone]

## 2021-03-30 ENCOUNTER — NURSE TRIAGE (OUTPATIENT)
Dept: NURSING | Facility: CLINIC | Age: 61
End: 2021-03-30

## 2021-03-30 NOTE — TELEPHONE ENCOUNTER
"Arm red, sore, mild swelling x receiving her COVID Vaccine 4 days ago. Sx improving. Developed itchiness at arm x 2 days ago. Assured pt that this may be normal side effect of vaccine, and to continue to monitor.    Pt reports lightheaded x weeks. She is pushing fluids, and able to walk, and work, but \"feels off.\" She added that her psychiatrist is reducing one of her medications and replacing it with a different medication. They are increasing her Wellbutrin and Vibryd, and tapering her off the nefazodone.  I advised that pt should call her psychiatrist's office to see if lightheadedness is a common side effect of this type of taper.I also advised that the pt should have another adult drive her to [facility], as she needs to be assessed. Pt stated that she does not have anyone to drive her, and she prefers to see her PCP tomorrow.  Pt transferred to scheduling and advised to call back if symptoms worsen.  Pt scheduled to see ANDREA Maya, tomorrow at 0820.  Annemarie Reyes RN 03/30/21 4:41 PM  [facility] Nurse Advisor      Additional Information    Negative: [1] Difficulty breathing or swallowing AND [2] starts within 2 hours after injection    Negative: Sounds like a life-threatening emergency to the triager    Negative: [1] COVID-19 exposure AND [2] no symptoms    Negative: [1] Typical COVID-19 symptoms AND [2] symptoms that are NOT expected from vaccine (e.g., cough, difficulty breathing, loss of taste or smell, runny nose, sore throat)    Negative: [1] Typical COVID-19 symptoms AND [2] started > 3 days after getting vaccine    Negative: Fever > 104 F (40 C)    Negative: Sounds like a severe, unusual reaction to the triager    Negative: Shock suspected (e.g., cold/pale/clammy skin, too weak to stand, low BP, rapid pulse)    Negative: Difficult to awaken or acting confused (e.g., disoriented, slurred speech)    Negative: Fainted, and still feels dizzy afterwards    Negative: Severe " difficulty breathing (e.g., struggling for each breath, speaks in single words)    Negative: Overdose (accidental or intentional) of medications    Negative: New neurologic deficit that is present now: * Weakness of the face, arm, or leg on one side of the body * Numbness of the face, arm, or leg on one side of the body * Loss of speech or garbled speech    Negative: Heart beating < 50 beats per minute OR > 140 beats per minute    Negative: Sounds like a life-threatening emergency to the triager    Negative: Chest pain    Negative: Rectal bleeding, bloody stool, or tarry-black stool    Negative: Vomiting is the main symptom    Negative: Diarrhea is the main symptom    Negative: Headache is the main symptom    Negative: Heat exhaustion suspected (i.e., dehydration from heat exposure)    Negative: Patient states that he/she is having an anxiety/panic attack    Negative: SEVERE dizziness (e.g., unable to stand, requires support to walk, feels like passing out now)    Negative: SEVERE headache or neck pain    Negative: Spinning or tilting sensation (vertigo) present now and one or more stroke risk factors (i.e., hypertension, diabetes, prior stroke/TIA, heart attack, age over 60) (Exception: prior physician evaluation for this AND no different/worse than usual)    Negative: Loss of vision or double vision    Negative: Extra heart beats OR irregular heart beating (i.e., 'palpitations')    Negative: Difficulty breathing    Negative: Drinking very little and has signs of dehydration (e.g., no urine > 12 hours, very dry mouth, very lightheaded)    Negative: Follows bleeding (e.g., stomach, rectum, vagina) (Exception: became dizzy from sight of small amount blood)    Negative: Patient sounds very sick or weak to the triager    Lightheadedness (dizziness) present now, after 2 hours of rest and fluids    Protocols used: CORONAVIRUS (COVID-19) VACCINE QUESTIONS AND LLFMYRREL-X-SJ 1.3, DIZZINESS-A-OH    COVID 19 Nurse Triage  Plan/Patient Instructions    Please be aware that novel coronavirus (COVID-19) may be circulating in the community. If you develop symptoms such as fever, cough, or SOB or if you have concerns about the presence of another infection including coronavirus (COVID-19), please contact your health care provider or visit [email or web address].     Disposition/Instructions    In-Person Visit with provider recommended. Reference Visit Selection Guide.    Thank you for taking steps to prevent the spread of this virus.  o Limit your contact with others.  o Wear a simple mask to cover your cough.  o Wash your hands well and often.    Resources    [facility]: About COVID-19: [email or web address]    CDC: What to Do If You're Sick: www.cdc.gov/coronavirus/2019-ncov/about/steps-when-sick.html    CDC: Ending Home Isolation: www.cdc.gov/coronavirus/2019-ncov/hcp/disposition-in-home-patients.html     CDC: Caring for Someone: www.cdc.gov/coronavirus/2019-ncov/if-you-are-sick/care-for-someone.html     [facility]: Interim Guidance for Hospital Discharge to Home: www.health.[facility].[place].us/diseases/coronavirus/hcp/hospdischarge.pdf    [facility] clinical trials (COVID-19 research studies): [email or web address]-trials     Below are the COVID-19 hotlines at the [place] Department of Health ([facility]). Interpreters are available.   o For health questions: Call 190-019-6645 or 1-265.725.8274 (7 a.m. to 7 p.m.)  o For questions about schools and childcare: Call 975-423-1802 or 1-356.498.7107 (7 a.m. to 7 p.m.)

## 2021-03-31 ENCOUNTER — OFFICE VISIT (OUTPATIENT)
Dept: FAMILY MEDICINE | Facility: CLINIC | Age: 61
End: 2021-03-31
Payer: COMMERCIAL

## 2021-03-31 VITALS
DIASTOLIC BLOOD PRESSURE: 68 MMHG | SYSTOLIC BLOOD PRESSURE: 130 MMHG | TEMPERATURE: 97.2 F | HEART RATE: 81 BPM | OXYGEN SATURATION: 98 % | WEIGHT: 138 LBS | BODY MASS INDEX: 21.29 KG/M2

## 2021-03-31 DIAGNOSIS — R53.83 FATIGUE: Primary | ICD-10-CM

## 2021-03-31 DIAGNOSIS — R53.83 FATIGUE, UNSPECIFIED TYPE: ICD-10-CM

## 2021-03-31 DIAGNOSIS — F31.4 BIPOLAR DISORDER, CURRENT EPISODE DEPRESSED, SEVERE, WITHOUT PSYCHOTIC FEATURES (H): Primary | ICD-10-CM

## 2021-03-31 LAB
ALBUMIN SERPL-MCNC: 3.9 G/DL (ref 3.4–5)
ALP SERPL-CCNC: 58 U/L (ref 40–150)
ALT SERPL W P-5'-P-CCNC: 22 U/L (ref 0–50)
ANION GAP SERPL CALCULATED.3IONS-SCNC: 7 MMOL/L (ref 3–14)
AST SERPL W P-5'-P-CCNC: 10 U/L (ref 0–45)
BASOPHILS # BLD AUTO: 0 10E9/L (ref 0–0.2)
BASOPHILS NFR BLD AUTO: 0.2 %
BILIRUB SERPL-MCNC: 0.2 MG/DL (ref 0.2–1.3)
BUN SERPL-MCNC: 14 MG/DL (ref 7–30)
CALCIUM SERPL-MCNC: 9.3 MG/DL (ref 8.5–10.1)
CHLORIDE SERPL-SCNC: 106 MMOL/L (ref 94–109)
CO2 SERPL-SCNC: 28 MMOL/L (ref 20–32)
CREAT SERPL-MCNC: 0.74 MG/DL (ref 0.52–1.04)
DEPRECATED CALCIDIOL+CALCIFEROL SERPL-MC: 27 UG/L (ref 20–75)
DIFFERENTIAL METHOD BLD: NORMAL
EOSINOPHIL # BLD AUTO: 0.1 10E9/L (ref 0–0.7)
EOSINOPHIL NFR BLD AUTO: 2.9 %
ERYTHROCYTE [DISTWIDTH] IN BLOOD BY AUTOMATED COUNT: 12.7 % (ref 10–15)
FERRITIN SERPL-MCNC: 103 NG/ML (ref 8–252)
GFR SERPL CREATININE-BSD FRML MDRD: 88 ML/MIN/{1.73_M2}
GLUCOSE SERPL-MCNC: 88 MG/DL (ref 70–99)
HCT VFR BLD AUTO: 39.9 % (ref 35–47)
HGB BLD-MCNC: 13.3 G/DL (ref 11.7–15.7)
LYMPHOCYTES # BLD AUTO: 1 10E9/L (ref 0.8–5.3)
LYMPHOCYTES NFR BLD AUTO: 23.8 %
MCH RBC QN AUTO: 29.3 PG (ref 26.5–33)
MCHC RBC AUTO-ENTMCNC: 33.3 G/DL (ref 31.5–36.5)
MCV RBC AUTO: 88 FL (ref 78–100)
MONOCYTES # BLD AUTO: 0.3 10E9/L (ref 0–1.3)
MONOCYTES NFR BLD AUTO: 6.3 %
NEUTROPHILS # BLD AUTO: 2.7 10E9/L (ref 1.6–8.3)
NEUTROPHILS NFR BLD AUTO: 66.8 %
PLATELET # BLD AUTO: 254 10E9/L (ref 150–450)
POTASSIUM SERPL-SCNC: 3.6 MMOL/L (ref 3.4–5.3)
PROT SERPL-MCNC: 7.6 G/DL (ref 6.8–8.8)
RBC # BLD AUTO: 4.54 10E12/L (ref 3.8–5.2)
SODIUM SERPL-SCNC: 141 MMOL/L (ref 133–144)
TSH SERPL DL<=0.005 MIU/L-ACNC: 1.37 MU/L (ref 0.4–4)
WBC # BLD AUTO: 4.1 10E9/L (ref 4–11)

## 2021-03-31 PROCEDURE — 82306 VITAMIN D 25 HYDROXY: CPT | Performed by: NURSE PRACTITIONER

## 2021-03-31 PROCEDURE — 36415 COLL VENOUS BLD VENIPUNCTURE: CPT | Performed by: NURSE PRACTITIONER

## 2021-03-31 PROCEDURE — 80050 GENERAL HEALTH PANEL: CPT | Performed by: NURSE PRACTITIONER

## 2021-03-31 PROCEDURE — 84443 ASSAY THYROID STIM HORMONE: CPT | Performed by: NURSE PRACTITIONER

## 2021-03-31 PROCEDURE — 99214 OFFICE O/P EST MOD 30 MIN: CPT | Performed by: NURSE PRACTITIONER

## 2021-03-31 PROCEDURE — 82728 ASSAY OF FERRITIN: CPT | Performed by: NURSE PRACTITIONER

## 2021-03-31 PROCEDURE — 96127 BRIEF EMOTIONAL/BEHAV ASSMT: CPT | Mod: 59 | Performed by: NURSE PRACTITIONER

## 2021-03-31 ASSESSMENT — PATIENT HEALTH QUESTIONNAIRE - PHQ9: SUM OF ALL RESPONSES TO PHQ QUESTIONS 1-9: 23

## 2021-03-31 NOTE — PATIENT INSTRUCTIONS
Call [facility] to see if you can get in sooner to see your Psychiatrist for any med changes.     Start to exercise just a little each day, cry when you need to.     We will call with the lab results.     Consider increasing how often you go to counseling.     Patient Education   Exercise Inventory  Take time to reflect on the questions below. Think about how exercise makes you feel. Think about what memories or other things you connect with exercise. Getting to know yourself better will help you move forward.  1. Find your style  Instead of forcing yourself to do something you don't enjoy, find exercise that fits you.     If you're a social person, you could try:  ? Taking a group exercise class,  ? Joining a recreational sports team OR  ? Starting a walking group with friends.    If you'd rather be alone, you could try:  ? Walking in nature. Pay attention to your breath and the sights, sounds and smells around you. Clear your mind and let yourself unplug.  ? Moving to a DVD in the privacy of your own home.  ? Using exercise equipment at home while watching your favorite show on TV.  What's your style?  _________________________________________  How do you most want to feel?  _________________________________________  What kind of place would you like to be in while you exercise?   _________________________________________  What days of the week and time of day are best for you?  Days: ____________________________________  Times: ___________________________________  2. Discover what you like  Have you ever done any exercise in the past that you enjoyed? Is there any kind of exercise that you have thought about trying?  _________________________________________  _________________________________________  3. Face your negative self-talk  What's getting in your way? Record your negative thoughts and barriers. (Some barriers are real. Others may be in your mind.) Think of solutions to break through each  barrier.  Negative Thoughts & Barriers Solutions                       For informational purposes only. Not to replace the advice of your health care provider. 2018 [facility]. All rights reserved. Clinically reviewed by [employer]  Team. for; to (do) [id number] - 11/18.       Patient Education   Exercise Benefits and Goal Setting  Being active is a natural and basic part of life. In our early history, human beings were padmini-gatherers moving across the earth. Now, we live in an industrial society where our movement is limited.   That's why we have to make an effort to make movement a part of our daily life.   The benefits of movement  Our bodies are designed to move. Moving our bodies helps us to get un-stuck, to let go of pent-up thoughts and feelings. We can let go of feeling heavy, tired and sluggish.   When we let ourselves move freely, our muscles begin to relax, and our bodies become more agile and flexible. Exercise boosts our mood and confidence. It also helps us sleep more soundly and better manage stress.  List the top 5 reasons you want to move your body:  1. ________________________________________  2. ________________________________________  3. ________________________________________  4. ________________________________________  5. ________________________________________  List the top 3 ways you plan to overcome challenges and barriers:  1. ________________________________________  ________________________________________  2. ________________________________________  ________________________________________  3. ________________________________________  ________________________________________  My exercise goal is __________________________  __________________________________________  __________________________________________  _________________________________________.  I will make sure I stick to my goal by    __________________________________________  _________________________________________.  I will start working toward my goal on this day:  __________________________________________  For informational purposes only. Not to replace the advice of your health care provider. 2018 [facility]. All rights reserved. Clinically reviewed by Bariatric Health  Team. [employer] [id number] - 11/18.       Patient Education     Exercise: Adding Intensity  You have been exercising for 30 minutes most days of the week. Now you can move on to the next stage: increasing the intensity. This means doing your activity in one or more of these ways:    Longer. Exercise for 30 minutes or more without a break.    Faster. Hike, run, or skate fast enough to raise your heart rate moderately--as if you had walked fast to catch a bus.    More often. Do your activity 4 to 6 times a week instead of 1 to 3 times.    Not just gym class  Be creative. You can reach your health and fitness goals in many ways. Try some of these activities:    Team sports, like basketball or soccer    Social or recreational activities, like hiking or dancing    Individual exercise, like cycling, swimming, or skating    Group fitness classes, like aerobic classes or weight training  Safety first  Whatever activity you choose, think about safety:    Wear the right safety gear and shoes for your activity.    Drink plenty of water during and after workouts.    Wear light-colored clothing if you re out when it s dark.    Make time to warm up before you exercise and cool down after.    Carry ID (identification) with you if you re out alone. And be sure someone knows where you re going.    If you re on foot, travel against traffic (except on blind corners). If you re on a bike, go with traffic. Obey the rules of the road.   Tips for sticking with it    Find a workout partner or sports club. If you know someone is expecting you, you ll be less likely to skip your  workout.    Pack a workout bag with everything you need. Then it s ready when you are.    Choose a few different activities so you ll stay interested. Make it fun!  What will help you to stick with it?  1.   2.   3.   [employer] last reviewed this educational content on 3/1/2018    9037-3669 [employer]. All rights reserved. This information is not intended as a substitute for professional medical care. Always follow your healthcare professional's instructions.

## 2021-03-31 NOTE — PROGRESS NOTES
Assessment & Plan       ICD-10-CM    1. Bipolar disorder, current episode depressed, severe, without psychotic features (H)  F31.4    2. Fatigue, unspecified type  R53.83 CANCELED: CBC with platelets differential     CANCELED: Ferritin     CANCELED: Vitamin D Deficiency     CANCELED: TSH with free T4 reflex     CANCELED: Comprehensive metabolic panel    SI, no plan and contracts for safety today, she does not feel she needs to be hospitalized and this is my first time meeting pt, she did agree to go to ER if anything changes.   Mental health worsened since her Specialist started changing her medications.    She agrees to call [facility] to see if she can move up her Psych follow up visit there to discuss symptoms and weaning off medications likely related to worsened depression. She is not sure she will continue there, but will let us know if needs referral. Wants to go back there once more first    Self care needs improvement, start walk or at least 5 min of exercise and work up daily as tolerated    Fatigue and weight changes, and pt reports eating poorly and minimal exercise likely contributory. Will do lab work today to rule out anemia, thyroid issues, electrolyte imbalances and Vit D deficiency and will follow up as indicated.          Depression Screening Follow Up    PHQ 3/31/2021   PHQ-9 Total Score 23   Q9: Thoughts of better off dead/self-harm past 2 weeks Several days     Last PHQ-9 3/31/2021   1.  Little interest or pleasure in doing things 3   2.  Feeling down, depressed, or hopeless 3   3.  Trouble falling or staying asleep, or sleeping too much 3   4.  Feeling tired or having little energy 3   5.  Poor appetite or overeating 3   6.  Feeling bad about yourself 3   7.  Trouble concentrating 2   8.  Moving slowly or restless 2   Q9: Thoughts of better off dead/self-harm past 2 weeks 1   PHQ-9 Total Score 23   Difficulty at work, home, or with people Very difficult               Follow Up    Follow Up  Actions Taken  Crisis resource information provided in the After Visit Summary  Referred patient back to mental health provider    Discussed the following ways the patient can remain in a safe environment:    See Patient Instructions    Return for for follow up visit, earlier if any concerns.    ANDREA Maya CNP [facility] YAMILET Caruso is a 60 year old who presents for the following health issues     HPI     Dizziness  Onset/Duration: 1 MONTH, MEDICATION CHANGE  Description:   Do you feel faint: no  Does it feel like the surroundings (bed, room) are moving: no  Unsteady/off balance: YES  Have you passed out or fallen: no  Intensity: moderate, severe  Progression of Symptoms: constant  Accompanying Signs & Symptoms:  Heart palpitations or chest pain: YES  Nausea, vomiting: no  Weakness or lack of coordination in arms or legs: no  Vision or speech changes: YES  Numbness or tingling: no  Ringing in ears (Tinnitus): no  Hearing Loss: no  History:   Head trauma/concussion history: no  Previous similar symptoms: no  Recent bleeding history: no  Any new medications (BP?): no  Precipitating factors:   Worse with activity: YES  Worse with head movement: YES  Alleviating factors:   Does staying in a fixed position give relief: YES  Therapies tried and outcome: None    Weaning off nefazadone slowly  2 psych [facility] then changed to [facility]  wellbutrin had side effects weird dreams and was feeling lightheaded with that one too, not focused  buspar was too much med  Felt lightheaded ever since  They considered Vybrid  Felt very depressed like she just didn't want to do anything  Breakfast a little lunch and dinner  Good nutrition and water intake but maybe just not enough, losing some weight   Balance is okay no syncope, 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      Objective    /68   Pulse 81   Temp 97.2  F (36.2  C) (Temporal)   Wt 62.6 kg (138 lb)   LMP 01/30/2014 (Exact Date)   SpO2 98%   BMI 21.29 kg/m    Body mass index is 21.29 kg/m .  Physical Exam   GENERAL: healthy, alert and no distress  EYES: Eyes grossly normal to inspection, PERRL and conjunctivae and sclerae normal  NECK: no adenopathy, no asymmetry, masses, or scars and thyroid normal to palpation  RESP: lungs clear to auscultation - no rales, rhonchi or wheezes  CV: regular rate and rhythm, normal S1 S2, no S3 or S4, no murmur, click or rub, no peripheral edema and peripheral pulses strong  ABDOMEN: soft, nontender, no hepatosplenomegaly, no masses and bowel sounds normal  MS: no gross musculoskeletal defects noted, no edema  SKIN: no suspicious lesions or rashes  NEURO: Normal strength and tone, mentation intact and speech normal  BACK: no CVA tenderness, no paralumbar tenderness  PSYCH: mentation appears normal, affect flat, judgement and insight intact, appearance well groomed and eye contact minimal looking down or straight ahead mostly    No results found for this or any previous visit (from the past 24 hour(s)).        would like a phone call with results

## 2021-04-02 ENCOUNTER — NURSE TRIAGE (OUTPATIENT)
Dept: NURSING | Facility: CLINIC | Age: 61
End: 2021-04-02

## 2021-04-02 NOTE — TELEPHONE ENCOUNTER
For the last month she has been feeling faint, since her covid vaccine. They have also lowered her depression and anxiety medications, and she was seen at the clinic 3/31/21, and last vaccine was 1 week ago on Saturday.  This is making her feel very anxious and then she gets some shortness of breath. It is happening throughout the day , multiple times. No headaches.  I recommended that she touch base with her provider and let them know that you are feeling dizziness daily, and not to drive until seen by her provider. Patient will call for an appointment.    Mary Landis RN/ [facility]        Reason for Disposition    COVID-19 vaccine, systemic reactions (e.g., fatigue, fever, muscle aches), questions about    COVID-19 vaccine, Frequently Asked Questions (FAQs)    Additional Information    Negative: [1] Difficulty breathing or swallowing AND [2] starts within 2 hours after injection    Negative: Sounds like a life-threatening emergency to the triager    Negative: Fever > 104 F (40 C)    Negative: Sounds like a severe, unusual reaction to the triager    Negative: [1] Redness or red streak around the injection site AND [2] started > 48 hours after getting vaccine AND [3] fever    Negative: [1] Fever > 101 F (38.3 C) AND [2] age > 60 AND [3] started > 48 hours after getting vaccine    Negative: [1] Fever > 100.0 F (37.8 C) AND [2] bedridden (e.g., nursing home patient, CVA, chronic illness, recovering from surgery) AND [3] started > 48 hours after getting vaccine    Negative: [1] Fever > 100.0 F (37.8 C) AND [2] diabetes mellitus or weak immune system (e.g., HIV positive, cancer chemo, splenectomy, organ transplant, chronic steroids) AND [3] started > 48 hours after getting vaccine    Negative: [1] Redness or red streak around the injection site AND [2] started > 48 hours after getting vaccine AND [3] no fever  (Exception: red area < 1 inch or 2.5 cm wide)    Negative: [1] Pain, tenderness, or  swelling at the injection site AND [2] over 3 days (72 hours) since vaccine AND [3] getting worse    Negative: Fever > 100.0 F (37.8 C) present > 3 days (72 hours)    Negative: [1] Fever > 100.0 F (37.8 C) AND [2] healthcare worker    Negative: [1] Pain, tenderness, or swelling at the injection site AND [2] lasts > 7 days    Protocols used: CORONAVIRUS (COVID-19) VACCINE QUESTIONS AND [id number]-N-RP 1.3

## 2021-04-06 ENCOUNTER — OFFICE VISIT (OUTPATIENT)
Dept: FAMILY MEDICINE | Facility: CLINIC | Age: 61
End: 2021-04-06
Payer: COMMERCIAL

## 2021-04-06 VITALS
BODY MASS INDEX: 21.22 KG/M2 | DIASTOLIC BLOOD PRESSURE: 72 MMHG | SYSTOLIC BLOOD PRESSURE: 106 MMHG | OXYGEN SATURATION: 98 % | HEART RATE: 65 BPM | TEMPERATURE: 97 F | WEIGHT: 137.5 LBS

## 2021-04-06 DIAGNOSIS — R53.83 FATIGUE, UNSPECIFIED TYPE: ICD-10-CM

## 2021-04-06 DIAGNOSIS — F43.23 ADJUSTMENT DISORDER WITH MIXED ANXIETY AND DEPRESSED MOOD: Primary | ICD-10-CM

## 2021-04-06 PROCEDURE — 99214 OFFICE O/P EST MOD 30 MIN: CPT | Performed by: FAMILY MEDICINE

## 2021-04-06 NOTE — TELEPHONE ENCOUNTER
Call to patient scheduled appointment - encouraged to have someone else drive - note can be provided if needed - encouraged increase fluid intake    Fluid intake last 24 hours: 32-48oz    She denies any injection site reaction/signs of infection    Patient verbalized understanding and agrees with plan

## 2021-04-06 NOTE — PROGRESS NOTES
"SUBJECTIVE:  Zuly Wheeler, a 60 year old female scheduled an appointment to discuss the following issues:   has been feeling jjittery, anxious as psych has been weaning her off of Serzone'   also tried an OTC supplement that made \" me feel worse\"   Adjustment disorder with mixed anxiety and depressed mood  Fatigue, unspecified type    Medical, social, surgical, and family histories reviewed.    ROS:  CONSTITUTIONAL: NEGATIVE for fever, chills  EYES: NEGATIVE for vision changes   RESP: NEGATIVE for significant cough or SOB  CV: NEGATIVE for chest pain, palpitations   GI: NEGATIVE for nausea, abdominal pain, heartburn, or change in bowel habits  : NEGATIVE for frequency, dysuria, or hematuria  MUSCULOSKELETAL: NEGATIVE for significant arthralgias or myalgia  NEURO: NEGATIVE for weakness, dizziness or paresthesias or headache    OBJECTIVE:  /72 (BP Location: Right arm, Patient Position: Sitting)   Pulse 65   Temp 97  F (36.1  C) (Temporal)   Wt 62.4 kg (137 lb 8 oz)   LMP 01/30/2014 (Exact Date)   SpO2 98%   BMI 21.22 kg/m    EXAM:  GENERAL APPEARANCE: healthy, alert and no distress  EYES: EOMI,  PERRL  HENT: ear canals and TM's normal and nose and mouth without ulcers or lesions  RESP: lungs clear to auscultation - no rales, rhonchi or wheezes  CV: regular rates and rhythm, normal S1 S2, no S3 or S4 and no murmur, click or rub -  ABDOMEN:  soft, nontender, no HSM or masses and bowel sounds normal  NEURO: Normal strength and tone, sensory exam grossly normal, mentation intact and speech normal  PSYCH: affect flat and anxious   reviewed labs at length    ASSESSMENT/PLAN:  (F43.23) Adjustment disorder with mixed anxiety and depressed mood  (primary encounter diagnosis)  Comment: consider trying buspar again   increase vit D to 2000 iu  Plan: Follow up with consultant as planned.     (R53.83) Fatigue, unspecified type  Comment: ugred daily walks    Plan: Follow up in 2 months.       "

## 2021-04-08 ENCOUNTER — TRANSFERRED RECORDS (OUTPATIENT)
Dept: HEALTH INFORMATION MANAGEMENT | Facility: CLINIC | Age: 61
End: 2021-04-08

## 2021-04-21 DIAGNOSIS — Z79.899 HIGH RISK MEDICATIONS (NOT ANTICOAGULANTS) LONG-TERM USE: Primary | ICD-10-CM

## 2021-04-21 LAB
ANION GAP SERPL CALCULATED.3IONS-SCNC: 4 MMOL/L (ref 3–14)
BUN SERPL-MCNC: 16 MG/DL (ref 7–30)
CALCIUM SERPL-MCNC: 8.8 MG/DL (ref 8.5–10.1)
CHLORIDE SERPL-SCNC: 108 MMOL/L (ref 94–109)
CO2 SERPL-SCNC: 29 MMOL/L (ref 20–32)
CREAT SERPL-MCNC: 0.77 MG/DL (ref 0.52–1.04)
GFR SERPL CREATININE-BSD FRML MDRD: 84 ML/MIN/{1.73_M2}
GLUCOSE SERPL-MCNC: 92 MG/DL (ref 70–99)
LITHIUM SERPL-SCNC: 0.23 MMOL/L (ref 0.6–1.2)
POTASSIUM SERPL-SCNC: 3.9 MMOL/L (ref 3.4–5.3)
SODIUM SERPL-SCNC: 141 MMOL/L (ref 133–144)
TSH SERPL DL<=0.005 MIU/L-ACNC: 1.72 MU/L (ref 0.4–4)

## 2021-04-21 PROCEDURE — 36415 COLL VENOUS BLD VENIPUNCTURE: CPT

## 2021-04-21 PROCEDURE — 80048 BASIC METABOLIC PNL TOTAL CA: CPT

## 2021-04-21 PROCEDURE — 84443 ASSAY THYROID STIM HORMONE: CPT

## 2021-04-21 PROCEDURE — 80178 ASSAY OF LITHIUM: CPT

## 2021-05-06 ENCOUNTER — TRANSFERRED RECORDS (OUTPATIENT)
Dept: HEALTH INFORMATION MANAGEMENT | Facility: CLINIC | Age: 61
End: 2021-05-06

## 2021-05-25 ENCOUNTER — TRANSFERRED RECORDS (OUTPATIENT)
Dept: HEALTH INFORMATION MANAGEMENT | Facility: CLINIC | Age: 61
End: 2021-05-25

## 2021-06-10 ENCOUNTER — TELEPHONE (OUTPATIENT)
Dept: PHARMACY | Facility: CLINIC | Age: 61
End: 2021-06-10

## 2021-06-10 NOTE — TELEPHONE ENCOUNTER
"Patient called reported that she is \"having a really hard time.\"  She is seeing provider at [facility], but feels as though she needs some additional medication support while tapering nefazodone.   She stated that she is feeling jittery and anxious, low mood, panicky, and easily frustrated.  She denied any thoughts of self harm.  She reported that she seems to be having hair thinning and realized it might be from lithium and is hesitant to increase, which has been recommended by [facility] provider.    Discussed option of PCP entering psychiatry referral for ongoing referral with alternate clinic, which patient would like to do.  She will call [facility] and discuss option of starting a new medication that would be similar nefazodone instead of increasing lithium.    Anushka Zhu, PharmD  Medication Therapy Management Pharmacist  [facility]  Phone: [phone]    "

## 2021-06-15 ENCOUNTER — NURSE TRIAGE (OUTPATIENT)
Dept: FAMILY MEDICINE | Facility: CLINIC | Age: 61
End: 2021-06-15

## 2021-06-15 NOTE — TELEPHONE ENCOUNTER
Pt is being seen at [facility] for psych, but does not feel that she is getting what she needs.   Pt continues to feel anxious, panicked with difficulty doing her daily activities.     Lithium 4/2021-low, but Pt did not want to jump from 300 to 450mg.  Also would like to discuss short acting and long acting med forms and what would be better for her.     Nefazodone is being discontinued. Also takes Lithium and Olanzapine.   Buspar 5mg 2x a day was added, worsening anxiety so Buspar increased to 10mg.   Pt recently called [facility] due to her exacerbated symptoms and they suggested reducing Buspar 5mg.  Pt does not feel like her self, unable to function. Feels anxious.   Tired of feeling what she is feeling and sometimes does not want to wake up. Would not harm herself though.   Pt has no plan and nurse did discuss safety plan with Pt: ED or who to call if she is in crisis or SI/HI.   Numbers below provided in emergency.   Pt will contact [facility] today to discuss Buspar and discontinuing now to see if this makes her less anxious.       Future Appointments   Date Time Provider Department Center   6/16/2021  3:00 PM Dom Tijerina MD [facility]     .emeCrisis Resources    Refer to the resources below as needed.    Steps to care for yourself    1. If you are currently in counseling, call your counselor for an appointment  2. Call the local crisis resources below if needed.  3. Contact friends or family for support.  4. Get more exercise.  5. Do activities you enjoy.  6. Eat a well-balanced diet and drink plenty of fluids.  7. Rest as needed.  8. Limit alcohol and recreational drugs. These can worsen depression.    When to contact your primary care provider       You have thoughts of harming or killing yourself but have not made a plan to carry it out.    Your depression gets in the way of daily activities.    You are often unable to sleep.    You need help cutting back on alcohol or recreational drugs.    When to  call 440 or go to the Emergency Room     Get emergency help right away if you have any of the following:    You are planning to harm or kill yourself and you have a way to carry out the plan.     You have injured yourself or others. Or, you think you will.    You feel confused or are having trouble thinking or remembering.    You are having delusions (false beliefs).    You are hearing voices or seeing things that aren t there.    You are feeling psychotic (paranoid, fearful, restless, agitated, nervous, racing thoughts or speech)    Crisis Resources     These hotlines are for both adults and children. The and are open 24 hours a day, 7 days a week unless noted otherwise.    National Suicide Prevention Lifeline   1-149-086-TALK (9072)    Crisis Text Line    www.crisistextline.org  Text HOME to 220011 from anywhere in the [place], anytime, about any type of crisis. A live, trained crisis counselor will receive the text and respond quickly.    [facility] Lifeline for LGBTQ Youth  A national crisis intervention and suicide lifeline for LGBTQ youth under 25. Provides a safe place to talk without judgement.   Call 1-213.512.1540; text START to 490285 or visit www.thetrevorproject.org to talk to a trained counselor.  For [facility] crisis numbers, visit the [facility] website at:  https://[place].gov/dhs/people-we-serve/adults/health-care/mental-health/resources/crisis-contacts.jsp         Reason for Disposition    Patient wants to be seen    Protocols used: SUICIDE [id number]            Gigi Betancur RN   [facility]

## 2021-06-16 ENCOUNTER — OFFICE VISIT (OUTPATIENT)
Dept: PSYCHIATRY | Facility: CLINIC | Age: 61
End: 2021-06-16
Payer: COMMERCIAL

## 2021-06-16 DIAGNOSIS — F31.76 BIPOLAR DISORDER, IN FULL REMISSION, MOST RECENT EPISODE DEPRESSED (H): Primary | ICD-10-CM

## 2021-06-16 PROCEDURE — 99204 OFFICE O/P NEW MOD 45 MIN: CPT | Performed by: PSYCHIATRY & NEUROLOGY

## 2021-06-16 RX ORDER — OLANZAPINE 2.5 MG/1
5 TABLET, FILM COATED ORAL AT BEDTIME
Qty: 90 TABLET | Refills: 3 | Status: ON HOLD | OUTPATIENT
Start: 2021-06-16 | End: 2021-06-21

## 2021-06-16 RX ORDER — HYDROXYZINE HYDROCHLORIDE 25 MG/1
25 TABLET, FILM COATED ORAL EVERY 4 HOURS PRN
Qty: 60 TABLET | Refills: 3 | Status: ON HOLD | OUTPATIENT
Start: 2021-06-16 | End: 2021-06-21

## 2021-06-17 ENCOUNTER — TELEPHONE (OUTPATIENT)
Dept: FAMILY MEDICINE | Facility: CLINIC | Age: 61
End: 2021-06-17

## 2021-06-17 NOTE — TELEPHONE ENCOUNTER
Patient seen by new psychiatrist Dr. Tijerina yesterday who made some medication changes. Today she is feeling more anxious and was wondering if they should be changes again. Denies SI/HI. Told patient I would forward the message to her care team and to go to ED if she is having an acute anxiety attack or through of hurting herself or others.    Milly Sr RN

## 2021-06-17 NOTE — PROGRESS NOTES
Visit Date: 06/16/2021    PSYCHIATRIC EVALUATION    IDENTIFICATION:  Ms. Zuly Wheeler is a 61-year-old -American  mother of 2, who is currently evaluated for anxiety and depression.    HISTORY:  Ms. Wheeler carries a diagnosis of bipolar 2, but tells me that she has really never had a manic episode.  She does not perceive herself to be bipolar, but she thinks that she just has very severe anxious depression.  Her main concern is her anxiety.  She does have panic attacks with palpitations, shortness of breath, etc.  Recently, she has been too anxious to drive, so she got a ride here by a friend.  She has been on nefazodone; because that will be taken off the market her physicians at [facility] decided to go ahead and taper the nefazodone.  They had actually done a great job tapering it, but as it has gone down her anxiety has gone up.  She does meet full criteria for depression.  She has very poor sleep with racing thoughts, but she does not report racing speech.  She does report decreased interest, decreased energy, poor appetite, feelings of helplessness and hopelessness.  She does not report active suicidal ideation.  She sees a therapist through her [facility] who she thinks is quite helpful.    Today we discussed treating her anxious depression with Prozac, hydroxyzine and an increase in her olanzapine.  For a while she has been on olanzapine 2.5 and lithium 300 as well as her nefazodone.  We decided to increase her olanzapine to 5 mg, discontinue her lithium, stop her nefazodone (she is already tapered to the lowest possible dose) and start Prozac 20 mg in the morning and hydroxyzine 25 mg q.4h. p.r.n.  The patient is very anxious about making any medication changes.  In the past she is always developed anxiety secondary to SSRIs and I assume she will probably have an increase in her anxiety with the Prozac.  I have suggested that she try to push through it, use her hydroxyzine as  necessary, and we will increase her olanzapine.  I also think we will need to have a rather quick return visit and asked her to come back in 2 weeks.  At that point, if she is tolerating the Prozac will go ahead and increase the Prozac to 40.  We also have quite a ways that we can increase the olanzapine or the hydroxyzine.  In the future we will consider adding Remeron at bedtime.  The patient is quite thin and an increase in appetite does not appear to be harmful.    It is unclear to me why the patient is leaving [facility].  She reports it is just not working out well.  I am not sure why.  I did review [facility]'s recent visits.    PAST MEDICAL HISTORY:  The patient has had recent visits for sinusitis, gastroenteritis and cough.  She denies any chronic medical problems.     ALLERGIES:  SHE HAS ALLERGIES LISTED TO VARIETY OF MEDICATIONS, MOST OF THEM SIMPLY CAUSED ANXIETY.  IT APPEARS THAT SHE HAS A CLEAR ALLERGY TO SULFA DRUGS, BUT OTHERWISE MOST OF HER SIDE EFFECTS HAVE BEEN PSYCHIATRIC IN NATURE.    FAMILY HISTORY:  The patient has an uncle who completed suicide.  Her mother was treated for depression.    SOCIAL HISTORY:  The patient has 2 adult children who she thinks are doing well.  She lives with her brother.  She does not smoke, drink or abuse drugs.  She quit smoking at age 30.  She currently works part-time doing  for high functioning adults.    PHYSICAL REVIEW OF SYSTEMS:  On my interview, she denied headache.  She has had some visual distortion.  She denied problems hearing.  She denied chest pain or shortness of breath, abdominal pain, diarrhea, constipation, genitourinary symptoms or problems with muscles, skin or joints.    MENTAL STATUS EXAM:  The patient was a pleasant, cooperative woman.  Her mood was depressed and her affect anxious.  Her speech was coherent and goal oriented.  It was not rapid or pressured.  Her associations were tight.  Her thought processes logical and linear.  Her  content of thought was without psychosis or suicidal ideation.  Recent and remote memory, concentration, fund of knowledge and use of language were at baseline.  She is alert and oriented x 3.  Insight and judgment are intact.  Gait and station are within normal limits.    ASSESSMENT:  Major depressive disorder and anxiety disorder, not otherwise specified.  Previous history of bipolar 2.    RECOMMENDATIONS:  1.  Discontinue nefazodone.  2.  Discontinue lithium.  3.  Start Prozac 20 mg p.o. q.a.m.  4.  Start hydroxyzine 25 mg q.4h. p.r.n. anxiety.  5.  Increase nighttime olanzapine to 5 mg.     The patient was asked to return to clinic in 2 weeks.      Dom Tijerina MD        D: 2021   T: 2021   MT: [facility]    Name:     CORA SCHMITZ  MRN:      [id number]        Account:    [id number]   :      1960           Visit Date: 2021     Document: [id number]

## 2021-06-18 ENCOUNTER — TELEPHONE (OUTPATIENT)
Dept: FAMILY MEDICINE | Facility: CLINIC | Age: 61
End: 2021-06-18

## 2021-06-18 NOTE — TELEPHONE ENCOUNTER
Pt calling says she was just seen at psych and had some med adjustments- went off lithium, continuing Zyprexa, started Prozac and hydroxyzine.    Calling because she is feeling really bad today, even took a hydroxyzine (is prn for anxiety) and feels shaky, having difficulty functioning, racing thoughts, pacing and feeling uncomfortable.    I let know I would send this on to [facility] nurse team and psych provider.    Pt wanted to know if she should go to ER. Advised if she feels like she needs to she can otherwise  Will send to provider/nurse.    Also encouraged calling if any questions in meantime.    Keturah Duff RN

## 2021-06-18 NOTE — TELEPHONE ENCOUNTER
Pt called again regarding her increased anxiety and change in her medications. Pt reports she's feeling much more anxious than usual, see previous note below. Reviewed pt's visit notes from her appt with Dr. Tijerina on 6/16:    The patient is very anxious about making any medication changes.  In the past she is always developed anxiety secondary to SSRIs and I assume she will probably have an increase in her anxiety with the Prozac.  I have suggested that she try to push through it, use her hydroxyzine as necessary, and we will increase her olanzapine.  I also think we will need to have a rather quick return visit and asked her to come back in 2 weeks.      Reassured pt that increased anxiety as a result of starting Prozac is expected, advised pt that if she feels like she is in a mental health crisis or develops symptoms such as chest pain, heart palpitations, or difficulty breathing she should head to ED. Also advised pt that although clinic will be closed over the weekend, [facility] has triage nurses available 24/7 if she feels she needs to speak with someone about her symptoms outside of office hours. Also scheduled pt for sooner f/u visit with Dr. Tijerina.    Coco Angel RN  [facility]

## 2021-06-19 ENCOUNTER — TELEPHONE (OUTPATIENT)
Dept: BEHAVIORAL HEALTH | Facility: CLINIC | Age: 61
End: 2021-06-19

## 2021-06-19 ENCOUNTER — HOSPITAL ENCOUNTER (INPATIENT)
Facility: CLINIC | Age: 61
LOS: 9 days | Discharge: HOME | End: 2021-06-28
Attending: EMERGENCY MEDICINE | Admitting: PSYCHIATRY & NEUROLOGY
Payer: COMMERCIAL

## 2021-06-19 DIAGNOSIS — Z11.52 ENCOUNTER FOR SCREENING LABORATORY TESTING FOR SEVERE ACUTE RESPIRATORY SYNDROME CORONAVIRUS 2 (SARS-COV-2): ICD-10-CM

## 2021-06-19 DIAGNOSIS — F31.76 BIPOLAR DISORDER, IN FULL REMISSION, MOST RECENT EPISODE DEPRESSED (H): ICD-10-CM

## 2021-06-19 DIAGNOSIS — F41.8 MIXED ANXIETY AND DEPRESSIVE DISORDER: ICD-10-CM

## 2021-06-19 DIAGNOSIS — I10 HYPERTENSION, UNSPECIFIED TYPE: Primary | ICD-10-CM

## 2021-06-19 LAB
ALBUMIN SERPL-MCNC: 4.3 G/DL (ref 3.4–5)
ALP SERPL-CCNC: 73 U/L (ref 40–150)
ALT SERPL W P-5'-P-CCNC: 29 U/L (ref 0–50)
AMPHETAMINES UR QL SCN: NEGATIVE
ANION GAP SERPL CALCULATED.3IONS-SCNC: 8 MMOL/L (ref 3–14)
AST SERPL W P-5'-P-CCNC: 21 U/L (ref 0–45)
BARBITURATES UR QL: NEGATIVE
BASOPHILS # BLD AUTO: 0 10E9/L (ref 0–0.2)
BASOPHILS NFR BLD AUTO: 0.5 %
BENZODIAZ UR QL: NEGATIVE
BILIRUB SERPL-MCNC: 0.4 MG/DL (ref 0.2–1.3)
BUN SERPL-MCNC: 12 MG/DL (ref 7–30)
CALCIUM SERPL-MCNC: 9.3 MG/DL (ref 8.5–10.1)
CANNABINOIDS UR QL SCN: NEGATIVE
CHLORIDE SERPL-SCNC: 105 MMOL/L (ref 94–109)
CO2 SERPL-SCNC: 28 MMOL/L (ref 20–32)
COCAINE UR QL: NEGATIVE
CREAT SERPL-MCNC: 0.76 MG/DL (ref 0.52–1.04)
DIFFERENTIAL METHOD BLD: NORMAL
EOSINOPHIL # BLD AUTO: 0 10E9/L (ref 0–0.7)
EOSINOPHIL NFR BLD AUTO: 0.7 %
ERYTHROCYTE [DISTWIDTH] IN BLOOD BY AUTOMATED COUNT: 12.3 % (ref 10–15)
ETHANOL UR QL SCN: NEGATIVE
GFR SERPL CREATININE-BSD FRML MDRD: 84 ML/MIN/{1.73_M2}
GLUCOSE SERPL-MCNC: 114 MG/DL (ref 70–99)
HCT VFR BLD AUTO: 44.9 % (ref 35–47)
HGB BLD-MCNC: 14.7 G/DL (ref 11.7–15.7)
IMM GRANULOCYTES # BLD: 0 10E9/L (ref 0–0.4)
IMM GRANULOCYTES NFR BLD: 0.2 %
LABORATORY COMMENT REPORT: NORMAL
LYMPHOCYTES # BLD AUTO: 1.6 10E9/L (ref 0.8–5.3)
LYMPHOCYTES NFR BLD AUTO: 29.1 %
MCH RBC QN AUTO: 29.1 PG (ref 26.5–33)
MCHC RBC AUTO-ENTMCNC: 32.7 G/DL (ref 31.5–36.5)
MCV RBC AUTO: 89 FL (ref 78–100)
MONOCYTES # BLD AUTO: 0.3 10E9/L (ref 0–1.3)
MONOCYTES NFR BLD AUTO: 4.8 %
NEUTROPHILS # BLD AUTO: 3.6 10E9/L (ref 1.6–8.3)
NEUTROPHILS NFR BLD AUTO: 64.7 %
NRBC # BLD AUTO: 0 10*3/UL
NRBC BLD AUTO-RTO: 0 /100
OPIATES UR QL SCN: NEGATIVE
PLATELET # BLD AUTO: 324 10E9/L (ref 150–450)
POTASSIUM SERPL-SCNC: 3.6 MMOL/L (ref 3.4–5.3)
PROT SERPL-MCNC: 8.6 G/DL (ref 6.8–8.8)
RBC # BLD AUTO: 5.05 10E12/L (ref 3.8–5.2)
SARS-COV-2 RNA RESP QL NAA+PROBE: NEGATIVE
SODIUM SERPL-SCNC: 141 MMOL/L (ref 133–144)
SPECIMEN SOURCE: NORMAL
TSH SERPL DL<=0.005 MIU/L-ACNC: 1.15 MU/L (ref 0.4–4)
WBC # BLD AUTO: 5.6 10E9/L (ref 4–11)

## 2021-06-19 PROCEDURE — 80320 DRUG SCREEN QUANTALCOHOLS: CPT | Performed by: EMERGENCY MEDICINE

## 2021-06-19 PROCEDURE — C9803 HOPD COVID-19 SPEC COLLECT: HCPCS

## 2021-06-19 PROCEDURE — 250N000013 HC RX MED GY IP 250 OP 250 PS 637: Performed by: INTERNAL MEDICINE

## 2021-06-19 PROCEDURE — 80053 COMPREHEN METABOLIC PANEL: CPT | Performed by: EMERGENCY MEDICINE

## 2021-06-19 PROCEDURE — 250N000013 HC RX MED GY IP 250 OP 250 PS 637: Performed by: CLINICAL NURSE SPECIALIST

## 2021-06-19 PROCEDURE — 84443 ASSAY THYROID STIM HORMONE: CPT | Performed by: EMERGENCY MEDICINE

## 2021-06-19 PROCEDURE — 85025 COMPLETE CBC W/AUTO DIFF WBC: CPT | Performed by: EMERGENCY MEDICINE

## 2021-06-19 PROCEDURE — 90791 PSYCH DIAGNOSTIC EVALUATION: CPT

## 2021-06-19 PROCEDURE — 124N000003 HC R&B MH SENIOR/ADOLESCENT

## 2021-06-19 PROCEDURE — 80307 DRUG TEST PRSMV CHEM ANLYZR: CPT | Performed by: EMERGENCY MEDICINE

## 2021-06-19 PROCEDURE — 87635 SARS-COV-2 COVID-19 AMP PRB: CPT | Performed by: EMERGENCY MEDICINE

## 2021-06-19 PROCEDURE — 99285 EMERGENCY DEPT VISIT HI MDM: CPT | Performed by: EMERGENCY MEDICINE

## 2021-06-19 PROCEDURE — 99285 EMERGENCY DEPT VISIT HI MDM: CPT | Mod: 25

## 2021-06-19 RX ORDER — GABAPENTIN 100 MG/1
100 CAPSULE ORAL 3 TIMES DAILY PRN
Status: DISCONTINUED | OUTPATIENT
Start: 2021-06-19 | End: 2021-06-21

## 2021-06-19 RX ORDER — TRAZODONE HYDROCHLORIDE 50 MG/1
50 TABLET, FILM COATED ORAL
Status: DISCONTINUED | OUTPATIENT
Start: 2021-06-19 | End: 2021-06-28 | Stop reason: HOSPADM

## 2021-06-19 RX ORDER — OLANZAPINE 10 MG/2ML
2.5-5 INJECTION, POWDER, FOR SOLUTION INTRAMUSCULAR 3 TIMES DAILY PRN
Status: DISCONTINUED | OUTPATIENT
Start: 2021-06-19 | End: 2021-06-28 | Stop reason: HOSPADM

## 2021-06-19 RX ORDER — HYDRALAZINE HYDROCHLORIDE 25 MG/1
50 TABLET, FILM COATED ORAL 4 TIMES DAILY PRN
Status: DISCONTINUED | OUTPATIENT
Start: 2021-06-19 | End: 2021-06-20

## 2021-06-19 RX ORDER — LORAZEPAM 0.5 MG/1
0.5 TABLET ORAL ONCE
Status: DISCONTINUED | OUTPATIENT
Start: 2021-06-19 | End: 2021-06-21

## 2021-06-19 RX ORDER — OLANZAPINE 5 MG/1
5 TABLET ORAL AT BEDTIME
Status: DISCONTINUED | OUTPATIENT
Start: 2021-06-19 | End: 2021-06-28 | Stop reason: HOSPADM

## 2021-06-19 RX ORDER — OLANZAPINE 2.5 MG/1
2.5-5 TABLET, FILM COATED ORAL 3 TIMES DAILY PRN
Status: DISCONTINUED | OUTPATIENT
Start: 2021-06-19 | End: 2021-06-28 | Stop reason: HOSPADM

## 2021-06-19 RX ORDER — MAGNESIUM HYDROXIDE/ALUMINUM HYDROXICE/SIMETHICONE 120; 1200; 1200 MG/30ML; MG/30ML; MG/30ML
30 SUSPENSION ORAL EVERY 4 HOURS PRN
Status: DISCONTINUED | OUTPATIENT
Start: 2021-06-19 | End: 2021-06-28 | Stop reason: HOSPADM

## 2021-06-19 RX ORDER — ACETAMINOPHEN 325 MG/1
650 TABLET ORAL EVERY 4 HOURS PRN
Status: DISCONTINUED | OUTPATIENT
Start: 2021-06-19 | End: 2021-06-28 | Stop reason: HOSPADM

## 2021-06-19 RX ADMIN — OLANZAPINE 5 MG: 5 TABLET, FILM COATED ORAL at 20:43

## 2021-06-19 RX ADMIN — HYDRALAZINE HYDROCHLORIDE 50 MG: 25 TABLET ORAL at 18:24

## 2021-06-19 RX ADMIN — GABAPENTIN 100 MG: 100 CAPSULE ORAL at 20:43

## 2021-06-19 ASSESSMENT — ACTIVITIES OF DAILY LIVING (ADL)
DRESS: SCRUBS (BEHAVIORAL HEALTH)
LAUNDRY: WITH SUPERVISION
ORAL_HYGIENE: INDEPENDENT
HYGIENE/GROOMING: INDEPENDENT

## 2021-06-19 ASSESSMENT — ENCOUNTER SYMPTOMS
SHORTNESS OF BREATH: 0
NERVOUS/ANXIOUS: 1
ABDOMINAL PAIN: 0
MUSCULOSKELETAL NEGATIVE: 1

## 2021-06-19 NOTE — ED NOTES
Report called to [facility] on inpatient unit 3B.  Pt informed that she will be going to a locked unit, no cell phone allowed, and pt will need to be in scrubs. Pt agreed with information given. Admitting nurse also informed of elevated BP.

## 2021-06-19 NOTE — PHARMACY-ADMISSION MEDICATION HISTORY
Admission Medication History Completed by Pharmacy    See [facility] Admission Navigator for allergy information, preferred outpatient pharmacy, prior to admission medications and immunization status.     Medication History Sources:     Per patient     Changes made to PTA medication list (reason):    Added: None    Deleted: Vitamin C, Vitamin D, Vitamin E     Changed: None    Additional Information:  o Pt was alert and easily answered questions. She stated that she has not taken any of her vitamins for about a month now. She is only on the 3 medications on her med list due to changes her psychiatrist has made recently.   Prior to Admission medications    Medication Sig Last Dose Taking? Auth Provider   FLUoxetine (PROZAC) 20 MG capsule Take 1 capsule (20 mg) by mouth daily 6/18/2021 at Unknown time Yes Dom Tijerina MD   hydrOXYzine (ATARAX) 25 MG tablet Take 1 tablet (25 mg) by mouth every 4 hours as needed for anxiety 6/18/2021 at Unknown time Yes Dom Tijerina MD   OLANZapine (ZYPREXA) 2.5 MG tablet Take 2 tablets (5 mg) by mouth At Bedtime 6/18/2021 at Unknown time Yes Dom Tijerina MD       Date completed: 06/19/21    Medication history completed by: Florencio Tomas, PD2 Intern

## 2021-06-19 NOTE — ED PROVIDER NOTES
ED Provider Note  [facility]      History     Chief Complaint   Patient presents with     Anxiety     Patient has been having some medication changes and is now feeling anxious and having panic attacks.     The history is provided by the patient and medical records.     Zuly Wheeler is a 61 year old female with PMH notable for anxiety, bipolar 2 disorder, and MDDwho presents to the ED for anxiety. Patient reports that she has recently been through many medication changes. She was previously on lithium, nefazodone, and olanzapine. She was also on Buspar for a period of time until 6/15. She was recently tapered down off of nefazodone and states that her anxiety has been getting much worse. She saw a new psychiatrist on 6/16 who then took her off of lithium, which she had been on for some time, and added Prozac and hydroxyzine. She states that these new medication changes are not working for her. She feels shaky, panicky, has racing thoughts, and is not sleeping well. She states that it is hard to even express what she is feeling.  She has no energy and is not eating.  She does not even have the energy to cook.  She denies suicidal ideation.  She states that she has had a PCP, but they have been switching clinics, so she has not been seeing the same one recently.   Social:  Here with brother Ivan      Past Medical History  Past Medical History:   Diagnosis Date     Anxiety and depression      Bipolar 1 disorder (H)      Depressive disorder      Past Surgical History:   Procedure Laterality Date     DILATION AND CURETTAGE  12/6/2013    Procedure: DILATION AND CURETTAGE;  Dilation And Curettage;  Surgeon: Edel Chew MD;  Location: [facility] OR     EYE SURGERY  8/2013    cataract surgery both eyes done     LAPAROSCOPIC HYSTERECTOMY SUPRACERVICAL, BILATERAL SALPINGO-OOPHORECTOMY, COMBINED  1/30/2014    Procedure: COMBINED LAPAROSCOPIC HYSTERECTOMY SUPRACERVICAL, SALPINGO-OOPHORECTOMY;   Laparoscopic Supracervical Hysterectomy With Bilateral Salingectomy;  Surgeon: Edel Chew MD;  Location: [facility] OR     TUBAL LIGATION       FLUoxetine (PROZAC) 20 MG capsule  hydrOXYzine (ATARAX) 25 MG tablet  OLANZapine (ZYPREXA) 2.5 MG tablet  cyanocobalamin 1000 MCG SUBL  vitamin C (ASCORBIC ACID) 250 MG tablet  Vitamin D, Cholecalciferol, 25 MCG (1000 UT) CAPS  vitamin E (TOCOPHEROL) 400 units (180 mg) capsule      Allergies   Allergen Reactions     Depakote      groggy     Lamotrigine      Intolerance, numb (Lamictal)     Olanzapine Other (See Comments)     Only generic/ineffective  Ineffective (only generic)     Seroquel [Quetiapine Fumarate] Visual Disturbance     Blurred vision; jumpy     Sulfa Drugs Hives     Trileptal Visual Disturbance     Blurred vision     Zoloft Other (See Comments)     jumpy     Citalopram Anxiety     Intolerance (Celexa)     Family History  Family History   Problem Relation Age of Onset     Diabetes Mother      Hypertension Mother      Psychotic Disorder Mother      Depression Mother      Hearing Loss Father      Coronary Artery Disease No family hx of      Hyperlipidemia No family hx of      Cerebrovascular Disease No family hx of      Breast Cancer No family hx of      Colon Cancer No family hx of      Prostate Cancer No family hx of      Other Cancer No family hx of      Anxiety Disorder No family hx of      Mental Illness No family hx of      Substance Abuse No family hx of      Anesthesia Reaction No family hx of      Asthma No family hx of      Osteoporosis No family hx of      Genetic Disorder No family hx of      Thyroid Disease No family hx of      Obesity No family hx of      Unknown/Adopted No family hx of      Social History   Social History     Tobacco Use     Smoking status: Former Smoker     Years: 30.00     Types: Cigarettes     Quit date: 1990     Years since quittin.5     Smokeless tobacco: Never Used   Substance Use Topics     Alcohol use: Never      Frequency: Never     Binge frequency: Never     Drug use: No      Past medical history, past surgical history, medications, allergies, family history, and social history were reviewed with the patient. No additional pertinent items.       Review of Systems   Respiratory: Negative for shortness of breath.    Cardiovascular: Negative for chest pain.   Gastrointestinal: Negative for abdominal pain.   Musculoskeletal: Negative.    Psychiatric/Behavioral: Negative for suicidal ideas. The patient is nervous/anxious.    All other systems reviewed and are negative.        Physical Exam   BP: (!) 175/69  Pulse: 81  Temp: 98.4  F (36.9  C)  Resp: 14  SpO2: 99 %  Physical Exam  Physical Exam   Constitutional:   well nourished, well developed,appears anxious  HENT:   Head: Normocephalic and atraumatic.   Cardiovascular: regular rate and rhythm without murmurs or gallops  Pulmonary/Chest: Clear to auscultation bilaterally, with no wheezes or retractions. No respiratory distress.  GI: Soft with good bowel sounds.  Non-tender, non-distended, with no guarding, no rebound, no peritoneal signs.   Back:  No bony or CVA tenderness   Musculoskeletal:  no edema  Skin: Skin is warm and dry  Neurological: alert and oriented to person, place, and time. Nonfocal exam  Psychiatric: Speech is normal. Judgment and thought content normal.mood appears flat, patient appears anxious and intermittently tearful .Thought content is not paranoid and not delusional. Cognition and memory are normal. No homicidal and no suicidal ideation.   ED Course      Procedures        The medical record was reviewed and interpreted.  Current labs reviewed and interpreted.  Previous labs reviewed and interpreted.       Results for orders placed or performed during the hospital encounter of 06/19/21   CBC with platelets differential     Status: None   Result Value Ref Range    WBC 5.6 4.0 - 11.0 10e9/L    RBC Count 5.05 3.8 - 5.2 10e12/L    Hemoglobin 14.7 11.7 -  "15.7 g/dL    Hematocrit 44.9 35.0 - 47.0 %    MCV 89 78 - 100 fl    MCH 29.1 26.5 - 33.0 pg    MCHC 32.7 31.5 - 36.5 g/dL    RDW 12.3 10.0 - 15.0 %    Platelet Count 324 150 - 450 10e9/L    Diff Method Automated Method     % Neutrophils 64.7 %    % Lymphocytes 29.1 %    % Monocytes 4.8 %    % Eosinophils 0.7 %    % Basophils 0.5 %    % Immature Granulocytes 0.2 %    Nucleated RBCs 0 0 /100    Absolute Neutrophil 3.6 1.6 - 8.3 10e9/L    Absolute Lymphocytes 1.6 0.8 - 5.3 10e9/L    Absolute Monocytes 0.3 0.0 - 1.3 10e9/L    Absolute Eosinophils 0.0 0.0 - 0.7 10e9/L    Absolute Basophils 0.0 0.0 - 0.2 10e9/L    Abs Immature Granulocytes 0.0 0 - 0.4 10e9/L    Absolute Nucleated RBC 0.0    Comprehensive metabolic panel     Status: Abnormal   Result Value Ref Range    Sodium 141 133 - 144 mmol/L    Potassium 3.6 3.4 - 5.3 mmol/L    Chloride 105 94 - 109 mmol/L    Carbon Dioxide 28 20 - 32 mmol/L    Anion Gap 8 3 - 14 mmol/L    Glucose 114 (H) 70 - 99 mg/dL    Urea Nitrogen 12 7 - 30 mg/dL    Creatinine 0.76 0.52 - 1.04 mg/dL    GFR Estimate 84 >60 mL/min/[1.73_m2]    GFR Estimate If Black >90 >60 mL/min/[1.73_m2]    Calcium 9.3 8.5 - 10.1 mg/dL    Bilirubin Total 0.4 0.2 - 1.3 mg/dL    Albumin 4.3 3.4 - 5.0 g/dL    Protein Total 8.6 6.8 - 8.8 g/dL    Alkaline Phosphatase 73 40 - 150 U/L    ALT 29 0 - 50 U/L    AST 21 0 - 45 U/L   TSH with free T4 reflex     Status: None   Result Value Ref Range    TSH 1.15 0.40 - 4.00 mU/L     Medications   LORazepam (ATIVAN) tablet 0.5 mg (0.5 mg Oral Not Given 6/19/21 1308)        Assessments & Plan (with Medical Decision Making)     I have reviewed the nursing notes.  Emergency Department course:  The patient was seen and examined at 1032 am.   I offered the patient with Ativan p.o. while she was in the ED awaiting assessment, but she declined.     Per chart review patient was seen by a new psychiatrist Dr. Tijerina on 6/16 who noted: \"The patient is very anxious about making any " "medication changes. In the past she is always developed anxiety secondary to SSRIs and I assume she will probably have an increase in her anxiety with the Prozac. I have suggested that she try to push through it, use her hydroxyzine as necessary, and we will increase her olanzapine. I also think we will need to have a rather quick return visit and asked her to come back in 2 weeks.  At that point, if she is tolerating the Prozac will go ahead and increase the Prozac to 40.  We also have quite a ways that we can increase the olanzapine or the hydroxyzine.  In the future we will consider adding Remeron at bedtime.\" They made the following recommendations:   1.  Discontinue nefazodone.  2.  Discontinue lithium.  3.  Start Prozac 20 mg p.o. q.a.m.  4.  Start hydroxyzine 25 mg q.4h. p.r.n. anxiety.  5.  Increase nighttime olanzapine to 5 mg.    The patient was asked to return to clinic in 2 weeks.    The patient was evaluated by [facility] at about 12:50 pm. Please see her documentation for details.  Briefly, the patient's anxiety has worsened to the point that she is not eating and feels depressed.  She works part-time as a  and has undergone a recent break-up.  She is not functioning well at home.    Laboratory studies show an unremarkable CBC and comprehensive metabolic panel apart from a slightly elevated glucose of 114.  TSH is normal at 1.15.     The patient is a 61-year-old female with a history of anxiety who has had multiple recent medication changes from several different physicians.  Her anxiety has worsened.  In fact, Dr. Tijerina, who evaluated her 3 days ago, believe that the patient would require a quick return visit for anxiety.  The patient feels unable to manage at home.  She will be admitted here to [facility] to an inpatient behavioral health bed for psychiatric assessment and medication adjustment.      I have reviewed the findings, diagnosis, plan and need for follow up with " the patient.    New Prescriptions    No medications on file       Final diagnoses:   Mixed anxiety and depressive disorder   I, Tamia Ramos, am serving as a trained medical scribe to document services personally performed by Lisbet Sims MD, based on the provider's statements to me.     ILisbet MD, was physically present and have reviewed and verified the accuracy of this note documented by Tamia Ramos.      --This note was created in part by the use of Dragon voice recognition dictation system. Inadvertent grammatical errors and typographical errors may still exist.  MD Lisbet Ferrell MD  [facility] EMERGENCY DEPARTMENT  6/19/2021     Lisbet Sims MD  06/19/21 4325

## 2021-06-19 NOTE — TELEPHONE ENCOUNTER
Patient cleared and ready for behavioral bed placement: Yes     S Pt is a 61 year old female [place] [facility]    B Pt has been decompensating. Pt anxiety and depression have worsened. Pt symptoms have worsened over last few months. Pt lacks energy. Pt appetite has been decreasing. Pt denies SI, HI, psychosis. Pt denies a history of dementia. Pt does see a psychiatrist and therapist.    A Vol    R Placed on adult worklist     - 207pm intake awaiting labs

## 2021-06-19 NOTE — PROGRESS NOTES
06/19/21 4465   Patient Belongings   Did you bring any home meds/supplements to the hospital?  No   Patient Belongings remains with patient;sent to security per site process;locker   Patient Belongings Remaining with Patient other (see comments)   Patient Belongings Put in Hospital Secure Location (Security or Locker, etc.) other (see comments)   Belongings Search Yes   Clothing Search Yes   Second Staff Annie Harding     W/patient -  underwear, bra, blue zip up, jeans    Locker -  shoes w/ laces, tank w/ strings, wallet x 2 w/ misc reward cards, insurance cards, cell phone, set of keys x1, purse w/ misc items and toiletries       Security - visa #[id number]    A               Admission:  I am responsible for any personal items that are not sent to the safe or pharmacy.  [facility] is not responsible for loss, theft or damage of any property in my possession.    Signature:  _________________________________ Date: _______  Time: _____                                              Staff Signature:  ____________________________ Date: ________  Time: _____      2nd Staff person, if patient is unable/unwilling to sign:    Signature: ________________________________ Date: ________  Time: _____     Discharge:  [facility] has returned all of my personal belongings:    Signature: _________________________________ Date: ________  Time: _____                                          Staff Signature:  ____________________________ Date: ________  Time: _____

## 2021-06-19 NOTE — ED TRIAGE NOTES
Patient has been having several medication changes over the past several weeks and started seeing a new psychiatrist. This new one took patient off lithium and started taking olanzapine and added prozac. Patient has not felt well for about 2 months with anxiety and panic attacks. Patient reports not sleeping and racing thoughts.

## 2021-06-19 NOTE — PLAN OF CARE
"Pt presents from [facility] ED after being brought in due to increased anxiety r/t to recent medication changes. Pt reports that she was on a decent combination of medications (olanzapine, lithium, and nefazodone) but they were recently changed due to nefazodone no longer being made. She reports feeling uneasy, increasingly anxious, jittery, having racing thoughts, and having heart palpitations. Pt presents with tense affect and depressed, anxious mood. Denies SI/SIB and hallucinations. Rates anxiety 7/10 and depression 4/10. Pt has a history of one previous hospitalization 30 years ago, at that time she attempted suicide by cutting. Pt currently sees a therapist and sees Dr. Hong at the clinic here at the . Denies any drug or alcohol usage, UTOX negative in ED. Goal for hospitalization is to lower her anxiety and get her depression under control. She is hoping not to be hospitalized for too long. Pt BP on admission was elevated (196/111), she denies chest pain, has a headache which she rates 6/10, and has some lightheadedness. Internal medicine was paged and PRN hydralazine orders were placed at this time and pt was provided with PRN dose, after dose BP was 164/86 by manual check and HA has decreased. All other VS WNL. Pt was oriented to unit and schedule. She was present in the lounge most of the evening, watching television with peers and talking on the phone. Pt is pleasant and cooperative. Refused Prozac this evening as she does not think this medication has been effective and has been making her feel \"weird\". She wishes to speak with the provider tomorrow regarding this medication. Pt did take the gabapentin this evening to see if it may help with her anxiety. Denies and other pain. No other concerns or complaints noted.   "

## 2021-06-20 PROCEDURE — 124N000003 HC R&B MH SENIOR/ADOLESCENT

## 2021-06-20 PROCEDURE — 99232 SBSQ HOSP IP/OBS MODERATE 35: CPT | Performed by: PHYSICIAN ASSISTANT

## 2021-06-20 PROCEDURE — 250N000013 HC RX MED GY IP 250 OP 250 PS 637: Performed by: CLINICAL NURSE SPECIALIST

## 2021-06-20 PROCEDURE — 99223 1ST HOSP IP/OBS HIGH 75: CPT | Mod: AI | Performed by: PSYCHIATRY & NEUROLOGY

## 2021-06-20 PROCEDURE — 99207 PR CONSULT E&M CHANGED TO SUBSEQUENT LEVEL: CPT | Performed by: PHYSICIAN ASSISTANT

## 2021-06-20 PROCEDURE — 250N000013 HC RX MED GY IP 250 OP 250 PS 637: Performed by: PSYCHIATRY & NEUROLOGY

## 2021-06-20 RX ORDER — HYDROXYZINE HYDROCHLORIDE 25 MG/1
25 TABLET, FILM COATED ORAL EVERY 4 HOURS PRN
Status: DISCONTINUED | OUTPATIENT
Start: 2021-06-20 | End: 2021-06-28 | Stop reason: HOSPADM

## 2021-06-20 RX ORDER — LITHIUM CARBONATE 300 MG/1
300 CAPSULE ORAL AT BEDTIME
Status: DISCONTINUED | OUTPATIENT
Start: 2021-06-20 | End: 2021-06-23

## 2021-06-20 RX ORDER — HYDRALAZINE HYDROCHLORIDE 25 MG/1
25 TABLET, FILM COATED ORAL EVERY 6 HOURS PRN
Status: DISCONTINUED | OUTPATIENT
Start: 2021-06-20 | End: 2021-06-28 | Stop reason: HOSPADM

## 2021-06-20 RX ADMIN — GABAPENTIN 100 MG: 100 CAPSULE ORAL at 08:42

## 2021-06-20 RX ADMIN — OLANZAPINE 5 MG: 5 TABLET, FILM COATED ORAL at 20:49

## 2021-06-20 RX ADMIN — LITHIUM CARBONATE 300 MG: 300 CAPSULE, GELATIN COATED ORAL at 20:49

## 2021-06-20 RX ADMIN — GABAPENTIN 100 MG: 100 CAPSULE ORAL at 15:24

## 2021-06-20 ASSESSMENT — ACTIVITIES OF DAILY LIVING (ADL)
DRESS: SCRUBS (BEHAVIORAL HEALTH)
ORAL_HYGIENE: INDEPENDENT
LAUNDRY: WITH SUPERVISION
LAUNDRY: WITH SUPERVISION
ORAL_HYGIENE: INDEPENDENT
HYGIENE/GROOMING: INDEPENDENT
HYGIENE/GROOMING: INDEPENDENT
DRESS: SCRUBS (BEHAVIORAL HEALTH)
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
## 2021-06-20 NOTE — CONSULTS
[facility]    Internal Medicine Initial Consult       Date of Admission: 6/19/2021  Consult Requested by: Marquise Morrell MD  Reason for Consult: Elevated Blood Pressure     Assessment & Recommendations  Zuly Wheeler is a 61 year old woman with a history of MDD and GABRIELE who is admitted to station 3B with anxiety.        Major depressive disorder severe recurrent without psychosis.  Generalized anxiety disorder.   Management per psychiatry team.     Elevated blood pressure.   BPs ranging 160s-190s/80s-110s since admit w/o underlying HTN dx and OP clinic BPs appearing well controlled. No reported substance abuse. Asymptomatic outside of her anxiety which is likely the primary driving force. Was improved to 153/75 this AM. Did receive a PRN dose of hydralazine last night.   - Will continue to have PRN hydralazine available (25 mg Q6hr PRN for SBP > 170 or DBP > 110) but please treat anxiety first. Doubt she needs a scheduled antihypertensive at this time but IM will follow BPs and adjust accordingly.     Medicine will follow BPs, please page with any additional concerns.     Meredith Parikh PA-C  Hospitalist Service  Job code [phone]   Text paging via [employer] is appreciated  ______________________________________________________________________    Reason for Admission  Anxiety    Chief Complaint   Anxiety     History of Present Illness   History is obtained from the patient and medical record.     Zuly Wheeler is a 61 year old year old woman with a history of depression and anxiety admitted to behavioral health for anxiety.     Internal Medicine service was asked to see patient for a general medical evaluation. Currently, patient is feeling anxious. Her BPs were high overnight prompting a dose of PRN hydralazine. She does not have a history of HTN nor does she take home BP meds. Upon chart review during last two clinic visits (3/31/21 and 4/6/21) BP was well  controlled 106/72 and 130/68. She denies headache, chest pain, dyspnea, history of heart problems.     Review of Systems   10 point ROS performed and negative unless otherwise noted in HPI     Past Medical History    I have reviewed this patient's medical history and updated it with pertinent information if needed.   Past Medical History:   Diagnosis Date     Anxiety and depression      Bipolar 1 disorder (H)      Depressive disorder         Past Surgical History   I have reviewed this patient's surgical history and updated it with pertinent information if needed.  Past Surgical History:   Procedure Laterality Date     DILATION AND CURETTAGE  2013    Procedure: DILATION AND CURETTAGE;  Dilation And Curettage;  Surgeon: Edel Chew MD;  Location: [facility] OR     EYE SURGERY  2013    cataract surgery both eyes done     LAPAROSCOPIC HYSTERECTOMY SUPRACERVICAL, BILATERAL SALPINGO-OOPHORECTOMY, COMBINED  2014    Procedure: COMBINED LAPAROSCOPIC HYSTERECTOMY SUPRACERVICAL, SALPINGO-OOPHORECTOMY;  Laparoscopic Supracervical Hysterectomy With Bilateral Salingectomy;  Surgeon: Edel Chew MD;  Location: [facility] OR     TUBAL LIGATION          Social History   Social History     Tobacco Use     Smoking status: Former Smoker     Years: 30.00     Types: Cigarettes     Quit date: 1990     Years since quittin.6     Smokeless tobacco: Never Used   Substance Use Topics     Alcohol use: Never     Frequency: Never     Binge frequency: Never     Drug use: No       Family History   I have reviewed this patient's family history and updated it with pertinent information if needed.   Family History   Problem Relation Age of Onset     Diabetes Mother      Hypertension Mother      Psychotic Disorder Mother      Depression Mother      Hearing Loss Father      Coronary Artery Disease No family hx of      Hyperlipidemia No family hx of      Cerebrovascular Disease No family hx of      Breast Cancer No family hx of       Colon Cancer No family hx of      Prostate Cancer No family hx of      Other Cancer No family hx of      Anxiety Disorder No family hx of      Mental Illness No family hx of      Substance Abuse No family hx of      Anesthesia Reaction No family hx of      Asthma No family hx of      Osteoporosis No family hx of      Genetic Disorder No family hx of      Thyroid Disease No family hx of      Obesity No family hx of      Unknown/Adopted No family hx of        Medications   Medications Prior to Admission   Medication Sig Dispense Refill Last Dose     FLUoxetine (PROZAC) 20 MG capsule Take 1 capsule (20 mg) by mouth daily 30 capsule 1 6/18/2021 at Unknown time     hydrOXYzine (ATARAX) 25 MG tablet Take 1 tablet (25 mg) by mouth every 4 hours as needed for anxiety 60 tablet 3 6/18/2021 at Unknown time     OLANZapine (ZYPREXA) 2.5 MG tablet Take 2 tablets (5 mg) by mouth At Bedtime 90 tablet 3 6/18/2021 at Unknown time       Allergies   Allergies   Allergen Reactions     Depakote      groggy     Lamotrigine      Intolerance, numb (Lamictal)     Olanzapine Other (See Comments)     Only generic/ineffective  Ineffective (only generic)     Seroquel [Quetiapine Fumarate] Visual Disturbance     Blurred vision; jumpy     Sulfa Drugs Hives     Trileptal Visual Disturbance     Blurred vision     Zoloft Other (See Comments)     jumpy     Citalopram Anxiety     Intolerance (Celexa)       Physical Exam   BP (!) 153/75   Pulse 88   Temp 98.6  F (37  C) (Temporal)   Resp 16   Wt 60.3 kg (133 lb)   LMP 01/30/2014 (Exact Date)   SpO2 99%   Breastfeeding No   BMI 20.52 kg/m     GENERAL: Alert and oriented x 3. Ambulatory on unit. Pleasant and conversant.   HEENT: Anicteric sclera. Mucous membranes moist.   CV: RRR. S1, S2. No murmurs appreciated.   RESPIRATORY: Effort normal on room air. Lungs CTAB with no wheezing, rales, rhonchi.   GI: Abdomen soft, non distended, non tender.   NEUROLOGICAL: No focal deficits. Moves all  extremities.   EXTREMITIES: No peripheral edema. Warm and well perfused.   SKIN: No jaundice. No rashes.     Data   Data reviewed today: I reviewed all medications, new labs and imaging results over the last 24 hours.

## 2021-06-20 NOTE — PROGRESS NOTES
[facility], [place]   Psychiatric Progress Note        Interim History:   From H&P: Zuly Wheeler is a 61 year old female with PMH notable for anxiety, bipolar 2 disorder, and MDDwho presents to the ED for anxiety. Patient reports that she has recently been through many medication changes. She was previously on lithium, nefazodone, and olanzapine. She was also on Buspar for a period of time until 6/15. She was recently tapered down off of nefazodone and states that her anxiety has been getting much worse. She saw a new psychiatrist on 6/16 who then took her off of lithium, which she had been on for some time, and added Prozac and hydroxyzine. She states that these new medication changes are not working for her. She feels shaky, panicky, has racing thoughts, and is not sleeping well. She states that it is hard to even express what she is feeling.  She has no energy and is not eating.  She does not even have the energy to cook.  She denies suicidal ideation.      Team meeting report: The patient's care was discussed with the treatment team during the daily team meeting and/or staff's chart notes were reviewed.  Staff report patient has been mostly calm, pleasant, cooperative.  She has been isolating to her room, came out for meals only.  Reports high depression and anxiety.  Affect is bland.  Visible in the milieu in the evening but keeps to herself.  Has not attended groups.  Appetite is intact.  The patient has been declining to take Prozac. Slept all night    Met with patient.  She is calm and appropriate.  The patient seems to have cognitive issues as evident by repeating herself and needing multiple explanations of the same topic.  The reason for admission is high anxiety and panic attacks.  She had medication changes prior to coming here that includes discontinuation of BuSpar, nefazodone, and lithium.  The patient was advised to increase the BuSpar however, she kept taking the  lower dose.  Today, the patient reports that she has not taken the Prozac since admission because she did not know what she takes it for.  Went over the indications for this medication multiple times however, the patient does not seem to retain information.  Continues to report high depression.  Anxiety varies from day to day.  She is agreeable to increase the prn dose of gabapentin and take Prozac.  Discussed the rest of the medication, their indications as well as side effects.  The patient worries about her job and will need a letter to the employer that she was hospitalized.         Medications:       FLUoxetine  20 mg Oral Daily     lithium  300 mg Oral At Bedtime     LORazepam  0.5 mg Oral Once     OLANZapine  5 mg Oral At Bedtime          Allergies:     Allergies   Allergen Reactions     Depakote      groggy     Lamotrigine      Intolerance, numb (Lamictal)     Olanzapine Other (See Comments)     Only generic/ineffective  Ineffective (only generic)     Seroquel [Quetiapine Fumarate] Visual Disturbance     Blurred vision; jumpy     Sulfa Drugs Hives     Trileptal Visual Disturbance     Blurred vision     Zoloft Other (See Comments)     jumpy     Citalopram Anxiety     Intolerance (Celexa)          Labs:     Recent Results (from the past 672 hour(s))   CBC with platelets differential    Collection Time: 06/19/21  2:16 PM   Result Value Ref Range    WBC 5.6 4.0 - 11.0 10e9/L    RBC Count 5.05 3.8 - 5.2 10e12/L    Hemoglobin 14.7 11.7 - 15.7 g/dL    Hematocrit 44.9 35.0 - 47.0 %    MCV 89 78 - 100 fl    MCH 29.1 26.5 - 33.0 pg    MCHC 32.7 31.5 - 36.5 g/dL    RDW 12.3 10.0 - 15.0 %    Platelet Count 324 150 - 450 10e9/L    Diff Method Automated Method     % Neutrophils 64.7 %    % Lymphocytes 29.1 %    % Monocytes 4.8 %    % Eosinophils 0.7 %    % Basophils 0.5 %    % Immature Granulocytes 0.2 %    Nucleated RBCs 0 0 /100    Absolute Neutrophil 3.6 1.6 - 8.3 10e9/L    Absolute Lymphocytes 1.6 0.8 - 5.3 10e9/L     Absolute Monocytes 0.3 0.0 - 1.3 10e9/L    Absolute Eosinophils 0.0 0.0 - 0.7 10e9/L    Absolute Basophils 0.0 0.0 - 0.2 10e9/L    Abs Immature Granulocytes 0.0 0 - 0.4 10e9/L    Absolute Nucleated RBC 0.0    Comprehensive metabolic panel    Collection Time: 06/19/21  2:16 PM   Result Value Ref Range    Sodium 141 133 - 144 mmol/L    Potassium 3.6 3.4 - 5.3 mmol/L    Chloride 105 94 - 109 mmol/L    Carbon Dioxide 28 20 - 32 mmol/L    Anion Gap 8 3 - 14 mmol/L    Glucose 114 (H) 70 - 99 mg/dL    Urea Nitrogen 12 7 - 30 mg/dL    Creatinine 0.76 0.52 - 1.04 mg/dL    GFR Estimate 84 >60 mL/min/[1.73_m2]    GFR Estimate If Black >90 >60 mL/min/[1.73_m2]    Calcium 9.3 8.5 - 10.1 mg/dL    Bilirubin Total 0.4 0.2 - 1.3 mg/dL    Albumin 4.3 3.4 - 5.0 g/dL    Protein Total 8.6 6.8 - 8.8 g/dL    Alkaline Phosphatase 73 40 - 150 U/L    ALT 29 0 - 50 U/L    AST 21 0 - 45 U/L   TSH with free T4 reflex    Collection Time: 06/19/21  2:16 PM   Result Value Ref Range    TSH 1.15 0.40 - 4.00 mU/L   Asymptomatic SARS-CoV-2 COVID-19 Virus (Coronavirus) by PCR    Collection Time: 06/19/21  2:17 PM    Specimen: Nasopharyngeal   Result Value Ref Range    SARS-CoV-2 Virus Specimen Source Nasopharyngeal     SARS-CoV-2 PCR Result NEGATIVE     SARS-CoV-2 PCR Comment (Note)    Drug abuse screen 6 urine (chem dep)    Collection Time: 06/19/21  2:54 PM   Result Value Ref Range    Amphetamine Qual Urine Negative NEG^Negative    Barbiturates Qual Urine Negative NEG^Negative    Benzodiazepine Qual Urine Negative NEG^Negative    Cannabinoids Qual Urine Negative NEG^Negative    Cocaine Qual Urine Negative NEG^Negative    Ethanol Qual Urine Negative NEG^Negative    Opiates Qualitative Urine Negative NEG^Negative            Psychiatric Examination:   Temp: 96.7  F (35.9  C) Temp src: Oral BP: (!) 157/84 Pulse: 76   Resp: 16 SpO2: 100 % O2 Device: None (Room air)    Weight is 133 lbs 0 oz  Body mass index is 20.52 kg/m .    Appearance: well groomed,  awake, alert, cooperative, no apparent distress and thin  Attitude:  cooperative  Eye Contact:  good  Mood:  anxious and depressed  Affect:  intensity is flat  Speech:  clear, coherent  Psychomotor Behavior:  no evidence of tardive dyskinesia, dystonia, or tics  Throught Process:  logical and goal oriented  Associations:  no loose associations  Thought Content:  no evidence of suicidal ideation or homicidal ideation  Insight:  fair  Judgement:  fair  Oriented to:  time, person, and place  Attention Span and Concentration:  limited  Recent and Remote Memory:  fair         Precautions:     Behavioral Orders   Procedures     Code 1 - Restrict to Unit     Fall precautions     Routine Programming     As clinically indicated     Status 15     Every 15 minutes.          DIagnoses:   Major depressive disorder, recurrent, severe, without psychosis  Generalized anxiety disorder  Panic disorder  Rule out borderline personality disorder         Plan:   --Restart lithium 300 mg at bedtime  --Continue Zyprexa 5 mg continue   --Continue Prozac 20 mg  --PRN: hydroxyzine, Trazodone, Zyprexa, gabapentin       Disposition Plan   Reason for ongoing admission: is unable to care for self due to anxiety  Disposition: home  Estimated length of stay: 5-7 days  Legal Status:  voluntary  Discharge will be granted once symptoms improved.    Marce MENDEZ, CNP    This note was created with the help of Dragon dictation system. All grammatical/typing errors or context distortion are unintentional and inherent to software.    More than 35 minutes were spent for assessment, documentation, counseling and  coordination of care.

## 2021-06-20 NOTE — PLAN OF CARE
Pt presents with blunted, flat affect and depressed, anxious mood. Denies SI/SIB and hallucinations. States that she is feeling somewhat depressed regarding being in the hospital and rates her anxiety 5/10. Pt reports feeling jittery and uneasy this morning. After pt ate breakfast she was still feeling this way and was receptive to taking PRN gabapentin 100 mg which she reports was somewhat effective. BP much improved since admission, all other VS WNL. Denies pain. Present in the lounge watching television but not social with peers. Had visit with son this afternoon. No other concerns or complaints noted.
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
## 2021-06-21 PROCEDURE — 99233 SBSQ HOSP IP/OBS HIGH 50: CPT | Performed by: NURSE PRACTITIONER

## 2021-06-21 PROCEDURE — 250N000013 HC RX MED GY IP 250 OP 250 PS 637: Performed by: PHYSICIAN ASSISTANT

## 2021-06-21 PROCEDURE — 250N000013 HC RX MED GY IP 250 OP 250 PS 637: Performed by: CLINICAL NURSE SPECIALIST

## 2021-06-21 PROCEDURE — 250N000013 HC RX MED GY IP 250 OP 250 PS 637: Performed by: PSYCHIATRY & NEUROLOGY

## 2021-06-21 PROCEDURE — 250N000013 HC RX MED GY IP 250 OP 250 PS 637: Performed by: NURSE PRACTITIONER

## 2021-06-21 PROCEDURE — 124N000003 HC R&B MH SENIOR/ADOLESCENT

## 2021-06-21 PROCEDURE — G0177 OPPS/PHP; TRAIN & EDUC SERV: HCPCS

## 2021-06-21 RX ORDER — HYDROXYZINE HYDROCHLORIDE 25 MG/1
25 TABLET, FILM COATED ORAL EVERY 4 HOURS PRN
Qty: 60 TABLET | Refills: 0 | Status: SHIPPED | OUTPATIENT
Start: 2021-06-21 | End: 2021-07-14 | Stop reason: ALTCHOICE

## 2021-06-21 RX ORDER — GABAPENTIN 100 MG/1
200-300 CAPSULE ORAL 3 TIMES DAILY PRN
Status: DISCONTINUED | OUTPATIENT
Start: 2021-06-21 | End: 2021-06-28 | Stop reason: HOSPADM

## 2021-06-21 RX ORDER — OLANZAPINE 2.5 MG/1
5 TABLET, FILM COATED ORAL AT BEDTIME
Qty: 60 TABLET | Refills: 0 | Status: SHIPPED | OUTPATIENT
Start: 2021-06-21 | End: 2021-07-14 | Stop reason: DRUGHIGH

## 2021-06-21 RX ORDER — GABAPENTIN 100 MG/1
100 CAPSULE ORAL 3 TIMES DAILY PRN
Qty: 90 CAPSULE | Refills: 0 | Status: SHIPPED | OUTPATIENT
Start: 2021-06-21 | End: 2021-06-30 | Stop reason: DRUGHIGH

## 2021-06-21 RX ORDER — LITHIUM CARBONATE 300 MG/1
300 CAPSULE ORAL AT BEDTIME
Qty: 30 CAPSULE | Refills: 0 | Status: SHIPPED | OUTPATIENT
Start: 2021-06-21 | End: 2021-07-01

## 2021-06-21 RX ADMIN — HYDRALAZINE HYDROCHLORIDE 25 MG: 25 TABLET ORAL at 08:27

## 2021-06-21 RX ADMIN — OLANZAPINE 5 MG: 5 TABLET, FILM COATED ORAL at 21:02

## 2021-06-21 RX ADMIN — FLUOXETINE 20 MG: 20 CAPSULE ORAL at 11:26

## 2021-06-21 RX ADMIN — LITHIUM CARBONATE 300 MG: 300 CAPSULE, GELATIN COATED ORAL at 21:02

## 2021-06-21 ASSESSMENT — ACTIVITIES OF DAILY LIVING (ADL)
HYGIENE/GROOMING: INDEPENDENT
DRESS: SCRUBS (BEHAVIORAL HEALTH)
ORAL_HYGIENE: INDEPENDENT
LAUNDRY: WITH SUPERVISION

## 2021-06-21 NOTE — PLAN OF CARE
Problem: General Plan of Care (Inpatient Behavioral)  Goal: Individualization/Patient Specific Goal (IP Behavioral)  Description: The patient and/or their representative will achieve their patient-specific goals related to the plan of care.    The patient-specific goals include:  Flowsheets (Taken 6/21/2021 0195)  Areas of Vulnerability: Severe anxiety in the context of recent medication changes.  Patient Strengths:   Steady employment   Stable housing   Motivated and ready for change   Adherent to medication regime   Setting and pursuing goals    The Patient completed the Personal Plan of Care today.     They identified the following as Reasons for hospitalization:  1. High anxiety  2.   3.   4.     They identified the following as Goals for Discharge:  1. Absence of anxiety  2. Medication evaluation and adjustments  3.

## 2021-06-21 NOTE — PLAN OF CARE
BEHAVIORAL TEAM DISCUSSION    Participants: Marce Borges CNP; Dom Peterson RN; Myla WALKER; Poonam CHILDERS  Progress: improving  Anticipated length of stay: 3-5 days  Continued Stay Criteria/Rationale: Patient is newly admitted with worsening anxiety and recent medication changes.  Evaluation in process.  Medical/Physical: Medicine consult was placed to address elevated blood pressure.  Precautions:   Behavioral Orders   Procedures     Code 1 - Restrict to Unit     Fall precautions     Routine Programming     As clinically indicated     Status 15     Every 15 minutes.     Plan: Psychiatric evaluation; medication evaluation; ensure aftercare appointments are in place, patient has therapist and med mgmt.  Discharge to home when stable.  Rationale for change in precautions or plan: initial plan.

## 2021-06-21 NOTE — PLAN OF CARE
Pt presents with blunted, flat affect and depressed, anxious mood. Denies SI/SIB and hallucinations. Continues to have depression surrounding hospitalization and has increased anxiety 8/10. PRN gabapentin was provided but pt unsure if it made a difference. She was present in the lounge most of the shift but not very social with peers. Did attend groups this evening. Appetite and fluid intake good. Pt continues to report vague symptoms regarding her anxiety and is not very receptive to interventions. She is looking forward to seeing the provider tomorrow as she hopes to go home tomorrow. Pt is hopeful about restarting the Lithium and it helping her symptoms. Pt BP was elevated this evening but pt refused intervention at this time, she states that it is r/t her anxiety and she will wait to take her PM medications for it. Denies pain. Pt was offered a shower this evening but refused, stating that she washed up in her bathroom. No other concerns or complaints noted.

## 2021-06-21 NOTE — PLAN OF CARE
"Nursing Assessment:  Zuly was up ad tal, spent most of the shift in the milieu. Pt attended groups. This  am, Zuly reported feeling tense and anxious, her BP was 171/84. Pt did agree to try hydralazine PRN at 08:27 which reduced her BP to 148/73 by 11:16 am. Pt's BP was 171/93 at approximately noon. At 14:45, Pt's BP was 149/84 with Pt reporting she felt calm and was finishing up Yoga movement.     Zuly denied having suicidal ideation or self harm thoughts. Pt's prozac was changed to q am and she took it at 11:26 am. Gagandeep said she was somewhat relieved to meet with her provider, said she was going to \"stay a couple of days to make sure I feel better with my medications\".  Zuly was med adherent, pleasant on approach, cooperative.   " Breath sounds clear and equal bilaterally.

## 2021-06-21 NOTE — PLAN OF CARE
Pt participated in Therapeutic Recreation group with focus on socializing, problem solving, and leisure participation. Engaged and cooperative in group recreational intervention; word puzzles. Pt participated throughout entire duration of the group. Pt affect was flat and mood was calm. Pt added to the group discussion during the activity, relating the discussion to each puzzle. Pt talked about what each quote meant to her. Pt also shared that going for walks is a positive coping strategy she utilizes.

## 2021-06-21 NOTE — PLAN OF CARE
Assessment/Intervention/Current Symtoms and Care Coordination  CTC met with patient today for check in and completion of Personal Plan of Care.  Patient indicates she is in agreement with suggestions for medication changes and that she is feeling calmer.  She would like some assistance in locating options for anxiety support groups and this will be included in After Visit Summary.      Discharge Plan or Goal  Patient will discharge to home when stable with outpatient follow up appointments in place.    Barriers to Discharge   Medication changes are in process, patient currently experiencing intermittent periods of high anxiety, high blood pressure being monitored.    Referral Status  No new referrals    Legal Status  Voluntary

## 2021-06-21 NOTE — PLAN OF CARE
INITIAL OT ASSESSMENT  Problem: OT General Care Plan  Goal: OT Goal 1  Description: Will attend OT groups improve concentration and motivation by engaging in more challenging and success oriented options while also improving insight with comments/answers made about mental health needs.     06/21/21 1500   General Information   Date Initially Attended OT 06/21/21   Clinical Impression   Affect Restricted;Appropriate to situation   Orientation Oriented to person, place and time   Appearance and ADLs Neatly groomed   Attention to Internal Stimuli No observed signs   Interaction Skills Interacts appropriately with staff   Ability to Communicate Needs Does so with prompts   Verbal Content Clear;Appropriate to topic   Ability to Maintain Boundaries Maintains appropriate physical boundaries;Maintains appropriate verbal boundaries   Participation Independently participates   Concentration Concentrates 30+ minutes   Ability to Concentrate With structure   Follows and Comprehends Directions Independently follows multi-step directions   Memory Needs further assessment   Organization Independently organizes medium tasks   Decision Making Independent   Planning and Problem Solving Independently plans ahead   Ability to Apply and Learn Concepts Applies within group structure   Frustrations / Stress Tolerance Independently identifies skills    Level of Insight Identifies needs with structure/support   Self Esteem Takes risks with support and encouragement;Other (see comments)  (identified goal to improve self-esteem)   Social Supports Has knowledge of support systems     Pt attended 2 out of 2 OT groups offered. Pt actively participated in occupational therapy clinic with 2 patients total x45 minutes. Pt was able to ask for assistance as needed, and independently initiate a structured creative expression task. Pt demonstrated good focus, planning, and attention to detail. Minimal interactions with peers observed, though she was  "pleasant and polite on approach. Pt actively participated in a structured occupational therapy group of 1 patient total x45 minutes with a focus on coping through movement via Zack Chi as a strategy to facilitate therapeutic exercise, calming, and stress management. Pt actively followed 100% of the movements, and remained attentive and engaged throughout group. Pt verbalized feeling \"good\" at the end of group. Pt contributed at least one idea to a discussion at the end of group regarding the benefits of exercise, stretching, and deep breathing. She expressed interest in engaging in similar movement practices after discharge, and was receptive to suggestions. Calm, pleasant, and cooperative throughout groups.    Pt was given and completed a written self-assessment form. OT staff reviewed with pt and explained the value of having them involved in their treatment plan, and provided options to meet current needs/self-identified goals. Pt stated reason for admission was \"to get my meds in control.\" Selected goals including improve self-esteem, increase motivation, improve focus/concentration, and feel more comfortable trying new things and taking risks. PLAN: Will provide structure, support, and encouragement. Offer education on coping strategies and life management skills. Assist pt to increase self awareness regarding mental health issues and expand ideas. Will assess further in the areas of organization, problem solving, and concentration.         "

## 2021-06-22 PROCEDURE — 124N000003 HC R&B MH SENIOR/ADOLESCENT

## 2021-06-22 PROCEDURE — 250N000013 HC RX MED GY IP 250 OP 250 PS 637: Performed by: PHYSICIAN ASSISTANT

## 2021-06-22 PROCEDURE — 250N000013 HC RX MED GY IP 250 OP 250 PS 637: Performed by: NURSE PRACTITIONER

## 2021-06-22 PROCEDURE — G0177 OPPS/PHP; TRAIN & EDUC SERV: HCPCS

## 2021-06-22 PROCEDURE — 99233 SBSQ HOSP IP/OBS HIGH 50: CPT | Performed by: NURSE PRACTITIONER

## 2021-06-22 PROCEDURE — 250N000013 HC RX MED GY IP 250 OP 250 PS 637: Performed by: PSYCHIATRY & NEUROLOGY

## 2021-06-22 PROCEDURE — 250N000013 HC RX MED GY IP 250 OP 250 PS 637: Performed by: CLINICAL NURSE SPECIALIST

## 2021-06-22 RX ORDER — DIVALPROEX SODIUM 125 MG/1
125 CAPSULE, COATED PELLETS ORAL EVERY 8 HOURS SCHEDULED
Status: DISCONTINUED | OUTPATIENT
Start: 2021-06-22 | End: 2021-06-23

## 2021-06-22 RX ORDER — AMLODIPINE BESYLATE 2.5 MG/1
2.5 TABLET ORAL DAILY
Status: DISCONTINUED | OUTPATIENT
Start: 2021-06-22 | End: 2021-06-24

## 2021-06-22 RX ADMIN — DIVALPROEX SODIUM 125 MG: 125 CAPSULE, COATED PELLETS ORAL at 20:37

## 2021-06-22 RX ADMIN — LITHIUM CARBONATE 300 MG: 300 CAPSULE, GELATIN COATED ORAL at 20:38

## 2021-06-22 RX ADMIN — GABAPENTIN 200 MG: 100 CAPSULE ORAL at 09:59

## 2021-06-22 RX ADMIN — DIVALPROEX SODIUM 125 MG: 125 CAPSULE, COATED PELLETS ORAL at 13:21

## 2021-06-22 RX ADMIN — FLUOXETINE 20 MG: 20 CAPSULE ORAL at 08:22

## 2021-06-22 RX ADMIN — AMLODIPINE BESYLATE 2.5 MG: 2.5 TABLET ORAL at 09:59

## 2021-06-22 RX ADMIN — HYDRALAZINE HYDROCHLORIDE 25 MG: 25 TABLET ORAL at 20:38

## 2021-06-22 RX ADMIN — OLANZAPINE 5 MG: 5 TABLET, FILM COATED ORAL at 20:37

## 2021-06-22 ASSESSMENT — ACTIVITIES OF DAILY LIVING (ADL)
LAUNDRY: WITH SUPERVISION
ORAL_HYGIENE: INDEPENDENT
DRESS: SCRUBS (BEHAVIORAL HEALTH)
HYGIENE/GROOMING: INDEPENDENT
HYGIENE/GROOMING: INDEPENDENT
ORAL_HYGIENE: INDEPENDENT
LAUNDRY: WITH SUPERVISION
DRESS: SCRUBS (BEHAVIORAL HEALTH)

## 2021-06-22 NOTE — PLAN OF CARE
Problem: OT General Care Plan  Goal: OT Goal 1  Description: Will attend OT groups improve concentration and motivation by engaging in more challenging and success oriented options while also improving insight with comments/answers made about mental health needs.    Pt actively participated in occupational therapy clinic with 2 patients for 50 minutes. Pt was able to ask for assistance as needed, and independently return to a familiar, single-step, creative expression task with minimal assistance from writer for task setup. Pt demonstrated good focus (40+ minutes without interruption), planning, and attention to detail during task completion. Pt appeared comfortable minimally interacting with writer and peers when conversation was initiated with her, however she generally kept to herself and appeared focused on her selected task. She was pleasant and engaged with a congruent affect.      Outcome: Improving

## 2021-06-22 NOTE — PLAN OF CARE
Pt presents with blunted, flat affect and calm mood. Denies SI/SIB and hallucinations. Denies depression and reports that her anxiety is minimal. Pt reports being hopeful regarding her improvement. Reports wanting to possibly discharge Wednesday evening or Thursday if she continues to improve. She was present in the lounge majority of the shift watching television with peers and talking to family and friends on the phone. Pt nephew visited her this evening and that went well. Appetite and fluid intake adequate. BP still elevated this evening 162/72, all other VS WNL. Denies pain. Medication compliant, no PRNs given this evening. No other concerns or complaints noted.

## 2021-06-22 NOTE — PLAN OF CARE
Pt attended group therapy. Discussed coping skills. Discussed handouts on taking a self-compassion break as well as 8 things to remember when going through tough times.   Pt was very quiet during group, participated and was oriented. Pt did state wasn t feeling well which she attributed to medication changes.

## 2021-06-22 NOTE — PLAN OF CARE
Assessment/Intervention/Current Symtoms and Care Coordination  CTC met with patient this afternoon, who is experiencing more anxiety/panic symptoms. She endorses feeling jittery and afraid. She indicates that another medication change was made today.      Discharge Plan or Goal  To home with outpatient appointments in place when stable.    Barriers to Discharge   Patient is experiencing worsening anxiety symptoms with today's medication change.     Referral Status  None    Legal Status  Voluntary

## 2021-06-22 NOTE — PROGRESS NOTES
SPIRITUAL HEALTH SERVICES  SPIRITUAL ASSESSMENT Progress Note  [facility] ([facility]) [address]     REFERRAL SOURCE: Patient request    Through the nurse, the patient shared a desire for a Bible, which I provided. She said she wasn't up for talking today. I asked if I could stop by another time; to which she agreed.    PLAN: I will continue to follow Zuly during her stay.    Nat Dailey  Chaplain Resident  Pager: [phone]

## 2021-06-22 NOTE — PLAN OF CARE
"Pt presents with full range affect and anxious mood. Denies SI/SIB and hallucinations. Rates anxiety 3.5/10 and depression 5/10. Pt feels discouraged today as she is feeling bad again. She reports that yesterday she was feeling great and today she does not - wants to be better. Pt was present in the lounge most of the shift and attended group activities. Later in the afternoon pt appeared really down, she reported that she just was not feeling well, stated that she felt \"uneasy\". Pt reported that she was just going to try and lay down for a bit. Pt was started on Depakote Sprinkles q8h and received her first dose this afternoon. She was provided with pt handout regarding the medication from [facility], she reported that she was concerned about side effects and how she did not think the medication was going to work. Offered pt reassurance and she was agreeable to taking the medication. Pt still having elevated BPs and was started on a low dose of amlodipine this morning. All other VS WNL. Denied pain. Offered referral for spiritual consult, pt declined at this time, states she will think about it. No other concerns or complaints noted.   "

## 2021-06-22 NOTE — PROGRESS NOTES
"[facility], [place]   Psychiatric Progress Note        Interim History:   From H&P: Zuly Wheeler is a 61 year old female with PMH notable for anxiety, bipolar 2 disorder, and MDDwho presents to the ED for anxiety. Patient reports that she has recently been through many medication changes. She was previously on lithium, nefazodone, and olanzapine. She was also on Buspar for a period of time until 6/15. She was recently tapered down off of nefazodone and states that her anxiety has been getting much worse. She saw a new psychiatrist on 6/16 who then took her off of lithium, which she had been on for some time, and added Prozac and hydroxyzine. She states that these new medication changes are not working for her. She feels shaky, panicky, has racing thoughts, and is not sleeping well. She states that it is hard to even express what she is feeling.  She has no energy and is not eating.  She does not even have the energy to cook.  She denies suicidal ideation.      Team meeting report: The patient's care was discussed with the treatment team during the daily team meeting and/or staff's chart notes were reviewed.  Staff report patient has been calm, pleasant, cooperative.  Attended all groups.  Affect is flat/blunt.  Denies depression.  Anxiety is minimal to moderate.  She is hopeful.  She has been eating well and taking medications as prescribed.  Slept 8-1/2 hours.       Met with patient.  She is intense.  States that she had a good evening however, this morning she is \"back to where I was before admission\".  The patient reports feeling very depressed and anxious.  Believes the Prozac makes her anxiety worse.  She feels jittery.  Will discontinue Prozac and start Depakote instead.  Patient is agreeable.  Appetite is sleep are intact.           Medications:       FLUoxetine  20 mg Oral Daily     lithium  300 mg Oral At Bedtime     OLANZapine  5 mg Oral At Bedtime          Allergies: "     Allergies   Allergen Reactions     Depakote      groggy     Lamotrigine      Intolerance, numb (Lamictal)     Olanzapine Other (See Comments)     Only generic/ineffective  Ineffective (only generic)     Seroquel [Quetiapine Fumarate] Visual Disturbance     Blurred vision; jumpy     Sulfa Drugs Hives     Trileptal Visual Disturbance     Blurred vision     Zoloft Other (See Comments)     jumpy     Citalopram Anxiety     Intolerance (Celexa)          Labs:     Recent Results (from the past 672 hour(s))   CBC with platelets differential    Collection Time: 06/19/21  2:16 PM   Result Value Ref Range    WBC 5.6 4.0 - 11.0 10e9/L    RBC Count 5.05 3.8 - 5.2 10e12/L    Hemoglobin 14.7 11.7 - 15.7 g/dL    Hematocrit 44.9 35.0 - 47.0 %    MCV 89 78 - 100 fl    MCH 29.1 26.5 - 33.0 pg    MCHC 32.7 31.5 - 36.5 g/dL    RDW 12.3 10.0 - 15.0 %    Platelet Count 324 150 - 450 10e9/L    Diff Method Automated Method     % Neutrophils 64.7 %    % Lymphocytes 29.1 %    % Monocytes 4.8 %    % Eosinophils 0.7 %    % Basophils 0.5 %    % Immature Granulocytes 0.2 %    Nucleated RBCs 0 0 /100    Absolute Neutrophil 3.6 1.6 - 8.3 10e9/L    Absolute Lymphocytes 1.6 0.8 - 5.3 10e9/L    Absolute Monocytes 0.3 0.0 - 1.3 10e9/L    Absolute Eosinophils 0.0 0.0 - 0.7 10e9/L    Absolute Basophils 0.0 0.0 - 0.2 10e9/L    Abs Immature Granulocytes 0.0 0 - 0.4 10e9/L    Absolute Nucleated RBC 0.0    Comprehensive metabolic panel    Collection Time: 06/19/21  2:16 PM   Result Value Ref Range    Sodium 141 133 - 144 mmol/L    Potassium 3.6 3.4 - 5.3 mmol/L    Chloride 105 94 - 109 mmol/L    Carbon Dioxide 28 20 - 32 mmol/L    Anion Gap 8 3 - 14 mmol/L    Glucose 114 (H) 70 - 99 mg/dL    Urea Nitrogen 12 7 - 30 mg/dL    Creatinine 0.76 0.52 - 1.04 mg/dL    GFR Estimate 84 >60 mL/min/[1.73_m2]    GFR Estimate If Black >90 >60 mL/min/[1.73_m2]    Calcium 9.3 8.5 - 10.1 mg/dL    Bilirubin Total 0.4 0.2 - 1.3 mg/dL    Albumin 4.3 3.4 - 5.0 g/dL     Protein Total 8.6 6.8 - 8.8 g/dL    Alkaline Phosphatase 73 40 - 150 U/L    ALT 29 0 - 50 U/L    AST 21 0 - 45 U/L   TSH with free T4 reflex    Collection Time: 06/19/21  2:16 PM   Result Value Ref Range    TSH 1.15 0.40 - 4.00 mU/L   Asymptomatic SARS-CoV-2 COVID-19 Virus (Coronavirus) by PCR    Collection Time: 06/19/21  2:17 PM    Specimen: Nasopharyngeal   Result Value Ref Range    SARS-CoV-2 Virus Specimen Source Nasopharyngeal     SARS-CoV-2 PCR Result NEGATIVE     SARS-CoV-2 PCR Comment (Note)    Drug abuse screen 6 urine (chem dep)    Collection Time: 06/19/21  2:54 PM   Result Value Ref Range    Amphetamine Qual Urine Negative NEG^Negative    Barbiturates Qual Urine Negative NEG^Negative    Benzodiazepine Qual Urine Negative NEG^Negative    Cannabinoids Qual Urine Negative NEG^Negative    Cocaine Qual Urine Negative NEG^Negative    Ethanol Qual Urine Negative NEG^Negative    Opiates Qualitative Urine Negative NEG^Negative            Psychiatric Examination:   Temp: 96.6  F (35.9  C) Temp src: Temporal BP: (!) 162/72 Pulse: 87   Resp: 16 SpO2: 97 % O2 Device: None (Room air)    Weight is 133 lbs 0 oz  Body mass index is 20.52 kg/m .    Appearance: well groomed, awake, alert, cooperative, no apparent distress and thin  Attitude:  cooperative  Eye Contact:  good  Mood:  anxious and depressed  Affect:  intensity is flat  Speech:  clear, coherent  Psychomotor Behavior:  no evidence of tardive dyskinesia, dystonia, or tics  Throught Process:  logical and goal oriented  Associations:  no loose associations  Thought Content:  no evidence of suicidal ideation or homicidal ideation  Insight:  fair  Judgement:  fair  Oriented to:  time, person, and place  Attention Span and Concentration:  limited  Recent and Remote Memory:  fair         Precautions:     Behavioral Orders   Procedures     Code 1 - Restrict to Unit     Fall precautions     Routine Programming     As clinically indicated     Status 15     Every 15  minutes.          DIagnoses:   Major depressive disorder, recurrent, severe, without psychosis  Generalized anxiety disorder  Panic disorder  Rule out borderline personality disorder         Plan:   --Restart lithium 300 mg at bedtime  --Continue Zyprexa 5 mg continue   --Discontinue Prozac 20 mg.  Patient reports increased anxiety and jitteriness with it.  --Start Depakote sprinkles 125 mg 3 times a day for management of anxiety.  --PRN: hydroxyzine, Trazodone, Zyprexa, gabapentin       Disposition Plan   Reason for ongoing admission: is unable to care for self due to anxiety  Disposition: home  Estimated length of stay: 5-7 days  Legal Status:  voluntary  Discharge will be granted once symptoms improved.    Marce Borges APRN, CNP    This note was created with the help of Dragon dictation system. All grammatical/typing errors or context distortion are unintentional and inherent to software.    More than 35 minutes were spent for assessment, documentation, counseling and  coordination of care.

## 2021-06-22 NOTE — PROGRESS NOTES
"Brief Medicine Note    Medicine following blood pressures, which have been persistently elevated above >140 Systolic.     Vital signs:  Temp: 96.6  F (35.9  C) Temp src: Temporal BP: (!) 162/73 Pulse: 90   Resp: 16 SpO2: 100 % O2 Device: None (Room air)     Weight: 61.6 kg (135 lb 12.8 oz)  Estimated body mass index is 20.96 kg/m  as calculated from the following:    Height as of 4/26/19: 1.715 m (5' 7.5\").    Weight as of this encounter: 61.6 kg (135 lb 12.8 oz).     Discussed with patient, who agrees she is anxious, but does have a family hx of HTN (mother and brother). Agreeable to starting low dose amlodipine 2.5 mg daily and monitoring. Recommended she buy a blood pressure machine from [employer] and to check her blood pressure daily and to notify her PCP if SPB >180/90 or <100/60. She states she has a PCP, and will follow up closely.     Medicine will continue to follow peripherally on blood pressure. No further recommendations at this time. Please page the on-call [facility] for any intercurrent medical issues which arise.     Tawanna Brand PA-C  Hospitalist Service  Contact information available via [facility] Paging/Directory              "

## 2021-06-23 PROCEDURE — G0177 OPPS/PHP; TRAIN & EDUC SERV: HCPCS

## 2021-06-23 PROCEDURE — 250N000013 HC RX MED GY IP 250 OP 250 PS 637: Performed by: PHYSICIAN ASSISTANT

## 2021-06-23 PROCEDURE — 124N000003 HC R&B MH SENIOR/ADOLESCENT

## 2021-06-23 PROCEDURE — 250N000013 HC RX MED GY IP 250 OP 250 PS 637: Performed by: NURSE PRACTITIONER

## 2021-06-23 PROCEDURE — H2032 ACTIVITY THERAPY, PER 15 MIN: HCPCS

## 2021-06-23 PROCEDURE — 250N000013 HC RX MED GY IP 250 OP 250 PS 637: Performed by: CLINICAL NURSE SPECIALIST

## 2021-06-23 PROCEDURE — 99233 SBSQ HOSP IP/OBS HIGH 50: CPT | Performed by: NURSE PRACTITIONER

## 2021-06-23 RX ORDER — BUSPIRONE HYDROCHLORIDE 5 MG/1
10 TABLET ORAL 3 TIMES DAILY
Status: DISCONTINUED | OUTPATIENT
Start: 2021-06-23 | End: 2021-06-28

## 2021-06-23 RX ORDER — PROPRANOLOL HYDROCHLORIDE 10 MG/1
10 TABLET ORAL 3 TIMES DAILY PRN
Status: DISCONTINUED | OUTPATIENT
Start: 2021-06-23 | End: 2021-06-28 | Stop reason: HOSPADM

## 2021-06-23 RX ORDER — DIVALPROEX SODIUM 125 MG/1
125 CAPSULE, COATED PELLETS ORAL 3 TIMES DAILY PRN
Status: DISCONTINUED | OUTPATIENT
Start: 2021-06-23 | End: 2021-06-23

## 2021-06-23 RX ORDER — DIVALPROEX SODIUM 125 MG/1
125 CAPSULE, COATED PELLETS ORAL 3 TIMES DAILY
Status: DISCONTINUED | OUTPATIENT
Start: 2021-06-23 | End: 2021-06-23

## 2021-06-23 RX ADMIN — BUSPIRONE HYDROCHLORIDE 10 MG: 5 TABLET ORAL at 13:43

## 2021-06-23 RX ADMIN — LITHIUM CARBONATE 450 MG: 300 CAPSULE, GELATIN COATED ORAL at 20:31

## 2021-06-23 RX ADMIN — AMLODIPINE BESYLATE 2.5 MG: 2.5 TABLET ORAL at 08:16

## 2021-06-23 RX ADMIN — BUSPIRONE HYDROCHLORIDE 10 MG: 5 TABLET ORAL at 20:31

## 2021-06-23 RX ADMIN — GABAPENTIN 200 MG: 100 CAPSULE ORAL at 06:55

## 2021-06-23 RX ADMIN — OLANZAPINE 5 MG: 5 TABLET, FILM COATED ORAL at 20:31

## 2021-06-23 ASSESSMENT — ACTIVITIES OF DAILY LIVING (ADL)
ORAL_HYGIENE: INDEPENDENT
DRESS: SCRUBS (BEHAVIORAL HEALTH);INDEPENDENT
HYGIENE/GROOMING: INDEPENDENT
HYGIENE/GROOMING: INDEPENDENT
DRESS: INDEPENDENT;SCRUBS (BEHAVIORAL HEALTH)
LAUNDRY: UNABLE TO COMPLETE
LAUNDRY: WITH SUPERVISION
ORAL_HYGIENE: INDEPENDENT

## 2021-06-23 NOTE — PLAN OF CARE
Assessment/Intervention/Current Symtoms and Care Coordination  Attended team meeting  Reviewed chart notes.  Met with patient in-person to address needs/concerns  Pt anxious sx continue to slowly stabilize     Discharge Plan or Goal  5-7 days pending further stabilization     Barriers to Discharge   Anxious sx, medication management     Referral Status  Anxiety support group added to AVS-no new referrals today     Legal Status  Voluntary

## 2021-06-23 NOTE — PROGRESS NOTES
"Patient slept a total of 6.25 hours and made no requests during the night.She refused her Depakote @ 0600 and when asked why she is refusing, she said,\"I don't know\". She said she is not feeling well. She looked very anxious, but when a medication was offered, she also declined.   "

## 2021-06-23 NOTE — PROGRESS NOTES
Patient approached this writer and verbalized that she was feeling anxious and was willing to take her anxiety medication. Neurontin 200 mg. given @ 0655 PRN for anxiety. This writer tried to give the Depakote again, but patient declined the second time.

## 2021-06-23 NOTE — PROGRESS NOTES
[facility], [place]   Psychiatric Progress Note        Interim History:   From H&P: Zuly Wheeler is a 61 year old female with PMH notable for anxiety, bipolar 2 disorder, and MDDwho presents to the ED for anxiety. Patient reports that she has recently been through many medication changes. She was previously on lithium, nefazodone, and olanzapine. She was also on Buspar for a period of time until 6/15. She was recently tapered down off of nefazodone and states that her anxiety has been getting much worse. She saw a new psychiatrist on 6/16 who then took her off of lithium, which she had been on for some time, and added Prozac and hydroxyzine. She states that these new medication changes are not working for her. She feels shaky, panicky, has racing thoughts, and is not sleeping well. She states that it is hard to even express what she is feeling.  She has no energy and is not eating.  She does not even have the energy to cook.  She denies suicidal ideation.      Team meeting report: The patient's care was discussed with the treatment team during the daily team meeting and/or staff's chart notes were reviewed.  Staff report patient has been mostly calm, pleasant, cooperative.  Attended all groups.  Affect is flat/blunt.  Anxiety and depression throughout the day her.  In the morning, she reported minimal depression and anxiety, in the evening both have increased significantly.  This morning, she refused to take Depakote stating that she is not feeling well.  Blood pressure have been consistently high.  She has been eating well and taking medications as prescribed.  Slept 6-1/2 hours.       Met with patient.  Appears calm however, reports high anxiety.  She is not feeling well.  She cannot explain what this means.  The patient refused Depakote this morning stating that it makes her feel funny.  Again unable to elaborate what this means.  The patient thinks that the Depakote makes her  "more anxious and jittery.  Discussed that the purpose of the Depakote but she does not want to take it \"It is not working anyway\".  Offered to increase the dose but she declined.  Encourage patient to stay on medications for longer period of time before she can be such conclusion.  Discussed other options.  The patient was on BuSpar in the past and would like to give it a try again.  Reminded her that she reported BuSpar not being helpful in the past, however she will give it another try.  She is aware that BuSpar take several weeks to build in the body in order to provide anxiety relief.  Discussed other options and patient agreed to increase the dose of the lithium at bedtime.  The patient is aware that she needs to drink plenty of fluids when taking lithium.  Reports not be able to sleep last night.  Denies suicidal ideation.         Medications:       amLODIPine  2.5 mg Oral Daily     divalproex sodium delayed-release  125 mg Oral TID     lithium  300 mg Oral At Bedtime     OLANZapine  5 mg Oral At Bedtime          Allergies:     Allergies   Allergen Reactions     Depakote      groggy     Lamotrigine      Intolerance, numb (Lamictal)     Olanzapine Other (See Comments)     Only generic/ineffective  Ineffective (only generic)     Seroquel [Quetiapine Fumarate] Visual Disturbance     Blurred vision; jumpy     Sulfa Drugs Hives     Trileptal Visual Disturbance     Blurred vision     Zoloft Other (See Comments)     jumpy     Citalopram Anxiety     Intolerance (Celexa)          Labs:     Recent Results (from the past 672 hour(s))   CBC with platelets differential    Collection Time: 06/19/21  2:16 PM   Result Value Ref Range    WBC 5.6 4.0 - 11.0 10e9/L    RBC Count 5.05 3.8 - 5.2 10e12/L    Hemoglobin 14.7 11.7 - 15.7 g/dL    Hematocrit 44.9 35.0 - 47.0 %    MCV 89 78 - 100 fl    MCH 29.1 26.5 - 33.0 pg    MCHC 32.7 31.5 - 36.5 g/dL    RDW 12.3 10.0 - 15.0 %    Platelet Count 324 150 - 450 10e9/L    Diff Method " Automated Method     % Neutrophils 64.7 %    % Lymphocytes 29.1 %    % Monocytes 4.8 %    % Eosinophils 0.7 %    % Basophils 0.5 %    % Immature Granulocytes 0.2 %    Nucleated RBCs 0 0 /100    Absolute Neutrophil 3.6 1.6 - 8.3 10e9/L    Absolute Lymphocytes 1.6 0.8 - 5.3 10e9/L    Absolute Monocytes 0.3 0.0 - 1.3 10e9/L    Absolute Eosinophils 0.0 0.0 - 0.7 10e9/L    Absolute Basophils 0.0 0.0 - 0.2 10e9/L    Abs Immature Granulocytes 0.0 0 - 0.4 10e9/L    Absolute Nucleated RBC 0.0    Comprehensive metabolic panel    Collection Time: 06/19/21  2:16 PM   Result Value Ref Range    Sodium 141 133 - 144 mmol/L    Potassium 3.6 3.4 - 5.3 mmol/L    Chloride 105 94 - 109 mmol/L    Carbon Dioxide 28 20 - 32 mmol/L    Anion Gap 8 3 - 14 mmol/L    Glucose 114 (H) 70 - 99 mg/dL    Urea Nitrogen 12 7 - 30 mg/dL    Creatinine 0.76 0.52 - 1.04 mg/dL    GFR Estimate 84 >60 mL/min/[1.73_m2]    GFR Estimate If Black >90 >60 mL/min/[1.73_m2]    Calcium 9.3 8.5 - 10.1 mg/dL    Bilirubin Total 0.4 0.2 - 1.3 mg/dL    Albumin 4.3 3.4 - 5.0 g/dL    Protein Total 8.6 6.8 - 8.8 g/dL    Alkaline Phosphatase 73 40 - 150 U/L    ALT 29 0 - 50 U/L    AST 21 0 - 45 U/L   TSH with free T4 reflex    Collection Time: 06/19/21  2:16 PM   Result Value Ref Range    TSH 1.15 0.40 - 4.00 mU/L   Asymptomatic SARS-CoV-2 COVID-19 Virus (Coronavirus) by PCR    Collection Time: 06/19/21  2:17 PM    Specimen: Nasopharyngeal   Result Value Ref Range    SARS-CoV-2 Virus Specimen Source Nasopharyngeal     SARS-CoV-2 PCR Result NEGATIVE     SARS-CoV-2 PCR Comment (Note)    Drug abuse screen 6 urine (chem dep)    Collection Time: 06/19/21  2:54 PM   Result Value Ref Range    Amphetamine Qual Urine Negative NEG^Negative    Barbiturates Qual Urine Negative NEG^Negative    Benzodiazepine Qual Urine Negative NEG^Negative    Cannabinoids Qual Urine Negative NEG^Negative    Cocaine Qual Urine Negative NEG^Negative    Ethanol Qual Urine Negative NEG^Negative    Opiates  "Qualitative Urine Negative NEG^Negative            Psychiatric Examination:   Temp: 98.5  F (36.9  C) Temp src: Oral BP: (!) 178/81 Pulse: 73   Resp: 16 SpO2: 99 % O2 Device: None (Room air)    Weight is 135 lbs 12.8 oz  Body mass index is 20.96 kg/m .    Appearance: well groomed, awake, alert, cooperative, no apparent distress and thin  Attitude:  cooperative  Eye Contact:  good  Mood:  anxious and depressed  Affect:  intensity is flat  Speech:  clear, coherent  Psychomotor Behavior:  no evidence of tardive dyskinesia, dystonia, or tics  Throught Process:  logical and goal oriented  Associations:  no loose associations  Thought Content:  no evidence of suicidal ideation or homicidal ideation  Insight:  fair  Judgement:  fair  Oriented to:  time, person, and place  Attention Span and Concentration:  limited  Recent and Remote Memory:  fair         Precautions:     Behavioral Orders   Procedures     Code 1 - Restrict to Unit     Fall precautions     Routine Programming     As clinically indicated     Status 15     Every 15 minutes.          DIagnoses:   Major depressive disorder, recurrent, severe, without psychosis  Generalized anxiety disorder  Panic disorder  Rule out borderline personality disorder         Plan:   --Start BuSpar 10 mg 3 times a day.  --Increase Lithium to450 mg at bedtime  --Continue Zyprexa 5 mg continue   --Discontinue Prozac 20 mg.  Patient reports increased anxiety and jitteriness with it.  --Discontinue Depakote, the patient does not want to take it stating it makes her feel \"funny\".  --Start propranolol 10 mg three times a day for anxiety and high blood pressure.  --PRN: hydroxyzine, Trazodone, Zyprexa, gabapentin       Disposition Plan   Reason for ongoing admission: is unable to care for self due to anxiety  Disposition: home  Estimated length of stay: 5-7 days  Legal Status:  voluntary  Discharge will be granted once symptoms improved.    Marce Borges APRN, CNP    This note was " created with the help of Dragon dictation system. All grammatical/typing errors or context distortion are unintentional and inherent to software.    More than 35 minutes were spent for assessment, documentation, counseling and  coordination of care.

## 2021-06-23 NOTE — PLAN OF CARE
Pt presents with blunted, flat affect and depressed, anxious mood. Denies SI/SIB and hallucinations. Continues to reporting being down this evening, she states that she feels hopeless about medications working and her getting better. Pt reports that her body just does not feel right. Offered pt reassurance and educated pt on giving her body some time to adjust, she was accepting of this. Pt was present in the lounge most of the shift but withdrawn to herself. Pt had a visitor this evening that seemed to help her spirits. Pt did attend evening group. BP was elevated this evening and PRN hydralazine was provided. Pt did endorse some mild anxiety this evening but declined PRNs for this. Denied pain. Appetite and fluid intake adequate. Pt did shower this evening. No other concerns or complaints noted.

## 2021-06-23 NOTE — PLAN OF CARE
Problem: OT General Care Plan  Goal: OT Goal 1  Description: Will attend OT groups improve concentration and motivation by engaging in more challenging and success oriented options while also improving insight with comments/answers made about mental health needs.    Pt attended 1 out of 2 OT groups offered. Pt actively participated in occupational therapy clinic with 3-5 patients total x60 minutes. Pt was able to ask for assistance as needed, and independently returned to a structured creative expression task. Pt demonstrated good focus, planning, and attention to detail. Minimal interactions with peers observed, appearing somewhat withdrawn. Politely requested to listen to relaxing guitar music. Calm, pleasant, and cooperative.

## 2021-06-23 NOTE — PLAN OF CARE
"Nursing Assessment:  Zuly was up ad tal, attended groups. Pt continues to report feeling anxious, indicated she didn't want to take depakote until she spoke with Provider \"It made me feel worse\".  Provider discontinued Depakote, started Buspar TID which Zuly took at 13:43. Pt's appetite was 100%, her fluid intake is WNL. Pt's affect was flat, she appeared tense at times though was pleasant on approach. Zuly was med adherent, cooperative.   "

## 2021-06-24 PROCEDURE — 93005 ELECTROCARDIOGRAM TRACING: CPT

## 2021-06-24 PROCEDURE — G0177 OPPS/PHP; TRAIN & EDUC SERV: HCPCS

## 2021-06-24 PROCEDURE — 250N000013 HC RX MED GY IP 250 OP 250 PS 637: Performed by: PHYSICIAN ASSISTANT

## 2021-06-24 PROCEDURE — 99233 SBSQ HOSP IP/OBS HIGH 50: CPT | Performed by: NURSE PRACTITIONER

## 2021-06-24 PROCEDURE — 250N000013 HC RX MED GY IP 250 OP 250 PS 637: Performed by: NURSE PRACTITIONER

## 2021-06-24 PROCEDURE — 250N000013 HC RX MED GY IP 250 OP 250 PS 637: Performed by: CLINICAL NURSE SPECIALIST

## 2021-06-24 PROCEDURE — 124N000003 HC R&B MH SENIOR/ADOLESCENT

## 2021-06-24 PROCEDURE — 93010 ELECTROCARDIOGRAM REPORT: CPT | Performed by: INTERNAL MEDICINE

## 2021-06-24 RX ORDER — AMLODIPINE BESYLATE 5 MG/1
5 TABLET ORAL DAILY
Status: DISCONTINUED | OUTPATIENT
Start: 2021-06-25 | End: 2021-06-27

## 2021-06-24 RX ORDER — AMLODIPINE BESYLATE 2.5 MG/1
2.5 TABLET ORAL ONCE
Status: COMPLETED | OUTPATIENT
Start: 2021-06-24 | End: 2021-06-24

## 2021-06-24 RX ADMIN — BUSPIRONE HYDROCHLORIDE 10 MG: 5 TABLET ORAL at 20:49

## 2021-06-24 RX ADMIN — TRAZODONE HYDROCHLORIDE 50 MG: 50 TABLET ORAL at 20:49

## 2021-06-24 RX ADMIN — BUSPIRONE HYDROCHLORIDE 10 MG: 5 TABLET ORAL at 08:13

## 2021-06-24 RX ADMIN — PROPRANOLOL HYDROCHLORIDE 10 MG: 10 TABLET ORAL at 10:34

## 2021-06-24 RX ADMIN — AMLODIPINE BESYLATE 2.5 MG: 2.5 TABLET ORAL at 12:28

## 2021-06-24 RX ADMIN — OLANZAPINE 5 MG: 5 TABLET, FILM COATED ORAL at 20:49

## 2021-06-24 RX ADMIN — BUSPIRONE HYDROCHLORIDE 10 MG: 5 TABLET ORAL at 15:27

## 2021-06-24 RX ADMIN — GABAPENTIN 200 MG: 100 CAPSULE ORAL at 16:59

## 2021-06-24 RX ADMIN — LITHIUM CARBONATE 450 MG: 300 CAPSULE, GELATIN COATED ORAL at 20:49

## 2021-06-24 RX ADMIN — AMLODIPINE BESYLATE 2.5 MG: 2.5 TABLET ORAL at 08:13

## 2021-06-24 ASSESSMENT — ACTIVITIES OF DAILY LIVING (ADL)
LAUNDRY: UNABLE TO COMPLETE
HYGIENE/GROOMING: INDEPENDENT
DRESS: SCRUBS (BEHAVIORAL HEALTH);INDEPENDENT
HYGIENE/GROOMING: INDEPENDENT
ORAL_HYGIENE: INDEPENDENT
LAUNDRY: UNABLE TO COMPLETE
DRESS: INDEPENDENT
ORAL_HYGIENE: INDEPENDENT

## 2021-06-24 NOTE — PLAN OF CARE
Pt actively participated in a structured Therapeutic Recreation group with a focus on leisure participation and exercise. Pt was the only patient to attend group and participated in the guided exercise for the full duration of the group. Pt followed along, engaged in the guided chair exercise routine.  Pt was encouraged to use positive imagery with the deep breathing and stretching to foster relaxation, improves focus, and reduce stress.

## 2021-06-24 NOTE — PLAN OF CARE
"Flat affect, mood anxious. Pt verbalized she did not sleep well last night, pt reports she was awake with anxiety and racing thoughts, pt educated on non-pharmacological interventions and to ask for PRNs, pt verbalized understanding. Pt rates her anxiety at 7/10 (10 being the worst). PRN propanolol given per pt request for anxiety with no relief reported, pt continues to rate anxiety 7/10. An hour after taking Norvasc 2.5 mg at 1228 pt reported a \"little\" relief from anxiety. Pt asked appropriate medication questions, questions answered and pt verbalized understanding.   "

## 2021-06-24 NOTE — PLAN OF CARE
Problem: Sleep Disturbance (Anxiety Signs/Symptoms)  Goal: Improved Sleep (Anxiety Signs/Symptoms)  Outcome: Improving     Slept uninterrupted for 8.5 hours  No concerns raised

## 2021-06-24 NOTE — PROGRESS NOTES
Brief Medicine Note    Medicine following peripherally for BP monitoring. Patient was started on Amlodipine 2.5mg daily on 6/22. BP continue to be elevated with SBP 140s-170s. Will increase dose to 5mg daily (w/ hold parameters) today and continue to monitor BP trends peripherally.     Please notify medicine with any additional questions or concerns.    Astrid Metcalf PA-C  Hospitalist Service  Pager [phone]

## 2021-06-24 NOTE — PLAN OF CARE
Assessment/Intervention/Current Symtoms and Care Coordination  Anxiety and medication changes.  Attended Team Meeting   Reviewed Chart  Improved    Discharge Plan or Goal  Pt to discharge home with community services.  Given referral information for anxiety support groups    Barriers to Discharge   Pt may discharge on 6/25 if she remains stable.    Referral Status  AVS is complete with appointments scheduled.    Legal Status  Voluntary

## 2021-06-24 NOTE — PLAN OF CARE
"Problem: OT General Care Plan  Goal: OT Goal 1  Description: Will attend OT groups improve concentration and motivation by engaging in more challenging and success oriented options while also improving insight with comments/answers made about mental health needs.    Pt attended 1 out of 2 OT groups offered. Pt actively participated in a structured occupational therapy group of 2 patients total x50 minutes with a focus on facilitating self-awareness, self-esteem, and social engagement. Pt appeared comfortable sharing positive personal information, and was respectful in listening and responding to peers. Pt identified \"listening and being dependable\" as positive personal strengths. She identified her son and daughter as positive social supports, and shared that she is thankful for her \"family and the few friends I have.\" Calm, pleasant, cooperative, and engaged.    "

## 2021-06-24 NOTE — PROGRESS NOTES
[facility], [place]   Psychiatric Progress Note        Interim History:   From H&P: Zuly Wheeler is a 61 year old female with PMH notable for anxiety, bipolar 2 disorder, and MDDwho presents to the ED for anxiety. Patient reports that she has recently been through many medication changes. She was previously on lithium, nefazodone, and olanzapine. She was also on Buspar for a period of time until 6/15. She was recently tapered down off of nefazodone and states that her anxiety has been getting much worse. She saw a new psychiatrist on 6/16 who then took her off of lithium, which she had been on for some time, and added Prozac and hydroxyzine. She states that these new medication changes are not working for her. She feels shaky, panicky, has racing thoughts, and is not sleeping well. She states that it is hard to even express what she is feeling.  She has no energy and is not eating.  She does not even have the energy to cook.  She denies suicidal ideation.      Team meeting report: The patient's care was discussed with the treatment team during the daily team meeting and/or staff's chart notes were reviewed.  Staff report patient has been calm, pleasant, cooperative.  Attended all groups.  Affect is flat/blunt, full range in the evening.  Reports improvement in depression and anxiety since she stopped the Depakote.  Believes it interfered with her sleep.  She appears tense at times.  Appetite is good.  Slept all night.    Met with patient.  The patient is very upset.  She is not feeling better.  Reminded her that she needs to be on medications for a bit before she sees any relief in her symptoms.  She is agreeable but is impatient.  She worries about losing her job.  She feels scared of never getting better.  She wants to go back to nefazodone however, understands that this medication is no longer manufactured.  Discussed with pharmacy who recommended Viibryd, Trazodone, or  Trintellix which are structurally closer to nefazodone but do not have the severe liver toxicity indication.  Will discuss with patient tomorrow.  It looks like she was on Viibryd in the past but did not take it long enough and reported significant side effects.  She wants to go off of BuSpar even though it was just started yesterday. Reported taking Buspar it in the past with good relief.  Denies suicidal ideation.          Medications:       amLODIPine  2.5 mg Oral Daily     busPIRone  10 mg Oral TID     lithium  450 mg Oral At Bedtime     OLANZapine  5 mg Oral At Bedtime          Allergies:     Allergies   Allergen Reactions     Depakote      groggy     Lamotrigine      Intolerance, numb (Lamictal)     Olanzapine Other (See Comments)     Only generic/ineffective  Ineffective (only generic)     Seroquel [Quetiapine Fumarate] Visual Disturbance     Blurred vision; jumpy     Sulfa Drugs Hives     Trileptal Visual Disturbance     Blurred vision     Zoloft Other (See Comments)     jumpy     Citalopram Anxiety     Intolerance (Celexa)          Labs:     Recent Results (from the past 672 hour(s))   CBC with platelets differential    Collection Time: 06/19/21  2:16 PM   Result Value Ref Range    WBC 5.6 4.0 - 11.0 10e9/L    RBC Count 5.05 3.8 - 5.2 10e12/L    Hemoglobin 14.7 11.7 - 15.7 g/dL    Hematocrit 44.9 35.0 - 47.0 %    MCV 89 78 - 100 fl    MCH 29.1 26.5 - 33.0 pg    MCHC 32.7 31.5 - 36.5 g/dL    RDW 12.3 10.0 - 15.0 %    Platelet Count 324 150 - 450 10e9/L    Diff Method Automated Method     % Neutrophils 64.7 %    % Lymphocytes 29.1 %    % Monocytes 4.8 %    % Eosinophils 0.7 %    % Basophils 0.5 %    % Immature Granulocytes 0.2 %    Nucleated RBCs 0 0 /100    Absolute Neutrophil 3.6 1.6 - 8.3 10e9/L    Absolute Lymphocytes 1.6 0.8 - 5.3 10e9/L    Absolute Monocytes 0.3 0.0 - 1.3 10e9/L    Absolute Eosinophils 0.0 0.0 - 0.7 10e9/L    Absolute Basophils 0.0 0.0 - 0.2 10e9/L    Abs Immature Granulocytes 0.0 0 -  0.4 10e9/L    Absolute Nucleated RBC 0.0    Comprehensive metabolic panel    Collection Time: 06/19/21  2:16 PM   Result Value Ref Range    Sodium 141 133 - 144 mmol/L    Potassium 3.6 3.4 - 5.3 mmol/L    Chloride 105 94 - 109 mmol/L    Carbon Dioxide 28 20 - 32 mmol/L    Anion Gap 8 3 - 14 mmol/L    Glucose 114 (H) 70 - 99 mg/dL    Urea Nitrogen 12 7 - 30 mg/dL    Creatinine 0.76 0.52 - 1.04 mg/dL    GFR Estimate 84 >60 mL/min/[1.73_m2]    GFR Estimate If Black >90 >60 mL/min/[1.73_m2]    Calcium 9.3 8.5 - 10.1 mg/dL    Bilirubin Total 0.4 0.2 - 1.3 mg/dL    Albumin 4.3 3.4 - 5.0 g/dL    Protein Total 8.6 6.8 - 8.8 g/dL    Alkaline Phosphatase 73 40 - 150 U/L    ALT 29 0 - 50 U/L    AST 21 0 - 45 U/L   TSH with free T4 reflex    Collection Time: 06/19/21  2:16 PM   Result Value Ref Range    TSH 1.15 0.40 - 4.00 mU/L   Asymptomatic SARS-CoV-2 COVID-19 Virus (Coronavirus) by PCR    Collection Time: 06/19/21  2:17 PM    Specimen: Nasopharyngeal   Result Value Ref Range    SARS-CoV-2 Virus Specimen Source Nasopharyngeal     SARS-CoV-2 PCR Result NEGATIVE     SARS-CoV-2 PCR Comment (Note)    Drug abuse screen 6 urine (chem dep)    Collection Time: 06/19/21  2:54 PM   Result Value Ref Range    Amphetamine Qual Urine Negative NEG^Negative    Barbiturates Qual Urine Negative NEG^Negative    Benzodiazepine Qual Urine Negative NEG^Negative    Cannabinoids Qual Urine Negative NEG^Negative    Cocaine Qual Urine Negative NEG^Negative    Ethanol Qual Urine Negative NEG^Negative    Opiates Qualitative Urine Negative NEG^Negative            Psychiatric Examination:   Temp: 96.7  F (35.9  C) Temp src: Temporal BP: (!) 146/82 Pulse: 82   Resp: 16 SpO2: 100 % O2 Device: None (Room air)    Weight is 135 lbs 12.8 oz  Body mass index is 20.96 kg/m .    Appearance: well groomed, awake, alert, cooperative, no apparent distress and thin  Attitude:  cooperative  Eye Contact:  good  Mood:  anxious and depressed  Affect:  intensity is  "flat  Speech:  clear, coherent  Psychomotor Behavior:  no evidence of tardive dyskinesia, dystonia, or tics  Throught Process:  logical and goal oriented  Associations:  no loose associations  Thought Content:  no evidence of suicidal ideation or homicidal ideation  Insight:  fair  Judgement:  fair  Oriented to:  time, person, and place  Attention Span and Concentration:  limited  Recent and Remote Memory:  fair         Precautions:     Behavioral Orders   Procedures     Code 1 - Restrict to Unit     Fall precautions     Routine Programming     As clinically indicated     Status 15     Every 15 minutes.          DIagnoses:   Major depressive disorder, recurrent, severe, without psychosis  Generalized anxiety disorder  Panic disorder  Rule out borderline personality disorder         Plan:   --Start BuSpar 10 mg 3 times a day.  --Continue Lithium to450 mg at bedtime  --Continue Zyprexa 5 mg qhs  --Discontinue Prozac 20 mg.  Patient reports increased anxiety and jitteriness with it.  --Discontinue Depakote, the patient reports it makes her feel \"funny\".  --Start PRN Propranolol 10 mg three times a day for anxiety and high blood pressure.  --PRN: hydroxyzine, Trazodone, Zyprexa, gabapentin       Disposition Plan   Reason for ongoing admission: is unable to care for self due to anxiety  Disposition: home  Estimated length of stay: 5-7 days  Legal Status:  voluntary  Discharge will be granted once symptoms improved.    Marce MENDEZ, CNP    This note was created with the help of Dragon dictation system. All grammatical/typing errors or context distortion are unintentional and inherent to software.    More than 35 minutes were spent for assessment, documentation, counseling and  coordination of care.         "

## 2021-06-24 NOTE — PLAN OF CARE
"Patient has been in the lounge all shift. Her affect is full range. Her mood is calm. She denies SI and SIB. She rates depression 4 and anxiety 3. She states her mood today is \"good, better\". She feels the change in her medications especially the Buspar are helping now. She states the Depakote interfered with her sleep last night. Made patient aware she has trazodone available if she cant sleep tonight. She denies pain. She is social with peers and staff. Her appetite is good. She attended all groups. She is medication compliant. She has not requested any PRN's this shift.   "

## 2021-06-25 LAB — INTERPRETATION ECG - MUSE: NORMAL

## 2021-06-25 PROCEDURE — 250N000013 HC RX MED GY IP 250 OP 250 PS 637: Performed by: CLINICAL NURSE SPECIALIST

## 2021-06-25 PROCEDURE — G0177 OPPS/PHP; TRAIN & EDUC SERV: HCPCS

## 2021-06-25 PROCEDURE — 124N000003 HC R&B MH SENIOR/ADOLESCENT

## 2021-06-25 PROCEDURE — 250N000013 HC RX MED GY IP 250 OP 250 PS 637: Performed by: NURSE PRACTITIONER

## 2021-06-25 PROCEDURE — 250N000013 HC RX MED GY IP 250 OP 250 PS 637: Performed by: PHYSICIAN ASSISTANT

## 2021-06-25 PROCEDURE — 99233 SBSQ HOSP IP/OBS HIGH 50: CPT | Performed by: NURSE PRACTITIONER

## 2021-06-25 RX ORDER — GABAPENTIN 100 MG/1
200 CAPSULE ORAL 3 TIMES DAILY
Status: DISCONTINUED | OUTPATIENT
Start: 2021-06-25 | End: 2021-06-25

## 2021-06-25 RX ORDER — GABAPENTIN 100 MG/1
200 CAPSULE ORAL 3 TIMES DAILY
Status: DISCONTINUED | OUTPATIENT
Start: 2021-06-25 | End: 2021-06-28 | Stop reason: HOSPADM

## 2021-06-25 RX ORDER — VILAZODONE HYDROCHLORIDE 10 MG/1
10 TABLET ORAL DAILY
Status: DISCONTINUED | OUTPATIENT
Start: 2021-06-25 | End: 2021-06-28 | Stop reason: HOSPADM

## 2021-06-25 RX ADMIN — BUSPIRONE HYDROCHLORIDE 10 MG: 5 TABLET ORAL at 20:36

## 2021-06-25 RX ADMIN — BUSPIRONE HYDROCHLORIDE 10 MG: 5 TABLET ORAL at 08:26

## 2021-06-25 RX ADMIN — LITHIUM CARBONATE 450 MG: 300 CAPSULE, GELATIN COATED ORAL at 20:37

## 2021-06-25 RX ADMIN — AMLODIPINE BESYLATE 5 MG: 5 TABLET ORAL at 08:26

## 2021-06-25 RX ADMIN — GABAPENTIN 200 MG: 100 CAPSULE ORAL at 10:23

## 2021-06-25 RX ADMIN — OLANZAPINE 5 MG: 5 TABLET, FILM COATED ORAL at 20:36

## 2021-06-25 RX ADMIN — GABAPENTIN 200 MG: 100 CAPSULE ORAL at 15:40

## 2021-06-25 RX ADMIN — BUSPIRONE HYDROCHLORIDE 10 MG: 5 TABLET ORAL at 15:40

## 2021-06-25 RX ADMIN — VILAZODONE HYDROCHLORIDE 10 MG: 10 TABLET ORAL at 11:51

## 2021-06-25 RX ADMIN — GABAPENTIN 200 MG: 100 CAPSULE ORAL at 20:34

## 2021-06-25 ASSESSMENT — ACTIVITIES OF DAILY LIVING (ADL)
ORAL_HYGIENE: INDEPENDENT
ORAL_HYGIENE: INDEPENDENT
DRESS: SCRUBS (BEHAVIORAL HEALTH);INDEPENDENT
HYGIENE/GROOMING: INDEPENDENT
HYGIENE/GROOMING: INDEPENDENT

## 2021-06-25 NOTE — PLAN OF CARE
Assessment/Intervention/Current Symtoms and Care Coordination  Attended team meeting  Reviewed chart notes.  Met with patient in-person to address needs/concerns  Reviewed Completed AVS  Spoke with Son, Orion Wheeler ([phone] signed NAVI) and gave him an update.       Discharge Plan or Goal  Likely discharge early next week, home, w/OP services     Barriers to Discharge   Sx stabilization and medication management     Referral Status  Additional resources given for anxiety support group     Legal Status  Voluntary, requesting discharge

## 2021-06-25 NOTE — PLAN OF CARE
"Patient has been in the lounge all shift. Her affect is full range. Her mood is calm. She denies SI and SIB. She endorses depression and anxiety stating \"they're both there but its getting better\". Her BP was high due to anxiety. She stated \"I get so anxious at times, it worries me\". Neurontin 200 mg given with good relief. Patient stated \"it really helped, can I get it scheduled?\". Note left for provider. She denies pain. She states she did not sleep well again last night. Trazodone given. She does not feel hopeful \"I just want to better and go home\". Her appetite is good. She attended groups. Her grandmother came to visit and it went well. She is medication compliant.   "

## 2021-06-25 NOTE — PLAN OF CARE
Problem: Sleep Disturbance (Anxiety Signs/Symptoms)  Goal: Improved Sleep (Anxiety Signs/Symptoms)  6/25/2021 0522 by Ana Razo, RN  Outcome: Improving   Patient slept for 9 hrs.  No concerns reported this shift.

## 2021-06-25 NOTE — PLAN OF CARE
Rates depression at 4/10, anxiety at 6/10, denies SI or the wish to be dead.  Stated earlier that she would like discharge today and later said she really didn't want to go home and that nothing has changed since admission.  Attends group, appetite good, slept 9 hrs last night.  Appears blunted and a bit sad to this writer.  P:  Continue same plan of care.

## 2021-06-25 NOTE — DISCHARGE INSTRUCTIONS
Behavioral Discharge Planning and Instructions    Summary: You were admitted on 6/19/2021  due to Anxiety.  You were treated by Marce Borges and discharged from [facility] to Home    Main Diagnosis: Major depressive disorder severe recurrent without psychosis  Generalized anxiety disorder  Panic disorder  Rule out borderline personality disorder     Health Care Follow-up:   Appointment Date/Time: Wednesday June 30, 2021 @ 2:00pm  Psychiatrist: Dr. Tijerina   Address: [place] [address]   Phone Number: [phone].      Appointment Date/Time: Monday June 28, 2021 @ 1:00pm   Therapist: Bonny Cali  Address: [facility]   Phone Number: [phone].      Follow up with your family physician in 1 week for hospital follow up and for ongoing management of HTN now on amlodipine    [facility] offers bi-monthly support groups in [place], [place], and [place]  288.582.9069 or toll free: 1-664-CEPN-Helps (1-223.110.1999); [email or web address]  Website  [email or web address]-groups-adults-living-[email or web address]-[email or web address]    Attend all scheduled appointments with your outpatient providers. Call at least 24 hours in advance if you need to reschedule an appointment to ensure continued access to your outpatient providers.     Major Treatments, Procedures and Findings:  You were provided with: a psychiatric assessment, assessed for medical stability, medication evaluation and/or management and group therapy    Symptoms to Report: feeling more aggressive, increased confusion, losing more sleep, mood getting worse or thoughts of suicide    Early warning signs can include: increased depression or anxiety sleep disturbances increased thoughts or behaviors of suicide or self-harm  increased unusual thinking, such as paranoia or hearing voices    Safety and Wellness:  Take all medicines as directed.  Make no changes unless your doctor suggests them.      Follow treatment  "recommendations.  Refrain from alcohol and non-prescribed drugs.  If there is a concern for safety, call 911.    Resources:   Crisis Intervention: 748.125.7263 or 673-776-4402 (TTY: 369.746.9801).  Call anytime for help.  National [place] [employer] Mental Illness (www.[place].org): 381.507.4970 or 665-508-1375.  Mental Health Consumer/Survivor Network of [place] (www.mhcsn.net): 199.322.4851 or 162-072-0587  Mental Health Association of [place] (www.mentalhealth.org): 330.145.2395 or 463-658-7725  [facility] Crisis (COPE) Response - Adult [phone]  Text 4 Life: txt \"LIFE\" to 19054 for immediate support and crisis intervention    General Medication Instructions:   See your medication sheet(s) for instructions.   Take all medicines as directed.  Make no changes unless your doctor suggests them.   Go to all your doctor visits.  Be sure to have all your required lab tests. This way, your medicines can be refilled on time.  Do not use any drugs not prescribed by your doctor.  Avoid alcohol.    Advance Directives:   Scanned document on file with [employer]? No scanned doc  Is document scanned? Pt states no documents  Honoring Choices Your Rights Handout: Informed and given  Was more information offered? Materials given    The Treatment team has appreciated the opportunity to work with you. If you have any questions or concerns about your recent admission, you can contact the unit which can receive your call 24 hours a day, 7 days a week. They will be able to get in touch with a Provider if needed. The unit number is [phone].  "

## 2021-06-25 NOTE — PLAN OF CARE
"Problem: OT General Care Plan  Goal: OT Goal 1  Description: Will attend OT groups improve concentration and motivation by engaging in more challenging and success oriented options while also improving insight with comments/answers made about mental health needs.    Pt attended 2 out of 2 OT groups offered. Pt actively participated in occupational therapy clinic with 2 patients total x60 minutes. Pt was able to ask for assistance as needed, and independently initiate a structured creative expression task. Pt demonstrated good focus, planning, and attention to detail. Pt appeared comfortable interacting with peers when conversation was initiated with her, otherwise she spent a majority of group quietly engaged in her task. Pt actively participated in a mental health management group of 1 patient total x45 minutes with a focus on coping through movement to facilitate relaxation and stress management via chair yoga. Pt followed and engaged in 100% of the yoga poses, and verbalized feeling \"good\" at the end of group. She continues to be calm, pleasant, cooperative, and engaged throughout all groups she attends.        "

## 2021-06-25 NOTE — PROGRESS NOTES
[facility], [place]   Psychiatric Progress Note        Interim History:   From H&P: Zuly Wheeler is a 61 year old female with PMH notable for anxiety, bipolar 2 disorder, and MDDwho presents to the ED for anxiety. Patient reports that she has recently been through many medication changes. She was previously on lithium, nefazodone, and olanzapine. She was also on Buspar for a period of time until 6/15. She was recently tapered down off of nefazodone and states that her anxiety has been getting much worse. She saw a new psychiatrist on 6/16 who then took her off of lithium, which she had been on for some time, and added Prozac and hydroxyzine. She states that these new medication changes are not working for her. She feels shaky, panicky, has racing thoughts, and is not sleeping well. She states that it is hard to even express what she is feeling.  She has no energy and is not eating.  She does not even have the energy to cook.  She denies suicidal ideation.      Team meeting report: The patient's care was discussed with the treatment team during the daily team meeting and/or staff's chart notes were reviewed.  Staff report patient has been calm, pleasant, cooperative.  Attended all groups.  Affect changes depending the time of the day, in the morning she is flat and anxious, in the evening full range affect.  In the morning, reports severe depression and anxiety, in the evening both are rated as moderate.  The patient took as needed gabapentin last evening and reported good relief of anxiety.  She wanted scheduled.  This morning, she changed her mind and declined to take it.  Slept all night.    Met with patient.  Mood is labile.  Started the interview appearing very sad and anxious, few minutes into it was completely calm.  The patient wants to go home but is not ready.  She is depressed and anxious.  Discussed the frequent changes and refusal of medications.  She is aware that she  needs to take medications consistently for period of time before she can have any relief.  The patient wanted to decrease the dose of the lithium after she requested to be increased 2 days earlier.  Discussed medications that are chemically similar to nefazodone which include Viibryd, trazodone, and Trintellix.  The patient reports that she was prescribed Viibryd but did not take it due to worries about side effects.  He is aware that Trintellix is a very expensive medications and she will likely not be able to afford it.  We had a lengthy conversation about her medications.  She was agreeable to start Viibryd, continue BuSpar, schedule gabapentin, and continue lithium and Zyprexa without change.  Again the patient try to negotiate how many and what type of medications to take even though we just had a lengthy conversation about it.  The patient will likely discharge early next week.         Medications:       amLODIPine  5 mg Oral Daily     busPIRone  10 mg Oral TID     gabapentin  200 mg Oral TID     lithium  450 mg Oral At Bedtime     OLANZapine  5 mg Oral At Bedtime     vilazodone  10 mg Oral Daily          Allergies:     Allergies   Allergen Reactions     Depakote      groggy     Lamotrigine      Intolerance, numb (Lamictal)     Olanzapine Other (See Comments)     Only generic/ineffective  Ineffective (only generic)     Seroquel [Quetiapine Fumarate] Visual Disturbance     Blurred vision; jumpy     Sulfa Drugs Hives     Trileptal Visual Disturbance     Blurred vision     Zoloft Other (See Comments)     jumpy     Citalopram Anxiety     Intolerance (Celexa)          Labs:     Recent Results (from the past 672 hour(s))   CBC with platelets differential    Collection Time: 06/19/21  2:16 PM   Result Value Ref Range    WBC 5.6 4.0 - 11.0 10e9/L    RBC Count 5.05 3.8 - 5.2 10e12/L    Hemoglobin 14.7 11.7 - 15.7 g/dL    Hematocrit 44.9 35.0 - 47.0 %    MCV 89 78 - 100 fl    MCH 29.1 26.5 - 33.0 pg    MCHC 32.7 31.5 -  36.5 g/dL    RDW 12.3 10.0 - 15.0 %    Platelet Count 324 150 - 450 10e9/L    Diff Method Automated Method     % Neutrophils 64.7 %    % Lymphocytes 29.1 %    % Monocytes 4.8 %    % Eosinophils 0.7 %    % Basophils 0.5 %    % Immature Granulocytes 0.2 %    Nucleated RBCs 0 0 /100    Absolute Neutrophil 3.6 1.6 - 8.3 10e9/L    Absolute Lymphocytes 1.6 0.8 - 5.3 10e9/L    Absolute Monocytes 0.3 0.0 - 1.3 10e9/L    Absolute Eosinophils 0.0 0.0 - 0.7 10e9/L    Absolute Basophils 0.0 0.0 - 0.2 10e9/L    Abs Immature Granulocytes 0.0 0 - 0.4 10e9/L    Absolute Nucleated RBC 0.0    Comprehensive metabolic panel    Collection Time: 06/19/21  2:16 PM   Result Value Ref Range    Sodium 141 133 - 144 mmol/L    Potassium 3.6 3.4 - 5.3 mmol/L    Chloride 105 94 - 109 mmol/L    Carbon Dioxide 28 20 - 32 mmol/L    Anion Gap 8 3 - 14 mmol/L    Glucose 114 (H) 70 - 99 mg/dL    Urea Nitrogen 12 7 - 30 mg/dL    Creatinine 0.76 0.52 - 1.04 mg/dL    GFR Estimate 84 >60 mL/min/[1.73_m2]    GFR Estimate If Black >90 >60 mL/min/[1.73_m2]    Calcium 9.3 8.5 - 10.1 mg/dL    Bilirubin Total 0.4 0.2 - 1.3 mg/dL    Albumin 4.3 3.4 - 5.0 g/dL    Protein Total 8.6 6.8 - 8.8 g/dL    Alkaline Phosphatase 73 40 - 150 U/L    ALT 29 0 - 50 U/L    AST 21 0 - 45 U/L   TSH with free T4 reflex    Collection Time: 06/19/21  2:16 PM   Result Value Ref Range    TSH 1.15 0.40 - 4.00 mU/L   Asymptomatic SARS-CoV-2 COVID-19 Virus (Coronavirus) by PCR    Collection Time: 06/19/21  2:17 PM    Specimen: Nasopharyngeal   Result Value Ref Range    SARS-CoV-2 Virus Specimen Source Nasopharyngeal     SARS-CoV-2 PCR Result NEGATIVE     SARS-CoV-2 PCR Comment (Note)    Drug abuse screen 6 urine (chem dep)    Collection Time: 06/19/21  2:54 PM   Result Value Ref Range    Amphetamine Qual Urine Negative NEG^Negative    Barbiturates Qual Urine Negative NEG^Negative    Benzodiazepine Qual Urine Negative NEG^Negative    Cannabinoids Qual Urine Negative NEG^Negative     "Cocaine Qual Urine Negative NEG^Negative    Ethanol Qual Urine Negative NEG^Negative    Opiates Qualitative Urine Negative NEG^Negative   EKG 12-lead, complete    Collection Time: 06/24/21  3:03 PM   Result Value Ref Range    Interpretation ECG Click View Image link to view waveform and result             Psychiatric Examination:   Temp: 97.4  F (36.3  C) Temp src: Temporal BP: (!) 157/86 Pulse: 82   Resp: 16 SpO2: 100 % O2 Device: None (Room air)    Weight is 135 lbs 8 oz  Body mass index is 20.91 kg/m .    Appearance: well groomed, awake, alert, cooperative, no apparent distress and thin  Attitude:  cooperative  Eye Contact:  good  Mood:  anxious and depressed  Affect:  intensity is flat  Speech:  clear, coherent  Psychomotor Behavior:  no evidence of tardive dyskinesia, dystonia, or tics  Throught Process:  logical and goal oriented  Associations:  no loose associations  Thought Content:  no evidence of suicidal ideation or homicidal ideation  Insight:  fair  Judgement:  fair  Oriented to:  time, person, and place  Attention Span and Concentration:  limited  Recent and Remote Memory:  fair         Precautions:     Behavioral Orders   Procedures     Code 1 - Restrict to Unit     Fall precautions     Routine Programming     As clinically indicated     Status 15     Every 15 minutes.          DIagnoses:   Major depressive disorder, recurrent, severe, without psychosis  Generalized anxiety disorder  Panic disorder  Rule out borderline personality disorder  Suspecting secondary gain         Plan:   --BuSpar 10 mg 3 times a day.  --Continue Lithium to450 mg at bedtime  --Continue Zyprexa 5 mg at bedtime  --Schedule Gabapentin 200 mg, tid  --Start Viibrid 10 mg, qam  --Discontinue Prozac 20 mg.  Patient reports increased anxiety and jitteriness with it.  --Discontinue Depakote, the patient reports it makes her feel \"funny\".  --Start PRN Propranolol 10 mg three times a day for anxiety and high blood pressure.  --PRN: " hydroxyzine, Trazodone, Zyprexa, gabapentin       Disposition Plan   Reason for ongoing admission: is unable to care for self due to anxiety  Disposition: home  Estimated length of stay: 5-7 days  Legal Status:  voluntary  Discharge will be granted once symptoms improved.    Marce MENDEZ, CNP    This note was created with the help of Dragon dictation system. All grammatical/typing errors or context distortion are unintentional and inherent to software.    More than 35 minutes were spent for assessment, documentation, counseling and  coordination of care.

## 2021-06-26 PROCEDURE — 250N000013 HC RX MED GY IP 250 OP 250 PS 637: Performed by: PHYSICIAN ASSISTANT

## 2021-06-26 PROCEDURE — 250N000013 HC RX MED GY IP 250 OP 250 PS 637: Performed by: NURSE PRACTITIONER

## 2021-06-26 PROCEDURE — 124N000003 HC R&B MH SENIOR/ADOLESCENT

## 2021-06-26 PROCEDURE — 250N000013 HC RX MED GY IP 250 OP 250 PS 637: Performed by: CLINICAL NURSE SPECIALIST

## 2021-06-26 RX ADMIN — GABAPENTIN 200 MG: 100 CAPSULE ORAL at 13:57

## 2021-06-26 RX ADMIN — GABAPENTIN 200 MG: 100 CAPSULE ORAL at 20:41

## 2021-06-26 RX ADMIN — AMLODIPINE BESYLATE 5 MG: 5 TABLET ORAL at 08:07

## 2021-06-26 RX ADMIN — GABAPENTIN 200 MG: 100 CAPSULE ORAL at 08:07

## 2021-06-26 RX ADMIN — VILAZODONE HYDROCHLORIDE 10 MG: 10 TABLET ORAL at 08:07

## 2021-06-26 RX ADMIN — BUSPIRONE HYDROCHLORIDE 10 MG: 5 TABLET ORAL at 20:41

## 2021-06-26 RX ADMIN — BUSPIRONE HYDROCHLORIDE 10 MG: 5 TABLET ORAL at 08:07

## 2021-06-26 RX ADMIN — OLANZAPINE 5 MG: 5 TABLET, FILM COATED ORAL at 20:42

## 2021-06-26 RX ADMIN — LITHIUM CARBONATE 450 MG: 300 CAPSULE, GELATIN COATED ORAL at 20:41

## 2021-06-26 ASSESSMENT — ACTIVITIES OF DAILY LIVING (ADL)
ORAL_HYGIENE: INDEPENDENT
ORAL_HYGIENE: INDEPENDENT
HYGIENE/GROOMING: INDEPENDENT
DRESS: INDEPENDENT
HYGIENE/GROOMING: INDEPENDENT
DRESS: INDEPENDENT

## 2021-06-26 NOTE — PLAN OF CARE
Zuly appeared to sleep most of the night shift.  Appeared comfortable.  No snacks or prns given or requested.

## 2021-06-26 NOTE — PLAN OF CARE
Rates depression at 4/10 and anxiety down to 4.5/10 this shift.  States she feels weak and spends most of her time tonight in bed.  Affect a bit brighter this evening.  Med compliant.

## 2021-06-26 NOTE — PLAN OF CARE
"Rates anxiety at 6/10, depression at 7/10.  Denies SI or the wish to be dead.  Appears sad, blunted affect.  \"I just don't feel good.  I feel very weak\".  Isolates  and is withdrawn from peers.  Attends some groups.  Appetite is good.  Showered today.    Patient declined her 1400 buspar dose.  \"i'll take the morning and nightime dose but the afternoon one makes me feel weak\"  "

## 2021-06-27 PROCEDURE — 90853 GROUP PSYCHOTHERAPY: CPT

## 2021-06-27 PROCEDURE — 250N000013 HC RX MED GY IP 250 OP 250 PS 637: Performed by: NURSE PRACTITIONER

## 2021-06-27 PROCEDURE — 250N000013 HC RX MED GY IP 250 OP 250 PS 637: Performed by: CLINICAL NURSE SPECIALIST

## 2021-06-27 PROCEDURE — 250N000013 HC RX MED GY IP 250 OP 250 PS 637: Performed by: PHYSICIAN ASSISTANT

## 2021-06-27 PROCEDURE — 124N000003 HC R&B MH SENIOR/ADOLESCENT

## 2021-06-27 RX ORDER — AMLODIPINE BESYLATE 5 MG/1
10 TABLET ORAL DAILY
Status: DISCONTINUED | OUTPATIENT
Start: 2021-06-28 | End: 2021-06-28 | Stop reason: HOSPADM

## 2021-06-27 RX ORDER — AMLODIPINE BESYLATE 5 MG/1
5 TABLET ORAL ONCE
Status: COMPLETED | OUTPATIENT
Start: 2021-06-27 | End: 2021-06-27

## 2021-06-27 RX ADMIN — OLANZAPINE 5 MG: 5 TABLET, FILM COATED ORAL at 20:52

## 2021-06-27 RX ADMIN — GABAPENTIN 200 MG: 100 CAPSULE ORAL at 08:03

## 2021-06-27 RX ADMIN — GABAPENTIN 200 MG: 100 CAPSULE ORAL at 20:51

## 2021-06-27 RX ADMIN — AMLODIPINE BESYLATE 5 MG: 5 TABLET ORAL at 14:07

## 2021-06-27 RX ADMIN — AMLODIPINE BESYLATE 5 MG: 5 TABLET ORAL at 08:03

## 2021-06-27 RX ADMIN — GABAPENTIN 200 MG: 100 CAPSULE ORAL at 14:09

## 2021-06-27 RX ADMIN — VILAZODONE HYDROCHLORIDE 10 MG: 10 TABLET ORAL at 08:03

## 2021-06-27 RX ADMIN — BUSPIRONE HYDROCHLORIDE 5 MG: 5 TABLET ORAL at 20:52

## 2021-06-27 RX ADMIN — BUSPIRONE HYDROCHLORIDE 10 MG: 5 TABLET ORAL at 08:03

## 2021-06-27 RX ADMIN — LITHIUM CARBONATE 450 MG: 300 CAPSULE, GELATIN COATED ORAL at 20:52

## 2021-06-27 ASSESSMENT — ACTIVITIES OF DAILY LIVING (ADL)
HYGIENE/GROOMING: INDEPENDENT
HYGIENE/GROOMING: INDEPENDENT
ORAL_HYGIENE: INDEPENDENT
ORAL_HYGIENE: INDEPENDENT
DRESS: SCRUBS (BEHAVIORAL HEALTH)
DRESS: SCRUBS (BEHAVIORAL HEALTH)

## 2021-06-27 NOTE — PROGRESS NOTES
Brief Medicine Note    Medicine following for elevated BP. No diagnosis of HTN prior to admission. Started on Norvasc 2.5 mg daily on 6/22. Increased to 5 mg daily on 6/24. BP remains elevated in 140s/60s-70s. Further increase to 10 mg daily.    Medicine will continue to peripherally review BP.    Jessi Marsh PA-C  Hospitalist Service  [phone]

## 2021-06-27 NOTE — PLAN OF CARE
Calm, quiet, pleasant, cooperative.  Affect brightens with conversation.  Depression rated at 2/10, anxiety at 4/10.  Denies SI or the wish to be dead.  States that her mood is the worst in the morning and improves as the day goes on.  Believes the buspar works best with 5mg bid.  Visible in the lounge but does not interact with peers.    P:  Continue same plan of care.

## 2021-06-27 NOTE — PLAN OF CARE
"  Problem: Mood Impairment (Anxiety Signs/Symptoms)  Goal: Improved Mood Symptoms (Anxiety Signs/Symptoms)  Outcome: No Change   Nursing Assessment    Mixed anxiety and depressive disorder     Admit Date: 6/19/2021    Length of Stay: 8    Patient evaluation continues. Assessed mood,anxiety,thoughts and behavior. Patient is progressing towards goals. Patient is encouraged to participate in groups and assisted to develop healthy coping skills.  Patient denies auditory or visual hallucinations. BP (!) 148/66   Pulse 84   Temp 97.1  F (36.2  C) (Temporal)   Resp 16   Wt 61.5 kg (135 lb 8 oz)   LMP 01/30/2014 (Exact Date)   SpO2 100%   Breastfeeding No   BMI 20.91 kg/m      Mood: \"just so so\".    Patient reports depression 5/10 and reports anxiety 5/10     Affect:flat,blunted    Sleep: 6-7 hours each night    Appetite: 75%    SI: denies    HI: denies    SIB: denies      Medication Compliance  medication compliant    Group participation: attends and participates in groups    ADL's:  independent, grooming is neat     Fall risk interventions:  steady on her feet    Yuan Score Interventions: none    Discharge planning in progress    Refer to daily team meeting notes for individualized plan of care. Nursing will continue to assess.    *Scale is 1-10 and 10 is the worst.    Pt states that she is feeling very tired and feels that the buspar is making her feel off, tired and not motivated. Pt rested in bed much of the day.    Pt had one time dose of Norvasc 5 mg's at 1407 B/P 140/68.  "

## 2021-06-27 NOTE — PLAN OF CARE
Affect brighter tonight, visible in the lounge until bedtime.  Rates depression at 2/10 and anxiety at 4/10.  States less dizziness and weakness since she did not take the afternoon dose of buspar.  P:  continue same plan of care.

## 2021-06-28 VITALS
WEIGHT: 135.5 LBS | RESPIRATION RATE: 16 BRPM | BODY MASS INDEX: 20.91 KG/M2 | HEART RATE: 90 BPM | OXYGEN SATURATION: 100 % | TEMPERATURE: 96.3 F | DIASTOLIC BLOOD PRESSURE: 81 MMHG | SYSTOLIC BLOOD PRESSURE: 138 MMHG

## 2021-06-28 LAB — LITHIUM SERPL-SCNC: 0.35 MMOL/L (ref 0.6–1.2)

## 2021-06-28 PROCEDURE — 250N000013 HC RX MED GY IP 250 OP 250 PS 637: Performed by: CLINICAL NURSE SPECIALIST

## 2021-06-28 PROCEDURE — 250N000013 HC RX MED GY IP 250 OP 250 PS 637: Performed by: PHYSICIAN ASSISTANT

## 2021-06-28 PROCEDURE — 99238 HOSP IP/OBS DSCHRG MGMT 30/<: CPT | Performed by: NURSE PRACTITIONER

## 2021-06-28 PROCEDURE — 80178 ASSAY OF LITHIUM: CPT | Performed by: NURSE PRACTITIONER

## 2021-06-28 PROCEDURE — G0177 OPPS/PHP; TRAIN & EDUC SERV: HCPCS

## 2021-06-28 PROCEDURE — 36415 COLL VENOUS BLD VENIPUNCTURE: CPT | Performed by: NURSE PRACTITIONER

## 2021-06-28 PROCEDURE — 250N000013 HC RX MED GY IP 250 OP 250 PS 637: Performed by: NURSE PRACTITIONER

## 2021-06-28 RX ORDER — GABAPENTIN 100 MG/1
200 CAPSULE ORAL 3 TIMES DAILY
Qty: 180 CAPSULE | Refills: 0 | Status: SHIPPED | OUTPATIENT
Start: 2021-06-28 | End: 2021-07-14

## 2021-06-28 RX ORDER — LITHIUM CARBONATE 150 MG/1
450 CAPSULE ORAL AT BEDTIME
Qty: 90 CAPSULE | Refills: 0 | Status: SHIPPED | OUTPATIENT
Start: 2021-06-28 | End: 2021-08-18

## 2021-06-28 RX ORDER — AMLODIPINE BESYLATE 10 MG/1
10 TABLET ORAL DAILY
Qty: 30 TABLET | Refills: 0 | Status: SHIPPED | OUTPATIENT
Start: 2021-06-28 | End: 2021-07-14

## 2021-06-28 RX ORDER — VILAZODONE HYDROCHLORIDE 10 MG/1
10 TABLET ORAL DAILY
Qty: 30 TABLET | Refills: 0 | Status: SHIPPED | OUTPATIENT
Start: 2021-06-28 | End: 2021-06-30

## 2021-06-28 RX ORDER — BUSPIRONE HYDROCHLORIDE 5 MG/1
10 TABLET ORAL 2 TIMES DAILY
Status: DISCONTINUED | OUTPATIENT
Start: 2021-06-28 | End: 2021-06-28 | Stop reason: HOSPADM

## 2021-06-28 RX ORDER — BUSPIRONE HYDROCHLORIDE 10 MG/1
10 TABLET ORAL 3 TIMES DAILY
Qty: 90 TABLET | Refills: 0 | Status: CANCELLED | OUTPATIENT
Start: 2021-06-28 | End: 2021-07-28

## 2021-06-28 RX ORDER — BUSPIRONE HYDROCHLORIDE 10 MG/1
10 TABLET ORAL 2 TIMES DAILY
Qty: 60 TABLET | Refills: 0 | Status: SHIPPED | OUTPATIENT
Start: 2021-06-28 | End: 2021-07-14 | Stop reason: ALTCHOICE

## 2021-06-28 RX ADMIN — VILAZODONE HYDROCHLORIDE 10 MG: 10 TABLET ORAL at 08:28

## 2021-06-28 RX ADMIN — ACETAMINOPHEN 650 MG: 325 TABLET, FILM COATED ORAL at 16:08

## 2021-06-28 RX ADMIN — GABAPENTIN 200 MG: 100 CAPSULE ORAL at 14:03

## 2021-06-28 RX ADMIN — AMLODIPINE BESYLATE 10 MG: 5 TABLET ORAL at 08:28

## 2021-06-28 RX ADMIN — GABAPENTIN 200 MG: 100 CAPSULE ORAL at 08:28

## 2021-06-28 RX ADMIN — BUSPIRONE HYDROCHLORIDE 5 MG: 5 TABLET ORAL at 08:28

## 2021-06-28 ASSESSMENT — ACTIVITIES OF DAILY LIVING (ADL)
ORAL_HYGIENE: INDEPENDENT
HYGIENE/GROOMING: INDEPENDENT
DRESS: INDEPENDENT
HYGIENE/GROOMING: INDEPENDENT
ORAL_HYGIENE: INDEPENDENT
DRESS: SCRUBS (BEHAVIORAL HEALTH);INDEPENDENT
LAUNDRY: UNABLE TO COMPLETE

## 2021-06-28 NOTE — PLAN OF CARE
Assessment/Intervention/Current Symtoms and Care Coordination    Attend Team Meeting  Chart Review  Complete AVS  Prepare letter for return to work.    Discharge Plan or Goal    Discharge home with outpatient services    Barriers to Discharge   Pt to discharge later on this date      Referral Status  AVS complete with appointments        Legal Status  Voluntary

## 2021-06-28 NOTE — PLAN OF CARE
Nursing Assessment    Mixed anxiety and depressive disorder [F41.8]    Admit Date: 6/19/2021    Length of Stay: 9    Patient evaluation continues. Assessed mood,anxiety,thoughts and behavior. Patient is progressing towards goals. Patient is encouraged to participate in groups and assisted to develop healthy coping skills.  Patient denies auditory or visual hallucinations. BP (!) 142/86 (BP Location: Right arm)   Pulse 102   Temp 96.2  F (35.7  C) (Temporal)   Resp 16   Wt 61.5 kg (135 lb 8 oz)   LMP 01/30/2014 (Exact Date)   SpO2 100%   Breastfeeding No   BMI 20.91 kg/m      Mood: fair better than yesterday per pt    Patient reports depression 4/10 and reports anxiety 4/10    Affect:flat but brightens upon approach    Sleep: 6-7 hours    Appetite: good    SI: denies    HI: denies    SIB: denies      Medication Compliance yes    Group participation: participated    ADL's: self well dressed and neat    Fall risk interventions: none    Yuan Score Interventions: none    Discharge planning :  today after dinner, son will pick her up    Refer to daily team meeting notes for individualized plan of care. Nursing will continue to assess.    Pain : pt denies pain    *Scale is 1-10 and 10 is the worst.

## 2021-06-28 NOTE — PLAN OF CARE
Problem: OT General Care Plan  Goal: OT Goal 1  Description: Will attend OT groups improve concentration and motivation by engaging in more challenging and success oriented options while also improving insight with comments/answers made about mental health needs.        Note: Attended 1 and a partial of the 2nd OT group, she participated for 60 minutes in a goal oriented task group with 4 pts. She initiated work and learning of a task requiring using more complex problem solving with visusospatial concepts. She identified errors and with occasional cues corrected them. She became more skilled in finding solutions the longer she worked. She was pleasant with others and on approach. She appeared comfortable in being actively involved and speaking up. In the 2nd group, she attended for approx 15 minutes (at no charge), learned a more complex task, identified some solutions and asked for guidance when needed when first learning the task.

## 2021-06-28 NOTE — PLAN OF CARE
BEHAVIORAL TEAM DISCUSSION    Participants: ALVRAADO Turner, ANDREA Pinedo, Kleber Cordero, RN  Progress: Improved  Anticipated length of stay: Pt to discharge later on this date  Continued Stay Criteria/Rationale: Has a ride coming after dinner  Medical/Physical: No  Precautions:   Behavioral Orders   Procedures     Code 1 - Restrict to Unit     Fall precautions     Routine Programming     As clinically indicated     Status 15     Every 15 minutes.     Plan: Pt to discharge home with outpatient services.  Rationale for change in precautions or plan: No changes.

## 2021-06-28 NOTE — PROGRESS NOTES
Pt verbalized satisfaction with plan to discharge this shift. Discharge teaching and medication education provided. Pt verbalizes understanding. AVS signed. Patient denies SI and SIB. Safety plan in place.  Plans to follow-up with scheduled outpatient providers after discharge.       Pt left unit with discharge medications and belongings. Transported by brother to home.
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
## 2021-06-30 ENCOUNTER — OFFICE VISIT (OUTPATIENT)
Dept: PSYCHIATRY | Facility: CLINIC | Age: 61
End: 2021-06-30
Payer: COMMERCIAL

## 2021-06-30 DIAGNOSIS — F31.76 BIPOLAR DISORDER, IN FULL REMISSION, MOST RECENT EPISODE DEPRESSED (H): ICD-10-CM

## 2021-06-30 PROCEDURE — 99213 OFFICE O/P EST LOW 20 MIN: CPT | Performed by: PSYCHIATRY & NEUROLOGY

## 2021-06-30 RX ORDER — VILAZODONE HYDROCHLORIDE 10 MG/1
20 TABLET ORAL DAILY
Qty: 30 TABLET | Refills: 3 | Status: SHIPPED | OUTPATIENT
Start: 2021-06-30 | End: 2021-07-06

## 2021-06-30 NOTE — PROGRESS NOTES
"Visit Date: 06/30/2021    MEDICATION MANAGEMENT NOTE    IDENTIFICATION:  Ms. Zuly Wheeler is a 61-year-old -American female who is treated for major depressive disorder.  She was recently discharged from the hospital.    I first saw Zuly on 06/16/2021.  I felt that she did not have bipolar and I stopped her lithium.  I also stopped nefazodone.  I chose to start Prozac because she was complaining of anxiety.  I also increased her nighttime olanzapine.  Since that time, she decided the Prozac was making her worse and she was admitted to the psychiatric hospital where she was started back on lithium.  Prozac was stopped and she was placed on Viibryd.  She continues to take some gabapentin, which she thinks is causing some \"tingling side effects\" and she was placed on amlodipine, which makes her feel weak.  She is quite tearful and dysphoric during my visit.  She also brought in a boyfriend who has been very supportive of her.  I decided to increase the Viibryd to 20.  I thought it would be beneficial for her to see the pharmacist Jennifer to talk about side effects of amlodipine and also answer any other pharmacologic questions and I suggested she make an appointment with her primary.  Her boyfriend was concerned about her nutrition.  I discussed a number of strategies which included increasing the gabapentin or increasing the buspirone or perhaps the olanzapine.  She really was afraid to try any of these.  Her anxiety level was just too high.  I was quite firm in suggesting we would increase the Viibryd to 20 mg and, other than that, I really could not talk her into any medication changes.  I agree with the boyfriend that her nutrition is quite poor.  She also is having poor sleep; however, I had difficulty talking her into any medication increases. She and the boyfriend were both very reassured by the idea of seeing a pharmacist and a visit with primary care.  I admitted I am somewhat surprised that the " lithium was restarted, it is at 450.  She has never had a therapeutic lithium level, but I believe it has never been intended to treat bipolar, but rather as an augmentation strategy.    MENTAL STATUS EXAMINATION:  Today, the patient was quite tearful, anxious and depressed.  Happily, she denied suicidal ideation.  Her speech was soft and slow, but coherent and goal oriented.  Her associations tight.  Her thought processes logical and linear.  Her content of thought was without psychosis or suicidal ideation.  Recent and remote memory, concentration, fund of knowledge and use of language were at baseline as was muscle strength and tone.  She is quite thin.  Insight and judgment are currently intact.  Gait and station are within normal limits.    ASSESSMENT:  Major depressive disorder, currently severe without psychosis.    RECOMMENDATIONS:    1.  Increase Viibryd.  2.  Appointment with Jennifer and with her primary to discuss side effects of amlodipine, which she thinks is causing her tremendous weakness and also to discuss nutrition.  In the future, I think it would be reasonable to increase her antianxiety medications, but today the patient was simply afraid of any medication increases, though I was quite firm that we should increase the Viibryd (the reason for increasing the Viibryd is that UpToDate suggests it should be increased to 20 mg at the end of 7 days).      Dom Tijerina MD        D: 2021   T: 2021   MT: JANAE    Name:     CORA SCHMITZ  MRN:      [id number]        Account:    [id number]   :      1960           Visit Date: 2021     Document: [id number]

## 2021-07-01 ENCOUNTER — VIRTUAL VISIT (OUTPATIENT)
Dept: PHARMACY | Facility: CLINIC | Age: 61
End: 2021-07-01
Attending: PSYCHIATRY & NEUROLOGY
Payer: COMMERCIAL

## 2021-07-01 DIAGNOSIS — F34.0 CYCLOTHYMIC DISORDER: ICD-10-CM

## 2021-07-01 DIAGNOSIS — F31.76 BIPOLAR DISORDER, IN FULL REMISSION, MOST RECENT EPISODE DEPRESSED (H): Primary | ICD-10-CM

## 2021-07-01 DIAGNOSIS — I10 ESSENTIAL HYPERTENSION: ICD-10-CM

## 2021-07-01 PROCEDURE — 99606 MTMS BY PHARM EST 15 MIN: CPT | Performed by: PHARMACIST

## 2021-07-01 NOTE — Clinical Note
FYI
Jennifer:     Patient was scheduled with both of us, but I think close follow-up is warranted. She moved amlodipine to evening dosing last night (after taking morning dose yesterday too) and just increased Viibryd today. Anxiety is high but was not utilizing prescribed buspirone, gabapentin, or hydroxyzine doses. Overwhelmed by changes/lack of quick improvement. Appears to be following in [facility] and with psych at [facility] as well.    Gerardo Mcallister, PharmD, BCACP  Medication Therapy Management Pharmacist  Pager: [phone]  
Offered and patient declined

## 2021-07-01 NOTE — PATIENT INSTRUCTIONS
Recommendations from today's MTM visit:                                                       1. Continue to take your current medications as prescribed.  It may take some more time to see benefits of your amlodipine timing change or Viibryd dose increase.    2. If you continue to have anxiety you may benefit from taking the full prescribed dose of buspirone, prescribed gabapentin dose (or at least evening dose to help with sleep), and hydroxyzine as needed.     Follow-up: You are scheduled to see my colleague Jennifer Lilly PharmD at 2 PM at the [facility] ([address] [place] [address] [address]) - 6th floor, in the same clinic as [place] providers    It was great to speak with you today.  I value your experience and would be very thankful for your time with providing feedback on our clinic survey. You may receive a survey via email or text message in the next few days.     To schedule another MTM appointment, please call the clinic directly or you may call the MTM scheduling line at [phone] or toll-free at [phone].     My Clinical Pharmacist's contact information:                                                      Please feel free to contact me with any questions or concerns you have.      Gerardo Mcallister, [place], [facility]  Medication Therapy Management Pharmacist  Pager: [phone]

## 2021-07-01 NOTE — PROGRESS NOTES
Medication Therapy Management (MTM) Encounter    ASSESSMENT:                            Medication Adherence/Access: No issues identified    Bipolar Disorder/Depression/Anxiety:  Anxiety continues - recent antidepressant changes/dose increases. Recent changes to medications may help with current low energy with more time - including amlodipine timing change.  Discussed using other medications intended for anxiety at prescribed doses.     Hypertension: Blood pressure at goal of <140/90 mmHg.     PLAN:                            1. Continue to take your current medications as prescribed.  It may take some more time to see benefits of your amlodipine timing change or Viibryd dose increase.    2. If you continue to have anxiety you may benefit from taking the full prescribed dose of buspirone, prescribed gabapentin dose (or at least evening dose to help with sleep), and hydroxyzine as needed.     Follow-up: You are scheduled to see my colleague Jennifer Lilly PharmD at 2 PM at the [facility] ([address] [place] [address] [address]) - 6th floor, in the same clinic as [place] providers    SUBJECTIVE/OBJECTIVE:                          Zuly Wheeler is a 61 year old female called for a transitions of care visit. She was discharged from [facility] on 9/28/2021 for depression/anxiety.      Reason for visit: Hospital Follow-Up.    Allergies/ADRs: Reviewed in chart  Tobacco: She reports that she quit smoking about 30 years ago. Her smoking use included cigarettes. She quit after 30.00 years of use. She has never used smokeless tobacco.  Alcohol: none  Caffeine: no caffeine  Activity: hasn't really did anything - just attended doctor appointment or store. Has not felt like doing anything.  Past Medical History: Reviewed in chart      Medication Adherence/Access: Patient takes medications directly from bottles.  Patient takes medications 2 time(s) per day.   Per patient, misses medication 0 times per  week.   Medication barriers: none.   The patient fills medications at [facility]: NO, fills medications at [facility] in [place].    Bipolar Disorder/Depression/Anxiety:  Current medications include: buspirone 5 mg twice daily (prescribed 10 mg twice daily but concerned about too high of a dose), olanzapine 5 mg at bedtime, lithium  mg at bedtime, and Viibryd 20 mg every morning (dose increase just started this morning).   She is also prescribed gabapentin 200 mg three times daily (stopped taking because she was concerned about warm tingling in her back, legs, and neck - only wants to take as needed) and hydroxyzine as needed (not sure if it was helpful in hospital).  States that she has low energy/fatigue and ongoing anxiety. Just wants to feel better and stressed that it has not happened despite all of the changes over the last 2+ weeks. Overwhelmed by amount of changes. Denies any thoughts of harm to self or others. Sleep is still not great. Appetite continues to be poor. Saw psychiatry yesterday and follow-up on 7/28/2021. Scheduled to see MTM colleague on 7/6/2021 and post-hospital visit on 7/14/2021.    PHQ-9 SCORE 4/26/2019 6/24/2020 3/31/2021   PHQ-9 Total Score - - -   PHQ-9 Total Score MyChart 0 - -   PHQ-9 Total Score 0 19 23     Hypertension: Current medications include amlodipine 10 mg daily.  She took yesterday morning and then again last night after being told to start taking at night.  Patient does self-monitor blood pressure.  Patient reports the following medication side effects: fatigue and occasional dizziness.  BP Readings from Last 3 Encounters:   06/28/21 138/81   04/06/21 106/72   03/31/21 130/68       Today's Vitals: LMP 01/30/2014 (Exact Date)      Wt Readings from Last 5 Encounters:   06/24/21 135 lb 8 oz (61.5 kg)   04/06/21 137 lb 8 oz (62.4 kg)   03/31/21 138 lb (62.6 kg)   11/27/19 148 lb (67.1 kg)   08/05/19 144 lb (65.3 kg)     ----------------  Post Discharge Medication  Reconciliation Status: discharge medications reconciled, continue medications without change.    I spent 35 minutes with this patient today. A copy of the visit note was provided to the patient's primary care provider.    The patient was sent via [employer] a summary of these recommendations.     Gerardo Mcallister, IrwinD, [facility]  Medication Therapy Management Pharmacist  Pager: [phone]    Telemedicine Visit Details  Type of service:  Telephone visit  Start Time: 9:43 AM  End Time: 10:18 AM  Originating Location (patient location): [place]  Distant Location (provider location):  [facility]      Medication Therapy Recommendations  No medication therapy recommendations to display

## 2021-07-06 ENCOUNTER — VIRTUAL VISIT (OUTPATIENT)
Dept: PHARMACY | Facility: CLINIC | Age: 61
End: 2021-07-06
Payer: COMMERCIAL

## 2021-07-06 DIAGNOSIS — F31.76 BIPOLAR DISORDER, IN FULL REMISSION, MOST RECENT EPISODE DEPRESSED (H): ICD-10-CM

## 2021-07-06 PROCEDURE — 99606 MTMS BY PHARM EST 15 MIN: CPT | Performed by: PHARMACIST

## 2021-07-06 PROCEDURE — 99607 MTMS BY PHARM ADDL 15 MIN: CPT | Performed by: PHARMACIST

## 2021-07-06 RX ORDER — VILAZODONE HYDROCHLORIDE 10 MG/1
10 TABLET ORAL DAILY
Start: 2021-07-06 | End: 2021-07-28 | Stop reason: SINTOL

## 2021-07-06 NOTE — PATIENT INSTRUCTIONS
Recommendations from today's MTM visit:                                                       1. Consider restarting Viibryd at 10mg once a day since this dose was not making you jittery and anxious. I will talk to Dr. Tijerina tomorrow to see about any other recommendations before your next visit with him.    2. Please continue taking your other medications as prescribed by your doctors. Focus on healthy eating, staying hydrated with lots of water, and using your non-medicine skills to help manage ups and downs of anxiety. I think your body needs more time to adjust to the medicines and to take the medicines long enough so they will start helping your anxiety.     Follow-up: August with Anushka Zhu    It was great to speak with you today.  I value your experience and would be very thankful for your time with providing feedback on our clinic survey. You may receive a survey via email or text message in the next few days.     To schedule another MTM appointment, please call the clinic directly or you may call the MTM scheduling line at [phone] or toll-free at [phone].     My Clinical Pharmacist's contact information:                                                      Please feel free to contact me with any questions or concerns you have.      Jennifer Lilly, PharmD, BCACP   Medication Management Pharmacist   [facility]  [phone]

## 2021-07-06 NOTE — PROGRESS NOTES
Medication Therapy Management (MTM) Encounter    ASSESSMENT:                            Medication Adherence/Access: See below for considerations    Depression/anxiety: long discussion on expectations of her medications and encouraged her to give more time for efficacy and resolution of side effects, focus on taking medication regularly as prescribed.   Encouraged her to consider resuming Viibryd at 10mg daily if she is unable to take 20mg at this time. Will discuss tomorrow with psychiatry     Hypertension: stable. Education on role of antihypertensives and encouraged continuing medication.       PLAN:                            1. Encouraged patient to resume Viibryd at 10mg daily - she states she will consider this.  2. Spoke with Dr. Tijerina regarding reaction to Viibryd, ok to continue with 10mg daily until scheduled follow-up with him and no other medication recommendations.    Follow-up: MTM with Anushka Zhu in 4 weeks    SUBJECTIVE/OBJECTIVE:                          Zuly Wheeler is a 61 year old female called for a follow-up visit. She was referred to me from [facility] psychiatry.  This is my first visit with this patient but she has been seen by my colleagues, last visit with Gerardo Mcallister PharmD 7/1/21.     Reason for visit: medication recheck.    Tobacco: She reports that she quit smoking about 30 years ago. Her smoking use included cigarettes. She quit after 30.00 years of use. She has never used smokeless tobacco.  Alcohol: none    Medication Adherence/Access: Patient takes medications directly from bottles.  Patient takes medications 3 time(s) per day.        Depression/Anxiety:  Current medications include: buspirone 10 mg twice daily (has been taking regularly for the last 5 days), olanzapine 5 mg at bedtime, lithium  mg at bedtime, and Viibryd 20 mg -stopped taking on Friday.   Ottosen down with Viibryd 10mg. Increased jittery feeling with 20mg which was frightening so she discontinued  the medication.  Current medication schedule-  8AM: buspirone 10mg and gabapentin 200mg.  Symptoms-Physically weak, anxious, lightheaded. Feels better about 11:30-12pm.   2pm takes another 200mg gabapentin.  Sometimes a little jittery and anxious later in the day.  8:30PM: amlodipine 10mg, buspirone 10mg, gabapentin 200mg, lithium 450mg, olanzapine 5mg  Sleeping through the night, 9pm-6am. Has been working on increasing water intake which does help her feel better.    Current symptoms- anxiety most bothersome.   Felt her symptoms overall worsened when she was weaned off nefazodone. Would be OK with how she is feeling in the afternoon, but doesn't like how she is feeling in the morning.    Efficacy:Can feel gabapentin working, has been taking regularly lately. Unsure about any of her other medications.     Followed by Dr. Tijerina psychiatry, next visit 7/28.    Good relationship with Anushka Zhu psych MTM    Hypertension: Current medications include amlodipine 10 mg daily.   Patient does self-monitor blood pressure - reports 130s/80s.  Patient reports the following medication side effects: unsure if the above effects in the morning are due to amlodipine.  Wonders if she can stop amlodipine now that her blood pressure has improved.  BP Readings from Last 3 Encounters:   06/28/21 138/81   04/06/21 106/72   03/31/21 130/68        Today's Vitals: LMP 01/30/2014 (Exact Date)   ----------------  Post Discharge Medication Reconciliation Status: discharge medications reconciled and changed, per note/orders.    I spent 30 minutes with this patient today. All changes were made via collaborative practice agreement with Trini Pardo. A copy of the visit note was provided to the patient's primary care provider.    The patient was sent via [employer] a summary of these recommendations.     Jennifer Lilly, [place], BCACP     Telemedicine Visit Details  Type of service:  Telephone visit  Start Time: 2:05 PM  End Time: 2:33  PM  Originating Location (patient location): Home  Distant Location (provider location):  [facility]      Medication Therapy Recommendations  Mixed anxiety and depressive disorder    Current Medication: vilazodone (VIIBRYD) 10 MG TABS tablet   Rationale: Undesirable effect - Adverse medication event - Safety   Recommendation: Change Medication - resume at previously tolerated dose   Status: Accepted per CPA

## 2021-07-06 NOTE — Clinical Note
Anushka OSPINA she thought you were helpful to meet with regularly in the past and would like to restart. I already scheduled her next visit with you.

## 2021-07-14 ENCOUNTER — OFFICE VISIT (OUTPATIENT)
Dept: PSYCHIATRY | Facility: CLINIC | Age: 61
End: 2021-07-14
Payer: COMMERCIAL

## 2021-07-14 ENCOUNTER — OFFICE VISIT (OUTPATIENT)
Dept: FAMILY MEDICINE | Facility: CLINIC | Age: 61
End: 2021-07-14
Payer: COMMERCIAL

## 2021-07-14 VITALS
HEIGHT: 68 IN | TEMPERATURE: 97.4 F | DIASTOLIC BLOOD PRESSURE: 76 MMHG | WEIGHT: 141 LBS | SYSTOLIC BLOOD PRESSURE: 134 MMHG | HEART RATE: 92 BPM | BODY MASS INDEX: 21.37 KG/M2 | RESPIRATION RATE: 16 BRPM | OXYGEN SATURATION: 99 %

## 2021-07-14 DIAGNOSIS — F51.05 INSOMNIA DUE TO OTHER MENTAL DISORDER: ICD-10-CM

## 2021-07-14 DIAGNOSIS — F22 PARANOIA (H): ICD-10-CM

## 2021-07-14 DIAGNOSIS — F99 INSOMNIA DUE TO OTHER MENTAL DISORDER: ICD-10-CM

## 2021-07-14 DIAGNOSIS — F31.76 BIPOLAR DISORDER, IN FULL REMISSION, MOST RECENT EPISODE DEPRESSED (H): ICD-10-CM

## 2021-07-14 DIAGNOSIS — F32.2 SEVERE MAJOR DEPRESSION (H): ICD-10-CM

## 2021-07-14 DIAGNOSIS — Z09 HOSPITAL DISCHARGE FOLLOW-UP: Primary | ICD-10-CM

## 2021-07-14 DIAGNOSIS — F31.76 BIPOLAR DISORDER, IN FULL REMISSION, MOST RECENT EPISODE DEPRESSED (H): Primary | ICD-10-CM

## 2021-07-14 DIAGNOSIS — F41.1 ANXIETY STATE: ICD-10-CM

## 2021-07-14 DIAGNOSIS — I10 HYPERTENSION, UNSPECIFIED TYPE: ICD-10-CM

## 2021-07-14 PROCEDURE — 96127 BRIEF EMOTIONAL/BEHAV ASSMT: CPT | Performed by: NURSE PRACTITIONER

## 2021-07-14 PROCEDURE — 99212 OFFICE O/P EST SF 10 MIN: CPT | Performed by: PSYCHIATRY & NEUROLOGY

## 2021-07-14 PROCEDURE — 99214 OFFICE O/P EST MOD 30 MIN: CPT | Performed by: NURSE PRACTITIONER

## 2021-07-14 RX ORDER — AMLODIPINE BESYLATE 10 MG/1
10 TABLET ORAL DAILY
Qty: 30 TABLET | Refills: 0 | Status: SHIPPED | OUTPATIENT
Start: 2021-07-14 | End: 2021-08-10

## 2021-07-14 RX ORDER — GABAPENTIN 100 MG/1
300 CAPSULE ORAL 3 TIMES DAILY
Qty: 180 CAPSULE | Refills: 0 | Status: SHIPPED | OUTPATIENT
Start: 2021-07-14 | End: 2021-08-18 | Stop reason: DRUGHIGH

## 2021-07-14 RX ORDER — OLANZAPINE 10 MG/1
10 TABLET ORAL AT BEDTIME
Qty: 30 TABLET | Refills: 1 | Status: SHIPPED | OUTPATIENT
Start: 2021-07-14 | End: 2021-08-18

## 2021-07-14 ASSESSMENT — ANXIETY QUESTIONNAIRES
7. FEELING AFRAID AS IF SOMETHING AWFUL MIGHT HAPPEN: NEARLY EVERY DAY
6. BECOMING EASILY ANNOYED OR IRRITABLE: NEARLY EVERY DAY
2. NOT BEING ABLE TO STOP OR CONTROL WORRYING: NEARLY EVERY DAY
1. FEELING NERVOUS, ANXIOUS, OR ON EDGE: NEARLY EVERY DAY
3. WORRYING TOO MUCH ABOUT DIFFERENT THINGS: NEARLY EVERY DAY
5. BEING SO RESTLESS THAT IT IS HARD TO SIT STILL: NOT AT ALL
IF YOU CHECKED OFF ANY PROBLEMS ON THIS QUESTIONNAIRE, HOW DIFFICULT HAVE THESE PROBLEMS MADE IT FOR YOU TO DO YOUR WORK, TAKE CARE OF THINGS AT HOME, OR GET ALONG WITH OTHER PEOPLE: EXTREMELY DIFFICULT
GAD7 TOTAL SCORE: 18

## 2021-07-14 ASSESSMENT — MIFFLIN-ST. JEOR: SCORE: 1245.45

## 2021-07-14 ASSESSMENT — PATIENT HEALTH QUESTIONNAIRE - PHQ9
5. POOR APPETITE OR OVEREATING: NEARLY EVERY DAY
SUM OF ALL RESPONSES TO PHQ QUESTIONS 1-9: 18

## 2021-07-14 NOTE — PROGRESS NOTES
Assessment & Plan       ICD-10-CM    1. Hospital discharge follow-up  Z09    2. Severe major depression (H)  F32.2    3. Paranoia (H)  F22    4. Anxiety state  F41.1    5. Hypertension, unspecified type  I10 amLODIPine (NORVASC) 10 MG tablet   6. Insomnia due to other mental disorder  F51.05     F99     depression and anxiety not controlled and pt crying here today, with her partner, reports she feels paranoid but no delusions or hallucinations, passive SI, gave hotlines see patient instructions   Long discussion regarding self care tips, sleep hygiene  Was able to get Psych appointment today with Dr. Tijerina and Resident for complete med review, diagnostic clarification and pt with many med questions. Will defer refills or med changes to them  Pt says her goal is to lower med burden, will consider MTM referral in the future     HTN stable, discussed importance of remaining on this for BP control and plan to continue long term for now, work on exercise and diet              See Patient Instructions  Follow up today with Psych after this appointment   Established care today   Return in about 2 weeks (around 7/28/2021) for for follow up visit, earlier if any concerns can be virtual .    ANDREA Maya CNP  [facility] YAMILET Caruso is a 61 year old who presents for the following health issues  accompanied by her partner:    [facility]       Hospital Follow-up Visit:    Hospital/Nursing Home/IP Rehab Facility: [facility]  Date of Admission: 6/19/2021  Date of Discharge: 6/28/2021  Reason(s) for Admission: Increased Anxiety      Was your hospitalization related to COVID-19? No   Problems taking medications regularly:  Her anxiety makes it harder for her to want to take them .  Medication changes since discharge: None  Problems adhering to non-medication therapy:  None    Summary of hospitalization:  [facility] hospital discharge  summary reviewed  Diagnostic Tests/Treatments reviewed.  Follow up needed: lithium level   Other Healthcare Providers Involved in Patient s Care:         None  Update since discharge: worsened.   Post Discharge Medication Reconciliation: discharge medications reconciled, continue medications without change.  Plan of care communicated with patient, family and other healthcare provider          Depression and Anxiety Follow-Up    How are you doing with your depression since your last visit? Worsened     How are you doing with your anxiety since your last visit?  Worsened     Are you having other symptoms that might be associated with depression or anxiety? No    Have you had a significant life event? No     Do you have any concerns with your use of alcohol or other drugs? No    Social History     Tobacco Use     Smoking status: Former Smoker     Years: 30.00     Types: Cigarettes     Quit date: 1990     Years since quittin.6     Smokeless tobacco: Never Used   Substance Use Topics     Alcohol use: Never     Drug use: No     PHQ 2020 3/31/2021 2021   PHQ-9 Total Score 19 23 18   Q9: Thoughts of better off dead/self-harm past 2 weeks Nearly every day Several days Not at all     GABRIELE-7 SCORE 2019   Total Score 0 (minimal anxiety) - -   Total Score 0 9 18     Last PHQ-9 2021   1.  Little interest or pleasure in doing things 3   2.  Feeling down, depressed, or hopeless 3   3.  Trouble falling or staying asleep, or sleeping too much 3   4.  Feeling tired or having little energy 3   5.  Poor appetite or overeating 0   6.  Feeling bad about yourself 3   7.  Trouble concentrating 3   8.  Moving slowly or restless 0   Q9: Thoughts of better off dead/self-harm past 2 weeks 0   PHQ-9 Total Score 18   Difficulty at work, home, or with people Extremely dIfficult     GABRIELE-7  2021   1. Feeling nervous, anxious, or on edge 3   2. Not being able to stop or control worrying 3   3.  "Worrying too much about different things 3   4. Trouble relaxing 3   5. Being so restless that it is hard to sit still 0   6. Becoming easily annoyed or irritable 3   7. Feeling afraid, as if something awful might happen 3   GABRIELE-7 Total Score 18   If you checked any problems, how difficult have they made it for you to do your work, take care of things at home, or get along with other people? Extremely difficult       Suicide Assessment Five-step Evaluation and Treatment (SAFE-T)      How many servings of fruits and vegetables do you eat daily?  0-1    On average, how many sweetened beverages do you drink each day (Examples: soda, juice, sweet tea, etc.  Do NOT count diet or artificially sweetened beverages)?   0    How many days per week do you exercise enough to make your heart beat faster? 3 or less    How many minutes a day do you exercise enough to make your heart beat faster? 9 or less    How many days per week do you miss taking your medication? 0    Feels paranoid, panicky, anxious and feels like cannot be alone, feels closed in, hard time sleeping and staying asleep  wories a lot and fearful   Safe home  Took half the vibryd  Partner is supportive    Looking for a Primary Care Provider   Weaned off the norfazadone several months       Review of Systems   Constitutional, HEENT, cardiovascular, pulmonary, GI, , musculoskeletal, neuro, skin, endocrine and psych systems are negative, except as otherwise noted.      Objective    /76   Pulse 92   Temp 97.4  F (36.3  C) (Temporal)   Resp 16   Ht 1.715 m (5' 7.52\")   Wt 64 kg (141 lb)   LMP 01/30/2014 (Exact Date)   SpO2 99%   BMI 21.74 kg/m    Body mass index is 21.74 kg/m .  Physical Exam   GENERAL: healthy, alert and no distress  EYES: Eyes grossly normal to inspection, PERRL and conjunctivae and sclerae normal  NECK: no adenopathy, no asymmetry, masses, or scars and thyroid normal to palpation  RESP: lungs clear to auscultation - no rales, " rhonchi or wheezes  CV: regular rate and rhythm, normal S1 S2, no S3 or S4, no murmur, click or rub, no peripheral edema and peripheral pulses strong  ABDOMEN: soft, nontender, no hepatosplenomegaly, no masses and bowel sounds normal  MS: no gross musculoskeletal defects noted, no edema  SKIN: no suspicious lesions or rashes  NEURO: Normal strength and tone, mentation intact and speech normal  PSYCH: mentation appears normal, tearful, anxious, judgement and insight intact and appearance well groomed

## 2021-07-14 NOTE — PATIENT INSTRUCTIONS
Patient Education     Depression  Depression is one of the most common mental health problems today. It is not just a state of unhappiness or sadness. It is a true disease. The cause seems to be linked to a drop in chemicals that transmit signals in the brain. Having a family history of depression, alcoholism, or suicide increases the risk. Chronic illness, chronic pain, migraine headaches, and high emotional stress also increase the risk.   Depression is something we tend to recognize in others. But we may have a hard time seeing it in ourselves. It can show in many physical and emotional ways:     Loss of appetite    Overeating    Not being able to sleep    Sleeping too much    Excessive tiredness not linked to physical exertion    Restlessness or irritability    Slowness of movement or speech    Feeling depressed or withdrawn    Loss of interest in things you once enjoyed    Trouble concentrating, remembering, or making decisions    Thoughts of harming or killing oneself, or thoughts that life is not worth living    Low self-esteem  The treatment for depression may include both medicine and psychotherapy. Antidepressants can reduce suffering. They can also improve the ability to function during the depressed period. Therapy can offer emotional support. It can also help you understand emotional factors that may be causing the depression.   Home care    Ongoing care and support help people manage this disease. Find a healthcare provider and therapist who meet your needs. Seek help when you feel like you may be getting ill.    Be kind to yourself. Make it a point to do things that you enjoy (gardening, walking in nature, going to a movie). Reward yourself for small successes.    Once you start your medicine, expect a slow decrease in your symptoms. Depression will lift gradually, not right away. Ask your healthcare provider how long it will take for the medicine to start working.    Take care of your physical  body. Eat a balanced diet (low in saturated fat and high in fruits and vegetables). Exercise at least 3 times a week for 30 minutes. Even mild to moderate exercise (like brisk walking) can make you feel better.    Don't make major decisions, such as a job change, a divorce, or a marriage until you feel better.    Don't drink alcohol. It can make depression worse.    Take medicine as prescribed. Don't stop your medicine or adjust the dose unless you talk with your healthcare provider.    Don't share your medicine or use someone else's medicine.    Tell all your healthcare providers about all the prescription and over-the-counter medicines, vitamins, and supplements you take. Certain supplements interact with medicines. They can cause dangerous side effects. Ask your pharmacist about medicine interactions when you have questions.    Talk with your family and trusted friends about your feelings and thoughts. Ask them to help you notice behavior changes early. You can then get help and, if needed, your medicine can be adjusted.    Talk with your healthcare provider if you are not getting better. He or she may change your medicine or have you try another treatment.    Follow-up care  Follow up with your healthcare provider as advised.   Call 911  Call 911 if you:     Have suicidal thoughts, a suicide plan, and the means to carry out the plan    Have serious thoughts of hurting someone else    Have trouble breathing    Are very confused    Feel very drowsy or have trouble awakening    Faint or lose consciousness    Have new chest pain that becomes more severe, lasts longer, or spreads into your shoulder, arm, neck, jaw, or back  When to seek medical advice  Call your healthcare provider right away if any of these happen:    Worsening of your symptoms    Feeling extreme depression, fear, anxiety, or anger toward yourself or others    Feeling out of control    Feeling that you may try to harm yourself or another    Hearing  voices that others do not hear    Seeing things that others do not see    Not sleeping or eating for 3 days in a row    Having friends or family express concern over your behavior and ask you to seek help  [employer] last reviewed this educational content on 2020-2021 [employer]. All rights reserved. This information is not intended as a substitute for professional medical care. Always follow your healthcare professional's instructions.           Patient Education     Know the Symptoms of Depression  Everyone feels down at times. The blues are a natural part of life. But an unhappy period that s intense or lasts for more than a few weeks is different. It can be a sign of depression. Depression is a serious illness. It's not a sign of weakness. It's not a character flaw. And it's not something you can just snap out of. Most people with depression need treatment to get better. Depression can disrupt the lives of family and friends. If you know someone who may be depressed, find out what you can do to help.    Symptoms of depression  People who are depressed may:    Feel unhappy, sad, blue, down, or miserable almost every day    Feel helpless, hopeless, or worthless    Lose interest in hobbies, friends, and activities that used to give pleasure    Not sleep well or sleep too much    Gain or lose weight    Feel low on energy or always tired    Have a hard time focusing or making decisions    Lose interest in sex    Have physical symptoms, such as stomachaches, headaches, or backaches  Know the serious signs  Never ignore a person's comments about suicide. Or behaviors that can lead to self-harm. Warning signs for suicide include:    Threats or talk of suicide. Talk of harming themselves or others.    Saying things such as  I won t be a problem much longer  or  Nothing matters.     Giving away their things. Or making a will or  plans.    Buying a gun or other weapon.    Sudden, unexplained  cheerfulness or calm after a period of depression.  If you see any of these signs, get help right away. Call a healthcare provider, mental health clinic, or suicide hotline. Ask what you should do. In an emergency, call 911.  Resources:    National Institutes of Mental Qagmhi762-359-3530rax.[facility].nih.gov    National [place] on Mental Gotlqja028-899-9491hwu.gosia.org     Mental Health Iphscsa048-473-3293bfn.[facility].org    National Suicide Qqwyyns451-482-6877 (800-SUICIDE)    National Suicide Prevention Lwkxpfer773-989-5000 (774-539-XBIY)www.suicidepreventionlifeline.org    [employer] last reviewed this educational content on 4/1/2019 2000-2021 [employer]. All rights reserved. This information is not intended as a substitute for professional medical care. Always follow your healthcare professional's instructions.

## 2021-07-14 NOTE — LETTER
[facility]  [address]  [address]  [place] [address]  Phone: [phone]  Fax: [phone]    July 14, 2021        Zuly Wheeler  [address]  [place] [address]          To whom it may concern:    RE: Zuly Wheeler    Patient was seen and treated today at our clinic and missed work. She is unable to return to work until further notice. She will follow up with me and Psychiatry frequently within the next 2-4 weeks and we will determine a plan at that time.     Please contact me for questions or concerns.      Sincerely,        ANDREA Maya CNP

## 2021-07-14 NOTE — TELEPHONE ENCOUNTER
Patient calling back to check on status. Anxious as provider is only in 1day a week and is need of 2 other medicaitons

## 2021-07-15 ASSESSMENT — ANXIETY QUESTIONNAIRES: GAD7 TOTAL SCORE: 18

## 2021-07-15 NOTE — PROGRESS NOTES
"Visit Date: 07/14/2021    IDENTIFICATION:  Ms. Zuly Wheeler is a 61-year-old -American female who is seen for depression, anxiety and some psychotic symptoms.  She carries a diagnosis of psychotic depression and was recently hospitalized on 06/19/2021.  I last saw this patient on 06/30/2021.  She carries a diagnosis of bipolar affective disorder, currently depressed, and has some psychotic features of paranoia.  She also has very significant anxiety.  She is quite afraid to take medications.  It is unclear to me whether she has ever been compliant to the medications that have been recommended.  However, she does think the gabapentin was somewhat helpful.  I changed that to 300 mg t.i.d.  Also, the olanzapine, which I believe she is taking 5 mg at night, really is not doing the trick.  She is still not sleeping and complains of paranoia.  Therefore, I increased the olanzapine to 10 and strongly recommended that she try the 10 mg of Viibryd.  If in the future she does not improve at all, I might actually start a benzodiazepine just to calm her down enough so she might try one of the other medications.  She comes in with her boyfriend, who is really quite helpful and clearly just wants the patient to get better.  Today, the patient spent some time with her primary, and I discussed the case with her primary prior to seeing the patient.    MENTAL STATUS EXAMINATION:  The patient is a tall, thin -American woman.  Her mood is depressed, and her affect is very anxious.  Her speech is coherent and goal-oriented.  Her associations tight.  Her thought processes are logical and linear.  Her content of thought includes some \"paranoia\" which is not well described.  She is currently not reporting suicidal ideation.  Recent and remote memory seem to be intact.  Concentration is impaired from anxiety.  Use of language is within normal limits.  She is alert and oriented x 3.  Insight and judgment are generally " intact.  Muscle strength and tone appear to be at her baseline.  Gait and station are within normal limits.    ASSESSMENT AND PLAN:  Bipolar affective disorder, currently depressed with psychotic features.    RECOMMENDATIONS:  Increase olanzapine to 10 mg at bedtime, increase Viibryd to 10 mg in the morning, and increase gabapentin to 3 times a day.  We decided to discontinue her BuSpar and her hydroxyzine, as she felt they were unhelpful, and she was really becoming anxious about taking so many medicines.  I strongly encouraged her to try to take Viibryd, olanzapine and gabapentin.      Dom Tijerina MD        D: 2021   T: 2021   MT: DIOGENES    Name:     CORA SCHMITZ  MRN:      [id number]        Account:    [id number]   :      1960           Visit Date: 2021     Document: [id number]

## 2021-07-16 ENCOUNTER — TELEPHONE (OUTPATIENT)
Dept: PHARMACY | Facility: CLINIC | Age: 61
End: 2021-07-16

## 2021-07-16 NOTE — TELEPHONE ENCOUNTER
"Patient called with concerns that she is still feeling anxious and paranoid (\"worried to leave the house and drive\"). She also reported that she was at the store with a friend who went to a different aisle and she started to \"panic that he left me.\"  She just saw Dr. Tijerina on 7/14, at which time she started taking Viibryd 10mg daily and olanzapine was increased from 5mg to 10mg.  She did make those changes.  Encouraged patient to continue with med regimen and discussed that two days since dose changes is too soon to notice improvement.  She will follow up with Dr. Tijerina on 7/28 and will call should symptoms worsen in the meantime.    Routed to Dr. Tijerina as viktoriya.    Anushka Zhu, PharmD  Medication Therapy Management Pharmacist  [facility]  Phone: [phone]  "

## 2021-07-20 ENCOUNTER — NURSE TRIAGE (OUTPATIENT)
Dept: NURSING | Facility: CLINIC | Age: 61
End: 2021-07-20

## 2021-07-20 ENCOUNTER — TELEPHONE (OUTPATIENT)
Dept: FAMILY MEDICINE | Facility: CLINIC | Age: 61
End: 2021-07-20

## 2021-07-20 NOTE — TELEPHONE ENCOUNTER
"Pt with history of anxiety, depression, and Bipolar Disorder.  She was seen in the ED on 6/19/2021 for increased anxiety.    Pt reports that she is having increased anxiety since her Zyprexa and Viibryd have been increased.    She states that her symptoms have worsened and changed since being seen in the ED. She is feeling edgy, jittery, panicky, and claustrophobic. She also has been having some lows. She has a decreased ability to focus. She can read the same sentence over and over again without understanding it.  She has been sleeping well at night, which she thinks is due to the Lithium and the Zyprexa.  She was unable to take the increased dosage of gabapentin as prescribed, as she said it made her feel \"funny.\" So, she is taking it at the lower dosage, and it is not really helping the anxiety.    Pt denies any suicidal ideation.  Pt denies any chest pain, SOB, or skipped heart beats.    Per protocol, pt advised to be seen at the ED for her panicky feeling.  Pt declined going to the ED now, and said she will make try to make it through the night, until Trini Pardo CNP, is in tomorrow morning. I brought up that her PCP may not be scheduled to work tomorrow.  Pt still declined the ED, saying that she will go in to the ED if her symptoms worsen, but she wants me to send Trini a message asking her to call the pt tomorrow.  I affirmed that pt is not alone, she said she is with her brother.    Message sent to Trini and her pool, to be addressed tomorrow morning.  Annemarie Reyes RN 07/20/21 5:27 PM  [facility] [place] Nurse Advisor      Reason for Disposition    Caller has NON-URGENT medication question about med that PCP prescribed and triager unable to answer question    Lightheadedness or dizziness and persists > 10 minutes and not relieved by reassurance provided by triager    Additional Information    Negative: Drug overdose and triager unable to answer question    Negative: Caller requesting information " unrelated to medicine    Negative: Caller requesting a prescription for Strep throat and has a positive culture result    Negative: Rash while taking a medication or within 3 days of stopping it    Negative: Immunization reaction suspected    Negative: Asthma and having symptoms of asthma (cough, wheezing, etc.)    Negative: Breastfeeding questions about mother's medicines and diet    Negative: MORE THAN A DOUBLE DOSE of a prescription or over-the-counter (OTC) drug    Negative: DOUBLE DOSE (an extra dose or lesser amount) of over-the-counter (OTC) drug and any symptoms (e.g., dizziness, nausea, pain, sleepiness)    Negative: DOUBLE DOSE (an extra dose or lesser amount) of prescription drug and any symptoms (e.g., dizziness, nausea, pain, sleepiness)    Negative: Took another person's prescription drug    Negative: DOUBLE DOSE (an extra dose or lesser amount) of prescription drug and NO symptoms (Exception: a double dose of antibiotics)    Negative: Diabetes drug error or overdose (e.g., took wrong type of insulin or took extra dose)    Negative: Caller has medication question about med not prescribed by PCP and triager unable to answer question (e.g., compatibility with other med, storage)    Negative: Severe difficulty breathing (e.g., struggling for each breath, speaks in single words)    Negative: Bluish (or gray) lips or face    Negative: Difficult to awaken or acting confused  (e.g., disoriented, slurred speech)    Negative: Hysterical or combative behavior    Negative: Sounds like a life-threatening emergency to the triager    Negative: Chest pain    Negative: Palpitations, skipped heart beat, or rapid heart beat    Negative: Cough is main symptom    Negative: Suicide thoughts, threats, attempts, or questions    Negative: Depression is main problem or symptom (e.g., feelings of sadness or hopelessness)    Protocols used: MEDICATION QUESTION CALL-A-OH, ANXIETY AND PANIC ATTACK-A-OH    COVID 19 Nurse Triage  Plan/Patient Instructions    Please be aware that novel coronavirus (COVID-19) may be circulating in the community. If you develop symptoms such as fever, cough, or SOB or if you have concerns about the presence of another infection including coronavirus (COVID-19), please contact your health care provider or visit [email or web address].     Disposition/Instructions    ED Visit recommended. Follow protocol based instructions.     Bring Your Own Device:  Please also bring your smart device(s) (smart phones, tablets, laptops) and their charging cables for your personal use and to communicate with your care team during your visit.    Thank you for taking steps to prevent the spread of this virus.  o Limit your contact with others.  o Wear a simple mask to cover your cough.  o Wash your hands well and often.    Resources    [facility]: About COVID-19: [email or web address]    CDC: What to Do If You're Sick: www.cdc.gov/coronavirus/2019-ncov/about/steps-when-sick.html    CDC: Ending Home Isolation: www.cdc.gov/coronavirus/2019-ncov/hcp/disposition-in-home-patients.html     CDC: Caring for Someone: www.cdc.gov/coronavirus/2019-ncov/if-you-are-sick/care-for-someone.html     [facility]: Interim Guidance for Hospital Discharge to Home: www.health.[facility].[place]/diseases/coronavirus/hcp/hospdischarge.pdf    [facility] clinical trials (COVID-19 research studies): clinicalaffairs.[facility]-clinical-trials     Below are the COVID-19 hotlines at the [place] Department of Health ([facility]). Interpreters are available.   o For health questions: Call 081-733-9696 or 1-166.933.1209 (7 a.m. to 7 p.m.)  o For questions about schools and childcare: Call 551-649-4675 or 1-806.974.1871 (7 a.m. to 7 p.m.)

## 2021-07-20 NOTE — TELEPHONE ENCOUNTER
Reason for Call:  Other call back    Detailed comments: Patient states that she is feel anxious and very panicky after having her vilazodone (VIIBRYD) 10 MG TABS tablet changed from 5 MG. Patient is requesting to talk to someone about whether she should continue taking the medication. Patient transferred to nurse triage.    Phone Number Patient can be reached at: Cell number on file:    Telephone Information:   Mobile [phone]       Best Time: Anytime    Can we leave a detailed message on this number? YES    Call taken on 7/20/2021 at 5:02 PM by Gavin Powell

## 2021-07-20 NOTE — TELEPHONE ENCOUNTER
Writer is covering for Anushka Zhu, PharmD. Received VM from patient for Anushka requesting a callback regarding medication questions.     Writer called pt who reports she has been feeling increased anxiety since starting Viibryd on 7/14. She describes her symptoms as: worry, feeling on edge, nervousness, and feeling claustrophobic/panic. She reports feeling very uncomfortable and is afraid to drive because of her symptoms. No SI endorsed. Pt states she is aware it can take some time for Viibryd to work. She has an appt scheduled with Dr. Tijerina 7/28.     Writer will route message to Dr. Tijerina and [facility] Nurse Pool. Writer encouraged pt to call her clinic or present to the ER/urgent care if symptoms worsen or pt no longer feels safe.     Anjelica Merchant, PharmD, BCPP  Medication Therapy Management Pharmacist  [facility]

## 2021-07-20 NOTE — TELEPHONE ENCOUNTER
Trini and Dr. Tijerina,    Please see below triage encounter and advise.    Also see telephone encounter from 7/16/21, pt has discussed Viibryd increase with Psych MTM.    Coco Angel RN  [facility]

## 2021-07-21 NOTE — TELEPHONE ENCOUNTER
Patient called 07/21/2021requesting to speak to her PCP she said she is feeling a little better but would still like to speak to Trini about her symptoms and whats going on with her.

## 2021-07-26 NOTE — TELEPHONE ENCOUNTER
Pt scheduled with Dr. Tijerina 7/28, scheduled with Trini 8/4.    Coco Angel RN  [facility]

## 2021-07-28 ENCOUNTER — OFFICE VISIT (OUTPATIENT)
Dept: PSYCHIATRY | Facility: CLINIC | Age: 61
End: 2021-07-28
Payer: COMMERCIAL

## 2021-07-28 DIAGNOSIS — F41.8 MIXED ANXIETY AND DEPRESSIVE DISORDER: Primary | ICD-10-CM

## 2021-07-28 DIAGNOSIS — F31.76 BIPOLAR DISORDER, IN FULL REMISSION, MOST RECENT EPISODE DEPRESSED (H): ICD-10-CM

## 2021-07-28 PROCEDURE — 99212 OFFICE O/P EST SF 10 MIN: CPT | Performed by: PSYCHIATRY & NEUROLOGY

## 2021-07-28 RX ORDER — LITHIUM CARBONATE 150 MG/1
450 CAPSULE ORAL AT BEDTIME
Qty: 90 CAPSULE | Refills: 0 | Status: CANCELLED | OUTPATIENT
Start: 2021-07-28

## 2021-07-28 RX ORDER — PAROXETINE 10 MG/1
10 TABLET, FILM COATED ORAL DAILY
Qty: 30 TABLET | Refills: 1 | Status: SHIPPED | OUTPATIENT
Start: 2021-07-28 | End: 2021-08-03 | Stop reason: DRUGHIGH

## 2021-07-28 NOTE — PROGRESS NOTES
"Visit Date: 07/28/2021    PSYCHIATRIC MEDICAL MANAGEMENT NOTE    IDENTIFICATION:  Ms. Zuly Wheeler is a 61-year-old -American female who is treated for anxious depression.    HISTORY OF PRESENT ILLNESS:  Ms. Wheeler was initially treated with nefazodone and olanzapine by a different psychiatrist.  She was doing fairly well until nefazodone was no longer available.  At that point, we tried different medications.  We tried Prozac, which caused a great deal of anxiety, and in the end, she was hospitalized on the Inpatient Psychiatric Unit.  She was placed on vilazodone but then stopped that because she thought it was making her anxious.  So today, she is on gabapentin, which she takes 300 three times a day; lithium, which she has been on for a long time and takes 450 mg; and olanzapine, which is now at 10 mg.  She continues to report that she is \"anxious and paranoid. \" When I get her to describe what paranoia is for her, she does not actually describe paranoia but rather social phobia.  She becomes extremely anxious and begins to panic when she is not right next to her boyfriend.  This will happen in any situation where there are other people involved.  It will also sometimes happen at home when she reports \"claustrophobia.\"  Despite her constant complaints of significant anxiety, she does not appear as frantic as she did on our initial visits.  She actually seems to be mildly improved but still continuing to have issues.  She is quite anxious.  She still has difficulty staying asleep.  She is quite thin and seems to have ongoing depression.  Today, we discussed various options Prior to interviewing the patient, I discussed the case again with our pharmacist, and we decided to try either trazodone at a slowly increasing dose or start with low-dose Paxil.  I did decide to use Paxil.  We discussed the potential side effects and the potential withdrawal effects, and I recommended we start Paxil just 10 mg in " the evening.  If this does not work, I would go to slowly increasing doses of trazodone, but given her anxious depression, I do think Paxil would be a wiser choice if the patient can actually tolerate it.  We decided to continue her lithium since she thinks it helps with mood stabilization.  She has never had a manic episode.  Her previous primary physician thought she had bipolar, but both the patient and her boyfriend do not think she has ever had a true manic episode.  So hopefully, she will be on Paxil 10, gabapentin 300 three times a day, olanzapine 10 and lithium 450.    MENTAL STATUS EXAMINATION:  Today, the patient was pleasant and cooperative.  She is a thin -American who was accompanied by her boyfriend who has been very helpful and supportive.  Her mood is somewhat dysphoric.  Her affect is quite anxious.  Her speech is coherent and goal oriented.  Her associations are tight, and her thought processes logical and linear.  Her content of thought is without psychosis, and she denies suicidal ideation.  Recent and remote memory, concentration, fund of knowledge and use of language are at baseline.  She is alert and oriented x 3.  Insight and judgment are currently intact.  Muscle strength and tone appear to be at her baseline.  Gait and station are within normal limits.    ASSESSMENT:  Major depressive disorder, recurrent, with anxiety.    RECOMMENDATIONS:  Continue current medications.  Add Paxil 10 mg p.o. in the evening.  The patient was asked to return on 2021.    Dom Tijerina MD        D: 2021   T: 2021   MT: [facility]    Name:     CORA SCHMITZ  MRN:      [id number]        Account:    [id number]   :      1960           Visit Date: 2021     Document: [id number]

## 2021-08-03 ENCOUNTER — VIRTUAL VISIT (OUTPATIENT)
Dept: PHARMACY | Facility: CLINIC | Age: 61
End: 2021-08-03
Payer: COMMERCIAL

## 2021-08-03 DIAGNOSIS — F41.1 GAD (GENERALIZED ANXIETY DISORDER): ICD-10-CM

## 2021-08-03 DIAGNOSIS — F31.76 BIPOLAR DISORDER, IN FULL REMISSION, MOST RECENT EPISODE DEPRESSED (H): Primary | ICD-10-CM

## 2021-08-03 PROCEDURE — 99606 MTMS BY PHARM EST 15 MIN: CPT | Performed by: PHARMACIST

## 2021-08-03 PROCEDURE — 99607 MTMS BY PHARM ADDL 15 MIN: CPT | Performed by: PHARMACIST

## 2021-08-03 RX ORDER — PAROXETINE 10 MG/1
5 TABLET, FILM COATED ORAL AT BEDTIME
COMMUNITY
End: 2021-08-18

## 2021-08-03 NOTE — PATIENT INSTRUCTIONS
Recommendations from today's MTM visit:                                                       1. Start taking paroxetine around 5-6pm to see if this is helpful for falling asleep more easily.    Follow-up: after psych follow up on 8/18    It was great to speak with you today.  I value your experience and would be very thankful for your time with providing feedback on our clinic survey. You may receive a survey via email or text message in the next few days.     To schedule another MTM appointment, please call the clinic directly or you may call the MTM scheduling line at [phone] or toll-free at [phone].     My Clinical Pharmacist's contact information:                                                      Please feel free to contact me with any questions or concerns you have.      Anushka Zhu, PharmD  Medication Therapy Management Pharmacist  [facility]  Phone: [phone]

## 2021-08-03 NOTE — Clinical Note
Hello,  I visited with this patient today for med follow-up.  She decided to start paroxetine at 5 mg 1 week ago, instead of 10 mg.  She noted that anxiety is much improved, but she is very tired throughout the day and having trouble initiating sleep.  She is going to start taking paroxetine around 5 or 6 PM and follow-up with Dr. Tijerina in 2 weeks, as scheduled. Please let me know if you have any questions Thank you  Anushka

## 2021-08-03 NOTE — PROGRESS NOTES
Medication Therapy Management (MTM) Encounter    ASSESSMENT:                            Medication Adherence/Access: No issues identified    Depression/Anxiety: Discussed how paroxetine may commonly cause drowsiness and reviewed option of taking it earlier in the evening to allow for benefit with initiating sleep and see if she is less tired during the day.  Also discussed timeline for establishing tolerability and efficacy, as she has only been taking it for the last 6 days.  Patient has follow-up with Dr. Tijerina in 2 weeks, which will provide a better timeline for follow-up on this new medication.      PLAN:                            Pt will start taking paroxetine around 5-6pm    Follow-up: after psych follow up    SUBJECTIVE/OBJECTIVE:                          Zuly Wheeler is a 61 year old female called for a follow-up visit. She was referred to me from [facility], now through PCP.  Today's visit is a follow-up MTM visit from 3/18/21, but has seen other MTM providers in the meantime.     Reason for visit: med follow up.    Allergies/ADRs: Reviewed in chart  Past Medical History: Reviewed in chart  Tobacco: She reports that she quit smoking about 30 years ago. Her smoking use included cigarettes. She quit after 30.00 years of use. She has never used smokeless tobacco.  Alcohol: not currently using      Medication Adherence/Access: no issues reported    Depression/Anxiety: Pt is currently taking gabapentin 200mg TID, lithium ER 450mg at bedtime, olanzapine 10mg at bedtime, paroxetine 5mg at bedtime.  She had been doing relatively well until nefazodone was discontinued and she needed to change medications. Discussion regarding past Bipolar diagnosis has come up and whether her current medications are indicated if Bipolar is not an accurate diagnosis.  For now, she has re-established care with [facility] and seeing Dr. Tijerina for psychiatry care. At last visit with him on 7/28/21,  "Viibryd was discontinued due to ongoing issues with increased anxiety and paroxetine 10mg was initiated.  Today, patient reported that she instead decided to start paroxetine at 5 mg once daily, which she has taken for the last 6 days.  She reported that anxiety is much improved and denied having any episodes of panic.  Her mood is \"a little down\" and she endorsed feeling quite tired throughout the day, often times taking a nap.  She stated that tiredness is \"different than before.\"  She takes her medication all at bedtime around 8:30 PM and often does not fall asleep until around 11 PM, then waking around 6 AM.  Patient denied any other new side effects since starting the medication. Patient denied any safety concerns.    ----------------      I spent 25 minutes with this patient today. A copy of the visit note was provided to the patient's primary care provider.    The patient was given a summary of these recommendations.     Irwin NormanD  Medication Therapy Management Pharmacist  [facility]  Phone: [phone]    Telemedicine Visit Deta  Type of service:  Telephone visit  Start Time: 2:00pm  End Time: 2:25pm  Originating Location (patient location): Home  Distant Location (provider location):  [facility]     Medication Therapy Recommendations  Bipolar disorder, in full remission, most recent episode depressed (H)    Current Medication: PARoxetine (PAXIL) 10 MG tablet   Rationale: Undesirable effect - Adverse medication event - Safety   Recommendation: Provide Education - pt will take dose at 5-6pm   Status: Patient Agreed - Adherence/Education                "

## 2021-08-04 ENCOUNTER — OFFICE VISIT (OUTPATIENT)
Dept: FAMILY MEDICINE | Facility: CLINIC | Age: 61
End: 2021-08-04
Payer: COMMERCIAL

## 2021-08-04 VITALS
TEMPERATURE: 97.5 F | HEART RATE: 80 BPM | DIASTOLIC BLOOD PRESSURE: 70 MMHG | OXYGEN SATURATION: 98 % | BODY MASS INDEX: 22.05 KG/M2 | WEIGHT: 143 LBS | SYSTOLIC BLOOD PRESSURE: 140 MMHG

## 2021-08-04 DIAGNOSIS — F31.76 BIPOLAR DISORDER, IN FULL REMISSION, MOST RECENT EPISODE DEPRESSED (H): ICD-10-CM

## 2021-08-04 DIAGNOSIS — F41.1 ANXIETY STATE: ICD-10-CM

## 2021-08-04 DIAGNOSIS — I10 ESSENTIAL HYPERTENSION: Primary | ICD-10-CM

## 2021-08-04 DIAGNOSIS — F41.8 MIXED ANXIETY AND DEPRESSIVE DISORDER: ICD-10-CM

## 2021-08-04 PROCEDURE — 99214 OFFICE O/P EST MOD 30 MIN: CPT | Performed by: NURSE PRACTITIONER

## 2021-08-04 RX ORDER — PROPRANOLOL HYDROCHLORIDE 10 MG/1
10 TABLET ORAL 2 TIMES DAILY
Qty: 60 TABLET | Refills: 0 | Status: SHIPPED | OUTPATIENT
Start: 2021-08-04 | End: 2021-08-18

## 2021-08-04 NOTE — PROGRESS NOTES
Assessment & Plan       ICD-10-CM    1. Essential hypertension  I10 propranolol (INDERAL) 10 MG tablet   2. Anxiety state  F41.1 propranolol (INDERAL) 10 MG tablet   3. Mixed anxiety and depressive disorder  F41.8     Discussed options for HTN not yet controlled, continue working in improving lifestyle , still not exercising much, work on diet. Continue norvasc, discussed adding another med and pt hesitant, discussed in detail and she does agree to try propanolol as it may also help with her anxiety.   Will start at night in case any side effects, low dose at first, continue to monitor and if still above 140 then increase to twice a day and titrate dose up as tolerated     Continue on paxil and other meds, can increase paxil to 10 mg if she feels the tiredness improving, although energy level would also likely increase with more exercise. Labs were fine see below, will recheck in 2-3 weeks.     Regular exercise    Letter given for work, pt feels unable to work, will discuss with Psychiatry next visit regarding when she may return  Follow up with Psych as planned 8/12    Return in about 4 weeks (around 9/1/2021) for for follow up visit, earlier if any concerns.    ANDREA Maya CNP  [facility]    Missy Caruso is a 61 year old who presents for the following health issues     HPI     Hypertension Follow-up      Do you check your blood pressure regularly outside of the clinic? Yes     Are you following a low salt diet? Yes    Are your blood pressures ever more than 140 on the top number (systolic) OR more   than 90 on the bottom number (diastolic), for example 140/90? Yes      How many servings of fruits and vegetables do you eat daily?  0-1    On average, how many sweetened beverages do you drink each day (Examples: soda, juice, sweet tea, etc.  Do NOT count diet or artificially sweetened beverages)?   0    How many days per week do you exercise enough to make your heart  beat faster? 3 or less    How many minutes a day do you exercise enough to make your heart beat faster? 9 or less    How many days per week do you miss taking your medication? 0  Sometimes 130-140 today 160/82  Diastolic running 77-82      Depression and Anxiety Follow-Up.  bipolar    How are you doing with your depression since your last visit? Improved     How are you doing with your anxiety since your last visit?  Improved still a bit nervous or jittery at times but feeling but much better     Are you having other symptoms that might be associated with depression or anxiety? No    Have you had a significant life event? No     Do you have any concerns with your use of alcohol or other drugs? No    Social History     Tobacco Use     Smoking status: Former Smoker     Years: 30.00     Types: Cigarettes     Quit date: 1990     Years since quittin.     Smokeless tobacco: Never Used   Substance Use Topics     Alcohol use: Never     Drug use: No     PHQ 2020 3/31/2021 2021   PHQ-9 Total Score 19 23 18   Q9: Thoughts of better off dead/self-harm past 2 weeks Nearly every day Several days Not at all     GABRIELE-7 SCORE 2019   Total Score 0 (minimal anxiety) - -   Total Score 0 9 18           Review of Systems   Constitutional, HEENT, cardiovascular, pulmonary, GI, , musculoskeletal, neuro, skin, endocrine and psych systems are negative, except as otherwise noted.      Objective    BP (!) 140/70   Pulse 80   Temp 97.5  F (36.4  C) (Temporal)   Wt 64.9 kg (143 lb)   LMP 2014 (Exact Date)   SpO2 98%   Breastfeeding No   BMI 22.05 kg/m    Body mass index is 22.05 kg/m .  Physical Exam   GENERAL: healthy, alert and no distress  NECK: no adenopathy, no asymmetry, masses, or scars and thyroid normal to palpation  RESP: lungs clear to auscultation - no rales, rhonchi or wheezes  CV: regular rate and rhythm, normal S1 S2, no S3 or S4, no murmur, click or rub, no peripheral  edema and peripheral pulses strong  ABDOMEN: soft, nontender, no hepatosplenomegaly, no masses and bowel sounds normal  MS: no gross musculoskeletal defects noted, no edema  PSYCH: mentation appears normal, affect flat, judgement and insight intact, appearance well groomed and seems more calm and not tearful today does have limited eye contact usually staring straight ahead     Admission on 06/19/2021, Discharged on 06/28/2021   Component Date Value Ref Range Status     WBC 06/19/2021 5.6  4.0 - 11.0 10e9/L Final     RBC Count 06/19/2021 5.05  3.8 - 5.2 10e12/L Final     Hemoglobin 06/19/2021 14.7  11.7 - 15.7 g/dL Final     Hematocrit 06/19/2021 44.9  35.0 - 47.0 % Final     MCV 06/19/2021 89  78 - 100 fl Final     MCH 06/19/2021 29.1  26.5 - 33.0 pg Final     MCHC 06/19/2021 32.7  31.5 - 36.5 g/dL Final     RDW 06/19/2021 12.3  10.0 - 15.0 % Final     Platelet Count 06/19/2021 324  150 - 450 10e9/L Final     Diff Method 06/19/2021 Automated Method   Final     % Neutrophils 06/19/2021 64.7  % Final     % Lymphocytes 06/19/2021 29.1  % Final     % Monocytes 06/19/2021 4.8  % Final     % Eosinophils 06/19/2021 0.7  % Final     % Basophils 06/19/2021 0.5  % Final     % Immature Granulocytes 06/19/2021 0.2  % Final     Nucleated RBCs 06/19/2021 0  0 /100 Final     Absolute Neutrophil 06/19/2021 3.6  1.6 - 8.3 10e9/L Final     Absolute Lymphocytes 06/19/2021 1.6  0.8 - 5.3 10e9/L Final     Absolute Monocytes 06/19/2021 0.3  0.0 - 1.3 10e9/L Final     Absolute Eosinophils 06/19/2021 0.0  0.0 - 0.7 10e9/L Final     Absolute Basophils 06/19/2021 0.0  0.0 - 0.2 10e9/L Final     Abs Immature Granulocytes 06/19/2021 0.0  0 - 0.4 10e9/L Final     Absolute Nucleated RBC 06/19/2021 0.0   Final     Sodium 06/19/2021 141  133 - 144 mmol/L Final     Potassium 06/19/2021 3.6  3.4 - 5.3 mmol/L Final     Chloride 06/19/2021 105  94 - 109 mmol/L Final     Carbon Dioxide 06/19/2021 28  20 - 32 mmol/L Final     Anion Gap 06/19/2021 8  3  - 14 mmol/L Final     Glucose 06/19/2021 114* 70 - 99 mg/dL Final     Urea Nitrogen 06/19/2021 12  7 - 30 mg/dL Final     Creatinine 06/19/2021 0.76  0.52 - 1.04 mg/dL Final     GFR Estimate 06/19/2021 84  >60 mL/min/[1.73_m2] Final    Comment: Non  GFR Calc  Starting 12/18/2018, serum creatinine based estimated GFR (eGFR) will be   calculated using the Chronic Kidney Disease Epidemiology Collaboration   (CKD-EPI) equation.       GFR Estimate If Black 06/19/2021 >90  >60 mL/min/[1.73_m2] Final    Comment:  GFR Calc  Starting 12/18/2018, serum creatinine based estimated GFR (eGFR) will be   calculated using the Chronic Kidney Disease Epidemiology Collaboration   (CKD-EPI) equation.       Calcium 06/19/2021 9.3  8.5 - 10.1 mg/dL Final     Bilirubin Total 06/19/2021 0.4  0.2 - 1.3 mg/dL Final     Albumin 06/19/2021 4.3  3.4 - 5.0 g/dL Final     Protein Total 06/19/2021 8.6  6.8 - 8.8 g/dL Final     Alkaline Phosphatase 06/19/2021 73  40 - 150 U/L Final     ALT 06/19/2021 29  0 - 50 U/L Final     AST 06/19/2021 21  0 - 45 U/L Final     SARS-CoV-2 Virus Specimen Source 06/19/2021 Nasopharyngeal   Final     SARS-CoV-2 PCR Result 06/19/2021 NEGATIVE   Final    SARS-CoV2 (COVID-19) RNA not detected, presumed negative.     SARS-CoV-2 PCR Comment 06/19/2021 (Note)   Final    Comment: Testing was performed using the mona SARS-CoV-2 & Influenza A/B Assay on the   mona [employer] System.  This test should be ordered for the detection of SARS-COV-2 in individuals who   meet SARS-CoV-2 clinical and/or epidemiological criteria. Test performance is   unknown in asymptomatic patients.  This test is for in vitro diagnostic use under the FDA EUA for laboratories   certified under CLIA to perform moderate and/or high complexity testing. This   test has not been FDA cleared or approved.  A negative test does not rule out the presence of PCR inhibitors in the   specimen or target RNA in concentration below the  limit of detection for the   assay. The possibility of a false negative should be considered if the   patient's recent exposure or clinical presentation suggests COVID-19.  [facility] are certified under the Clinical Laboratory   Improvement Amendments of 1988 (CLIA-88) as qualified to perform moderate   and/or high complexity laboratory testing.       TSH 06/19/2021 1.15  0.40 - 4.00 mU/L Final     Amphetamine Qual Urine 06/19/2021 Negative  NEG^Negative Final    Cutoff for a negative amphetamine is 500 ng/mL or less.     Barbiturates Qual Urine 06/19/2021 Negative  NEG^Negative Final    Cutoff for a negative barbiturate is 200 ng/mL or less.     Benzodiazepine Qual Urine 06/19/2021 Negative  NEG^Negative Final    Cutoff for a negative benzodiazepine is 200 ng/mL or less.     Cannabinoids Qual Urine 06/19/2021 Negative  NEG^Negative Final    Cutoff for a negative cannabinoid is 50 ng/mL or less.     Cocaine Qual Urine 06/19/2021 Negative  NEG^Negative Final    Cutoff for a negative cocaine is 300 ng/mL or less.     Ethanol Qual Urine 06/19/2021 Negative  NEG^Negative Final    Cutoff for a negative urine ethanol is 0.05 g/dL or less     Opiates Qualitative Urine 06/19/2021 Negative  NEG^Negative Final    Cutoff for a negative opiate is 300 ng/mL or less.     Interpretation ECG 06/24/2021 Click View Image link to view waveform and result   Final     Lithium Level 06/28/2021 0.35* 0.60 - 1.20 mmol/L Final

## 2021-08-04 NOTE — LETTER
[facility]  [address]  [address]  [place] [address]  Phone: [phone]  Fax: [phone]    August 4, 2021        Zuly Wheeler  [address]  [place] [address]          To whom it may concern:    RE: Zuly Wheeler    Patient was seen and treated today at our clinic and missed work. She is unable to return to work until further notice. She will follow up with me and Psychiatry frequently within the next 2-4 weeks and we will determine any additional plan at that time.     Please contact me for questions or concerns.      Sincerely,        ANDREA Maya CNP

## 2021-08-09 ENCOUNTER — NURSE TRIAGE (OUTPATIENT)
Dept: FAMILY MEDICINE | Facility: CLINIC | Age: 61
End: 2021-08-09

## 2021-08-09 DIAGNOSIS — I10 HYPERTENSION, UNSPECIFIED TYPE: ICD-10-CM

## 2021-08-09 NOTE — TELEPHONE ENCOUNTER
"Patient calling recently added on another HTN medication propranolol (INDERAL) 10 MG tablet and has questions on if her current BP reading is low  11:15AM /61 P 79      BP Readings from Last 6 Encounters:   08/04/21 (!) 140/70   07/14/21 134/76   06/28/21 138/81   04/06/21 106/72   03/31/21 130/68   11/27/19 124/86     Office visit 8/4/21    Will start at night in case any side effects, low dose at first, continue to monitor and if still above 140 then increase to twice a day and titrate dose up as tolerated     Started propranolol (INDERAL) 10 MG tablet 8/5/21 8/6/21 - 136/73   P82  8/7/21   146/74   P95  8/8/21   132/77   P78    Additional Information    Negative: Systolic BP < 90 and feeling dizzy, lightheaded, or weak    Negative: Started suddenly after an allergic medicine, an allergic food, or bee sting    Negative: Shock suspected (e.g., cold/pale/clammy skin, too weak to stand, low BP, rapid pulse)    Negative: Difficult to awaken or acting confused  (e.g., disoriented, slurred speech)    Negative: Fainted    Negative: Chest pain    Negative: Bleeding (e.g., vomiting blood, rectal bleeding or tarry stools, severe vaginal bleeding)    Negative: Extra heart beats or heart is beating fast  (i.e., \"palpitations\")    Negative: Sounds like a life-threatening emergency to the triager    Negative: Systolic BP < 80 and NOT dizzy, lightheaded or weak (feels normal)    Negative: Abdominal pain    Negative: Major surgery in the past month    Negative: Fever > 100.4 F (38.0 C)    Negative: Drinking very little and has signs of dehydration (e.g., no urine > 12 hours, very dry mouth, very lightheaded)    Negative: Fall in systolic BP > 20 mm Hg from normal and feeling dizzy, lightheaded, or weak    Negative: Patient sounds very sick or weak to the triager    Negative: Systolic BP < 90 and NOT dizzy, lightheaded or weak    Negative: Systolic BP  while taking blood pressure medications and NOT dizzy, " lightheaded or weak    Negative: Fall in systolic BP > 20 mm Hg from normal and NOT dizzy, lightheaded, or weak    Negative: Fall in systolic BP > 20 mmHg after standing up    Negative: Fall in systolic BP > 20 mmHg after eating a meal    Negative: Diastolic BP < 50 mm Hg    Negative: Brief dizziness or lightheadedness after standing up or eating    Negative: Wants doctor to measure BP    Protocols used: LOW BLOOD PRESSURE-A-OH    Discussed with patient reading is within normal limits for BP and pulse - we discussed low, normal, and high BP readings (goal of below 140/90) - she will call 911 if she experiences a low reading with symptoms - she is encouraged to drink a large glass of water with any low readings or symptoms - we reviewed how to check blood pressure and she was encouraged to journal her readings and check daily during this time of medication change - we reviewed how to check BP (including waiting when consuming caffeine/tobacco) and she will recheck any BP over goal - with consistent readings above goal the additional of a second tablet would be appropriate - but she is also welcome to call medical staff 24/7 for guidance as well - she has a follow up appointment in a couple of weeks - she verbalized understanding and agrees with plan

## 2021-08-10 RX ORDER — AMLODIPINE BESYLATE 10 MG/1
10 TABLET ORAL DAILY
Qty: 90 TABLET | Refills: 1 | Status: SHIPPED | OUTPATIENT
Start: 2021-08-10 | End: 2021-12-03

## 2021-08-18 ENCOUNTER — OFFICE VISIT (OUTPATIENT)
Dept: PSYCHIATRY | Facility: CLINIC | Age: 61
End: 2021-08-18
Payer: COMMERCIAL

## 2021-08-18 DIAGNOSIS — I10 ESSENTIAL HYPERTENSION: ICD-10-CM

## 2021-08-18 DIAGNOSIS — F31.76 BIPOLAR DISORDER, IN FULL REMISSION, MOST RECENT EPISODE DEPRESSED (H): ICD-10-CM

## 2021-08-18 DIAGNOSIS — F41.1 ANXIETY STATE: ICD-10-CM

## 2021-08-18 PROCEDURE — 99212 OFFICE O/P EST SF 10 MIN: CPT | Performed by: PSYCHIATRY & NEUROLOGY

## 2021-08-18 RX ORDER — OLANZAPINE 10 MG/1
10 TABLET ORAL AT BEDTIME
Qty: 30 TABLET | Refills: 1 | Status: SHIPPED | OUTPATIENT
Start: 2021-08-18 | End: 2021-09-22

## 2021-08-18 RX ORDER — PROPRANOLOL HYDROCHLORIDE 10 MG/1
10 TABLET ORAL 2 TIMES DAILY
Qty: 60 TABLET | Refills: 0 | Status: SHIPPED | OUTPATIENT
Start: 2021-08-18 | End: 2021-09-27

## 2021-08-18 RX ORDER — PAROXETINE 10 MG/1
5 TABLET, FILM COATED ORAL AT BEDTIME
Qty: 30 TABLET | Refills: 3 | Status: SHIPPED | OUTPATIENT
Start: 2021-08-18 | End: 2021-08-23

## 2021-08-18 RX ORDER — GABAPENTIN 100 MG/1
200 CAPSULE ORAL 3 TIMES DAILY
Qty: 180 CAPSULE | Refills: 3 | Status: SHIPPED | OUTPATIENT
Start: 2021-08-18 | End: 2021-10-27

## 2021-08-18 RX ORDER — LITHIUM CARBONATE 150 MG/1
450 CAPSULE ORAL AT BEDTIME
Qty: 90 CAPSULE | Refills: 3 | Status: SHIPPED | OUTPATIENT
Start: 2021-08-18 | End: 2021-10-27

## 2021-08-18 NOTE — PROGRESS NOTES
Visit Date: 2021    MEDICATION MANAGEMENT NOTE    IDENTIFICATION:  Ms. Gudelia Schmitz is a 61-year-old -American female who is treated for depression and anxiety.    HISTORY OF PRESENT ILLNESS:  Today, Ms. Schmitz reports she is doing well.  She drove herself to the appointment.  She has been out and about and looking for a job.  She really displays remarkable improvement over our last visit.  I note that she is only on 5 mg of Paxil, which makes me wonder if she is a slow metabolizer on 2D6. Anyway, she is doing remarkably well on gabapentin 200 mg 3 times a day, lithium 450 mg a day, olanzapine 10 mg a day, Inderal 10 mg b.i.d. and she is also on Norvasc 10 mg.  Ms. Schmitz is quite happy with the way things are going.  She would prefer not to change anything.  She would like to return to clinic in 1 month.    MENTAL STATUS EXAMINATION:  Today, the patient was quite pleasant, cooperative, and appeared quite happy with her improvement.  Her mood and affect were both much improved.  Her speech was coherent and goal oriented.  Her associations were tight and her thought processes logical and linear.  Her content of thought was without psychosis or suicidal ideation.  Recent and remote memory, concentration, fund of knowledge and use of language were all at baseline.  She is alert and oriented x 3.  Insight and judgment are intact.  Muscle strength and tone are at baseline.  Gait and station are within normal limits.    IMPRESSION:  Major depressive disorder, in partial remission.    RECOMMENDATIONS PLAN:  Continue current medications.  Continue with her psychotherapist.  Return to clinic in 1 month.      Dom Tijerina MD        D: 2021   T: 2021   MT: JANAE    Name:     COAR SCHMITZ  MRN:      [id number]        Account:    [id number]   :      1960           Visit Date: 2021     Document: [id number]

## 2021-08-23 DIAGNOSIS — F31.76 BIPOLAR DISORDER, IN FULL REMISSION, MOST RECENT EPISODE DEPRESSED (H): ICD-10-CM

## 2021-08-23 NOTE — TELEPHONE ENCOUNTER
Dr. Tijerina and MTM,    Please see message below.     Thanks,  ELI Felipe  [facility]

## 2021-08-23 NOTE — TELEPHONE ENCOUNTER
Patient calling requesting to increase paxil dose to 10 mg. She is tolerating this well (previosuly dropped to 5 mg, but doing better now).    Please review/send if appropriate    Milly Sr RN

## 2021-08-25 RX ORDER — VILAZODONE HYDROCHLORIDE 10 MG/1
10 TABLET ORAL DAILY
OUTPATIENT
Start: 2021-08-25

## 2021-08-25 RX ORDER — PAROXETINE 10 MG/1
10 TABLET, FILM COATED ORAL AT BEDTIME
Qty: 30 TABLET | Refills: 3 | Status: SHIPPED | OUTPATIENT
Start: 2021-08-25 | End: 2021-09-22

## 2021-08-25 NOTE — TELEPHONE ENCOUNTER
Routed to Dr Tijerina and   also to the POD for Trini Pardo,     Pt is waiting for the refill, she is requesting 10mg as that works well, she has been on the 10mg for the past 4 days and this has been working well she is not as edgy  She only has a week of med left    Joanne Robison RN   [facility]

## 2021-09-22 ENCOUNTER — OFFICE VISIT (OUTPATIENT)
Dept: PSYCHIATRY | Facility: CLINIC | Age: 61
End: 2021-09-22
Payer: COMMERCIAL

## 2021-09-22 DIAGNOSIS — F31.76 BIPOLAR DISORDER, IN FULL REMISSION, MOST RECENT EPISODE DEPRESSED (H): ICD-10-CM

## 2021-09-22 PROCEDURE — 99212 OFFICE O/P EST SF 10 MIN: CPT | Performed by: PSYCHIATRY & NEUROLOGY

## 2021-09-22 RX ORDER — PAROXETINE 10 MG/1
20 TABLET, FILM COATED ORAL AT BEDTIME
Qty: 30 TABLET | Refills: 3 | Status: SHIPPED | OUTPATIENT
Start: 2021-09-22 | End: 2021-10-27

## 2021-09-22 RX ORDER — OLANZAPINE 5 MG/1
5 TABLET ORAL DAILY PRN
Qty: 30 TABLET | Refills: 3 | Status: SHIPPED | OUTPATIENT
Start: 2021-09-22 | End: 2021-10-27 | Stop reason: DRUGHIGH

## 2021-09-22 RX ORDER — OLANZAPINE 5 MG/1
5 TABLET ORAL AT BEDTIME
Qty: 30 TABLET | Refills: 3 | Status: SHIPPED | OUTPATIENT
Start: 2021-09-22 | End: 2021-10-27 | Stop reason: ALTCHOICE

## 2021-09-22 NOTE — PATIENT INSTRUCTIONS
Treatment Plan Today:     1) Medications-              - Increase PO Paxil to 20mg QHS              - Continue PO Lithium 450 mg QHS              - Change PO Olanzapine to 5 mg QHS; and 5mg PRN              - Continue PO Gabapentin 200 mg TID       2) Follow-up appt with Dr Tijerina in 1 month    3) Do the following labs : Lipid panel + hemoglobin A1c (ordered)      ------------------------------------------------------------------------    Thank you for coming to the [facility]    Lab Testing:  If you had lab testing today and your results are reassuring or normal they will be mailed to you or sent through [employer] within 7 days. If the lab tests need quick action we will call you with the results. The phone number we will call with results is # [phone] (home) . If this is not the best number please call our clinic and change the number.    Medication Refills:  If you need any refills please call your pharmacy and they will contact us. Our fax number for refills is [phone]. Please allow three business for refill processing. If you need to  your refill at a new pharmacy, please contact the new pharmacy directly. The new pharmacy will help you get your medications transferred.     Scheduling:  If you have any concerns about today's visit or wish to schedule another appointment please call our office during normal business hours [phone] (8-5:00 M-F)    Contact Us:  Please call [phone] during business hours (8-5:00 M-F).  If after clinic hours, or on the weekend, please call  [phone].    Financial Assistance [phone]  [facility] Billing [phone]  Central Billing Office, ealth: [phone]  [place] Billing [phone]  Medical Records [phone]  [place] Patient Bill of Rights [email or web address]       MENTAL HEALTH CRISIS NUMBERS:  For a medical emergency please call  911 or go to the  nearest ER.     [facility] ([facility]) [facility] ER  [phone]    [facility]:   [facility] -[phone]   Crisis Residence [facility] -[phone]   Walk-In Counseling Center [facility] -[phone]   COPE 24/7 [facility] Mobile Team -[phone] (adults)/[phone] (child)  CHILD: [facility] needs assessment team - [phone]      [facility]:   [facility] - [phone]   Walk-in counseling [facility] - [phone]   Walk-in counseling [facility] - [phone]   Crisis Residence [facility]idence - [phone]  Urgent Care Adult Mental Vkfijm-[phone] mobile unit/ 24/7 crisis line    National Crisis Numbers:   National Suicide Prevention Lifeline: 9-422-837-TALK (359-478-1375)  Poison Control Center - 6-166-531-0161  [employer]/resources for a list of additional resources (SOS)  Trans Lifeline a hotline for transgender people 1-737.130.5309  The [employer] Project a hotline for LGBT youth 1-608.343.2289  Crisis Text Line: For any crisis 24/7   To: 519527  see www.crisistextline.org  - IF MAKING A CALL FEELS TOO HARD, send a text!       Again thank you for choosing [facility] and please let us know how we can best partner with you to improve you and your family's health.    You may be receiving a survey regarding this appointment. We would love to have your feedback, both positive and negative. The survey is done by an external company, so your answers are anonymous.

## 2021-09-22 NOTE — PROGRESS NOTES
[facility]  PROGRESS NOTE   30 minute medication management     INTERIM HISTORY                                                 The patient was last seen on 08/18/2021 at which time no medication changes were made.  The patient reports good treatment adherence.    Since the last visit:  Zuly reports that she's doing okay but continues to feel jittery when she drives. She feels really anxious when she has to drive long distances or on the highway. If she persists, then the feeling escalates which she described as shortness of breath and excessive sweaty palms. Not being able to drive like she would like is causing her to feel low. She went back to work last week and feels good about going back.  Substance use: None reported    PSYCH ROS:   DEPRESSION:  reports-impaired concentration, low mood, irritability;  DENIES- changes in appetite  PSYCHOSIS:  reports-none  ANXIETY:  excessive worry, feeling fearful and nervous/overwhelmed related to driving  SLEEP:  No changes       MEDICATIONS                               Current Outpatient Medications   Medication Sig Dispense Refill     amLODIPine (NORVASC) 10 MG tablet Take 1 tablet (10 mg) by mouth daily 90 tablet 1     gabapentin (NEURONTIN) 100 MG capsule Take 2 capsules (200 mg) by mouth 3 times daily 180 capsule 3     lithium (ESKALITH) 150 MG capsule Take 3 capsules (450 mg) by mouth At Bedtime 90 capsule 3     OLANZapine (ZYPREXA) 10 MG tablet Take 1 tablet (10 mg) by mouth At Bedtime 30 tablet 1     PARoxetine (PAXIL) 10 MG tablet Take 1 tablet (10 mg) by mouth At Bedtime 30 tablet 3     propranolol (INDERAL) 10 MG tablet Take 1 tablet (10 mg) by mouth 2 times daily 60 tablet 0       VITALS                                                                                                                            3, 3   LMP 01/30/2014 (Exact Date)    MENTAL STATUS EXAM                                                                        "             9, 14 [facility]   Alertness: alert   Appearance: well groomed  Behavior/Demeanor: cooperative, with good  eye contact   Speech: regular rate and rhythm  Language: intact  Psychomotor: normal or unremarkable  Mood: \"pleasant\"  Affect: blunted; was congruent to mood; was congruent to content  Thought Process/Associations: linear and logical  Thought Content:  Reports none;  Denies suicidal ideation and violent ideation  Perception:  Reports none;  Denies auditory hallucinations and visual hallucinations  Insight: good  Judgment: good  Cognition: (6) does  appear grossly intact; formal cognitive testing was not done  Gait and Station: unremarkable    LABS and DATA     PHQ9 TODAY = N/A  PHQ 6/24/2020 3/31/2021 7/14/2021   PHQ-9 Total Score 19 23 18   Q9: Thoughts of better off dead/self-harm past 2 weeks Nearly every day Several days Not at all         Recent Labs   Lab Test 06/19/21  1416 04/21/21  1145 03/31/21  0930   CR 0.76 0.77 0.74   GFRESTIMATED 84 84 88     Recent Labs   Lab Test 06/19/21  1416 03/31/21  0930 07/28/20  0927   AST 21 10 11   ALT 29 22 18   ALKPHOS 73 58 64       PSYCHIATRIC DIAGNOSES                                                                                                 Major depressive disorder, in partial remission  Unspecified anxiety  ASSESSMENT                                   Today, Zuly reports overall stable mood with feelings of anxiety associated with driving, These feelings tend to escalate to the point that patient feels afraid of having a panic attack. We will increase Paxil to 20 mg to better target these symptoms of anxiety which patient is agreeable to. We also discussed changing how Olanzapine is used. Instead of 10 mg at bedtime, to take 5 mg at bedtime and 5 mg as needed for anxiety. Patient was also counseled to bring this up with her therapist about doing some work around the anxiety associated with driving. No SI, HI or imminent safety concerns at " this time.    PSYCHOTROPIC DRUG INTERACTION CHECK was remarkable for:    Concurrent use of GABAPENTIN and CNS DEPRESSANTS may result in respiratory depression.   Concurrent use of LITHIUM and DOPAMINE-2 ANTAGONISTS may result in weakness, dyskinesias, increased extrapyramidal symptoms, encephalopathy, and brain damage.   Concurrent use of LITHIUM and SELECTIVE SEROTONIN REUPTAKE INHIBITORS may result in increased risk of serotonin syndrome  Concurrent use of PROPRANOLOL and CYP2D6 INHIBITORS may result in increased propranolol exposure      TREATMENT RISK STATEMENT:  The risks, benefits, alternatives and potential adverse effects have been explained and are understood by the pt. The pt agrees to the treatment plan with the ability to do so. The pt knows to call the clinic for any problems or to access emergency care if needed.  Medical and CD concerns are documented above.  Psychotropic drug interaction check was done, including changes made today, and is discussed above.     PLAN                                                                                                       1) MEDICATION:   - Increase PO Paxil to 20mg QHS              - Continue PO Lithium 450 mg QHS              - Change PO Olanzapine to 5 mg QHS; and 5mg PRN              - Continue PO Gabapentin 200 mg TID           2) THERAPY:  Continue    3) LABS:  yes, Lipid panel + hemoglobin A1c      RATING SCALES:  N/A    4) Notes for PCP: N/A     5) RTC: In 1 month    TREATMENT RISK STATEMENT:  The risks, benefits, alternatives and potential adverse effects have been discussed and are understood by the pt. The pt understands the risks of using street drugs or alcohol. There are no medical contraindications, the pt agrees to treatment with the ability to do so. The pt knows to call the clinic for any problems or to access emergency care if needed.  Medical and substance use concerns are documented above.  Psychotropic drug interaction check was done,  including changes made today.       RESIDENT:  John Pacheco MD, MPH    Patient was staffed in clinic with Dr. Tijerina who will sign the note.    I, Dr. Tijerina, had an opportunity to interview this patient with the resident and was present for key portions of the exam. I agree with the assessment and plan outlined above.     On my interview the patient was dressed in casual clothing and appeared stated age. Patient was pleasant and cooperative, mood and affect anxious, speech was coherent and goal oriented. Associations tight. Thought process was logical and linear. Content of thought without psychosis or suicidal ideation.Patient alert and oriented x 3.  Recent and remote memory, concentration, fund of knowledge, and use of language were within normal limits. Insight and judgement intact. Gait and station within normal limits.     Dr. RONALDO Tijerina

## 2021-09-27 ENCOUNTER — OFFICE VISIT (OUTPATIENT)
Dept: FAMILY MEDICINE | Facility: CLINIC | Age: 61
End: 2021-09-27
Payer: COMMERCIAL

## 2021-09-27 VITALS
DIASTOLIC BLOOD PRESSURE: 69 MMHG | SYSTOLIC BLOOD PRESSURE: 131 MMHG | TEMPERATURE: 97.5 F | WEIGHT: 148 LBS | HEIGHT: 69 IN | OXYGEN SATURATION: 99 % | BODY MASS INDEX: 21.92 KG/M2 | HEART RATE: 78 BPM

## 2021-09-27 DIAGNOSIS — F41.1 ANXIETY STATE: ICD-10-CM

## 2021-09-27 DIAGNOSIS — I10 ESSENTIAL HYPERTENSION: ICD-10-CM

## 2021-09-27 PROCEDURE — 99214 OFFICE O/P EST MOD 30 MIN: CPT | Performed by: NURSE PRACTITIONER

## 2021-09-27 RX ORDER — PROPRANOLOL HYDROCHLORIDE 10 MG/1
10 TABLET ORAL 2 TIMES DAILY
Qty: 60 TABLET | Refills: 0 | Status: SHIPPED | OUTPATIENT
Start: 2021-09-27 | End: 2021-12-03

## 2021-09-27 ASSESSMENT — MIFFLIN-ST. JEOR: SCORE: 1300.7

## 2021-09-27 NOTE — PROGRESS NOTES
"    Assessment & Plan     Anxiety state  Per psych bipolar depression is now in full remission, has made much improvement, panic continues daily   - propranolol (INDERAL) 10 MG tablet; Take 1 tablet (10 mg) by mouth 2 times daily    Essential hypertension  At goal, keep working on lifestyle. Will be good to have transportation to get her out of her home more and more active   - propranolol (INDERAL) 10 MG tablet; Take 1 tablet (10 mg) by mouth 2 times daily    Declines flu shot today     30 minutes spent on the date of the encounter doing chart review, history and exam, documentation and further activities per the note      Return in about 4 weeks (around 10/25/2021) for annual exam, follow up earlier if any concerns.    ANDREA Maya CNP  [facility]    Missy Caruso is a 61 year old who presents for the following health issues     HPI   Here for [employer] application form  Mental health, anxiety with panic  Bipolar, Depression  \"Doing a bit better\" still has panic almost constantly every day but she feels better and is learning how to control it more, working on self talk and relaxation  Cannot afford lyft and too severe to drive, taking public bussing causes her to have terrible panic when too many people around, sometimes doesn't leave house due to transportation fears, then causes physical symptoms such as nausea etc     Paroxetine was upped by 10 mg and feels edgy at times, does feel better  Dr Tijerina saw 9/22 return in 1 month  Not h appy with 1 month refills of zyprexa, doing 5 hs and additional 5 prn (she does every night)    HTN okay, 130s  Not any added salt, feet a bit puffy sometimes at night after starting new med, still not really exercising  Review of Systems   Constitutional, HEENT, cardiovascular, pulmonary, GI, , musculoskeletal, neuro, skin, endocrine and psych systems are negative, except as otherwise noted.      Objective    /69   Pulse 78  " " Temp 97.5  F (36.4  C)   Ht 1.753 m (5' 9\")   Wt 67.1 kg (148 lb)   LMP 01/30/2014 (Exact Date)   SpO2 99%   BMI 21.86 kg/m    Body mass index is 21.86 kg/m .  Physical Exam                   "

## 2021-10-18 ENCOUNTER — TELEPHONE (OUTPATIENT)
Dept: FAMILY MEDICINE | Facility: CLINIC | Age: 61
End: 2021-10-18

## 2021-10-18 NOTE — TELEPHONE ENCOUNTER
Dr. Tijerina,    Patient would like to keep appt with you on the 27 th but unable to leave work. Stated she can take a break at 2 pm to do a telephone visit and that she would be willing to come into clinic for labs or paperwork if needed as it would be easier to schedule a lab visit since there is more availability in terms of time for lab.     I changed the visit to a telephone visit. Please let me know if this is not acceptable and I will call the patient back to reschedule her. Please keep in mind your next available appt is 11/10 and pt stated she could not wait this long.     Thanks,  ELI Felipe  [facility]

## 2021-10-23 ENCOUNTER — HEALTH MAINTENANCE LETTER (OUTPATIENT)
Age: 61
End: 2021-10-23

## 2021-10-27 ENCOUNTER — VIRTUAL VISIT (OUTPATIENT)
Dept: PSYCHIATRY | Facility: CLINIC | Age: 61
End: 2021-10-27
Payer: COMMERCIAL

## 2021-10-27 ENCOUNTER — NURSE TRIAGE (OUTPATIENT)
Dept: FAMILY MEDICINE | Facility: CLINIC | Age: 61
End: 2021-10-27

## 2021-10-27 DIAGNOSIS — F31.76 BIPOLAR DISORDER, IN FULL REMISSION, MOST RECENT EPISODE DEPRESSED (H): ICD-10-CM

## 2021-10-27 DIAGNOSIS — F41.1 ANXIETY STATE: Primary | ICD-10-CM

## 2021-10-27 PROCEDURE — 99207 PR CDG-CODE CATEGORY CHANGED: CPT | Performed by: PSYCHIATRY & NEUROLOGY

## 2021-10-27 PROCEDURE — 99214 OFFICE O/P EST MOD 30 MIN: CPT | Mod: 95 | Performed by: PSYCHIATRY & NEUROLOGY

## 2021-10-27 RX ORDER — LITHIUM CARBONATE 150 MG/1
450 CAPSULE ORAL AT BEDTIME
Qty: 90 CAPSULE | Refills: 3 | Status: SHIPPED | OUTPATIENT
Start: 2021-10-27 | End: 2022-03-02

## 2021-10-27 RX ORDER — GABAPENTIN 100 MG/1
200 CAPSULE ORAL 3 TIMES DAILY
Qty: 180 CAPSULE | Refills: 3 | Status: SHIPPED | OUTPATIENT
Start: 2021-10-27 | End: 2022-03-08

## 2021-10-27 RX ORDER — OLANZAPINE 10 MG/1
10 TABLET ORAL AT BEDTIME
Qty: 30 TABLET | Refills: 3 | Status: SHIPPED | OUTPATIENT
Start: 2021-10-27 | End: 2021-12-01 | Stop reason: DRUGHIGH

## 2021-10-27 RX ORDER — PAROXETINE 10 MG/1
20 TABLET, FILM COATED ORAL AT BEDTIME
Qty: 60 TABLET | Refills: 3 | Status: SHIPPED | OUTPATIENT
Start: 2021-10-27 | End: 2022-01-19

## 2021-10-27 NOTE — PROGRESS NOTES
Visit Date: 10/27/2021    MEDICATION MANAGEMENT     IDENTIFICATION:  Ms. Zuly Wheeler is a 61-year-old female who is being treated for severe anxiety and depression.    Today, I did a telephone consult by request of the patient, as she did not want to take time off work.  I contacted her at [phone].  I called her at 2:00 and was off the phone by 2:10.    Today, Ms. Wheeler reports that she is doing quite well.  She is driving without difficulty and she is back at work again without difficulty.  She reports some swelling in her feet, which I suggested she talk to her primary about.  She also notes that she thought she should be on 10 of olanzapine at bedtime and none during the day.  Therefore, we changed the olanzapine prescription to simply 10 mg at bedtime.  I also refilled her lithium and gabapentin as well as increased the number on her paroxetine 10, 2 tablets, #60 and with refills.  I did check her most recent lithium level and note that her levels have been subtherapeutic since 2019.  Most recently in 06/2021, it was 0.35.  We discussed this.  The patient does not have bipolar disorder.  She did have chronic suicidal ideation in the past, and it appears to me like the lithium was probably started for suicidal ideation.  I asked the patient if she thought it would be okay to go ahead and discontinue the lithium as she is no longer experiencing SI, but she would like to continue it.  Since she is on gabapentin 200 t.i.d., I did check her most recent creatinine and GFR, both were normal.  I also checked her most recent EKG, which was also from 06/2021, and reveals a QTc of 428, within normal limits.    I spent some time congratulating Ms. Wheeler on her hard work and recovery.  She has really done quite well.  She has a therapist who she sees at an office in her [employer] and she has found her therapist to be quite helpful, so she will continue her current medications and her current psychotherapy.  She  "would like to make a return appointment in 1 month.    MENTAL STATUS EXAMINATION:  Today on the telephone, the patient was pleasant and cooperative.  She reports her mood is \"pretty good.\"  Her affect seemed full.  Her speech was coherent and goal oriented.  Her associations were tight.  Her thought processes were logical and linear.  Her content of thought was without psychosis and she denies suicidal ideation.  Recent and remote memory, concentration, fund of knowledge and use of language were at baseline.  She is alert and oriented x3.  Insight and judgment are intact.    ASSESSMENT:  Major depressive disorder, in partial remission, and unspecified anxiety.    RECOMMENDATIONS:  Continue current medications.  The patient requests a return appointment in 1 month; likely, she would prefer that be a telephone appointment.  She will follow up with her primary as well.    Dom Tijerina MD        D: 10/27/2021   T: 10/27/2021   MT: [id number]    Name:     CORA SCHMITZ  MRN:      [id number]        Account:    [id number]   :      1960           Visit Date: 10/27/2021     Document: [id number]    "

## 2021-10-27 NOTE — TELEPHONE ENCOUNTER
Patient calling for bilateral leg swelling x 1.5 weeks, worsens at night. Wondering if it may be related to her medication. Denies any chest pain, SOB, or other symptoms. Able to walk without difficulty.     Forwarding to PCP's team to call patient back for further assistance.    Milly Sr RN

## 2021-10-28 NOTE — TELEPHONE ENCOUNTER
Spoke with pt regarding pedal edema - pt reports she has had mild, non-pitting edema in bilateral feet and ankles for ~1.5 weeks. She reports that they are not red, but are painful to the touch and painful with walking, reports this pain is sharp 7/10. Edema is worse at nighttime. However, pt reports she is still able to stand and walk.    Pt reports that she started taking propranolol ~1 month ago, and when prescribed by PCP, Trini Pardo, she was told it may lead to some pedal swelling. Scheduled virtual f/u visit with PCP within 3 days per [facility] protocol.    Coco Angel RN  [facility]      Reason for Disposition    MILD swelling of both ankles (i.e., pedal edema) AND new onset or worsening    Additional Information    Negative: Chest pain    Negative: Small area of swelling and followed an insect bite to the area    Negative: Followed a knee injury    Negative: Ankle or foot injury    Negative: Pregnant with leg swelling or edema    Negative: Difficulty breathing at rest    Negative: Entire foot is cool or blue in comparison to other side    Negative: SEVERE swelling (e.g., swelling extends above knee, entire leg is swollen, weeping fluid)    Negative: Thigh or calf pain and only 1 side and present > 1 hour    Negative: Thigh, calf, or ankle swelling in only one leg    Negative: Thigh, calf, or ankle swelling in both legs, but one side is definitely more swollen (Exception: longstanding difference between legs)    Negative: Cast on leg or ankle and has increasing pain    Negative: Can't walk or can barely stand (new onset)    Negative: Fever and red area (or area very tender to touch)    Negative: Patient sounds very sick or weak to the triager    Negative: Swelling of face, arm or hands (Exception: slight puffiness of fingers during hot weather)    Negative: Pregnant > 20 weeks and sudden weight gain (i.e., > 2 lbs, 1 kg in one week)    Negative: MODERATE swelling of both ankles (e.g., swelling  "extends up to the knees) AND new onset or worsening    Negative: Difficulty breathing with exertion AND worsening or new onset    Negative: Looks like a boil, infected sore, deep ulcer, or other infected rash (spreading redness, pus)    Negative: Patient wants to be seen    Answer Assessment - Initial Assessment Questions  1. ONSET: \"When did the swelling start?\" (e.g., minutes, hours, days)      1.5 weeks ago  2. LOCATION: \"What part of the leg is swollen?\"  \"Are both legs swollen or just one leg?\"      Bilateral feet and ankles  3. SEVERITY: \"How bad is the swelling?\" (e.g., localized; mild, moderate, severe)   - Localized - small area of swelling localized to one leg   - MILD pedal edema - swelling limited to foot and ankle, pitting edema < 1/4 inch (6 mm) deep, rest and elevation eliminate most or all swelling   - MODERATE edema - swelling of lower leg to knee, pitting edema > 1/4 inch (6 mm) deep, rest and elevation only partially reduce swelling   - SEVERE edema - swelling extends above knee, facial or hand swelling present       Mild - no pitting present  4. REDNESS: \"Does the swelling look red or infected?\"      No  5. PAIN: \"Is the swelling painful to touch?\" If so, ask: \"How painful is it?\"   (Scale 1-10; mild, moderate or severe)      Yes, sharp 7/10 when touching or walking  6. FEVER: \"Do you have a fever?\" If so, ask: \"What is it, how was it measured, and when did it start?\"       No  7. CAUSE: \"What do you think is causing the leg swelling?\"      Unsure -  Possibly medication, propranolol? Has been taking for ~1.5 months, worse at night  8. MEDICAL HISTORY: \"Do you have a history of heart failure, kidney disease, liver failure, or cancer?\"      No  9. RECURRENT SYMPTOM: \"Have you had leg swelling before?\" If so, ask: \"When was the last time?\" \"What happened that time?\"      No  10. OTHER SYMPTOMS: \"Do you have any other symptoms?\" (e.g., chest pain, difficulty breathing)        No    Protocols used: " LEG SWELLING AND EDEMA-A-OH

## 2021-10-29 ENCOUNTER — VIRTUAL VISIT (OUTPATIENT)
Dept: FAMILY MEDICINE | Facility: CLINIC | Age: 61
End: 2021-10-29
Payer: COMMERCIAL

## 2021-10-29 DIAGNOSIS — R60.9 DEPENDENT EDEMA: Primary | ICD-10-CM

## 2021-10-29 DIAGNOSIS — I10 ESSENTIAL HYPERTENSION: ICD-10-CM

## 2021-10-29 DIAGNOSIS — I10 HYPERTENSION, UNSPECIFIED TYPE: ICD-10-CM

## 2021-10-29 DIAGNOSIS — F41.1 ANXIETY STATE: ICD-10-CM

## 2021-10-29 PROCEDURE — 99214 OFFICE O/P EST MOD 30 MIN: CPT | Mod: 95 | Performed by: NURSE PRACTITIONER

## 2021-10-29 RX ORDER — AMLODIPINE BESYLATE 10 MG/1
10 TABLET ORAL DAILY
Qty: 90 TABLET | Refills: 1 | Status: CANCELLED | OUTPATIENT
Start: 2021-10-29

## 2021-10-29 RX ORDER — PROPRANOLOL HYDROCHLORIDE 10 MG/1
10 TABLET ORAL 2 TIMES DAILY
Qty: 60 TABLET | Refills: 0 | Status: CANCELLED | OUTPATIENT
Start: 2021-10-29

## 2021-10-29 NOTE — PROGRESS NOTES
Zuly is a 61 year old who is being evaluated via a billable telephone visit.      What phone number would you like to be contacted at?   How would you like to obtain your AVS? MyChart    Assessment & Plan       ICD-10-CM    1. Dependent edema  R60.9 Basic metabolic panel  (Ca, Cl, CO2, Creat, Gluc, K, Na, BUN)     Compression Sleeve/Stocking Order for DME - ONLY FOR DME   2. Anxiety state  F41.1    3. Essential hypertension  I10    4. Hypertension, unspecified type  I10     try compression hose and would consider changing the norvasc if doesn't resolve, continue with Psych for med adjustments, doing well overall     Due for lab work, will check for other causes in lab work and follow up next visit             Regular exercise  See Patient Instructions    Return in about 3 months (around 2022) for for follow up visit, earlier if any concerns.    ANDREA Maya CNP  [facility]    Missy Caruso is a 61 year old who presents for the following health issues     HPI     Hypertension Follow-up      Do you check your blood pressure regularly outside of the clinic? No     Are you following a low salt diet? Yes    Doing well taking meds, propanolol really helps for anxiety , follow up as planned with Dr. Tijerina    Depression and Anxiety Follow-Up    How are you doing with your depression since your last visit? improved    How are you doing with your anxiety since your last visit?  Improved     Are you having other symptoms that might be associated with depression or anxiety? No    Have you had a significant life event? No     Do you have any concerns with your use of alcohol or other drugs? No    Social History     Tobacco Use     Smoking status: Former Smoker     Years: 30.00     Types: Cigarettes     Quit date: 1990     Years since quittin.0     Smokeless tobacco: Never Used   Substance Use Topics     Alcohol use: Never     Drug use: No     PHQ 2020 3/31/2021  7/14/2021   PHQ-9 Total Score 19 23 18   Q9: Thoughts of better off dead/self-harm past 2 weeks Nearly every day Several days Not at all     GABRIELE-7 SCORE 4/26/2019 6/24/2020 7/14/2021   Total Score 0 (minimal anxiety) - -   Total Score 0 9 18                 Review of Systems   Constitutional, HEENT, cardiovascular, pulmonary, GI, , musculoskeletal, neuro, skin, endocrine and psych systems are negative, except as otherwise noted.      Objective           Vitals:  No vitals were obtained today due to virtual visit.    Physical Exam   healthy, alert and no distress  PSYCH: Alert and oriented times 3; coherent speech, normal   rate and volume, able to articulate logical thoughts, able   to abstract reason, no tangential thoughts, no hallucinations   or delusions  Her affect is normal  RESP: No cough, no audible wheezing, able to talk in full sentences  Remainder of exam unable to be completed due to telephone visits    Admission on 06/19/2021, Discharged on 06/28/2021   Component Date Value Ref Range Status     WBC 06/19/2021 5.6  4.0 - 11.0 10e9/L Final     RBC Count 06/19/2021 5.05  3.8 - 5.2 10e12/L Final     Hemoglobin 06/19/2021 14.7  11.7 - 15.7 g/dL Final     Hematocrit 06/19/2021 44.9  35.0 - 47.0 % Final     MCV 06/19/2021 89  78 - 100 fl Final     MCH 06/19/2021 29.1  26.5 - 33.0 pg Final     MCHC 06/19/2021 32.7  31.5 - 36.5 g/dL Final     RDW 06/19/2021 12.3  10.0 - 15.0 % Final     Platelet Count 06/19/2021 324  150 - 450 10e9/L Final     Diff Method 06/19/2021 Automated Method   Final     % Neutrophils 06/19/2021 64.7  % Final     % Lymphocytes 06/19/2021 29.1  % Final     % Monocytes 06/19/2021 4.8  % Final     % Eosinophils 06/19/2021 0.7  % Final     % Basophils 06/19/2021 0.5  % Final     % Immature Granulocytes 06/19/2021 0.2  % Final     Nucleated RBCs 06/19/2021 0  0 /100 Final     Absolute Neutrophil 06/19/2021 3.6  1.6 - 8.3 10e9/L Final     Absolute Lymphocytes 06/19/2021 1.6  0.8 - 5.3 10e9/L  Final     Absolute Monocytes 06/19/2021 0.3  0.0 - 1.3 10e9/L Final     Absolute Eosinophils 06/19/2021 0.0  0.0 - 0.7 10e9/L Final     Absolute Basophils 06/19/2021 0.0  0.0 - 0.2 10e9/L Final     Abs Immature Granulocytes 06/19/2021 0.0  0 - 0.4 10e9/L Final     Absolute Nucleated RBC 06/19/2021 0.0   Final     Sodium 06/19/2021 141  133 - 144 mmol/L Final     Potassium 06/19/2021 3.6  3.4 - 5.3 mmol/L Final     Chloride 06/19/2021 105  94 - 109 mmol/L Final     Carbon Dioxide 06/19/2021 28  20 - 32 mmol/L Final     Anion Gap 06/19/2021 8  3 - 14 mmol/L Final     Glucose 06/19/2021 114* 70 - 99 mg/dL Final     Urea Nitrogen 06/19/2021 12  7 - 30 mg/dL Final     Creatinine 06/19/2021 0.76  0.52 - 1.04 mg/dL Final     GFR Estimate 06/19/2021 84  >60 mL/min/[1.73_m2] Final    Comment: Non  GFR Calc  Starting 12/18/2018, serum creatinine based estimated GFR (eGFR) will be   calculated using the Chronic Kidney Disease Epidemiology Collaboration   (CKD-EPI) equation.       GFR Estimate If Black 06/19/2021 >90  >60 mL/min/[1.73_m2] Final    Comment:  GFR Calc  Starting 12/18/2018, serum creatinine based estimated GFR (eGFR) will be   calculated using the Chronic Kidney Disease Epidemiology Collaboration   (CKD-EPI) equation.       Calcium 06/19/2021 9.3  8.5 - 10.1 mg/dL Final     Bilirubin Total 06/19/2021 0.4  0.2 - 1.3 mg/dL Final     Albumin 06/19/2021 4.3  3.4 - 5.0 g/dL Final     Protein Total 06/19/2021 8.6  6.8 - 8.8 g/dL Final     Alkaline Phosphatase 06/19/2021 73  40 - 150 U/L Final     ALT 06/19/2021 29  0 - 50 U/L Final     AST 06/19/2021 21  0 - 45 U/L Final     SARS-CoV-2 Virus Specimen Source 06/19/2021 Nasopharyngeal   Final     SARS-CoV-2 PCR Result 06/19/2021 NEGATIVE   Final    SARS-CoV2 (COVID-19) RNA not detected, presumed negative.     SARS-CoV-2 PCR Comment 06/19/2021 (Note)   Final    Comment: Testing was performed using the mona SARS-CoV-2 & Influenza A/B Assay  on the   mona [employer] System.  This test should be ordered for the detection of SARS-COV-2 in individuals who   meet SARS-CoV-2 clinical and/or epidemiological criteria. Test performance is   unknown in asymptomatic patients.  This test is for in vitro diagnostic use under the FDA EUA for laboratories   certified under CLIA to perform moderate and/or high complexity testing. This   test has not been FDA cleared or approved.  A negative test does not rule out the presence of PCR inhibitors in the   specimen or target RNA in concentration below the limit of detection for the   assay. The possibility of a false negative should be considered if the   patient's recent exposure or clinical presentation suggests COVID-19.  [facility] are certified under the Clinical Laboratory   Improvement Amendments of 1988 (CLIA-88) as qualified to perform moderate   and/or high complexity laboratory testing.       TSH 06/19/2021 1.15  0.40 - 4.00 mU/L Final     Amphetamine Qual Urine 06/19/2021 Negative  NEG^Negative Final    Cutoff for a negative amphetamine is 500 ng/mL or less.     Barbiturates Qual Urine 06/19/2021 Negative  NEG^Negative Final    Cutoff for a negative barbiturate is 200 ng/mL or less.     Benzodiazepine Qual Urine 06/19/2021 Negative  NEG^Negative Final    Cutoff for a negative benzodiazepine is 200 ng/mL or less.     Cannabinoids Qual Urine 06/19/2021 Negative  NEG^Negative Final    Cutoff for a negative cannabinoid is 50 ng/mL or less.     Cocaine Qual Urine 06/19/2021 Negative  NEG^Negative Final    Cutoff for a negative cocaine is 300 ng/mL or less.     Ethanol Qual Urine 06/19/2021 Negative  NEG^Negative Final    Cutoff for a negative urine ethanol is 0.05 g/dL or less     Opiates Qualitative Urine 06/19/2021 Negative  NEG^Negative Final    Cutoff for a negative opiate is 300 ng/mL or less.     Interpretation ECG 06/24/2021 Click View Image link to view waveform and result   Final     Lithium  Level 06/28/2021 0.35* 0.60 - 1.20 mmol/L Final               Phone call duration: 12 minutes

## 2021-11-08 DIAGNOSIS — I10 ESSENTIAL HYPERTENSION: ICD-10-CM

## 2021-11-08 DIAGNOSIS — F41.1 ANXIETY STATE: ICD-10-CM

## 2021-11-08 NOTE — TELEPHONE ENCOUNTER
Reason for Call:  Medication or medication refill:    Do you use a [facility]?  Name of the pharmacy and phone number for the current request:  [facility] [place], [place] - [address] (Ph[address]acy)    Name of the medication requested: propranolol (INDERAL) 10 MG tablet    Other request: n/a    Can we leave a detailed message on this number? YES    Phone number patient can be reached at: Cell number on file:    Telephone Information:   Mobile [phone]       Best Time: any    Call taken on 11/8/2021 at 2:04 PM by Barbie Parr

## 2021-11-10 RX ORDER — PROPRANOLOL HYDROCHLORIDE 10 MG/1
10 TABLET ORAL 2 TIMES DAILY
Qty: 60 TABLET | Refills: 0 | OUTPATIENT
Start: 2021-11-10

## 2021-11-10 NOTE — TELEPHONE ENCOUNTER
Patient called back and wanted an explanation of her Herpes result. Explained that results showed that she has had exposure to the herpes virus.     Yes

## 2021-11-17 ENCOUNTER — LAB (OUTPATIENT)
Dept: LAB | Facility: CLINIC | Age: 61
End: 2021-11-17
Payer: COMMERCIAL

## 2021-11-17 DIAGNOSIS — F31.76 BIPOLAR DISORDER, IN FULL REMISSION, MOST RECENT EPISODE DEPRESSED (H): ICD-10-CM

## 2021-11-17 DIAGNOSIS — R60.9 DEPENDENT EDEMA: ICD-10-CM

## 2021-11-17 LAB
ANION GAP SERPL CALCULATED.3IONS-SCNC: 7 MMOL/L (ref 3–14)
BUN SERPL-MCNC: 16 MG/DL (ref 7–30)
CALCIUM SERPL-MCNC: 8.9 MG/DL (ref 8.5–10.1)
CHLORIDE BLD-SCNC: 112 MMOL/L (ref 94–109)
CHOLEST SERPL-MCNC: 193 MG/DL
CO2 SERPL-SCNC: 25 MMOL/L (ref 20–32)
CREAT SERPL-MCNC: 0.81 MG/DL (ref 0.52–1.04)
FASTING STATUS PATIENT QL REPORTED: NO
GFR SERPL CREATININE-BSD FRML MDRD: 79 ML/MIN/1.73M2
GLUCOSE BLD-MCNC: 101 MG/DL (ref 70–99)
HBA1C MFR BLD: 5.4 % (ref 0–5.6)
HDLC SERPL-MCNC: 63 MG/DL
LDLC SERPL CALC-MCNC: 92 MG/DL
NONHDLC SERPL-MCNC: 130 MG/DL
POTASSIUM BLD-SCNC: 3.7 MMOL/L (ref 3.4–5.3)
SODIUM SERPL-SCNC: 144 MMOL/L (ref 133–144)
TRIGL SERPL-MCNC: 192 MG/DL

## 2021-11-17 PROCEDURE — 36415 COLL VENOUS BLD VENIPUNCTURE: CPT

## 2021-11-17 PROCEDURE — 80048 BASIC METABOLIC PNL TOTAL CA: CPT

## 2021-11-17 PROCEDURE — 83036 HEMOGLOBIN GLYCOSYLATED A1C: CPT

## 2021-11-17 PROCEDURE — 80061 LIPID PANEL: CPT

## 2021-12-01 ENCOUNTER — VIRTUAL VISIT (OUTPATIENT)
Dept: PSYCHIATRY | Facility: CLINIC | Age: 61
End: 2021-12-01
Payer: COMMERCIAL

## 2021-12-01 DIAGNOSIS — F41.1 ANXIETY STATE: Primary | ICD-10-CM

## 2021-12-01 DIAGNOSIS — F31.76 BIPOLAR DISORDER, IN FULL REMISSION, MOST RECENT EPISODE DEPRESSED (H): ICD-10-CM

## 2021-12-01 PROCEDURE — 99442 PR PHYSICIAN TELEPHONE EVALUATION 11-20 MIN: CPT | Performed by: PSYCHIATRY & NEUROLOGY

## 2021-12-01 RX ORDER — OLANZAPINE 5 MG/1
5 TABLET ORAL AT BEDTIME
Qty: 30 TABLET | Refills: 3 | Status: SHIPPED | OUTPATIENT
Start: 2021-12-01 | End: 2022-03-02

## 2021-12-02 NOTE — PROGRESS NOTES
Visit Date: 12/01/2021    IDENTIFICATION:  Ms. Zuly Wheeler is a 61-year-old female who is treated for depression and anxiety.    When Zuly first came in for an evaluation, she was extremely depressed and very anxious; however, over the next several months, she improved quite significantly.      Her current medications are olanzapine 10 mg, which I have cut in half on this meeting.  Also, gabapentin 200 twice a day, lithium a total of 450 mg, paroxetine 20, Inderal 10 b.i.d., and amlodipine 10 mg.      Today on the telephone, the patient reports that she is generally doing well, but she feels quite jittery and shaky, and she has swelling in her ankles.  Looking at her most recent laboratories, they actually looked pretty good.  Her most recent EKG was in June, but that also looked pretty good, so the reason for her edema remains unclear to me and I asked her to discuss this with her primary.  Her shakiness and jitteriness seem to be worse in the last month.  I note that last time I saw her, we changed her olanzapine from 5 b.i.d. to 10 mg at bedtime.  I also note that she is on Paxil.  There may be some drug-drug interaction.  At any rate, I decided to cut the olanzapine in half.  She has been on that drug for years, she is not psychotic, and currently, except for her jitteriness and anxiety, she is doing quite well.    I did encourage the patient to talk with her primary about her swelling and also discuss her shakiness.    MENTAL STATUS EXAM:  On the telephone call, the patient was pleasant and cooperative.  Her mood was neutral, her affect full.  Her speech coherent and goal oriented.  Her associations tight.  Her thought processes were logical and linear.  Her content of thought was without psychosis or suicidal ideation.  Recent and remote memory, concentration, fund of knowledge, and use of language were all at baseline.  Insight and judgment are intact.    ASSESSMENT:    1.  Major depressive disorder.     2.  Generalized anxiety disorder.    RECOMMENDATIONS:    1.  Continue current medications, but cut olanzapine to 5 mg at bedtime.    2.  Make a return appointment for two months.    3.  See her primary for her edema.      I also reminded the patient that I would be leaving within the next 3-4 months and I suggested she try to make an appointment with the psychiatric Outpatient Clinic.  I gave her their intake number, which is [phone].    Dom Tijerina MD        D: 2021   T: 2021   MT: [id number]    Name:     CORA SCHMITZ  MRN:      [id number]        Account:    [id number]   :      1960           Visit Date: 2021     Document: [id number]

## 2021-12-03 ENCOUNTER — TELEPHONE (OUTPATIENT)
Dept: FAMILY MEDICINE | Facility: CLINIC | Age: 61
End: 2021-12-03

## 2021-12-03 ENCOUNTER — VIRTUAL VISIT (OUTPATIENT)
Dept: FAMILY MEDICINE | Facility: CLINIC | Age: 61
End: 2021-12-03
Payer: COMMERCIAL

## 2021-12-03 DIAGNOSIS — E55.9 VITAMIN D DEFICIENCY: ICD-10-CM

## 2021-12-03 DIAGNOSIS — F31.76 BIPOLAR DISORDER, IN FULL REMISSION, MOST RECENT EPISODE DEPRESSED (H): ICD-10-CM

## 2021-12-03 DIAGNOSIS — F31.76 BIPOLAR DISORDER, IN FULL REMISSION, MOST RECENT EPISODE DEPRESSED (H): Primary | ICD-10-CM

## 2021-12-03 DIAGNOSIS — I10 ESSENTIAL HYPERTENSION: ICD-10-CM

## 2021-12-03 DIAGNOSIS — F41.1 ANXIETY STATE: ICD-10-CM

## 2021-12-03 PROCEDURE — 99215 OFFICE O/P EST HI 40 MIN: CPT | Mod: 95 | Performed by: NURSE PRACTITIONER

## 2021-12-03 RX ORDER — HYDROCHLOROTHIAZIDE 25 MG/1
12.5 TABLET ORAL DAILY
Qty: 30 TABLET | Refills: 1 | Status: SHIPPED | OUTPATIENT
Start: 2021-12-03 | End: 2022-07-19

## 2021-12-03 RX ORDER — PROPRANOLOL HYDROCHLORIDE 10 MG/1
10 TABLET ORAL 2 TIMES DAILY
Qty: 60 TABLET | Refills: 1 | Status: SHIPPED | OUTPATIENT
Start: 2021-12-03 | End: 2022-01-19

## 2021-12-03 NOTE — PROGRESS NOTES
Zuly is a 61 year old who is being evaluated via a billable telephone visit.      What phone number would you like to be contacted at? [phone]  How would you like to obtain your AVS? MyChart    Assessment & Plan       ICD-10-CM    1. Bipolar disorder, in full remission, most recent episode depressed (H)  F31.76 MENTAL HEALTH REFERRAL  - Adult; Psychiatry; Psychiatry; [facility] [phone]; We will contact you to schedule the appointment or please call with any questions     Lithium level     Comprehensive metabolic panel (BMP + Alb, Alk Phos, ALT, AST, Total. Bili, TP)   2. Anxiety state  F41.1 MENTAL HEALTH REFERRAL  - Adult; Psychiatry; Psychiatry; [facility] [phone]; We will contact you to schedule the appointment or please call with any questions     propranolol (INDERAL) 10 MG tablet     Comprehensive metabolic panel (BMP + Alb, Alk Phos, ALT, AST, Total. Bili, TP)   3. Essential hypertension  I10 propranolol (INDERAL) 10 MG tablet     hydrochlorothiazide (HYDRODIURIL) 25 MG tablet     Comprehensive metabolic panel (BMP + Alb, Alk Phos, ALT, AST, Total. Bili, TP)   4. Bipolar disorder, in full remission, most recent episode depressed (H)  F31.76 MENTAL HEALTH REFERRAL  - Adult; Psychiatry; Psychiatry; [facility] [phone]; We will contact you to schedule the appointment or please call with any questions     Lithium level     Comprehensive metabolic panel (BMP + Alb, Alk Phos, ALT, AST, Total. Bili, TP)    Quite stable.  Doing well.  PHQ9=0.  No manic symptoms.  Refilled meds.  Updated labs today   5. Vitamin D deficiency  E55.9 Vitamin D Deficiency    Edema in her legs is bothersome, lab work reviewed and low sodium diet, working on getting more exercise and vegetables other than potatoes and rice. Stop CCB and starting hydrochlorothiazide today at low dose. Watch BPs daily and notify if concerns or side effects  Notified Dr Tijerina her Psych,  will check lithium level and CMP in 2 weeks or earlier if any concerns.   Recheck D, taking 1000 units daily likely will need to increase, follow up by phone as she doesn't always use mychart   Set request for scheduling help to get in follow up with Dr Tijerina-see enc      46 minutes spent on the date of the encounter doing chart review, history and exam, documentation and further activities per the note       Regular exercise  See Patient Instructions    No follow-ups on file.    ANDREA Maya CNP  [facility]    Missy Caruso is a 61 year old who presents for the following health issues     HPI     Edema and go over lab results   Edema bilateral legs, worse in the end of the day and compression hose not working, uncomfortable no new symptoms  She is asking if propanolol can cause this, we did discuss taking her off norvasc if no other cause found, BP have been good    IL- Triglycerides up, all else looks great, not on med working on lifestyle first     Hypertension Follow-up      Do you check your blood pressure regularly outside of the clinic? Yes     Are you following a low salt diet? Yes    Are your blood pressures ever more than 140 on the top number (systolic) OR more   than 90 on the bottom number (diastolic), for example 140/90? No    Depression and Anxiety Follow-Up    How are you doing with your depression since your last visit? No change    How are you doing with your anxiety since your last visit?  No change    Are you having other symptoms that might be associated with depression or anxiety? No    Have you had a significant life event? No     Do you have any concerns with your use of alcohol or other drugs? No    Social History     Tobacco Use     Smoking status: Former Smoker     Years: 30.00     Types: Cigarettes     Quit date: 1990     Years since quittin.0     Smokeless tobacco: Never Used   Substance Use Topics     Alcohol use: Never     Drug  use: No     PHQ 6/24/2020 3/31/2021 7/14/2021   PHQ-9 Total Score 19 23 18   Q9: Thoughts of better off dead/self-harm past 2 weeks Nearly every day Several days Not at all     GABRIELE-7 SCORE 4/26/2019 6/24/2020 7/14/2021   Total Score 0 (minimal anxiety) - -   Total Score 0 9 18           Review of Systems   Constitutional, HEENT, cardiovascular, pulmonary, GI, , musculoskeletal, neuro, skin, endocrine and psych systems are negative, except as otherwise noted.      Objective           Vitals:  No vitals were obtained today due to virtual visit.    Physical Exam   healthy, alert and no distress  PSYCH: Alert and oriented times 3; coherent speech, normal   rate and volume, able to articulate logical thoughts, able   to abstract reason, no tangential thoughts, no hallucinations   or delusions  Her affect is normal  RESP: No cough, no audible wheezing, able to talk in full sentences  Remainder of exam unable to be completed due to telephone visits    Lab on 11/17/2021   Component Date Value Ref Range Status     Cholesterol 11/17/2021 193  <200 mg/dL Final     Triglycerides 11/17/2021 192* <150 mg/dL Final     Direct Measure HDL 11/17/2021 63  >=50 mg/dL Final     LDL Cholesterol Calculated 11/17/2021 92  <=100 mg/dL Final     Non HDL Cholesterol 11/17/2021 130* <130 mg/dL Final     Patient Fasting > 8hrs? 11/17/2021 No   Final     Hemoglobin A1C 11/17/2021 5.4  0.0 - 5.6 % Final    Normal <5.7%   Prediabetes 5.7-6.4%    Diabetes 6.5% or higher     Note: Adopted from ADA consensus guidelines.     Sodium 11/17/2021 144  133 - 144 mmol/L Final     Potassium 11/17/2021 3.7  3.4 - 5.3 mmol/L Final     Chloride 11/17/2021 112* 94 - 109 mmol/L Final     Carbon Dioxide (CO2) 11/17/2021 25  20 - 32 mmol/L Final     Anion Gap 11/17/2021 7  3 - 14 mmol/L Final     Urea Nitrogen 11/17/2021 16  7 - 30 mg/dL Final     Creatinine 11/17/2021 0.81  0.52 - 1.04 mg/dL Final     Calcium 11/17/2021 8.9  8.5 - 10.1 mg/dL Final     Glucose  11/17/2021 101* 70 - 99 mg/dL Final     GFR Estimate 11/17/2021 79  >60 mL/min/1.73m2 Final    As of July 11, 2021, eGFR is calculated by the CKD-EPI creatinine equation, without race adjustment. eGFR can be influenced by muscle mass, exercise, and diet. The reported eGFR is an estimation only and is only applicable if the renal function is stable.               Phone call duration: 30 minutes

## 2021-12-03 NOTE — TELEPHONE ENCOUNTER
Pt having trouble scheduling follow up with Dr. Tijerina. Can someone please elizabeth her to schedule an appointment with him when she is due (early January)?    Just FYI- Then she will transition to longer term Psych at the  when she gets in there--hopefully this Spring seomtime (I referred her there today)    Thanks,   Trini MENDEZ CNP

## 2022-01-02 NOTE — TELEPHONE ENCOUNTER
Forwarding to [facility], can you help with this? Pt should have one more appointment with Dr. Tijerina and then can return to me or likely will need referral to long term Psych    Thanks,   Trini

## 2022-01-19 ENCOUNTER — VIRTUAL VISIT (OUTPATIENT)
Dept: PSYCHIATRY | Facility: CLINIC | Age: 62
End: 2022-01-19
Payer: COMMERCIAL

## 2022-01-19 DIAGNOSIS — I10 ESSENTIAL HYPERTENSION: ICD-10-CM

## 2022-01-19 DIAGNOSIS — F31.76 BIPOLAR DISORDER, IN FULL REMISSION, MOST RECENT EPISODE DEPRESSED (H): ICD-10-CM

## 2022-01-19 DIAGNOSIS — F41.1 ANXIETY STATE: ICD-10-CM

## 2022-01-19 PROCEDURE — 99213 OFFICE O/P EST LOW 20 MIN: CPT | Performed by: PSYCHIATRY & NEUROLOGY

## 2022-01-19 PROCEDURE — 99207 PR CDG-CODE CATEGORY CHANGED: CPT | Performed by: PSYCHIATRY & NEUROLOGY

## 2022-01-19 RX ORDER — PAROXETINE 10 MG/1
20 TABLET, FILM COATED ORAL AT BEDTIME
Qty: 60 TABLET | Refills: 3 | Status: SHIPPED | OUTPATIENT
Start: 2022-01-19 | End: 2022-06-06

## 2022-01-19 RX ORDER — PROPRANOLOL HYDROCHLORIDE 10 MG/1
10 TABLET ORAL 2 TIMES DAILY
Qty: 60 TABLET | Refills: 1 | Status: SHIPPED | OUTPATIENT
Start: 2022-01-19 | End: 2022-03-02

## 2022-01-19 NOTE — PROGRESS NOTES
Visit Date: 01/19/2022    IDENTIFICATION:  Ms. Zuly Wheeler is a 61-year-old -American female who is treated for major depressive disorder.  This is a telephone meeting at the patient's request.    Zuly's medications include Paxil 20, Inderal 10 b.i.d., gabapentin 200 t.i.d.  She stopped her hydrochlorothiazide.  She is on 450 mg of lithium for suicidal thoughts and 5 mg of Zyprexa at bedtime.  Today, the patient reports she is doing very well and definitely does not want to change her medication regimen.  I did talk about potential long-term side effects from olanzapine.  She is aware of this, but for now she really does not want to change anything because she is doing very well.  She does want to continue to follow up with me as long as I am here.  She knows I will be leaving the clinic, and she is following with her primary, Trini Pardo, whom she is very comfortable with.  Happily, Trini is also quite comfortable with the psychiatric medications, but we are trying to find a place to refer her Zuly for the long-term.  In the meantime, I will be seeing her back in a month and we will continue her current medications.    MENTAL STATUS EXAMINATION:  Today on the telephone, the patient was a pleasant, cooperative and quite satisfied.  Her mood was good, her affect full.  Her speech was coherent and goal oriented, her associations tight.  Her thought processes logical and linear.  Her content of thought was without psychosis or suicidal ideation.  Recent and remote memory, concentration, fund of knowledge and use of language were all at baseline.  She is alert and oriented x3.  Insight and judgment are intact.    ASSESSMENT:  Major depressive disorder, currently in partial remission, and generalized anxiety disorder.    RECOMMENDATIONS:  Continue current medications.  Make a return appointment for 1 month.  She will follow up with her primary, Trini.    Dom Tijerina MD        D: 01/19/2022   T:  2022   MT: [id number]    Name:     CORA SCHMITZ  MRN:      [id number]        Account:    [id number]   :      1960           Visit Date: 2022     Document: [id number]

## 2022-02-03 ENCOUNTER — NURSE TRIAGE (OUTPATIENT)
Dept: NURSING | Facility: CLINIC | Age: 62
End: 2022-02-03
Payer: COMMERCIAL

## 2022-02-03 NOTE — TELEPHONE ENCOUNTER
RN triage   Call from pt   Pt states got positive covid test at [facility] center yesterday  Has cough and phlegm  No fever   No chest symptoms   No diff breathing   Pt has had covid vaccines -- no booster   Reviewed home care advice and when to call back/ be seen     Oma Mora RN  [facility]  Triage Nurse Advisor    COVID 19 Nurse Triage Plan/Patient Instructions    Please be aware that novel coronavirus (COVID-19) may be circulating in the community. If you develop symptoms such as fever, cough, or SOB or if you have concerns about the presence of another infection including coronavirus (COVID-19), please contact your health care provider or visit [email or web address].     Disposition/Instructions    Home care recommended. Follow home care protocol based instructions.    Thank you for taking steps to prevent the spread of this virus.  o Limit your contact with others.  o Wear a simple mask to cover your cough.  o Wash your hands well and often.    Resources    [facility]: About COVID-19: [email or web address]    CDC: What to Do If You're Sick: www.cdc.gov/coronavirus/2019-ncov/about/steps-when-sick.html    CDC: Ending Home Isolation: www.cdc.gov/coronavirus/2019-ncov/hcp/disposition-in-home-patients.html     CDC: Caring for Someone: www.cdc.gov/coronavirus/2019-ncov/if-you-are-sick/care-for-someone.html     [facility]: Interim Guidance for Hospital Discharge to Home: www.health.[facility].[place]/diseases/coronavirus/hcp/hospdischarge.pdf    [facility] clinical trials (COVID-19 research studies): [email or web address]     Below are the COVID-19 hotlines at the [facility] ([facility]). Interpreters are available.   o For health questions: Call 922-011-3765 or 1-207.141.2897 (7 a.m. to 7 p.m.)  o For questions about schools and childcare: Call 740-698-4385 or 1-305.254.6335 (7 a.m. to 7 p.m.)                     Reason for Disposition    [1] COVID-19 diagnosed  by positive lab test AND [2] NO symptoms (e.g., cough, fever, others)    Additional Information    Negative: SEVERE difficulty breathing (e.g., struggling for each breath, speaks in single words)    Negative: Difficult to awaken or acting confused (e.g., disoriented, slurred speech)    Negative: Bluish (or gray) lips or face now    Negative: Shock suspected (e.g., cold/pale/clammy skin, too weak to stand, low BP, rapid pulse)    Negative: Sounds like a life-threatening emergency to the triager    Negative: SEVERE or constant chest pain or pressure (Exception: mild central chest pain, present only when coughing)    Negative: MODERATE difficulty breathing (e.g., speaks in phrases, SOB even at rest, pulse 100-120)    Negative: [1] Headache AND [2] stiff neck (can't touch chin to chest)    Negative: MILD difficulty breathing (e.g., minimal/no SOB at rest, SOB with walking, pulse <100)    Negative: Chest pain or pressure    Negative: Patient sounds very sick or weak to the triager    Negative: Fever > 103 F (39.4 C)    Negative: [1] Fever > 101 F (38.3 C) AND [2] age > 60 years    Negative: [1] Fever > 100.0 F (37.8 C) AND [2] bedridden (e.g., nursing home patient, CVA, chronic illness, recovering from surgery)    Negative: HIGH RISK for severe COVID complications (e.g., age > 64 years, obesity with BMI > 25, pregnant, chronic lung disease or other chronic medical condition)  (Exception: Already seen by PCP and no new or worsening symptoms.)    Protocols used: CORONAVIRUS (COVID-19) DIAGNOSED OR [id number] 8.25.2021

## 2022-02-09 ENCOUNTER — NURSE TRIAGE (OUTPATIENT)
Dept: NURSING | Facility: CLINIC | Age: 62
End: 2022-02-09
Payer: COMMERCIAL

## 2022-02-09 NOTE — TELEPHONE ENCOUNTER
Patient is calling and states last week for covid and also tested this week positive for covid.  Cough is present and lass of taste and loss of smell.  Patient is staying hydrated.  Patient will continue to treat the symptoms.    COVID 19 Nurse Triage Plan/Patient Instructions    Please be aware that novel coronavirus (COVID-19) may be circulating in the community. If you develop symptoms such as fever, cough, or SOB or if you have concerns about the presence of another infection including coronavirus (COVID-19), please contact your health care provider or visit [email or web address].     Disposition/Instructions    Home care recommended. Follow home care protocol based instructions.    Thank you for taking steps to prevent the spread of this virus.  o Limit your contact with others.  o Wear a simple mask to cover your cough.  o Wash your hands well and often.    Resources    [facility]: About COVID-19: [email or web address]    CDC: What to Do If You're Sick: www.cdc.gov/coronavirus/2019-ncov/about/steps-when-sick.html    CDC: Ending Home Isolation: www.cdc.gov/coronavirus/2019-ncov/hcp/disposition-in-home-patients.html     CDC: Caring for Someone: www.cdc.gov/coronavirus/2019-ncov/if-you-are-sick/care-for-someone.html     [facility]: Interim Guidance for Hospital Discharge to Home: www.health.[facility].mn.us/diseases/coronavirus/hcp/hospdischarge.pdf    [facility] clinical trials (COVID-19 research studies): [email or web address]/umn-clinical-trials     Below are the COVID-19 hotlines at the [place] [facility] ([facility]). Interpreters are available.   o For health questions: Call 163-105-6947 or 1-185.374.2094 (7 a.m. to 7 p.m.)  o For questions about schools and childcare: Call 587-637-3344 or 1-438.931.2757 (7 a.m. to 7 p.m.)                        Reason for Disposition    [1] COVID-19 diagnosed by positive lab test AND [2] mild symptoms (e.g., cough, fever, others) AND [3] no  complications or SOB    Additional Information    Negative: SEVERE difficulty breathing (e.g., struggling for each breath, speaks in single words)    Negative: Difficult to awaken or acting confused (e.g., disoriented, slurred speech)    Negative: Bluish (or gray) lips or face now    Negative: Shock suspected (e.g., cold/pale/clammy skin, too weak to stand, low BP, rapid pulse)    Negative: Sounds like a life-threatening emergency to the triager    Negative: SEVERE or constant chest pain or pressure (Exception: mild central chest pain, present only when coughing)    Negative: MODERATE difficulty breathing (e.g., speaks in phrases, SOB even at rest, pulse 100-120)    Negative: [1] Headache AND [2] stiff neck (can't touch chin to chest)    Negative: MILD difficulty breathing (e.g., minimal/no SOB at rest, SOB with walking, pulse <100)    Negative: Chest pain or pressure    Negative: Patient sounds very sick or weak to the triager    Negative: Fever > 103 F (39.4 C)    Negative: [1] Fever > 101 F (38.3 C) AND [2] age > 60 years    Negative: [1] Fever > 100.0 F (37.8 C) AND [2] bedridden (e.g., nursing home patient, CVA, chronic illness, recovering from surgery)    Negative: HIGH RISK for severe COVID complications (e.g., age > 64 years, obesity with BMI > 25, pregnant, chronic lung disease or other chronic medical condition)  (Exception: Already seen by PCP and no new or worsening symptoms.)    Negative: [1] HIGH RISK patient AND [2] influenza is widespread in the community AND [3] ONE OR MORE respiratory symptoms: cough, sore throat, runny or stuffy nose    Negative: [1] HIGH RISK patient AND [2] influenza exposure within the last 7 days AND [3] ONE OR MORE respiratory symptoms: cough, sore throat, runny or stuffy nose    Negative: [1] COVID-19 infection suspected by caller or triager AND [2] mild symptoms (cough, fever, or others) AND [3] negative COVID-19 rapid test    Negative: Fever present > 3 days (72 hours)     Negative: [1] Fever returns after gone for over 24 hours AND [2] symptoms worse or not improved    Negative: [1] Continuous (nonstop) coughing interferes with work or school AND [2] no improvement using cough treatment per protocol    Negative: Cough present > 3 weeks    Negative: [1] COVID-19 diagnosed by positive lab test AND [2] NO symptoms (e.g., cough, fever, others)    Protocols used: CORONAVIRUS (COVID-19) DIAGNOSED OR [id number] 8.25.2021

## 2022-02-23 ENCOUNTER — OFFICE VISIT (OUTPATIENT)
Dept: PSYCHIATRY | Facility: CLINIC | Age: 62
End: 2022-02-23
Payer: COMMERCIAL

## 2022-02-23 DIAGNOSIS — F31.76 BIPOLAR DISORDER, IN FULL REMISSION, MOST RECENT EPISODE DEPRESSED (H): Primary | ICD-10-CM

## 2022-02-23 PROCEDURE — 99213 OFFICE O/P EST LOW 20 MIN: CPT | Mod: 95 | Performed by: PSYCHIATRY & NEUROLOGY

## 2022-02-23 NOTE — PROGRESS NOTES
Visit Date: 2022    PSYCHIATRY CONSULTATION    IDENTIFICATION:  Ms. Zuly Wheeler is a 61-year-old -American female who is treated for major depressive disorder, currently in remission.    Ms. Wheeler continues on Paxil 20, Inderal 10 b.i.d., gabapentin 200 t.i.d., 450 mg of lithium and 5 of Zyprexa at bedtime.  Today, she reports she is doing very well psychiatrically.  Unfortunately, she did develop COVID and has been recovering from that.  She was sick for about 2 weeks, but did not require hospitalization.  Today, she reports her mood is good.  She likes her current medications.  I thought about decreasing her olanzapine, but she really wanted to continue with things the way they are.  She is not experiencing hallucinations or suicidal ideation.  She knows that I will be leaving the clinic and she and Trini will work on finding her a followup psychiatrist.  For now, she can continue to follow with her primary, Trini Pardo, who the patient is very comfortable with.    Today, we had a telephone consultation.  It only lasted from 3:00 until 3:07.  It was a telephone consultation at patient request.    MENTAL STATUS EXAMINATION:  The patient was pleasant, cooperative.  Her mood was good, her affect full, her speech coherent and goal oriented.  Her associations tight.  Her thought processes logical and linear.  Her content of thought was without current psychosis or suicidal ideation.  Recent and remote memory, concentration, fund of knowledge and use of language were at baseline.  She is alert and oriented x3.  Insight and judgment are intact.    ASSESSMENT:  Major depressive disorder, in remission.    RECOMMENDATIONS:  Continue current medications.  Follow up with Trini Pardo.    Dom Tijerina MD        D: 2022   T: 2022   MT: [id number]    Name:     ZULY WHEELER  MRN:      [id number]        Account:    [id number]   :      1960           Visit Date: 2022      Document: [id number]

## 2022-03-02 ENCOUNTER — VIRTUAL VISIT (OUTPATIENT)
Dept: FAMILY MEDICINE | Facility: CLINIC | Age: 62
End: 2022-03-02
Payer: COMMERCIAL

## 2022-03-02 DIAGNOSIS — L30.9 DERMATITIS: Primary | ICD-10-CM

## 2022-03-02 DIAGNOSIS — F31.76 BIPOLAR DISORDER, IN FULL REMISSION, MOST RECENT EPISODE DEPRESSED (H): ICD-10-CM

## 2022-03-02 DIAGNOSIS — I10 ESSENTIAL HYPERTENSION: ICD-10-CM

## 2022-03-02 DIAGNOSIS — F41.1 ANXIETY STATE: ICD-10-CM

## 2022-03-02 PROCEDURE — 99214 OFFICE O/P EST MOD 30 MIN: CPT | Mod: 95 | Performed by: NURSE PRACTITIONER

## 2022-03-02 RX ORDER — GABAPENTIN 100 MG/1
200 CAPSULE ORAL 3 TIMES DAILY
Qty: 180 CAPSULE | Refills: 3 | Status: CANCELLED | OUTPATIENT
Start: 2022-03-02

## 2022-03-02 ASSESSMENT — ANXIETY QUESTIONNAIRES
IF YOU CHECKED OFF ANY PROBLEMS ON THIS QUESTIONNAIRE, HOW DIFFICULT HAVE THESE PROBLEMS MADE IT FOR YOU TO DO YOUR WORK, TAKE CARE OF THINGS AT HOME, OR GET ALONG WITH OTHER PEOPLE: NOT DIFFICULT AT ALL
GAD7 TOTAL SCORE: 5
2. NOT BEING ABLE TO STOP OR CONTROL WORRYING: SEVERAL DAYS
1. FEELING NERVOUS, ANXIOUS, OR ON EDGE: SEVERAL DAYS
5. BEING SO RESTLESS THAT IT IS HARD TO SIT STILL: NOT AT ALL
3. WORRYING TOO MUCH ABOUT DIFFERENT THINGS: SEVERAL DAYS
6. BECOMING EASILY ANNOYED OR IRRITABLE: SEVERAL DAYS
7. FEELING AFRAID AS IF SOMETHING AWFUL MIGHT HAPPEN: SEVERAL DAYS

## 2022-03-02 ASSESSMENT — PATIENT HEALTH QUESTIONNAIRE - PHQ9
5. POOR APPETITE OR OVEREATING: NOT AT ALL
SUM OF ALL RESPONSES TO PHQ QUESTIONS 1-9: 3

## 2022-03-02 NOTE — PATIENT INSTRUCTIONS
"  Patient Education     Gentle Skin Care  For Babies and Children  Gentle skin care starts with good bathing and keeping the skin moist. Gentle skin care helps babies and children with sensitive skin and eczema. It also helps with long-lasting (chronic) dry skin.  Skin care products  Here are some gentle skin care products you may want to try.* You can try other brands too. Generic and store brands are OK as well. Just make sure everything is fragrance free.  Mild cleansers (instead of soap):    Aquaphor 2 in1 Gentle Wash and Shampoo    CeraVe    Cetaphil Gentle Cleanser (Stay away from Cetaphil's \"baby\" line because it has fragrance.)    Dove Fragrance Free Bar    Vanicream Cleansing Bar  Shampoos and conditioners:    Aquaphor 2 in 1 Gentle Wash and Shampoo    [place] Baby \"Super Sensitive\" Shampoo    Free and Clear by Vanicream  Moisturizers:    Creams: Cetaphil cream, CeraVe cream, Eucerin cream, and Vanicream    Ointments: Aquaphor Ointment, Vaseline, petroleum jelly, and Vaniply  Don't use lotion: It's too thin for eczema. It can also have alcohol, which irritates the skin. Ointments and creams work better.  Oils:    Mineral oil    Coconut and sunflower seed oil work for some children.  Sunblock:     Use sunscreens that have zinc oxide or titanium dioxide. These block the sun.    Make sure the sunblock has SPF 30 or more.    Don't use spray cans (aerosols) or \"chemical\" sunscreens if you can avoid them.  Laundry products:    All Free and Clear    Cheer Free    Dreft    Tide Free    Generic Brands are OK as long as they are \"Fragrance Free.\"    Don't use fabric softeners or dryer sheets.  Stay away from these products    Don't use products that have added fragrance.    \"Organic\" does not mean \"fragrance free.\" In fact, some organic products have plant parts that can irritate sensitive skin.    Many \"baby\" products have added fragrance that may bother your child's skin.  Skin care tips  1. Daily bathing in a " "tub bath is best to soak the skin and get clean.  2. Use lukewarm water.  3. Keep bathing and showering short--less than 15 minutes.  4. When you wash, focus on the skin folds, face and feet.  5. After bathing, pat the skin lightly with a towel. Don't rub or scrub when drying.  6. Put on moisturizer right away after the bath.  7. If the doctor prescribed medicine to put on the skin, put the medicine on first. Then put on the moisturizer.  8. Use moisturizing creams at least 2 times a day on the whole body. For example, in the morning and before bed. Your provider may suggest using a lighter or heavier cream based on your child's skin and the time of year.  \"Do's\" and \"Don'ts\"  Do    Bathe in a tub rather than shower whenever you can.    Wash new clothes before your child wears them for the first time.    Put on moisturizing creams or ointments at least twice daily to the whole body.  Don't    Don't use bubble bath.    Don't scrub hard when cleaning the skin.    Don't use skin lotion instead of cream. Lotions don't work as well.    Don't use products like powders, perfumes or colognes.    Don't dress your child in \"scratchy\" clothes, like wool.  *We don't endorse any specific product or brand. The products listed here are just examples.  Prepared by the [facility] Division of Pediatric Dermatology. For informational purposes only. Not to replace the advice of your health care provider. Copyright   2017 [facility]. All rights reserved. [employer] [id number] - 4/17.           "

## 2022-03-02 NOTE — PROGRESS NOTES
"Zuly is a 61 year old who is being evaluated via a billable telephone visit.      What phone number would you like to be contacted at? [phone]  How would you like to obtain your AVS? Lj    Assessment & Plan     Anxiety state  GABRIELE 7 score improved today, 5 down from 18 at last check. She has felt stable on her current medications. She has been bothered by shakiness/\"jerky\" movements when purposefully using her arms.  -will decrease current olanzapine dose from 5 mg to 2.5 mg  -follow-up in 1-2 months following dose change    Bipolar disorder, in full remission, most recent episode depressed (H)  She has not had manic or depressive episodes. She feels she has been stable on her current medications. She states \"I am in a good place now\"    Dermatitis  Scalp scabs for the past few months. She cannot attribute them to anything. They have been itching and she has been picking at them.  -Recommend changing shampoo to baby shampoo without fragrance or dyes.  -Recommend hydrocortisone cream for itching.  -Ordered hydroxyzine for itching  -Recommend not picking at the scab and allowing it to come off on its own  -Recommend follow-up in-person visit in one week if no improvement.     referred to Psych for ongoing management of medications and adjustments as needed    MEDICATIONS:   Orders Placed This Encounter   Medications     OLANZapine (ZYPREXA) 2.5 MG tablet     Sig: Take 1 tablet (2.5 mg) by mouth At Bedtime     Dispense:  90 tablet     Refill:  3     Profile Rx: patient will contact pharmacy when needed     lithium (ESKALITH) 150 MG capsule     Sig: Take 3 capsules (450 mg) by mouth At Bedtime     Dispense:  90 capsule     Refill:  3     Profile Rx: patient will contact pharmacy when needed     propranolol (INDERAL) 10 MG tablet     Sig: Take 1 tablet (10 mg) by mouth 2 times daily     Dispense:  60 tablet     Refill:  1     Profile Rx: patient will contact pharmacy when needed     hydrOXYzine (ATARAX) 25 " "MG tablet     Sig: Take 1 tablet (25 mg) by mouth 3 times daily as needed for itching     Dispense:  30 tablet     Refill:  1          - Continue other medications without change  CONSULTATION/REFERRAL to Psychiatry  See Patient Instructions    Return in about 1 week (around 3/9/2022) for in person scalp skin check.    Nuris Lloyd DNP FNP student    I was present with the NP Student during the history and exam.  I discussed the case with the NP Student and agree with the findings as documented in the assessment and plan.     ANDREA Maya [facility]    Missy Caruso is a 61 year old who presents for the following health issues     HPI   Zuly has a history of anxiety/depression and bipolar disorder.  Depression and Anxiety Follow-Up    How are you doing with your depression since your last visit? Improved     How are you doing with your anxiety since your last visit?  Improved     Are you having other symptoms that might be associated with depression or anxiety? No    Have you had a significant life event? No     Do you have any concerns with your use of alcohol or other drugs? No  Medication side effects: still having some shakiness- describes this as \"jerky\", this happens when she is trying to do something, but this has improved, recently lowered the olanzapine dose from 10 mg to 5 mg. She was wondering about decreasing this dose again today.  Social History     Tobacco Use     Smoking status: Former Smoker     Years: 30.00     Types: Cigarettes     Quit date: 1990     Years since quittin.2     Smokeless tobacco: Never Used   Substance Use Topics     Alcohol use: Never     Drug use: No     PHQ 3/31/2021 2021 3/2/2022   PHQ-9 Total Score 23 18 3   Q9: Thoughts of better off dead/self-harm past 2 weeks Several days Not at all Not at all     GABRIELE-7 SCORE 2020 2021 3/2/2022   Total Score - - -   Total Score 9 18 5     Last PHQ-9 3/2/2022 "   1.  Little interest or pleasure in doing things 0   2.  Feeling down, depressed, or hopeless 0   3.  Trouble falling or staying asleep, or sleeping too much 0   4.  Feeling tired or having little energy 1   5.  Poor appetite or overeating 0   6.  Feeling bad about yourself 1   7.  Trouble concentrating 1   8.  Moving slowly or restless 0   Q9: Thoughts of better off dead/self-harm past 2 weeks 0   PHQ-9 Total Score 3   Difficulty at work, home, or with people Not difficult at all     GABRIELE-7  3/2/2022   1. Feeling nervous, anxious, or on edge 1   2. Not being able to stop or control worrying 1   3. Worrying too much about different things 1   4. Trouble relaxing 0   5. Being so restless that it is hard to sit still 0   6. Becoming easily annoyed or irritable 1   7. Feeling afraid, as if something awful might happen 1   GABRIELE-7 Total Score 5   If you checked any problems, how difficult have they made it for you to do your work, take care of things at home, or get along with other people? Not difficult at all       Suicide Assessment Five-step Evaluation and Treatment (SAFE-T)      Skin Lesion  Onset/Duration: not sure when it started, it has been months  Description  Location: under her hair on the top of her head and midway in the back  Color: black- they look like scabs  Border description: round- simona sized  Character: round  Itching: mild  Bleeding:  no  Intensity:  mild  Progression of Symptoms:  same and constant  Accompanying signs and symptoms:   Bleeding: no  Scaling: no  Excessive sun exposure/tanning: no  Sunscreen used: not applicable  History:           Any previous history of skin cancer: no  Any family history of melanoma: no  Previous episodes of similar lesion: no  Precipitating or alleviating factors: has she has not tried any treatments  Therapies tried and outcome: none  She has been picking at these and they return.  No changes to soaps, detergents, nothing new that she has worn on her  head.      Review of Systems   Constitutional, HEENT, cardiovascular, pulmonary, gi and gu systems are negative, except as otherwise noted.      Objective           Vitals:  No vitals were obtained today due to virtual visit.    Physical Exam     PSYCH: Alert and oriented times 3; coherent speech, normal   rate and volume, able to articulate logical thoughts, able   to abstract reason, no tangential thoughts, no hallucinations   or delusions  Her affect is normal and pleasant  RESP: No cough, no audible wheezing, able to talk in full sentences  Remainder of exam unable to be completed due to telephone visits                Phone call duration: 25 minutes

## 2022-03-03 ASSESSMENT — ANXIETY QUESTIONNAIRES: GAD7 TOTAL SCORE: 5

## 2022-03-07 DIAGNOSIS — F31.76 BIPOLAR DISORDER, IN FULL REMISSION, MOST RECENT EPISODE DEPRESSED (H): ICD-10-CM

## 2022-03-07 NOTE — TELEPHONE ENCOUNTER
Requested Prescriptions   Pending Prescriptions Disp Refills     gabapentin (NEURONTIN) 100 MG capsule 180 capsule 3     Sig: Take 2 capsules (200 mg) by mouth 3 times daily       There is no refill protocol information for this order        Routing refill request to provider for review/approval because:  Drug not on the [facility] refill protocol     Destinee Rodríguez RN  [facility]

## 2022-03-07 NOTE — TELEPHONE ENCOUNTER
Reason for Call:  Medication or medication refill:    Do you use a [facility]?  Name of the pharmacy and phone number for the current request: [facility] [place], [place] - [address].    Name of the medication requested: gabapentin (NEURONTIN) 100 MG capsule    Other request: N/A    Can we leave a detailed message on this number? YES    Phone number patient can be reached at: Home number on file [phone] (home)    Best Time: ANY    Call taken on 3/7/2022 at 9:39 AM by Rossana Celaya

## 2022-03-08 RX ORDER — LITHIUM CARBONATE 150 MG/1
450 CAPSULE ORAL AT BEDTIME
Qty: 90 CAPSULE | Refills: 3 | Status: SHIPPED | OUTPATIENT
Start: 2022-03-08 | End: 2022-09-19

## 2022-03-08 RX ORDER — HYDROXYZINE HYDROCHLORIDE 25 MG/1
25 TABLET, FILM COATED ORAL 3 TIMES DAILY PRN
Qty: 30 TABLET | Refills: 1 | Status: SHIPPED | OUTPATIENT
Start: 2022-03-08 | End: 2022-07-19

## 2022-03-08 RX ORDER — OLANZAPINE 2.5 MG/1
2.5 TABLET, FILM COATED ORAL AT BEDTIME
Qty: 90 TABLET | Refills: 3 | Status: SHIPPED | OUTPATIENT
Start: 2022-03-08 | End: 2022-09-19

## 2022-03-08 RX ORDER — GABAPENTIN 100 MG/1
200 CAPSULE ORAL 3 TIMES DAILY
Qty: 180 CAPSULE | Refills: 3 | Status: SHIPPED | OUTPATIENT
Start: 2022-03-08 | End: 2022-08-05

## 2022-03-08 RX ORDER — PROPRANOLOL HYDROCHLORIDE 10 MG/1
10 TABLET ORAL 2 TIMES DAILY
Qty: 60 TABLET | Refills: 1 | Status: SHIPPED | OUTPATIENT
Start: 2022-03-08 | End: 2022-06-02

## 2022-03-23 ENCOUNTER — OFFICE VISIT (OUTPATIENT)
Dept: FAMILY MEDICINE | Facility: CLINIC | Age: 62
End: 2022-03-23
Payer: COMMERCIAL

## 2022-03-23 VITALS
OXYGEN SATURATION: 99 % | HEIGHT: 69 IN | WEIGHT: 153 LBS | DIASTOLIC BLOOD PRESSURE: 66 MMHG | HEART RATE: 74 BPM | TEMPERATURE: 98.8 F | SYSTOLIC BLOOD PRESSURE: 120 MMHG | BODY MASS INDEX: 22.66 KG/M2

## 2022-03-23 DIAGNOSIS — L40.9 SCALP PSORIASIS: Primary | ICD-10-CM

## 2022-03-23 DIAGNOSIS — Z12.11 SCREEN FOR COLON CANCER: ICD-10-CM

## 2022-03-23 PROCEDURE — 99213 OFFICE O/P EST LOW 20 MIN: CPT | Performed by: NURSE PRACTITIONER

## 2022-03-23 RX ORDER — CLOBETASOL PROPIONATE 0.5 MG/ML
SOLUTION TOPICAL 2 TIMES DAILY
Qty: 50 ML | Refills: 2 | Status: SHIPPED | OUTPATIENT
Start: 2022-03-23 | End: 2024-07-19

## 2022-03-23 NOTE — PATIENT INSTRUCTIONS
Start using the Neutrogena T Gel shampoo. You can use this daily for 1-2 weeks, then use 1-2 times per week.     Start using the scalp oil twice daily until the spots are better. You can repeat this again if they come back.      Patient Education     What Is Psoriasis?  Psoriasis is a chronic skin disease. Researchers believe this condition develops from a combination of immune, genetic, and environmental factors. Psoriasis can start at any age. It's most common between ages 30 and 39 and also between ages 50 and 69. Psoriasis affects nearly equal numbers of men and women. In people with this disease, the skin grows too fast. Dead skin cells build up on the skin s surface to form inflamed, thick, silvery scales called plaques. Sometimes people form many small lesions that can hurt or have pus in them. Psoriasis does not spread from person to person.   About your symptoms  Psoriasis plaques tend to form on the elbows, knees, scalp, navel, arms, legs, and the penis or vulva. They can be unsightly, painful, and itchy. Plaques on the joints can limit movement. On the fingernails or toenails, psoriasis can cause pitting, a change in nail color, and a change in nail shape. In some cases, psoriasis also causes arthritis. Symptoms may come and go on their own. Factors such as stress, infection, and certain medicines may cause flare-ups. If symptoms bother you, many treatments are available to help ease symptoms. Discuss your treatment options with your healthcare provider.   Treatments for your skin  There are many types of medical medicines that can treat the affected skin lesions. Your healthcare provider may prescribe one of many types of medicines that are put on the skin. These include moisturizers, steroids, types of vitamin D, medicines made from vitamin A (retinoids), and other non-steroid medicines. You put them on your skin on a regular basis. Coal tar is a thick black liquid. You may put it on thick plaques. In  some cases, your skin may be exposed to a special light in the healthcare provider's office. Or you can expose the psoriatic plaques to short periods (5 minutes) of natural sun as directed by your healthcare provider. This method is called phototherapy. It can be helped with a type of medicine called psoralen.   Treatments by mouth or by shot  Internal treatments are taken by mouth or given by shot (injection). A number of new oral and injectable medicines can treat severe psoriasis. Your healthcare provider can tell you more about these treatments.   [place] last reviewed this educational content on 8/1/2019 2000-2021 [employer]. All rights reserved. This information is not intended as a substitute for professional medical care. Always follow your healthcare professional's instructions.

## 2022-03-23 NOTE — PROGRESS NOTES
"  Assessment & Plan     Scalp psoriasis  Recommend using Neutrogena T Gel shampoo, Clobetasol to plaques. Discussed this can be difficult to treat and recommend follow up with Derm as she has fairly extensive disease  - Adult Dermatology Referral; Future  - clobetasol (TEMOVATE) 0.05 % external solution; Apply topically 2 times daily    Screen for colon cancer    - Fecal colorectal cancer screen FIT - Future (S+30); Future  - Fecal colorectal cancer screen FIT - Future (S+30)    Prescription drug management         See Patient Instructions    Return in about 2 weeks (around 4/6/2022) for Physical with PCP.    Ryann Noland, ANDREA CNP  [facility] KAJAL Caruso is a 61 year old who presents for the following health issues  accompanied by her self.    HPI     Concern - Sores    Onset: 3 months+  Description: Sores that keep forming on scalp  Intensity: mild  Progression of Symptoms:  intermittent  Accompanying Signs & Symptoms: none  Previous history of similar problem: none  Precipitating factors:        Worsened by: unknown  Alleviating factors:        Improved by: unknown  Therapies tried and outcome:  none     Patient attests to above symptoms ongoing for many months. Some associated itching. Wonders if related to her medications.    Review of Systems   Constitutional, HEENT, cardiovascular, pulmonary, gi and gu systems are negative, except as otherwise noted.      Objective    /66 (BP Location: Left arm, Patient Position: Chair, Cuff Size: Adult Regular)   Pulse 74   Temp 98.8  F (37.1  C) (Oral)   Ht 1.753 m (5' 9\")   Wt 69.4 kg (153 lb)   LMP 01/30/2014 (Exact Date)   SpO2 99%   BMI 22.59 kg/m    Body mass index is 22.59 kg/m .  Physical Exam   GENERAL: healthy, alert and no distress  SKIN: Very thick keratotic plaques over scalp    No results found for any visits on 03/23/22.            "

## 2022-03-30 PROCEDURE — 82274 ASSAY TEST FOR BLOOD FECAL: CPT | Performed by: NURSE PRACTITIONER

## 2022-04-01 LAB — HEMOCCULT STL QL IA: NEGATIVE

## 2022-04-09 ENCOUNTER — HEALTH MAINTENANCE LETTER (OUTPATIENT)
Age: 62
End: 2022-04-09

## 2022-04-21 ENCOUNTER — APPOINTMENT (OUTPATIENT)
Dept: URBAN - METROPOLITAN AREA CLINIC 254 | Age: 62
Setting detail: DERMATOLOGY
End: 2022-04-22

## 2022-04-21 VITALS — HEIGHT: 69 IN | WEIGHT: 148 LBS

## 2022-04-21 DIAGNOSIS — L40.0 PSORIASIS VULGARIS: ICD-10-CM

## 2022-04-21 DIAGNOSIS — L65.9 NONSCARRING HAIR LOSS, UNSPECIFIED: ICD-10-CM

## 2022-04-21 PROCEDURE — OTHER MIPS QUALITY: OTHER

## 2022-04-21 PROCEDURE — 36415 COLL VENOUS BLD VENIPUNCTURE: CPT

## 2022-04-21 PROCEDURE — OTHER ORDER TESTS: OTHER

## 2022-04-21 PROCEDURE — OTHER COUNSELING: OTHER

## 2022-04-21 PROCEDURE — OTHER VENIPUNCTURE: OTHER

## 2022-04-21 PROCEDURE — 99204 OFFICE O/P NEW MOD 45 MIN: CPT

## 2022-04-21 PROCEDURE — OTHER PRESCRIPTION: OTHER

## 2022-04-21 RX ORDER — FLUOCINOLONE ACETONIDE 0.11 MG/ML
0.01% OIL TOPICAL QD
Qty: 118.28 | Refills: 6 | Status: ERX | COMMUNITY
Start: 2022-04-21

## 2022-04-21 RX ORDER — COAL TAR 1 %
1% SHAMPOO TOPICAL BID
Qty: 40 | Refills: 3 | Status: ERX | COMMUNITY
Start: 2022-04-21

## 2022-04-21 ASSESSMENT — LOCATION DETAILED DESCRIPTION DERM
LOCATION DETAILED: RIGHT ANTECUBITAL SKIN
LOCATION DETAILED: RIGHT SUPERIOR OCCIPITAL SCALP
LOCATION DETAILED: RIGHT MEDIAL FRONTAL SCALP
LOCATION DETAILED: LEFT MEDIAL FRONTAL SCALP
LOCATION DETAILED: RIGHT SUPERIOR PARIETAL SCALP

## 2022-04-21 ASSESSMENT — LOCATION ZONE DERM
LOCATION ZONE: SCALP
LOCATION ZONE: ARM

## 2022-04-21 ASSESSMENT — LOCATION SIMPLE DESCRIPTION DERM
LOCATION SIMPLE: RIGHT UPPER ARM
LOCATION SIMPLE: RIGHT OCCIPITAL SCALP
LOCATION SIMPLE: SCALP
LOCATION SIMPLE: RIGHT SCALP
LOCATION SIMPLE: LEFT SCALP

## 2022-04-21 NOTE — HPI: SKIN LESION
What Type Of Note Output Would You Prefer (Optional)?: Bullet Format
How Severe Is Your Skin Lesion?: mild
Has Your Skin Lesion Been Treated?: not been treated
Is This A New Presentation, Or A Follow-Up?: Skin Lesions
Additional History: Patient has used Tgel and Clobetasol scalp solution. Patient reports Tgel has not helped at all. Patients PCP prescribed Clobetasol scalp solution but reports this burned with application. Patient reports sores don’t ever resolve. Patient feels her hair has been falling out as well. Patient presents for further evaluation and treatment options.

## 2022-04-21 NOTE — PROCEDURE: VENIPUNCTURE
Bill For Individual Tests Below?: no
Venipuncture Paragraph: An alcohol pad was applied to the venipuncture site. Venipuncture was performed using a butterfly needle. Pressure and a bandaid was applied to the site. No complications were noted.
Number Of Tubes Drawn: 4
Detail Level: None

## 2022-05-16 ENCOUNTER — RX ONLY (RX ONLY)
Age: 62
End: 2022-05-16

## 2022-05-16 RX ORDER — ERGOCALCIFEROL 1.25 MG/1
CAPSULE ORAL
Qty: 6 | Refills: 0 | Status: ERX | COMMUNITY
Start: 2022-05-16

## 2022-05-24 ENCOUNTER — APPOINTMENT (OUTPATIENT)
Dept: URBAN - METROPOLITAN AREA CLINIC 254 | Age: 62
Setting detail: DERMATOLOGY
End: 2022-05-27

## 2022-05-24 VITALS — RESPIRATION RATE: 14 BRPM | HEIGHT: 69 IN | WEIGHT: 148 LBS

## 2022-05-24 DIAGNOSIS — L30.4 ERYTHEMA INTERTRIGO: ICD-10-CM

## 2022-05-24 DIAGNOSIS — R21 RASH AND OTHER NONSPECIFIC SKIN ERUPTION: ICD-10-CM

## 2022-05-24 PROBLEM — D23.5 OTHER BENIGN NEOPLASM OF SKIN OF TRUNK: Status: ACTIVE | Noted: 2022-05-24

## 2022-05-24 PROCEDURE — OTHER ADDITIONAL NOTES: OTHER

## 2022-05-24 PROCEDURE — OTHER COUNSELING: OTHER

## 2022-05-24 PROCEDURE — 99214 OFFICE O/P EST MOD 30 MIN: CPT | Mod: 25

## 2022-05-24 PROCEDURE — 11102 TANGNTL BX SKIN SINGLE LES: CPT

## 2022-05-24 PROCEDURE — OTHER PRESCRIPTION: OTHER

## 2022-05-24 PROCEDURE — OTHER MIPS QUALITY: OTHER

## 2022-05-24 PROCEDURE — OTHER BIOPSY BY SHAVE METHOD: OTHER

## 2022-05-24 RX ORDER — KETOCONAZOLE 20 MG/G
2% CREAM TOPICAL BID
Qty: 30 | Refills: 3 | Status: ERX | COMMUNITY
Start: 2022-05-24

## 2022-05-24 ASSESSMENT — LOCATION DETAILED DESCRIPTION DERM
LOCATION DETAILED: RIGHT OCCIPITAL SCALP
LOCATION DETAILED: RIGHT RIB CAGE
LOCATION DETAILED: LEFT RIB CAGE

## 2022-05-24 ASSESSMENT — LOCATION ZONE DERM
LOCATION ZONE: TRUNK
LOCATION ZONE: SCALP

## 2022-05-24 ASSESSMENT — LOCATION SIMPLE DESCRIPTION DERM
LOCATION SIMPLE: POSTERIOR SCALP
LOCATION SIMPLE: ABDOMEN

## 2022-05-24 NOTE — PROCEDURE: ADDITIONAL NOTES
Detail Level: Simple
Render Risk Assessment In Note?: no
Additional Notes: Advised patient to purchase Selsun Blue shampoo to take the place of the Coal tar shampoo to be used three times weekly, left in scalp for ten minutes minimum before rinsing. Also advised patient to use mineral oil/coconut oil on the scalp to help soften hard crusted plaques. Patient expressed understanding.

## 2022-05-24 NOTE — PROCEDURE: BIOPSY BY SHAVE METHOD
Billing Type: Third-Party Bill
Hide Anesthesia Volume?: No
Biopsy Method: Dermablade
Size Of Lesion In Cm: 0
Dressing: bandage
Curettage Text: The wound bed was treated with curettage after the biopsy was performed.
Anesthesia Type: 1% lidocaine with epinephrine
Detail Level: Detailed
Consent: Written consent was obtained and risks were reviewed including but not limited to scarring, infection, bleeding, scabbing, incomplete removal, nerve damage and allergy to anesthesia.
Post-Care Instructions: I reviewed with the patient in detail post-care instructions. Patient is to keep the biopsy site dry overnight, and then apply bacitracin twice daily until healed. Patient may apply hydrogen peroxide soaks to remove any crusting.
Electrodesiccation And Curettage Text: The wound bed was treated with electrodesiccation and curettage after the biopsy was performed.
Wound Care: Petrolatum
Was A Bandage Applied: Yes
Biopsy Type: H and E
Type Of Destruction Used: Curettage
Cryotherapy Text: The wound bed was treated with cryotherapy after the biopsy was performed.
Silver Nitrate Text: The wound bed was treated with silver nitrate after the biopsy was performed.
Notification Instructions: Patient will be notified of biopsy results. However, patient instructed to call the office if not contacted within 2 weeks.
Anesthesia Volume In Cc (Will Not Render If 0): 0.5
Depth Of Biopsy: dermis
Hemostasis: Drysol
Information: Selecting Yes will display possible errors in your note based on the variables you have selected. This validation is only offered as a suggestion for you. PLEASE NOTE THAT THE VALIDATION TEXT WILL BE REMOVED WHEN YOU FINALIZE YOUR NOTE. IF YOU WANT TO FAX A PRELIMINARY NOTE YOU WILL NEED TO TOGGLE THIS TO 'NO' IF YOU DO NOT WANT IT IN YOUR FAXED NOTE.
Electrodesiccation Text: The wound bed was treated with electrodesiccation after the biopsy was performed.

## 2022-05-24 NOTE — PROCEDURE: MIPS QUALITY
Quality 110: Preventive Care And Screening: Influenza Immunization: Influenza immunization was not ordered or administered, reason not given
Quality 402: Tobacco Use And Help With Quitting Among Adolescents: Patient screened for tobacco and is an ex-smoker
Quality 130: Documentation Of Current Medications In The Medical Record: Current Medications Documented
Quality 431: Preventive Care And Screening: Unhealthy Alcohol Use - Screening: Patient not identified as an unhealthy alcohol user when screened for unhealthy alcohol use using a systematic screening method
Detail Level: Detailed

## 2022-06-02 DIAGNOSIS — F31.76 BIPOLAR DISORDER, IN FULL REMISSION, MOST RECENT EPISODE DEPRESSED (H): ICD-10-CM

## 2022-06-02 DIAGNOSIS — I10 ESSENTIAL HYPERTENSION: ICD-10-CM

## 2022-06-02 DIAGNOSIS — F41.1 ANXIETY STATE: ICD-10-CM

## 2022-06-02 RX ORDER — PROPRANOLOL HYDROCHLORIDE 10 MG/1
10 TABLET ORAL 2 TIMES DAILY
Qty: 60 TABLET | Refills: 1 | Status: SHIPPED | OUTPATIENT
Start: 2022-06-02 | End: 2022-08-04

## 2022-06-02 NOTE — TELEPHONE ENCOUNTER
Reason for Call:  Medication or medication refill:    Do you use a [facility]?  Name of the pharmacy and phone number for the current request:    [facility]    Name of the medication requested:   PARoxetine (PAXIL) 10 MG tablet  propranolol (INDERAL) 10 MG tablet    Other request: NA    Can we leave a detailed message on this number? YES    Phone number patient can be reached at: Cell number on file:    Telephone Information:   Mobile [phone]       Best Time: any    Call taken on 6/2/2022 at 8:22 AM by Lucy Finn

## 2022-06-02 NOTE — TELEPHONE ENCOUNTER
Paxil:  Routing refill request to provider for review/approval because:  Last Rx from outside provider    Gricel SANTIAGO RN

## 2022-06-02 NOTE — TELEPHONE ENCOUNTER
Propranolol:  Prescription approved per [facility] Refill Protocol.    Gricel SANTIAGO RN

## 2022-06-03 NOTE — TELEPHONE ENCOUNTER
From my last note:          referred to Psych for ongoing management of medications and adjustments as needed       Seems to be a lot of people today who did not follow up as instructed, they were supposed to let us know if cannot get in to Psychiatry in a timely manner.     I can bridge for 1-2 more weeks to hold her over, NO further refills from me until has at least a Med review with Psychiatry.     Thanks,   Trini MENDEZ CNP

## 2022-06-03 NOTE — TELEPHONE ENCOUNTER
Pt called to make appointment for psych. Was told to get a [facility] order for a shorter term psychiatry.      Patient feels like she is doing well and doesn't want to change anything. She had the understanding that Trini would be able to fill the medications after Dr. Hong left.  She only has 7 days worth of the Paxil medication.         Patient requesting that Trini Johnson call her.    Francheska Everett RN  [facility]

## 2022-06-03 NOTE — TELEPHONE ENCOUNTER
Left message for pt to call back regarding refill and referral    See provider Mychart notes from provider 6/2/22    Francheska Everett RN  [facility]

## 2022-06-03 NOTE — TELEPHONE ENCOUNTER
She called to make an appointment for psych. They told her that she needed a different order. They told her to get a [facility] order for a shorter term psychiatry.     Patient feels like she is doing well and doesn't want to change anything. She had the understanding that Trini would be able to fill the medications after Dr. Hong left.  She only has 7 days worth of the Paxil medication.     Relayed information from Trini's note.    Zuly is requesting that Trini Pardo to call her.    Please follow up.    ELI Ordaz RN

## 2022-06-06 RX ORDER — PAROXETINE 10 MG/1
20 TABLET, FILM COATED ORAL AT BEDTIME
Qty: 60 TABLET | Refills: 3 | Status: SHIPPED | OUTPATIENT
Start: 2022-06-06 | End: 2022-06-08

## 2022-06-07 NOTE — TELEPHONE ENCOUNTER
Trini,  Informed pt below.  First available 40 min OV is august. Ok to wait until then or ok to use VV slot earlier or 3:20 20 min slot 7/19?  Please advise.  Thanks,  Keturah Duff RN

## 2022-06-07 NOTE — TELEPHONE ENCOUNTER
Okay I did refill this. Then if she wants me to do medication management without Psych oversight, please have her schedule an appointment with me every month or so for now, we can do less as time goes on if nothing changes.   Please help her get soonest avail for a MH follow up     Thanks,   Trini

## 2022-06-08 ENCOUNTER — RX ONLY (RX ONLY)
Age: 62
End: 2022-06-08

## 2022-06-08 RX ORDER — BETAMETHASONE DIPROPIONATE 0.5 MG/ML
LOTION TOPICAL
Qty: 60 | Refills: 0 | Status: ERX | COMMUNITY
Start: 2022-06-08

## 2022-06-08 RX ORDER — PAROXETINE 20 MG/1
20 TABLET, FILM COATED ORAL AT BEDTIME
Qty: 90 TABLET | Refills: 1 | Status: SHIPPED | OUTPATIENT
Start: 2022-06-08 | End: 2022-09-19

## 2022-06-08 NOTE — TELEPHONE ENCOUNTER
Patient called to follow up. Pharmacy informed her insurance will not cover dosage. Is requesting Paxil 20mg 1x daily be sent.

## 2022-06-30 ENCOUNTER — RX ONLY (RX ONLY)
Age: 62
End: 2022-06-30

## 2022-06-30 ENCOUNTER — APPOINTMENT (OUTPATIENT)
Dept: URBAN - METROPOLITAN AREA CLINIC 254 | Age: 62
Setting detail: DERMATOLOGY
End: 2022-07-01

## 2022-06-30 VITALS — WEIGHT: 148 LBS | HEIGHT: 69 IN

## 2022-06-30 DIAGNOSIS — L40.0 PSORIASIS VULGARIS: ICD-10-CM

## 2022-06-30 PROCEDURE — OTHER COUNSELING: OTHER

## 2022-06-30 PROCEDURE — OTHER MIPS QUALITY: OTHER

## 2022-06-30 PROCEDURE — 99213 OFFICE O/P EST LOW 20 MIN: CPT

## 2022-06-30 RX ORDER — BETAMETHASONE DIPROPIONATE 0.5 MG/ML
LOTION TOPICAL
Qty: 60 | Refills: 2 | Status: ERX | COMMUNITY
Start: 2022-06-30

## 2022-06-30 ASSESSMENT — LOCATION DETAILED DESCRIPTION DERM: LOCATION DETAILED: MID-FRONTAL SCALP

## 2022-06-30 ASSESSMENT — LOCATION ZONE DERM: LOCATION ZONE: SCALP

## 2022-06-30 ASSESSMENT — LOCATION SIMPLE DESCRIPTION DERM: LOCATION SIMPLE: ANTERIOR SCALP

## 2022-06-30 ASSESSMENT — PGA PSORIASIS: PGA PSORIASIS 2020: MODERATE

## 2022-06-30 NOTE — PROCEDURE: MIPS QUALITY
Quality 110: Preventive Care And Screening: Influenza Immunization: Influenza Immunization previously received during influenza season
Quality 402: Tobacco Use And Help With Quitting Among Adolescents: Patient screened for tobacco and never smoked
Quality 431: Preventive Care And Screening: Unhealthy Alcohol Use - Screening: Patient not identified as an unhealthy alcohol user when screened for unhealthy alcohol use using a systematic screening method
Quality 130: Documentation Of Current Medications In The Medical Record: Current Medications Documented
Detail Level: Detailed

## 2022-06-30 NOTE — PROCEDURE: COUNSELING
Patient Specific Counseling (Will Not Stick From Patient To Patient): -Discussed biologics vs Topicals vs methotrexate (she does not drink) and pt wanted to stay on creams for a couple more months to see if this can be controlled with topical as this has only been a problem for the past 6 months. With her bipolar disorder she prefers to not go on more pills if possible.
Detail Level: Detailed

## 2022-07-19 ENCOUNTER — OFFICE VISIT (OUTPATIENT)
Dept: FAMILY MEDICINE | Facility: CLINIC | Age: 62
End: 2022-07-19
Payer: COMMERCIAL

## 2022-07-19 VITALS
HEART RATE: 65 BPM | TEMPERATURE: 97.2 F | OXYGEN SATURATION: 98 % | SYSTOLIC BLOOD PRESSURE: 131 MMHG | DIASTOLIC BLOOD PRESSURE: 75 MMHG | BODY MASS INDEX: 22.74 KG/M2 | WEIGHT: 154 LBS

## 2022-07-19 DIAGNOSIS — F34.1 DYSTHYMIC DISORDER: ICD-10-CM

## 2022-07-19 DIAGNOSIS — F31.76 BIPOLAR DISORDER, IN FULL REMISSION, MOST RECENT EPISODE DEPRESSED (H): Primary | ICD-10-CM

## 2022-07-19 DIAGNOSIS — E55.9 VITAMIN D DEFICIENCY: ICD-10-CM

## 2022-07-19 DIAGNOSIS — F41.1 ANXIETY STATE: ICD-10-CM

## 2022-07-19 DIAGNOSIS — I10 ESSENTIAL HYPERTENSION: ICD-10-CM

## 2022-07-19 PROCEDURE — 36415 COLL VENOUS BLD VENIPUNCTURE: CPT | Performed by: NURSE PRACTITIONER

## 2022-07-19 PROCEDURE — 99214 OFFICE O/P EST MOD 30 MIN: CPT | Performed by: NURSE PRACTITIONER

## 2022-07-19 PROCEDURE — 80178 ASSAY OF LITHIUM: CPT | Performed by: NURSE PRACTITIONER

## 2022-07-19 PROCEDURE — 80053 COMPREHEN METABOLIC PANEL: CPT | Performed by: NURSE PRACTITIONER

## 2022-07-19 PROCEDURE — 82306 VITAMIN D 25 HYDROXY: CPT | Performed by: NURSE PRACTITIONER

## 2022-07-19 RX ORDER — HYDROXYZINE HYDROCHLORIDE 25 MG/1
25 TABLET, FILM COATED ORAL 3 TIMES DAILY PRN
Qty: 60 TABLET | Refills: 1 | Status: SHIPPED | OUTPATIENT
Start: 2022-07-19 | End: 2022-08-15 | Stop reason: SINTOL

## 2022-07-19 ASSESSMENT — PAIN SCALES - GENERAL: PAINLEVEL: MILD PAIN (3)

## 2022-07-19 NOTE — PROGRESS NOTES
Assessment & Plan       ICD-10-CM    1. Bipolar disorder, in full remission, most recent episode depressed (H)  F31.76 Adult Mental Health  Referral     Lithium level     Comprehensive metabolic panel (BMP + Alb, Alk Phos, ALT, AST, Total. Bili, TP)   2. Anxiety state  F41.1 hydrOXYzine (ATARAX) 25 MG tablet     Adult Mental Health  Referral     Comprehensive metabolic panel (BMP + Alb, Alk Phos, ALT, AST, Total. Bili, TP)   3. Dysthymic disorder  F34.1 Adult [facility]   4. Essential hypertension  I10 Comprehensive metabolic panel (BMP + Alb, Alk Phos, ALT, AST, Total. Bili, TP)   5. Vitamin D deficiency  E55.9 Vitamin D Deficiency    doing well, due for lab work for med mgmt, follow up in [facility] as indicated  Needs to establish with long term counseling and Psych for oversight prn   Continue good work on self care follow up earlier prn              Regular exercise  See Patient Instructions    Return in about 4 weeks (around 8/16/2022) for for follow up visit, earlier if any concerns.    ANDREA Maya [facility]    Missy Caruso is a 62 year old, presenting for the following health issues:  RECHECK      History of Present Illness       Reason for visit:  Med check    She eats 0-1 servings of fruits and vegetables daily.She consumes 0 sweetened beverage(s) daily.She exercises with enough effort to increase her heart rate 10 to 19 minutes per day.  She exercises with enough effort to increase her heart rate 3 or less days per week.   She is taking medications regularly.     Noted she is seeing a dermatologist for some psoriasis of her scalp, is kind of a slow process but is getting better. Also says she feels like anxiety rises up some times but she usually can talk herself down to a reasonable level, comes and goes. Mentioned she does get lightheaded sometimes but thinks it is when she does not drink enough water. Doesn't skip  meals, no HA or n/v, she is not sure if had anemia in past, ferritin wa snormal last year    vit D low normal and reports is taking it but not sure of dose    HTN at goal, stopped hydrochlorothiazide but taking propanolol, BP at goal today,     Sometimes anxious such as getting teeth pulled, but other times for no reason just feels anxious  Last time ordered atarax for anxiety but said for itching so she forgot and didn't try it        Review of Systems   Constitutional, HEENT, cardiovascular, pulmonary, GI, , musculoskeletal, neuro, skin, endocrine and psych systems are negative, except as otherwise noted.      Objective    /75 (BP Location: Left arm, Patient Position: Sitting, Cuff Size: Adult Regular)   Pulse 65   Temp 97.2  F (36.2  C) (Temporal)   Wt 69.9 kg (154 lb)   LMP 01/30/2014 (Exact Date)   SpO2 98%   BMI 22.74 kg/m    There is no height or weight on file to calculate BMI.  Physical Exam   GENERAL: healthy, alert and no distress  EYES: Eyes grossly normal to inspection, PERRL and conjunctivae and sclerae normal  MS: no gross musculoskeletal defects noted, no edema  SKIN: no suspicious lesions or rashes  NEURO: Normal strength and tone, mentation intact and speech normal  PSYCH: MENTAL STATUS EXAM  Appearance: appropriate  Attitude: cooperative  Behavior: normal  Eye Contact: 60-70 % not looking down constantly like has in the past  Speech: normal  Orientation: oriented to person , place, time and situation  Mood: states her mood has been stable/ good   Affect: Normal/bright and slightly anxious, more pleasant than in the past able to talk more freely  Thought Process: clear     Office Visit on 03/23/2022   Component Date Value Ref Range Status     Occult Blood Screen FIT 03/30/2022 Negative  Negative Final                   .  ..

## 2022-07-19 NOTE — PATIENT INSTRUCTIONS
Let us know how much Vitamin D you are taking so we can update your med list and  appropriately

## 2022-07-20 LAB
ALBUMIN SERPL-MCNC: 3.9 G/DL (ref 3.4–5)
ALP SERPL-CCNC: 63 U/L (ref 40–150)
ALT SERPL W P-5'-P-CCNC: 21 U/L (ref 0–50)
ANION GAP SERPL CALCULATED.3IONS-SCNC: 5 MMOL/L (ref 3–14)
AST SERPL W P-5'-P-CCNC: 13 U/L (ref 0–45)
BILIRUB SERPL-MCNC: 0.3 MG/DL (ref 0.2–1.3)
BUN SERPL-MCNC: 17 MG/DL (ref 7–30)
CALCIUM SERPL-MCNC: 9.1 MG/DL (ref 8.5–10.1)
CHLORIDE BLD-SCNC: 112 MMOL/L (ref 94–109)
CO2 SERPL-SCNC: 26 MMOL/L (ref 20–32)
CREAT SERPL-MCNC: 0.9 MG/DL (ref 0.52–1.04)
GFR SERPL CREATININE-BSD FRML MDRD: 72 ML/MIN/1.73M2
GLUCOSE BLD-MCNC: 82 MG/DL (ref 70–99)
LITHIUM SERPL-SCNC: 0.4 MMOL/L
POTASSIUM BLD-SCNC: 4 MMOL/L (ref 3.4–5.3)
PROT SERPL-MCNC: 7.3 G/DL (ref 6.8–8.8)
SODIUM SERPL-SCNC: 143 MMOL/L (ref 133–144)

## 2022-07-21 LAB — DEPRECATED CALCIDIOL+CALCIFEROL SERPL-MC: 18 UG/L (ref 20–75)

## 2022-08-04 DIAGNOSIS — F31.76 BIPOLAR DISORDER, IN FULL REMISSION, MOST RECENT EPISODE DEPRESSED (H): ICD-10-CM

## 2022-08-04 DIAGNOSIS — I10 ESSENTIAL HYPERTENSION: ICD-10-CM

## 2022-08-04 DIAGNOSIS — F41.1 ANXIETY STATE: ICD-10-CM

## 2022-08-04 RX ORDER — PROPRANOLOL HYDROCHLORIDE 10 MG/1
10 TABLET ORAL 2 TIMES DAILY
Qty: 60 TABLET | Refills: 1 | Status: SHIPPED | OUTPATIENT
Start: 2022-08-04 | End: 2022-09-19

## 2022-08-04 NOTE — TELEPHONE ENCOUNTER
"Requested Prescriptions   Pending Prescriptions Disp Refills     gabapentin (NEURONTIN) 100 MG capsule 180 capsule 3     Sig: Take 2 capsules (200 mg) by mouth 3 times daily       There is no refill protocol information for this order        propranolol (INDERAL) 10 MG tablet 60 tablet 1     Sig: Take 1 tablet (10 mg) by mouth 2 times daily       Beta-Blockers Protocol Passed - 8/4/2022  8:28 AM        Passed - Blood pressure under 140/90 in past 12 months     BP Readings from Last 3 Encounters:   07/19/22 131/75   03/23/22 120/66   09/27/21 131/69                 Passed - Patient is age 6 or older        Passed - Recent (12 mo) or future (30 days) visit within the authorizing provider's specialty     Patient has had an office visit with the authorizing provider or a provider within the authorizing providers department within the previous 12 mos or has a future within next 30 days. See \"Patient Info\" tab in inbasket, or \"Choose Columns\" in Meds & Orders section of the refill encounter.              Passed - Medication is active on med list           Propranolol refilled per protocol.  Joanna AVILES RN    "

## 2022-08-05 RX ORDER — GABAPENTIN 100 MG/1
200 CAPSULE ORAL 3 TIMES DAILY
Qty: 180 CAPSULE | Refills: 3 | Status: SHIPPED | OUTPATIENT
Start: 2022-08-05 | End: 2023-01-05

## 2022-08-15 ENCOUNTER — VIRTUAL VISIT (OUTPATIENT)
Dept: FAMILY MEDICINE | Facility: CLINIC | Age: 62
End: 2022-08-15
Payer: COMMERCIAL

## 2022-08-15 DIAGNOSIS — E55.9 VITAMIN D DEFICIENCY: Primary | ICD-10-CM

## 2022-08-15 DIAGNOSIS — F41.1 ANXIETY STATE: ICD-10-CM

## 2022-08-15 DIAGNOSIS — I10 ESSENTIAL HYPERTENSION: ICD-10-CM

## 2022-08-15 DIAGNOSIS — Z12.31 VISIT FOR SCREENING MAMMOGRAM: ICD-10-CM

## 2022-08-15 DIAGNOSIS — Z12.4 CERVICAL CANCER SCREENING: ICD-10-CM

## 2022-08-15 PROCEDURE — 99213 OFFICE O/P EST LOW 20 MIN: CPT | Mod: 95 | Performed by: NURSE PRACTITIONER

## 2022-08-15 RX ORDER — CHOLECALCIFEROL (VITAMIN D3) 50 MCG
1 TABLET ORAL DAILY
Qty: 90 TABLET | Refills: 3 | Status: SHIPPED | OUTPATIENT
Start: 2022-08-15 | End: 2023-07-03

## 2022-08-30 ENCOUNTER — APPOINTMENT (OUTPATIENT)
Dept: URBAN - METROPOLITAN AREA CLINIC 254 | Age: 62
Setting detail: DERMATOLOGY
End: 2022-09-01

## 2022-08-30 VITALS — WEIGHT: 148 LBS | RESPIRATION RATE: 14 BRPM | HEIGHT: 69 IN

## 2022-08-30 DIAGNOSIS — L40.0 PSORIASIS VULGARIS: ICD-10-CM

## 2022-08-30 PROCEDURE — OTHER VENIPUNCTURE: OTHER

## 2022-08-30 PROCEDURE — 36415 COLL VENOUS BLD VENIPUNCTURE: CPT

## 2022-08-30 PROCEDURE — 99214 OFFICE O/P EST MOD 30 MIN: CPT

## 2022-08-30 PROCEDURE — OTHER SKYRIZI INITIATION: OTHER

## 2022-08-30 PROCEDURE — OTHER ORDER TESTS: OTHER

## 2022-08-30 PROCEDURE — OTHER COUNSELING: OTHER

## 2022-08-30 PROCEDURE — OTHER PRESCRIPTION: OTHER

## 2022-08-30 PROCEDURE — OTHER MIPS QUALITY: OTHER

## 2022-08-30 RX ORDER — RISANKIZUMAB-RZAA 150 MG/ML
150MG INJECTION SUBCUTANEOUS
Qty: 1 | Refills: 0 | Status: ERX | COMMUNITY
Start: 2022-08-30

## 2022-08-30 RX ORDER — RISANKIZUMAB-RZAA 150 MG/ML
150MG INJECTION SUBCUTANEOUS
Qty: 2 | Refills: 0 | Status: ERX

## 2022-08-30 ASSESSMENT — LOCATION SIMPLE DESCRIPTION DERM
LOCATION SIMPLE: RIGHT UPPER ARM
LOCATION SIMPLE: SCALP
LOCATION SIMPLE: POSTERIOR SCALP
LOCATION SIMPLE: LEFT FOREHEAD

## 2022-08-30 ASSESSMENT — LOCATION DETAILED DESCRIPTION DERM
LOCATION DETAILED: RIGHT ANTECUBITAL SKIN
LOCATION DETAILED: RIGHT CENTRAL PARIETAL SCALP
LOCATION DETAILED: LEFT CENTRAL PARIETAL SCALP
LOCATION DETAILED: LEFT SUPERIOR MEDIAL FOREHEAD
LOCATION DETAILED: MID-OCCIPITAL SCALP

## 2022-08-30 ASSESSMENT — PGA PSORIASIS: PGA PSORIASIS 2020: MODERATE

## 2022-08-30 ASSESSMENT — LOCATION ZONE DERM
LOCATION ZONE: ARM
LOCATION ZONE: FACE
LOCATION ZONE: SCALP

## 2022-08-30 ASSESSMENT — BSA PSORIASIS: % BODY COVERED IN PSORIASIS: 10

## 2022-08-30 ASSESSMENT — ITCH NUMERIC RATING SCALE: HOW SEVERE IS YOUR ITCHING?: 5

## 2022-08-30 NOTE — PROCEDURE: MIPS QUALITY
Detail Level: Detailed
Quality 130: Documentation Of Current Medications In The Medical Record: Current Medications Documented
Quality 337: Tuberculosis Prevention For Psoriasis And Psoriatic Arthritis Patients On A Biological Immune Response Modifier: Patient has a documented negative TB screening prior to treatment.
Quality 110: Preventive Care And Screening: Influenza Immunization: Influenza Immunization previously received during influenza season
Quality 431: Preventive Care And Screening: Unhealthy Alcohol Use - Screening: Patient not identified as an unhealthy alcohol user when screened for unhealthy alcohol use using a systematic screening method
Quality 402: Tobacco Use And Help With Quitting Among Adolescents: Patient screened for tobacco and never smoked
Quality 410: Psoriasis Clinical Response To Oral Systemic Or Biologic Mediations: Patient has been on biologic or system therapy for less than 6 months

## 2022-08-30 NOTE — PROCEDURE: SKYRIZI INITIATION
Skyrizi Monitoring Guidelines: - Discussed that a panel of labs need to be drawn at baseline and monitored periodically. \\n- Additionally, the patient will need to be screened for tuberculosis and Hepatitis B annually including at baseline.\\n- It is important to not take drug holidays unless otherwise instructed as this can affect a patient's ability to recapture the same degree of skin clearance upon restarting.

## 2022-08-30 NOTE — HPI: RASH (PSORIASIS)
Do You Have A Family History Of Psoriasis?: no
How Severe Is Your Psoriasis?: moderate
Is This A New Presentation, Or A Follow-Up?: Follow Up Psoriasis
Additional History: 6/30/2022-Discussed biologics vs Topicals vs methotrexate (she does not drink) and pt wanted to stay on creams for a\\ncouple more months to see if this can be controlled with topical as this has only been a problem for the past 6\\nmonths. With her bipolar disorder she prefers to not go on more pills if possible.

## 2022-09-02 NOTE — PROGRESS NOTES
" 06/26/21 2200   Groups   Details    (Psychotherapy group)   Number of patients attending the group:  2  Group Length:  1 Hours     Group Therapy Type: Psychotherapy Process     Summary of Group / Topics Discussed:        The  Psychotherapy group goal is to promote insight to positive choice and change. Group processing is within a supportive and safe environment. Patients will process emotions using verbal group and expressive psychotherapy interventions including visual art/writing interventions.     Group interventions support patients by: creative self expression, emotional regulation and hope/optimism- \" What is in my heart.\"     Modalities to reach these goals include: Narrative psychology and Expressive Arts Therapies     Subjective -patient report of mood today- \"pretty good, better, still slow motion     Objective/ Intervention- Goal of group and Therapeutic modality utilized- What is in my heart directive, exploration of feelings and what is important to you     Group Response- small group engaged     Patient Response- Pts had a blunted affect. She needed repetition, she seemed to be experiencing some brain fog. She did brighten with music and the activity and said that the group had helped her mood. 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     Juarez Molina, LMFT, ATR-BC          " Adequate: hears normal conversation without difficulty

## 2022-09-15 DIAGNOSIS — I10 ESSENTIAL HYPERTENSION: ICD-10-CM

## 2022-09-15 DIAGNOSIS — F41.1 ANXIETY STATE: ICD-10-CM

## 2022-09-15 DIAGNOSIS — F31.76 BIPOLAR DISORDER, IN FULL REMISSION, MOST RECENT EPISODE DEPRESSED (H): ICD-10-CM

## 2022-09-19 RX ORDER — LITHIUM CARBONATE 150 MG/1
450 CAPSULE ORAL AT BEDTIME
Qty: 30 CAPSULE | Refills: 0 | Status: SHIPPED | OUTPATIENT
Start: 2022-09-19 | End: 2022-10-19

## 2022-09-19 RX ORDER — PAROXETINE 20 MG/1
20 TABLET, FILM COATED ORAL AT BEDTIME
Qty: 30 TABLET | Refills: 0 | Status: SHIPPED | OUTPATIENT
Start: 2022-09-19 | End: 2022-12-21

## 2022-09-19 RX ORDER — PROPRANOLOL HYDROCHLORIDE 10 MG/1
10 TABLET ORAL 2 TIMES DAILY
Qty: 60 TABLET | Refills: 0 | Status: SHIPPED | OUTPATIENT
Start: 2022-09-19 | End: 2022-12-19

## 2022-09-19 RX ORDER — OLANZAPINE 2.5 MG/1
2.5 TABLET, FILM COATED ORAL AT BEDTIME
Qty: 30 TABLET | Refills: 0 | Status: SHIPPED | OUTPATIENT
Start: 2022-09-19

## 2022-10-10 ENCOUNTER — HEALTH MAINTENANCE LETTER (OUTPATIENT)
Age: 62
End: 2022-10-10

## 2022-10-13 ENCOUNTER — OFFICE VISIT (OUTPATIENT)
Dept: FAMILY MEDICINE | Facility: CLINIC | Age: 62
End: 2022-10-13
Payer: COMMERCIAL

## 2022-10-13 VITALS
TEMPERATURE: 98.6 F | RESPIRATION RATE: 16 BRPM | HEIGHT: 69 IN | OXYGEN SATURATION: 100 % | WEIGHT: 165 LBS | HEART RATE: 86 BPM | SYSTOLIC BLOOD PRESSURE: 116 MMHG | DIASTOLIC BLOOD PRESSURE: 72 MMHG | BODY MASS INDEX: 24.44 KG/M2

## 2022-10-13 DIAGNOSIS — Z12.4 CERVICAL CANCER SCREENING: ICD-10-CM

## 2022-10-13 DIAGNOSIS — Z12.31 VISIT FOR SCREENING MAMMOGRAM: ICD-10-CM

## 2022-10-13 DIAGNOSIS — Z00.00 ROUTINE GENERAL MEDICAL EXAMINATION AT A HEALTH CARE FACILITY: Primary | ICD-10-CM

## 2022-10-13 PROCEDURE — 84443 ASSAY THYROID STIM HORMONE: CPT | Performed by: STUDENT IN AN ORGANIZED HEALTH CARE EDUCATION/TRAINING PROGRAM

## 2022-10-13 PROCEDURE — 99396 PREV VISIT EST AGE 40-64: CPT | Performed by: STUDENT IN AN ORGANIZED HEALTH CARE EDUCATION/TRAINING PROGRAM

## 2022-10-13 PROCEDURE — 36415 COLL VENOUS BLD VENIPUNCTURE: CPT | Performed by: STUDENT IN AN ORGANIZED HEALTH CARE EDUCATION/TRAINING PROGRAM

## 2022-10-13 PROCEDURE — 87624 HPV HI-RISK TYP POOLED RSLT: CPT | Performed by: STUDENT IN AN ORGANIZED HEALTH CARE EDUCATION/TRAINING PROGRAM

## 2022-10-13 PROCEDURE — G0145 SCR C/V CYTO,THINLAYER,RESCR: HCPCS | Performed by: STUDENT IN AN ORGANIZED HEALTH CARE EDUCATION/TRAINING PROGRAM

## 2022-10-13 ASSESSMENT — ENCOUNTER SYMPTOMS
EYE PAIN: 0
CHILLS: 0
HEMATURIA: 0
HEMATOCHEZIA: 0
HEARTBURN: 0
FEVER: 0
HEADACHES: 0
JOINT SWELLING: 0
FREQUENCY: 0
ARTHRALGIAS: 0
PARESTHESIAS: 0
NAUSEA: 0
WEAKNESS: 0
DIARRHEA: 0
MYALGIAS: 0
DYSURIA: 0
DIZZINESS: 1
CONSTIPATION: 0
NERVOUS/ANXIOUS: 1
SORE THROAT: 0
SHORTNESS OF BREATH: 0
PALPITATIONS: 0

## 2022-10-13 ASSESSMENT — PATIENT HEALTH QUESTIONNAIRE - PHQ9
10. IF YOU CHECKED OFF ANY PROBLEMS, HOW DIFFICULT HAVE THESE PROBLEMS MADE IT FOR YOU TO DO YOUR WORK, TAKE CARE OF THINGS AT HOME, OR GET ALONG WITH OTHER PEOPLE: NOT DIFFICULT AT ALL
SUM OF ALL RESPONSES TO PHQ QUESTIONS 1-9: 2
SUM OF ALL RESPONSES TO PHQ QUESTIONS 1-9: 2

## 2022-10-13 NOTE — PATIENT INSTRUCTIONS
Remember to breath and meditate while you are in the car. You have power over your situation! Keep up the great work!     The team will let you know of your pap results once they return.   Please follow-up with the mammogram team once completed!    Thank you for choosing [facility] and also for choosing me as your Physician. :)    Have a wonderful day     -Dr. Augustina Valdovinos     Preventive Health Recommendations  Female Ages 50 - 64    Yearly exam: See your health care provider every year in order to  Review health changes.   Discuss preventive care.    Review your medicines if your doctor has prescribed any.    Get a Pap test every three years (unless you have an abnormal result and your provider advises testing more often).  If you get Pap tests with HPV test, you only need to test every 5 years, unless you have an abnormal result.   You do not need a Pap test if your uterus was removed (hysterectomy) and you have not had cancer.  You should be tested each year for STDs (sexually transmitted diseases) if you're at risk.   Have a mammogram every 1 to 2 years.  Have a colonoscopy at age 50, or have a yearly FIT test (stool test). These exams screen for colon cancer.    Have a cholesterol test every 5 years, or more often if advised.  Have a diabetes test (fasting glucose) every three years. If you are at risk for diabetes, you should have this test more often.   If you are at risk for osteoporosis (brittle bone disease), think about having a bone density scan (DEXA).    Shots: Get a flu shot each year. Get a tetanus shot every 10 years.    Nutrition:   Eat at least 5 servings of fruits and vegetables each day.  Eat whole-grain bread, whole-wheat pasta and brown rice instead of white grains and rice.  Get adequate Calcium and Vitamin D.     Lifestyle  Exercise at least 150 minutes a week (30 minutes a day, 5 days a week). This will help you control your weight and prevent disease.  Limit alcohol to one drink  per day.  No smoking.   Wear sunscreen to prevent skin cancer.   See your dentist every six months for an exam and cleaning.  See your eye doctor every 1 to 2 years.

## 2022-10-14 LAB — TSH SERPL DL<=0.005 MIU/L-ACNC: 1.71 MU/L (ref 0.4–4)

## 2022-10-18 LAB
BKR LAB AP GYN ADEQUACY: NORMAL
BKR LAB AP GYN INTERPRETATION: NORMAL
BKR LAB AP HPV REFLEX: NORMAL
BKR LAB AP PREVIOUS ABNORMAL: NORMAL
PATH REPORT.COMMENTS IMP SPEC: NORMAL
PATH REPORT.COMMENTS IMP SPEC: NORMAL
PATH REPORT.RELEVANT HX SPEC: NORMAL

## 2022-10-19 DIAGNOSIS — F31.76 BIPOLAR DISORDER, IN FULL REMISSION, MOST RECENT EPISODE DEPRESSED (H): ICD-10-CM

## 2022-10-19 RX ORDER — LITHIUM CARBONATE 150 MG/1
CAPSULE ORAL
Qty: 30 CAPSULE | Refills: 0 | Status: SHIPPED | OUTPATIENT
Start: 2022-10-19 | End: 2022-11-04

## 2022-10-19 NOTE — TELEPHONE ENCOUNTER
Patient checking on status of refill request for Lithium.   Has about 7 days left.  New PCP is now Augustina Santa   Encounter already routed to Dr. Bonifacio Bahena, ELI  [facility]

## 2022-10-19 NOTE — TELEPHONE ENCOUNTER
Requested Prescriptions   Pending Prescriptions Disp Refills     lithium (ESKALITH) 150 MG capsule [Pharmacy Med Name: LITHIUM CARBONATE 150MG CAPS] 30 capsule 0     Sig: TAKE THREE CAPSULES BY MOUTH AT BEDTIME       There is no refill protocol information for this order          Routing refill request to provider for review/approval because:  Drug not on the [facility] refill protocol     Pt was seen on 10/13/2022    Kiki Choi RN  [facility]

## 2022-10-20 ENCOUNTER — PATIENT OUTREACH (OUTPATIENT)
Dept: FAMILY MEDICINE | Facility: CLINIC | Age: 62
End: 2022-10-20

## 2022-10-20 LAB
HUMAN PAPILLOMA VIRUS 16 DNA: NEGATIVE
HUMAN PAPILLOMA VIRUS 18 DNA: NEGATIVE
HUMAN PAPILLOMA VIRUS FINAL DIAGNOSIS: ABNORMAL
HUMAN PAPILLOMA VIRUS OTHER HR: POSITIVE

## 2022-11-04 DIAGNOSIS — F31.76 BIPOLAR DISORDER, IN FULL REMISSION, MOST RECENT EPISODE DEPRESSED (H): ICD-10-CM

## 2022-11-04 RX ORDER — LITHIUM CARBONATE 150 MG/1
CAPSULE ORAL
Qty: 90 CAPSULE | Refills: 3 | Status: SHIPPED | OUTPATIENT
Start: 2022-11-04

## 2022-11-04 NOTE — TELEPHONE ENCOUNTER
Reason for Call:  Medication or medication refill:    Do you use a [facility]?  Name of the pharmacy and phone number for the current request:  [facility] in [place]    Name of the medication requested: lithium (ESKALITH) 150 MG capsule    Other request: Patient takes 3 pills a day.  Prescription that was given to her on 10-, was only for 30 pills.    Patient would like a prescription sent in for a 90 day supply with 3 refills.  This is how her previous doctor filled it.    She currently has about 3-4 days left of medication.    Can we leave a detailed message on this number? YES    Phone number patient can be reached at: Home number on file [phone] (home)    Best Time: anytime    Call taken on 11/4/2022 at 10:28 AM by Sunitha Rodriguez

## 2022-11-05 ENCOUNTER — NURSE TRIAGE (OUTPATIENT)
Dept: NURSING | Facility: CLINIC | Age: 62
End: 2022-11-05

## 2022-11-05 NOTE — TELEPHONE ENCOUNTER
TRIAGE CALL - Is this a 2nd Level Triage? Yes   Nurse Triage SBAR - Patient calling   Situation:  abdominal pain  3 weeks - Nubness  Lower a Abdominal ride side  Background:    Chronic right sided - low back pain - not siatic  Assessment  Pain was very severe last night  Pain comes and goes and something hit her hip  Lower a Abdominal ride side - Front part of her Right leg is numb from knee to her ankle  Able to walk - Right side feels weaker at walking   No numbness on any other part of her body  No swelling on leg  No differences in color  No headaches  No tingling  -just nubness   Patients denies difficulty with breathing, chest pain, dizziness, or fever  Patients denies fever, vomiting, or diarrhea.   Measures taken: tylenol this AM  Patient is able to speak in full long sentences without getting short of breath.  Recommended Disposition per protocol ER or 2LT  Pt s PCP/Clinic Augustina Valdovinos MD -  [facility]  2LT - RN seeking advice from License practitioner to recommend a safe alternative plan for patient. RN Paged ONCALL, Via answeing service # [phone] @ 4:35 PM  Provider ON CALL  Dr Barnard    She has advice to be   Get evaluated today or tomorrow at [facility] - BUT if any owrseing today go to the Hospital ER     Spke with Pt and give msg - Patient verbalized understanding and agrees with plan - her closes [facility] will be closed at 5:00 PM she will go tomorrow    Kamini De Luan RN Nurse Triage Advisor 4:49 PM 11/5/2022       Reason for Disposition    [1] Numbness (i.e., loss of sensation) of the face, arm / hand, or leg / foot on one side of the body AND [2] sudden onset AND [3] brief (now gone)    Additional Information    Negative: [1] SEVERE weakness (i.e., unable to walk or barely able to walk, requires support) AND [2] new-onset or worsening    Negative: [1] Weakness (i.e., paralysis, loss of muscle strength) of the face, arm / hand, or leg / foot on one side of the body AND [2]  sudden onset AND [3] present now (Exception: Bell's palsy suspected [i.e., weakness only on one side of the face, developing over hours to days, no other symptoms])    Negative: [1] Numbness (i.e., loss of sensation) of the face, arm / hand, or leg / foot on one side of the body AND [2] sudden onset AND [3] present now    Negative: [1] Loss of speech or garbled speech AND [2] sudden onset AND [3] present now    Negative: Difficult to awaken or acting confused (e.g., disoriented, slurred speech)    Negative: Sounds like a life-threatening emergency to the triager    Negative: Headache  (and neurologic deficit)    Negative: [1] Back pain AND [2] numbness (loss of sensation) in groin or rectal area    Negative: [1] Unable to urinate (or only a few drops) > 4 hours AND [2] bladder feels very full (e.g., palpable bladder or strong urge to urinate)    Negative: [1] Loss of bladder or bowel control (urine or bowel incontinence; wetting self, leaking stool) AND [2] new-onset    Protocols used: NEUROLOGIC DEFICIT-A-AH

## 2022-11-06 ENCOUNTER — HOSPITAL ENCOUNTER (EMERGENCY)
Facility: CLINIC | Age: 62
Discharge: HOME | End: 2022-11-06
Attending: EMERGENCY MEDICINE | Admitting: EMERGENCY MEDICINE
Payer: COMMERCIAL

## 2022-11-06 ENCOUNTER — APPOINTMENT (OUTPATIENT)
Dept: CT IMAGING | Facility: CLINIC | Age: 62
End: 2022-11-06
Attending: EMERGENCY MEDICINE
Payer: COMMERCIAL

## 2022-11-06 VITALS
RESPIRATION RATE: 16 BRPM | WEIGHT: 158 LBS | HEIGHT: 69 IN | TEMPERATURE: 98.6 F | OXYGEN SATURATION: 96 % | HEART RATE: 70 BPM | BODY MASS INDEX: 23.4 KG/M2 | SYSTOLIC BLOOD PRESSURE: 129 MMHG | DIASTOLIC BLOOD PRESSURE: 67 MMHG

## 2022-11-06 DIAGNOSIS — R79.89 ELEVATED SERUM CREATININE: ICD-10-CM

## 2022-11-06 DIAGNOSIS — M54.10 RADICULAR LOW BACK PAIN: ICD-10-CM

## 2022-11-06 DIAGNOSIS — K76.9 HEPATIC LESION: ICD-10-CM

## 2022-11-06 LAB
ALBUMIN SERPL-MCNC: 3.9 G/DL (ref 3.4–5)
ALBUMIN UR-MCNC: 10 MG/DL
ALP SERPL-CCNC: 76 U/L (ref 40–150)
ALT SERPL W P-5'-P-CCNC: 23 U/L (ref 0–50)
ANION GAP SERPL CALCULATED.3IONS-SCNC: 10 MMOL/L (ref 3–14)
APPEARANCE UR: CLEAR
AST SERPL W P-5'-P-CCNC: 14 U/L (ref 0–45)
BASOPHILS # BLD AUTO: 0 10E3/UL (ref 0–0.2)
BASOPHILS NFR BLD AUTO: 1 %
BILIRUB SERPL-MCNC: 0.4 MG/DL (ref 0.2–1.3)
BILIRUB UR QL STRIP: NEGATIVE
BUN SERPL-MCNC: 13 MG/DL (ref 7–30)
CALCIUM SERPL-MCNC: 9.3 MG/DL (ref 8.5–10.1)
CHLORIDE BLD-SCNC: 109 MMOL/L (ref 94–109)
CO2 SERPL-SCNC: 28 MMOL/L (ref 20–32)
COLOR UR AUTO: ABNORMAL
CREAT BLD-MCNC: 1.3 MG/DL (ref 0.5–1)
CREAT SERPL-MCNC: 1.2 MG/DL (ref 0.52–1.04)
EOSINOPHIL # BLD AUTO: 0.1 10E3/UL (ref 0–0.7)
EOSINOPHIL NFR BLD AUTO: 3 %
ERYTHROCYTE [DISTWIDTH] IN BLOOD BY AUTOMATED COUNT: 12.9 % (ref 10–15)
GFR SERPL CREATININE-BSD FRML MDRD: 46 ML/MIN/1.73M2
GFR SERPL CREATININE-BSD FRML MDRD: 51 ML/MIN/1.73M2
GLUCOSE BLD-MCNC: 86 MG/DL (ref 70–99)
GLUCOSE UR STRIP-MCNC: NEGATIVE MG/DL
HCT VFR BLD AUTO: 40.4 % (ref 35–47)
HGB BLD-MCNC: 12.9 G/DL (ref 11.7–15.7)
HGB UR QL STRIP: NEGATIVE
HYALINE CASTS: 1 /LPF
IMM GRANULOCYTES # BLD: 0 10E3/UL
IMM GRANULOCYTES NFR BLD: 0 %
KETONES UR STRIP-MCNC: NEGATIVE MG/DL
LEUKOCYTE ESTERASE UR QL STRIP: NEGATIVE
LIPASE SERPL-CCNC: 135 U/L (ref 73–393)
LYMPHOCYTES # BLD AUTO: 1.5 10E3/UL (ref 0.8–5.3)
LYMPHOCYTES NFR BLD AUTO: 34 %
MCH RBC QN AUTO: 28.7 PG (ref 26.5–33)
MCHC RBC AUTO-ENTMCNC: 31.9 G/DL (ref 31.5–36.5)
MCV RBC AUTO: 90 FL (ref 78–100)
MONOCYTES # BLD AUTO: 0.3 10E3/UL (ref 0–1.3)
MONOCYTES NFR BLD AUTO: 6 %
MUCOUS THREADS #/AREA URNS LPF: PRESENT /LPF
NEUTROPHILS # BLD AUTO: 2.5 10E3/UL (ref 1.6–8.3)
NEUTROPHILS NFR BLD AUTO: 56 %
NITRATE UR QL: NEGATIVE
NRBC # BLD AUTO: 0 10E3/UL
NRBC BLD AUTO-RTO: 0 /100
PH UR STRIP: 5.5 [PH] (ref 5–7)
PLATELET # BLD AUTO: 290 10E3/UL (ref 150–450)
POTASSIUM BLD-SCNC: 4.2 MMOL/L (ref 3.4–5.3)
PROT SERPL-MCNC: 7.7 G/DL (ref 6.8–8.8)
RBC # BLD AUTO: 4.49 10E6/UL (ref 3.8–5.2)
RBC URINE: 0 /HPF
SODIUM SERPL-SCNC: 147 MMOL/L (ref 133–144)
SP GR UR STRIP: 1.02 (ref 1–1.03)
SQUAMOUS EPITHELIAL: 1 /HPF
UROBILINOGEN UR STRIP-MCNC: NORMAL MG/DL
WBC # BLD AUTO: 4.4 10E3/UL (ref 4–11)
WBC URINE: 1 /HPF

## 2022-11-06 PROCEDURE — 82565 ASSAY OF CREATININE: CPT

## 2022-11-06 PROCEDURE — 83690 ASSAY OF LIPASE: CPT | Performed by: EMERGENCY MEDICINE

## 2022-11-06 PROCEDURE — 81001 URINALYSIS AUTO W/SCOPE: CPT | Performed by: EMERGENCY MEDICINE

## 2022-11-06 PROCEDURE — 85004 AUTOMATED DIFF WBC COUNT: CPT | Performed by: EMERGENCY MEDICINE

## 2022-11-06 PROCEDURE — 80053 COMPREHEN METABOLIC PANEL: CPT | Performed by: EMERGENCY MEDICINE

## 2022-11-06 PROCEDURE — 72131 CT LUMBAR SPINE W/O DYE: CPT

## 2022-11-06 PROCEDURE — 74177 CT ABD & PELVIS W/CONTRAST: CPT

## 2022-11-06 PROCEDURE — 99284 EMERGENCY DEPT VISIT MOD MDM: CPT | Performed by: EMERGENCY MEDICINE

## 2022-11-06 PROCEDURE — 250N000009 HC RX 250: Performed by: EMERGENCY MEDICINE

## 2022-11-06 PROCEDURE — 250N000011 HC RX IP 250 OP 636: Performed by: EMERGENCY MEDICINE

## 2022-11-06 PROCEDURE — 36415 COLL VENOUS BLD VENIPUNCTURE: CPT | Performed by: EMERGENCY MEDICINE

## 2022-11-06 PROCEDURE — 99285 EMERGENCY DEPT VISIT HI MDM: CPT | Mod: 25 | Performed by: EMERGENCY MEDICINE

## 2022-11-06 RX ORDER — METHOCARBAMOL 500 MG/1
500 TABLET, FILM COATED ORAL 3 TIMES DAILY PRN
Qty: 24 TABLET | Refills: 0 | Status: SHIPPED | OUTPATIENT
Start: 2022-11-06 | End: 2023-01-10

## 2022-11-06 RX ORDER — IOPAMIDOL 755 MG/ML
100 INJECTION, SOLUTION INTRAVASCULAR ONCE
Status: COMPLETED | OUTPATIENT
Start: 2022-11-06 | End: 2022-11-06

## 2022-11-06 RX ORDER — METHYLPREDNISOLONE 4 MG
TABLET, DOSE PACK ORAL
Qty: 21 TABLET | Refills: 0 | Status: SHIPPED | OUTPATIENT
Start: 2022-11-06 | End: 2023-01-10

## 2022-11-06 RX ADMIN — IOPAMIDOL 78 ML: 755 INJECTION, SOLUTION INTRAVENOUS at 12:18

## 2022-11-06 RX ADMIN — SODIUM CHLORIDE 59 ML: 9 INJECTION, SOLUTION INTRAVENOUS at 12:31

## 2022-11-06 ASSESSMENT — ACTIVITIES OF DAILY LIVING (ADL): ADLS_ACUITY_SCORE: 37

## 2022-11-06 NOTE — ED TRIAGE NOTES
Pt reports right lower abdominal pain that radiates down her right leg for the past 3/4 weeks. Pt states on Friday the pain increased and from her knee down feels numb. Pt last took tylenol yesterday. Pt doesn't report any falls.

## 2022-11-06 NOTE — ED TRIAGE NOTES
Triage Assessment     Row Name 11/06/22 1100       Triage Assessment (Adult)    Airway WDL WDL       Respiratory WDL    Respiratory WDL WDL       Skin Circulation/Temperature WDL    Skin Circulation/Temperature WDL WDL       Cardiac WDL    Cardiac WDL WDL       Peripheral/Neurovascular WDL    Peripheral Neurovascular WDL X;neurovascular assessment lower       RLE Neurovascular Assessment    Sensation RLE numbness present       Cognitive/Neuro/Behavioral WDL    Cognitive/Neuro/Behavioral WDL WDL

## 2022-11-06 NOTE — ED PROVIDER NOTES
ED Provider Note  [facility]      History     Chief Complaint   Patient presents with     Abdominal Pain     Right lower abdominal pain that radiates down right leg     Leg Pain     HPI  Zuly Wheeler iA 62 year old female with a past medical history of bipolar disorder, anxiety, right sided back pain and hypertension who presents with an approximately 1 month history of right lower quadrant abdominal pain that she describes as  radiating down the front of her right leg .   It has been present for about a month.  She describes it as a sharp pain that is intermittent.  She also has pain that radiates around the side of her right hip and down the front of her right leg.  She describes numbness of her shin from the knee down.  She denies fevers, nausea, or vomiting.  She has no loss of bowel or bladder control.  She denies injection drug use.  She has no fevers she has no history of trauma.  Social: Here with her significant other, no tobacco use no drug use.    Past Medical History  Past Medical History:   Diagnosis Date     Anxiety and depression      Bipolar 1 disorder (H)      Depressive disorder      Past Surgical History:   Procedure Laterality Date     DILATION AND CURETTAGE  12/6/2013    Procedure: DILATION AND CURETTAGE;  Dilation And Curettage;  Surgeon: Edel Chew MD;  Location: [facility] OR     EYE SURGERY  8/2013    cataract surgery both eyes done     LAPAROSCOPIC HYSTERECTOMY SUPRACERVICAL, BILATERAL SALPINGO-OOPHORECTOMY, COMBINED  1/30/2014    Procedure: COMBINED LAPAROSCOPIC HYSTERECTOMY SUPRACERVICAL, SALPINGO-OOPHORECTOMY;  Laparoscopic Supracervical Hysterectomy With Bilateral Salingectomy;  Surgeon: Edel Chew MD;  Location: [facility] OR     TUBAL LIGATION  1990     gabapentin (NEURONTIN) 100 MG capsule  lithium (ESKALITH) 150 MG capsule  methocarbamol (ROBAXIN) 500 MG tablet  methylPREDNISolone (MEDROL DOSEPAK) 4 MG tablet therapy pack  OLANZapine (ZYPREXA) 2.5  MG tablet  PARoxetine (PAXIL) 20 MG tablet  propranolol (INDERAL) 10 MG tablet  clobetasol (TEMOVATE) 0.05 % external solution  vitamin D3 (CHOLECALCIFEROL) 50 mcg (2000 units) tablet      Allergies   Allergen Reactions     Depakote      groggy     Lamotrigine      Intolerance, numb (Lamictal)     Olanzapine Other (See Comments)     Only generic/ineffective  Ineffective (only generic)     Seroquel [Quetiapine Fumarate] Visual Disturbance     Blurred vision; jumpy     Sulfa Drugs Hives     Trileptal Visual Disturbance     Blurred vision     Zoloft Other (See Comments)     jumpy     Citalopram Anxiety     Intolerance (Celexa)     Family History  Family History   Problem Relation Age of Onset     Diabetes Mother      Hypertension Mother      Psychotic Disorder Mother      Depression Mother      Hearing Loss Father      Coronary Artery Disease No family hx of      Hyperlipidemia No family hx of      Cerebrovascular Disease No family hx of      Breast Cancer No family hx of      Colon Cancer No family hx of      Prostate Cancer No family hx of      Other Cancer No family hx of      Anxiety Disorder No family hx of      Mental Illness No family hx of      Substance Abuse No family hx of      Anesthesia Reaction No family hx of      Asthma No family hx of      Osteoporosis No family hx of      Genetic Disorder No family hx of      Thyroid Disease No family hx of      Obesity No family hx of      Unknown/Adopted No family hx of      Social History   Social History     Tobacco Use     Smoking status: Former     Years: 30.00     Types: Cigarettes     Quit date: 1990     Years since quittin.9     Smokeless tobacco: Never   Vaping Use     Vaping Use: Never used   Substance Use Topics     Alcohol use: Never     Drug use: No      Past medical history, past surgical history, medications, allergies, family history, and social history were reviewed with the patient. No additional pertinent items.       Review of  "Systems  A complete review of systems was performed with pertinent positives and negatives noted in the HPI, and all other systems negative.    Physical Exam   BP: 129/67  Pulse: 70  Temp: 98.6  F (37  C)  Resp: 16  Height: 175.3 cm (5' 9\")  Weight: 71.7 kg (158 lb)  SpO2: 96 %  Physical Exam  Musculoskeletal:        Legs:        Physical Exam   Constitutional:   well nourished, well developed, resting comfortably   HENT:   Head: Normocephalic and atraumatic.   Eyes: Conjunctivae are normal. Pupils are equal, round, and reactive to light.   pharynx has no erythema or exudate, mucous membranes are moist  Neck:   no adenopathy, no bony tenderness  Cardiovascular: regular rate and rhythm without murmurs or gallops  Pulmonary/Chest: Clear to auscultation bilaterally, with no wheezes or retractions. No respiratory distress.  GI: Soft with good bowel sounds.  Tenderness in the lower right quadrant., non-distended, with no guarding, no rebound, no peritoneal signs.  McBurney sign is negative.  No upper abdominal tenderness. No hernias appreciated  Back:  No bony or CVA tenderness   Musculoskeletal:  no edema  Skin: Skin is warm and dry. No rash noted.   Neurological: alert and oriented to person, place, and time. Nonfocal exam.  NEUROLOGIC: speech normal, mental status intact, muscle tone normal, muscle strength normal,  CN II-XII intact: face symmetric, facial sensation to light touch equal, symmetric cheek puff, eyebrow raise, palate elevation. PERRL, no nystagmus. EOMI. Tongue midline. Shoulder shrug strong and symmetric.  Motor: normal bulk/tone, no muscle wasting or fasciculations  No pronator drift, Romberg negative  Gait: normal, able to tiptoe and heel walk, able to walk heel to toe  Subjective numbness to the anterior shin on the right  Good distal pulses, neurovascular and light touch intact with less than 2-second capillary refill.     Psychiatric:  normal mood and affect.   ED Course      Procedures       The " medical record was reviewed and interpreted.  Current labs reviewed and interpreted.  Previous labs reviewed and interpreted.  Current images reviewed and interpreted: see below.  Managed outpatient prescription medications.         Results for orders placed or performed during the hospital encounter of 11/06/22   CT Abdomen Pelvis w Contrast     Status: None    Narrative    EXAM: CT ABDOMEN PELVIS W CONTRAST  LOCATION: [facility]  DATE/TIME: 11/6/2022 12:45 PM    INDICATION: Right lower quadrant abdominal pain that radiates down her leg  COMPARISON: None.  TECHNIQUE: CT scan of the abdomen and pelvis was performed following injection of IV contrast. Multiplanar reformats were obtained. Dose reduction techniques were used.  CONTRAST: 78mL isovue 370    FINDINGS:   LOWER CHEST: Normal.    HEPATOBILIARY: Subcentimeter low-attenuation lesion in the posterior right lobe. Normal gallbladder and bile ducts.    PANCREAS: Normal.    SPLEEN: Normal.    ADRENAL GLANDS: Normal.    KIDNEYS/BLADDER: No significant mass, stone, or hydronephrosis. The bladder is completely collapsed and poorly evaluated.    BOWEL: No obstruction or inflammatory change. Normal appendix.    LYMPH NODES: Normal.    VASCULATURE: Unremarkable.    PELVIC ORGANS: Absent.    MUSCULOSKELETAL: No aggressive or destructive lesions.      Impression    IMPRESSION:   1.  No acute findings or other explanation for abdominal pain.    2.  Subcentimeter low-attenuation lesion in the right hepatic lobe is too small to characterize, but likely benign unless there are risk factors for malignancy.   Lumbar spine CT w/o contrast     Status: None    Narrative    EXAM: CT LUMBAR SPINE W/O CONTRAST  LOCATION: [facility]  DATE/TIME: 11/6/2022 12:45 PM    INDICATION: Radicular right-sided back pain that goes across hip and down the front of her right leg.  COMPARISON:  None.  TECHNIQUE: Routine CT Lumbar Spine without IV contrast. Multiplanar reformats. Dose reduction techniques were used.     FINDINGS:  Mild height loss involving the L4 vertebral body with superior plate Schmorl's node, age indeterminate. Multiple levels of mild vertebral body height loss are present with subtle Schmorl's nodes. Multilevel mild degenerative disc disease. Moderate right   and severe left facet arthropathy at L3-L4. Moderate bilateral facet arthropathy at L4-L5. Mild bilateral facet arthropathy at L5-S1. Multiple areas of central disc bulging contributing to mild spinal canal stenosis at L1-L2, moderate spinal canal   stenosis at L2-L3, severe spinal canal stenosis at L3-L4, severe spinal canal stenosis at L4-L5. Asymmetric foraminal disc bulging and facet arthropathy are present contributing to multiple moderate foraminal stenoses.    Mild bilateral sacroiliac joint degenerative change. Scattered mild vascular calcifications.      Impression    IMPRESSION:  1.  Degenerative change with significant stenoses at multiple levels. Lumbar spine MRI would be typically recommended.  2.  Mild height loss involving multiple vertebral bodies with scattered Schmorl's nodes, most conspicuous at L4.   Comprehensive metabolic panel     Status: Abnormal   Result Value Ref Range    Sodium 147 (H) 133 - 144 mmol/L    Potassium 4.2 3.4 - 5.3 mmol/L    Chloride 109 94 - 109 mmol/L    Carbon Dioxide (CO2) 28 20 - 32 mmol/L    Anion Gap 10 3 - 14 mmol/L    Urea Nitrogen 13 7 - 30 mg/dL    Creatinine 1.20 (H) 0.52 - 1.04 mg/dL    Calcium 9.3 8.5 - 10.1 mg/dL    Glucose 86 70 - 99 mg/dL    Alkaline Phosphatase 76 40 - 150 U/L    AST 14 0 - 45 U/L    ALT 23 0 - 50 U/L    Protein Total 7.7 6.8 - 8.8 g/dL    Albumin 3.9 3.4 - 5.0 g/dL    Bilirubin Total 0.4 0.2 - 1.3 mg/dL    GFR Estimate 51 (L) >60 mL/min/1.73m2   Lipase     Status: Normal   Result Value Ref Range    Lipase 135 73 - 393 U/L   UA with Microscopic reflex to  Culture     Status: Abnormal    Specimen: Urine, Midstream   Result Value Ref Range    Color Urine Light Yellow Colorless, Straw, Light Yellow, Yellow    Appearance Urine Clear Clear    Glucose Urine Negative Negative mg/dL    Bilirubin Urine Negative Negative    Ketones Urine Negative Negative mg/dL    Specific Gravity Urine 1.024 1.003 - 1.035    Blood Urine Negative Negative    pH Urine 5.5 5.0 - 7.0    Protein Albumin Urine 10 (A) Negative mg/dL    Urobilinogen Urine Normal Normal, 2.0 mg/dL    Nitrite Urine Negative Negative    Leukocyte Esterase Urine Negative Negative    Mucus Urine Present (A) None Seen /LPF    RBC Urine 0 <=2 /HPF    WBC Urine 1 <=5 /HPF    Squamous Epithelials Urine 1 <=1 /HPF    Hyaline Casts Urine 1 <=2 /LPF    Narrative    Urine Culture not indicated   CBC with platelets and differential     Status: None   Result Value Ref Range    WBC Count 4.4 4.0 - 11.0 10e3/uL    RBC Count 4.49 3.80 - 5.20 10e6/uL    Hemoglobin 12.9 11.7 - 15.7 g/dL    Hematocrit 40.4 35.0 - 47.0 %    MCV 90 78 - 100 fL    MCH 28.7 26.5 - 33.0 pg    MCHC 31.9 31.5 - 36.5 g/dL    RDW 12.9 10.0 - 15.0 %    Platelet Count 290 150 - 450 10e3/uL    % Neutrophils 56 %    % Lymphocytes 34 %    % Monocytes 6 %    % Eosinophils 3 %    % Basophils 1 %    % Immature Granulocytes 0 %    NRBCs per 100 WBC 0 <1 /100    Absolute Neutrophils 2.5 1.6 - 8.3 10e3/uL    Absolute Lymphocytes 1.5 0.8 - 5.3 10e3/uL    Absolute Monocytes 0.3 0.0 - 1.3 10e3/uL    Absolute Eosinophils 0.1 0.0 - 0.7 10e3/uL    Absolute Basophils 0.0 0.0 - 0.2 10e3/uL    Absolute Immature Granulocytes 0.0 <=0.4 10e3/uL    Absolute NRBCs 0.0 10e3/uL   Creatinine POCT     Status: Abnormal   Result Value Ref Range    Creatinine POCT 1.3 (H) 0.5 - 1.0 mg/dL    GFR, ESTIMATED POCT 46 (L) >60 mL/min/1.73m2   CBC with platelets differential     Status: None    Narrative    The following orders were created for panel order CBC with platelets differential.  Procedure                                Abnormality         Status                     ---------                               -----------         ------                     CBC with platelets and d...[[id number]]                      Final result                 Please view results for these tests on the individual orders.     Medications   iopamidol (ISOVUE-370) solution 100 mL (78 mLs Intravenous Given 11/6/22 1218)   sodium chloride 100mL for CT scan flush use (59 mLs Intravenous Given 11/6/22 1231)        Assessments & Plan (with Medical Decision Making)       I have reviewed the nursing notes.  Emergency Department course:  The patient was seen and examined at 1109 am .   Laboratory studies show an unremarkable CBC, with a WBC of 4.4.  Comprehensive metabolic panel shows mild hypernatremia, with a sodium of 147.  The patient has acute kidney injury, with creatinine of 1.20 with a and a GFR 51. (on July 19, creatinine was 0.90 with a GFR of 72).    UA is nitrite and LCE negative  CT of the abdomen and pelvis with IV contrast shows IMPRESSION:   1.  No acute findings or other explanation for abdominal pain.     2.  Subcentimeter low-attenuation lesion in the right hepatic lobe is too small to characterize, but likely benign unless there are risk factors for malignancy.     Lumbar spine CT shows   IMPRESSION:  1.  Degenerative change with significant stenoses at multiple levels. Lumbar spine MRI would be typically recommended.  2.  Mild height loss involving multiple vertebral bodies with scattered Schmorl's nodes, most conspicuous at L4.    I discussed these findings with the patient.  I did offer to have her undergo an MRI study of the lumbar spine to further evaluate her back pain..  The patient stated she would prefer to wait to do this as an outpatient and organize the study with her primary care clinic.    She has no acute neurologic deficits decreasing suspicion of cauda equina syndrome or significant nerve root  impingement. No risk factors, red flags or clinical findings suggestive of AAA, epidural or perispinal abscess, diskitis or vertebral osteomyelitis. No  complaints to suggest renal colic, pelvic process, UTI or pyelonephritis and no signs of zoster.  I suspect the patient has radicular back pain from disc as etiology for her symptoms.  I recommend taking ibuprofen with food for pain.  I prescribed Robaxin as a muscle relaxer and a Medrol dose pack for back pain.  The patient was discharged with a list of back care tips and back exercises to strengthen her core muscles.  She also has mild ABBIE with creatinine elevation.  This may be secondary to dehydration.  I discussed this finding with the patient and recommend that she drink plenty of fluids and have her kidney function tests rechecked through her primary care clinic within 1 to 2 weeks.  CT of the abdomen pelvis also shows small hepatic lesion that is too small to characterize.  She is not a smoker or drug user and has no risk factors for malignancy I discussed this finding with patient and told her this will likely need follow-up.  She can do this through her primary care doctor.  I have reviewed the findings, diagnosis, plan and need for follow up with the patient.    New Prescriptions    METHOCARBAMOL (ROBAXIN) 500 MG TABLET    Take 1 tablet (500 mg) by mouth 3 times daily as needed for muscle spasms    METHYLPREDNISOLONE (MEDROL DOSEPAK) 4 MG TABLET THERAPY PACK    Follow Package Directions       Final diagnoses:   Radicular low back pain   Elevated serum creatinine   Hepatic lesion       --This note was created in part by the use of Dragon voice recognition dictation system. Inadvertent grammatical errors and typographical errors may still exist.  MD Lisbet Ferrell  formerly [facility] EMERGENCY DEPARTMENT  11/6/2022     Lisbet Sims MD  11/06/22 1533

## 2022-11-06 NOTE — DISCHARGE INSTRUCTIONS
You have degenerative changes in your back.  You likely have pressure on a nerve from a disc in your back.  Further evaluation would be with an MRI study.  You can obtain the study through your primary care clinic.  Take ibuprofen with food for pain.  Take Robaxin as needed as a muscle relaxer.  Use Medrol Dosepak as directed.  Use ice to the sore areas.    Additionally, one of your kidney function tests is slightly high today.  This is called the creatinine.  Please drink plenty of fluids.  This should be rechecked with your primary doctor within 1 to 2 weeks.    Your CT of your abdomen also shows that you have a very small lesion on your liver.  This is too small to characterize and likely is benign.  Your liver function tests are normal. This will need to be followed up and can be done through your primary care clinic.

## 2022-11-07 ENCOUNTER — TELEPHONE (OUTPATIENT)
Dept: FAMILY MEDICINE | Facility: CLINIC | Age: 62
End: 2022-11-07

## 2022-11-07 NOTE — TELEPHONE ENCOUNTER
Received call from patient. She is calling because she was seen in the ED 11/06 and advised to f/u with PCP for imaging order for MRI potentially and for further imaging of hepatic lesion. Recommended scheduling ED f/u appointment. Nothing available with PCP for entire month. Scheduled appt with Dr. Barrera for 11/09.    Krissy Jaimes RN   [facility]

## 2022-11-09 ENCOUNTER — OFFICE VISIT (OUTPATIENT)
Dept: FAMILY MEDICINE | Facility: CLINIC | Age: 62
End: 2022-11-09
Payer: COMMERCIAL

## 2022-11-09 VITALS
DIASTOLIC BLOOD PRESSURE: 70 MMHG | OXYGEN SATURATION: 100 % | TEMPERATURE: 98 F | SYSTOLIC BLOOD PRESSURE: 130 MMHG | BODY MASS INDEX: 23.18 KG/M2 | WEIGHT: 157 LBS | HEART RATE: 64 BPM

## 2022-11-09 DIAGNOSIS — M48.062 SPINAL STENOSIS OF LUMBAR REGION WITH NEUROGENIC CLAUDICATION: Primary | ICD-10-CM

## 2022-11-09 PROBLEM — B00.9 HERPES SIMPLEX VIRUS INFECTION: Status: RESOLVED | Noted: 2017-11-29 | Resolved: 2022-11-09

## 2022-11-09 PROBLEM — B00.9 HERPES SIMPLEX VIRUS INFECTION: Status: ACTIVE | Noted: 2017-11-29

## 2022-11-09 PROBLEM — M54.50 ACUTE RIGHT-SIDED LOW BACK PAIN WITHOUT SCIATICA: Status: RESOLVED | Noted: 2018-03-07 | Resolved: 2022-11-09

## 2022-11-09 PROBLEM — R06.83 SNORING: Status: RESOLVED | Noted: 2017-11-29 | Resolved: 2022-11-09

## 2022-11-09 PROBLEM — F41.8 MIXED ANXIETY AND DEPRESSIVE DISORDER: Status: RESOLVED | Noted: 2021-06-19 | Resolved: 2022-11-09

## 2022-11-09 PROCEDURE — 99214 OFFICE O/P EST MOD 30 MIN: CPT | Performed by: FAMILY MEDICINE

## 2022-11-09 RX ORDER — NAPROXEN 500 MG/1
500 TABLET ORAL 2 TIMES DAILY WITH MEALS
Qty: 60 TABLET | Refills: 0 | Status: SHIPPED | OUTPATIENT
Start: 2022-11-09 | End: 2023-01-10

## 2022-11-09 NOTE — PATIENT INSTRUCTIONS
Call the imaging center at [phone] or  [phone] to schedule your back MRI.     Schedule your mammogram.

## 2022-11-09 NOTE — PROGRESS NOTES
Assessment & Plan     (M48.062) Spinal stenosis of lumbar region with neurogenic claudication  (primary encounter diagnosis)  Comment: ED records and CT reviewed with patient   Plan: MR Lumbar Spine w/o Contrast, Physical Therapy         Referral, naproxen (NAPROSYN) 500 MG tablet        Discussed risks and benefits of this medication. Consider spine specialty referral pending MRI results. Follow-up with PCP.              MED REC REQUIRED   Post Medication Reconciliation Status: discharge medications reconciled and changed, per note/orders  See Patient Instructions    Return in about 11 months (around 10/13/2023) for physical (fasting labs up to one week prior).    Yanni Barrera MD  [facility] KAJAL Caruso is a 62 year old, presenting for the following health issues:  ER F/U (11/6 [facility])      HPI     ED/UC Followup:    Facility:  [facility]  Date of visit: 11/6/22  Reason for visit: Low abdominal pain on right side that goes down her right leg and side of hip.  Current Status: Still has the discomfort but isn't as bad. Pain is 5/10.    Zuly Wheeler is a 62 year old female who presents with acute onset right groin and leg pain with accompanying weakness and numbness. Symptom onset has been sudden, improving for a time period of 3 days. Severity is described as moderate. Course of her symptoms over time is improving some with Medrol DosePak and muscle relaxants. She already is on gabapentin for Bipolar disease.       Review of Systems   CONSTITUTIONAL: NEGATIVE for fever, chills, change in weight  INTEGUMENTARY/SKIN: NEGATIVE for worrisome rashes, moles or lesions  ENT/MOUTH: NEGATIVE for ear, mouth and throat problems  RESP: NEGATIVE for significant cough or SOB  CV: NEGATIVE for chest pain, palpitations or peripheral edema  GI:  NEGATIVE for bowel changes    female: NEGATIVE for bladder changes   MUSCULOSKELETAL:radicular pain right lower extremity   NEURO: dysesthesias and  weakness of right lower extremity   PSYCHIATRIC: HX depression      Objective    /70 (BP Location: Left arm, Patient Position: Sitting, Cuff Size: Adult Regular)   Pulse 64   Temp 98  F (36.7  C) (Oral)   Wt 71.2 kg (157 lb)   LMP 01/30/2014 (Exact Date)   SpO2 100%   BMI 23.18 kg/m    Body mass index is 23.18 kg/m .  Physical Exam   GENERAL: healthy, alert and no distress  EYES: Eyes grossly normal to inspection, PERRL and conjunctivae and sclerae normal  RESP: lungs clear to auscultation - no rales, rhonchi or wheezes  CV: regular rate and rhythm, normal S1 S2, no S3 or S4, no murmur, click or rub, no peripheral edema    MS: no gross musculoskeletal defects noted, no edema  NEURO: weakness of right lower extremity, sensory deficit right thigh and mentation intact  PSYCH: mentation appears normal, affect flat, fatigued, judgement and insight intact and appearance well groomed    Admission on 11/06/2022, Discharged on 11/06/2022   Component Date Value Ref Range Status     Sodium 11/06/2022 147 (H)  133 - 144 mmol/L Final     Potassium 11/06/2022 4.2  3.4 - 5.3 mmol/L Final     Chloride 11/06/2022 109  94 - 109 mmol/L Final     Carbon Dioxide (CO2) 11/06/2022 28  20 - 32 mmol/L Final     Anion Gap 11/06/2022 10  3 - 14 mmol/L Final     Urea Nitrogen 11/06/2022 13  7 - 30 mg/dL Final     Creatinine 11/06/2022 1.20 (H)  0.52 - 1.04 mg/dL Final     Calcium 11/06/2022 9.3  8.5 - 10.1 mg/dL Final     Glucose 11/06/2022 86  70 - 99 mg/dL Final     Alkaline Phosphatase 11/06/2022 76  40 - 150 U/L Final     AST 11/06/2022 14  0 - 45 U/L Final     ALT 11/06/2022 23  0 - 50 U/L Final     Protein Total 11/06/2022 7.7  6.8 - 8.8 g/dL Final     Albumin 11/06/2022 3.9  3.4 - 5.0 g/dL Final     Bilirubin Total 11/06/2022 0.4  0.2 - 1.3 mg/dL Final     GFR Estimate 11/06/2022 51 (L)  >60 mL/min/1.73m2 Final    Effective December 21, 2021 eGFRcr in adults is calculated using the 2021 CKD-EPI creatinine equation which  includes age and gender (Kirby calles al., [place], DOI: 10.1056/PCTWbb3777839)     Lipase 11/06/2022 135  73 - 393 U/L Final     Color Urine 11/06/2022 Light Yellow  Colorless, Straw, Light Yellow, Yellow Final     Appearance Urine 11/06/2022 Clear  Clear Final     Glucose Urine 11/06/2022 Negative  Negative mg/dL Final     Bilirubin Urine 11/06/2022 Negative  Negative Final     Ketones Urine 11/06/2022 Negative  Negative mg/dL Final     Specific Gravity Urine 11/06/2022 1.024  1.003 - 1.035 Final     Blood Urine 11/06/2022 Negative  Negative Final     pH Urine 11/06/2022 5.5  5.0 - 7.0 Final     Protein Albumin Urine 11/06/2022 10 (A)  Negative mg/dL Final     Urobilinogen Urine 11/06/2022 Normal  Normal, 2.0 mg/dL Final     Nitrite Urine 11/06/2022 Negative  Negative Final     Leukocyte Esterase Urine 11/06/2022 Negative  Negative Final     Mucus Urine 11/06/2022 Present (A)  None Seen /LPF Final     RBC Urine 11/06/2022 0  <=2 /HPF Final     WBC Urine 11/06/2022 1  <=5 /HPF Final     Squamous Epithelials Urine 11/06/2022 1  <=1 /HPF Final     Hyaline Casts Urine 11/06/2022 1  <=2 /LPF Final     WBC Count 11/06/2022 4.4  4.0 - 11.0 10e3/uL Final     RBC Count 11/06/2022 4.49  3.80 - 5.20 10e6/uL Final     Hemoglobin 11/06/2022 12.9  11.7 - 15.7 g/dL Final     Hematocrit 11/06/2022 40.4  35.0 - 47.0 % Final     MCV 11/06/2022 90  78 - 100 fL Final     MCH 11/06/2022 28.7  26.5 - 33.0 pg Final     MCHC 11/06/2022 31.9  31.5 - 36.5 g/dL Final     RDW 11/06/2022 12.9  10.0 - 15.0 % Final     Platelet Count 11/06/2022 290  150 - 450 10e3/uL Final     % Neutrophils 11/06/2022 56  % Final     % Lymphocytes 11/06/2022 34  % Final     % Monocytes 11/06/2022 6  % Final     % Eosinophils 11/06/2022 3  % Final     % Basophils 11/06/2022 1  % Final     % Immature Granulocytes 11/06/2022 0  % Final     NRBCs per 100 WBC 11/06/2022 0  <1 /100 Final     Absolute Neutrophils 11/06/2022 2.5  1.6 - 8.3 10e3/uL Final     Absolute  Lymphocytes 11/06/2022 1.5  0.8 - 5.3 10e3/uL Final     Absolute Monocytes 11/06/2022 0.3  0.0 - 1.3 10e3/uL Final     Absolute Eosinophils 11/06/2022 0.1  0.0 - 0.7 10e3/uL Final     Absolute Basophils 11/06/2022 0.0  0.0 - 0.2 10e3/uL Final     Absolute Immature Granulocytes 11/06/2022 0.0  <=0.4 10e3/uL Final     Absolute NRBCs 11/06/2022 0.0  10e3/uL Final     Creatinine POCT 11/06/2022 1.3 (H)  0.5 - 1.0 mg/dL Final     GFR, ESTIMATED POCT 11/06/2022 46 (L)  >60 mL/min/1.73m2 Final     No results found for this or any previous visit (from the past 24 hour(s)).

## 2022-11-14 ENCOUNTER — TELEPHONE (OUTPATIENT)
Dept: FAMILY MEDICINE | Facility: CLINIC | Age: 62
End: 2022-11-14

## 2022-11-14 NOTE — TELEPHONE ENCOUNTER
Could we call the patient and see if taking Benadryl would be an option? Due to her medications, there is concern for drug interactions with Benzo's that could be problematic. We do not want that to occur    Thank you      Dr. Augustina Valdovinos

## 2022-11-14 NOTE — TELEPHONE ENCOUNTER
Reason for Call:  Other prescription    Detailed comments: Patient is getting an MRI on 11/22. She was told to ask for a sedative as she is claustrophobic. Please call patient once medication has been sent to the pharmacy     Phone Number Patient can be reached at: Home number on file [phone] (home)    Best Time: Day    Can we leave a detailed message on this number? YES    Call taken on 11/14/2022 at 8:55 AM by ONEL CREWS

## 2022-11-14 NOTE — TELEPHONE ENCOUNTER
Spoke with patient and relayed PCPs message below.     Educated on diphenhydramine/Benadryl use.     Verbalized understanding and will get OTC.     Ricarda Saunders RN  [facility]

## 2022-11-14 NOTE — TELEPHONE ENCOUNTER
Also- upon checking - pt have never been prescribed a sedative for claustrophobia so a visit detailing where this originates from etc would be recommended prior to anything being prescribed... it would be beneficial. At this time, recommendation would be use of Benadryl to keep heart rate and fears down- it also is a sedative.       Dr. Augustina Valdovinos

## 2022-11-14 NOTE — TELEPHONE ENCOUNTER
Left message on voicemail advising patient to return call at [phone].    When patient returns call, please see notes per Dr. Valdovinos below.    Sofiya FLETCHERN, RN  [facility] [place]

## 2022-11-22 ENCOUNTER — ANCILLARY PROCEDURE (OUTPATIENT)
Dept: MRI IMAGING | Facility: CLINIC | Age: 62
End: 2022-11-22
Attending: FAMILY MEDICINE
Payer: COMMERCIAL

## 2022-11-22 DIAGNOSIS — M48.062 SPINAL STENOSIS OF LUMBAR REGION WITH NEUROGENIC CLAUDICATION: ICD-10-CM

## 2022-11-22 PROCEDURE — 72148 MRI LUMBAR SPINE W/O DYE: CPT | Mod: TC | Performed by: RADIOLOGY

## 2022-12-05 ENCOUNTER — TELEPHONE (OUTPATIENT)
Dept: FAMILY MEDICINE | Facility: CLINIC | Age: 62
End: 2022-12-05

## 2022-12-05 DIAGNOSIS — M48.062 SPINAL STENOSIS OF LUMBAR REGION WITH NEUROGENIC CLAUDICATION: Primary | ICD-10-CM

## 2022-12-05 NOTE — TELEPHONE ENCOUNTER
Spoke with patient and updated on referrals for spine specialist and PT.     Patient verbalized understanding.     Ricarda Saunders RN  [facility]

## 2022-12-05 NOTE — TELEPHONE ENCOUNTER
"Received call from patient. She is calling to ask what her MRI results showed. Read imaging result message to patient. Pt is wondering what she should do for next steps. The pain is not improved, is worse now than it was at OV 11/09/22. The pain is in lower back on upper part of buttocks in the center and radiates towards the right. She rates pain as 7/10. The pain intensity comes and goes. The numbness in front side of right leg is still present. Pain previously was in front and running down leg, now it feels like it is in the back and running down the leg. Pt states that if provider still recommends seeing spine specialist, she would be in agreement. She is wondering if there is anything else that she can do currently for the pain. She is taking ibuprofen and tylenol to control the pain.     Results from MRI lumbar 11/22/22:  \"Zuly,     Your MRI shows multiple disc herniations and narrowings called stenosis. Call or return to clinic as needed if these symptoms worsen or fail to improve as anticipated to consider referral to a spine specialist.      Yanni Barrera MD\"  Written by Yanni Barrera MD on 11/22/2022 11:46 AM CST    Krissy Jaimes RN   [facility]  "

## 2022-12-15 ENCOUNTER — APPOINTMENT (OUTPATIENT)
Dept: URBAN - METROPOLITAN AREA CLINIC 254 | Age: 62
Setting detail: DERMATOLOGY
End: 2022-12-19

## 2022-12-15 ENCOUNTER — THERAPY VISIT (OUTPATIENT)
Dept: PHYSICAL THERAPY | Facility: CLINIC | Age: 62
End: 2022-12-15
Attending: FAMILY MEDICINE
Payer: COMMERCIAL

## 2022-12-15 VITALS — HEIGHT: 69 IN | WEIGHT: 150 LBS

## 2022-12-15 DIAGNOSIS — M48.062 SPINAL STENOSIS OF LUMBAR REGION WITH NEUROGENIC CLAUDICATION: ICD-10-CM

## 2022-12-15 DIAGNOSIS — L40.0 PSORIASIS VULGARIS: ICD-10-CM

## 2022-12-15 DIAGNOSIS — M51.369 BULGING OF LUMBAR INTERVERTEBRAL DISC: ICD-10-CM

## 2022-12-15 PROCEDURE — OTHER COUNSELING: OTHER

## 2022-12-15 PROCEDURE — 99214 OFFICE O/P EST MOD 30 MIN: CPT

## 2022-12-15 PROCEDURE — 97161 PT EVAL LOW COMPLEX 20 MIN: CPT | Mod: GP | Performed by: PHYSICAL THERAPIST

## 2022-12-15 PROCEDURE — OTHER MIPS QUALITY: OTHER

## 2022-12-15 PROCEDURE — 97110 THERAPEUTIC EXERCISES: CPT | Mod: GP | Performed by: PHYSICAL THERAPIST

## 2022-12-15 PROCEDURE — OTHER MEDICATION COUNSELING: OTHER

## 2022-12-15 PROCEDURE — OTHER PRESCRIPTION: OTHER

## 2022-12-15 PROCEDURE — 97112 NEUROMUSCULAR REEDUCATION: CPT | Mod: GP | Performed by: PHYSICAL THERAPIST

## 2022-12-15 RX ORDER — METHOTREXATE SODIUM 2.5 MG/1
2.5MG TABLET ORAL QWEEK
Qty: 16 | Refills: 0 | Status: ERX | COMMUNITY
Start: 2022-12-15

## 2022-12-15 RX ORDER — KETOCONAZOLE 20 MG/ML
2% SHAMPOO, SUSPENSION TOPICAL
Qty: 120 | Refills: 3 | Status: ERX | COMMUNITY
Start: 2022-12-15

## 2022-12-15 ASSESSMENT — LOCATION DETAILED DESCRIPTION DERM
LOCATION DETAILED: LEFT INFERIOR OCCIPITAL SCALP
LOCATION DETAILED: RIGHT CENTRAL FRONTAL SCALP
LOCATION DETAILED: LEFT FOREHEAD
LOCATION DETAILED: LEFT CENTRAL FRONTAL SCALP

## 2022-12-15 ASSESSMENT — LOCATION ZONE DERM
LOCATION ZONE: SCALP
LOCATION ZONE: FACE

## 2022-12-15 ASSESSMENT — LOCATION SIMPLE DESCRIPTION DERM
LOCATION SIMPLE: LEFT FOREHEAD
LOCATION SIMPLE: SCALP
LOCATION SIMPLE: POSTERIOR SCALP

## 2022-12-15 NOTE — PROGRESS NOTES
Physical Therapy Initial Evaluation  Subjective:    Patient Health History  Zuly Wheeler being seen for LBP with R LE numbness and tingling.     Problem began: 12/5/2022 (MD appt).   Problem occurred: Patient reports her LBP began ~1 month ago   Pain is reported as 7/10 on pain scale.  General health as reported by patient is good.  Pertinent medical history includes: depression and numbness/tingling.   Red flags:  None as reported by patient.  Medical allergies: none.   Surgeries include:  Other. Other surgery history details: s/p hysterectomy >10 years ago.    Current medications:  Anti-depressants.    Current occupation is .   Primary job tasks include:  Prolonged standing.                  Therapist Generated HPI Evaluation         Type of problem:  Lumbar.    This is a new condition.  Condition occurred with:  Insidious onset.  Where condition occurred: for unknown reasons.  Patient reports pain:  Central lumbar spine.  Pain is described as sharp and is constant.  Pain radiates to:  Gluteals right, thigh right, knee right and lower leg right. Pain is the same all the time.    Associated symptoms:  Numbness, tingling and loss of motion/stiffness. Symptoms are exacerbated by lifting, walking and standing (steps)  and relieved by rest (shift shoulders to L when sitting).  Special tests included:  MRI (multilevel discu bulging anf stemosis).    Restrictions due to condition include:  Working in normal job without restrictions.  Barriers include:  None as reported by patient.                        Objective:    Gait:    Gait Type:  Antalgic     Deviations:  General Deviations:  Base of support decr, emile decr, toe out R and toe out L               Lumbar/SI Evaluation    Lumbar Myotomes:  normal                Lumbar Dermtomes:  Lumbar dermatomes: R anterior lower leg.          L4 Left:  Hypo-light touch               Lumbar Palpation:    Tenderness present at Left:    Vertebral  Tenderness  present at Right: Vertebral      Spinal Segmental Conclusions:     Level: Hypo noted at L1, L2, L3, L4, L5 and S1                                                     Diamond Lumbar Evaluation    Posture:  Sitting: fair  Standing: fair  Lordosis: Reduced  Lateral Shift: no  Correction of Posture: no effect    Movement Loss:  Flexion (Flex): mod and pain  Extension (EXT): major and pain  Side [place] R (SG R): mod and pain  Side Glide L (SG L): mod  Test Movements:  FIS: During: no effect  After: no effect  Mechanical Response: no effectPretest Movements: central L-S with R glut/thigh/lower leg  Repeat FIS: During: increases  After: worse  Mechanical Response: no effect  EIS: During: increases  After: no worse  Mechanical Response: no effect  Repeat EIS: During: decreases  After: no better  Mechanical Response: no effect    EIL: During: increases  After: no worse  Mechanical Response: no effect  Repeat EIL: During: centralizing  After: better  Mechanical Response: IncROM      Static Tests:          Lying Prone in Extension: prone lying abolish/NB, DERIK's centralizing/B, increase ROM    Conclusion: derangement (vs other)  Principle of Treatment:  Posture Correction: slouch/over correct, location of neutral spine, use of lumbar support, educated pt in disc model of pathology to explain how the exercise may help his symptoms, educated pt in traffic light guide for pain, and frequency of HEP needed to test for therapeutic effect.  Also emphasized stopping exercise if pt gets a red light and contact clinic.    Extension: prone lying, DERIK's EIS(sagging hips into counter)                                           ROS    Assessment/Plan:    Patient is a 62 year old female with lumbar complaints.    Patient has the following significant findings with corresponding treatment plan.                Diagnosis 1:  LBP with R sciatica(known stenosis and disc bulging multiple levels)  Pain -  manual therapy, self management, education,  directional preference exercise and home program  Decreased ROM/flexibility - manual therapy, therapeutic exercise, therapeutic activity and home program  Decreased joint mobility - manual therapy, therapeutic exercise, therapeutic activity and home program  Decreased strength - therapeutic exercise, therapeutic activities and home program  Impaired gait - gait training and home program  Decreased function - therapeutic activities and home program  Impaired posture - neuro re-education, therapeutic activities and home program    Cumulative Therapy Evaluation is: Low complexity.    Previous and current functional limitations:  (See Goal Flow Sheet for this information)    Short term and Long term goals: (See Goal Flow Sheet for this information)     Communication ability:  Patient appears to be able to clearly communicate and understand verbal and written communication and follow directions correctly.  Treatment Explanation - The following has been discussed with the patient:   RX ordered/plan of care  Anticipated outcomes  Possible risks and side effects  This patient would benefit from PT intervention to resume normal activities.   Rehab potential is good.    Frequency:  1 X week, once daily  Duration:  for 4 weeks tapering to 1 X a week, every other wee, over 8 weeks  Discharge Plan:  Achieve all LTG.  Independent in home treatment program.  Reach maximal therapeutic benefit.    Please refer to the daily flowsheet for treatment today, total treatment time and time spent performing 1:1 timed codes.

## 2022-12-15 NOTE — PROCEDURE: MEDICATION COUNSELING
Patient was scheduled for an appointment today. She did not show.   Zoryve Counseling:  I discussed with the patient that Zoryve is not for use in the eyes, mouth or vagina. The most commonly reported side effects include diarrhea, headache, insomnia, application site pain, upper respiratory tract infections, and urinary tract infections.  All of the patient's questions and concerns were addressed.

## 2022-12-15 NOTE — PROCEDURE: MEDICATION COUNSELING
Negative covid result, viewed in patient portal.  Topical Clindamycin Pregnancy And Lactation Text: This medication is Pregnancy Category B and is considered safe during pregnancy. It is unknown if it is excreted in breast milk.

## 2022-12-15 NOTE — PROCEDURE: COUNSELING
Patient Specific Counseling (Will Not Stick From Patient To Patient): - Discussed lab work every 3-6 months for methotrexate. \\n- Will consider Skyrizi again (was denied previously) if patient cannot tolerate methotrexate. \\n- Discussed joint pain with psoriasis. Patient has joint pain on the back and legs. Reviewed that Skyrizi can help improve joint pain.
Detail Level: Detailed

## 2022-12-15 NOTE — PROCEDURE: MEDICATION COUNSELING
8 Picato Counseling:  I discussed with the patient the risks of Picato including but not limited to erythema, scaling, itching, weeping, crusting, and pain.

## 2022-12-15 NOTE — PROCEDURE: MEDICATION COUNSELING
Full range of motion of upper and lower extremities, no joint tenderness/swelling. Solaraze Pregnancy And Lactation Text: This medication is Pregnancy Category B and is considered safe. There is some data to suggest avoiding during the third trimester. It is unknown if this medication is excreted in breast milk.

## 2022-12-15 NOTE — PROCEDURE: MEDICATION COUNSELING
Xelhyacinthz Pregnancy And Lactation Text: This medication is Pregnancy Category D and is not considered safe during pregnancy.  The risk during breast feeding is also uncertain. Xelhycainthz Pregnancy And Lactation Text: This medication is Pregnancy Category D and is not considered safe during pregnancy.  The risk during breast feeding is also uncertain.

## 2022-12-16 NOTE — PROGRESS NOTES
[facility]    OUTPATIENT Physical Therapy ORTHOPEDIC EVALUATION  PLAN OF TREATMENT FOR OUTPATIENT REHABILITATION  (COMPLETE FOR INITIAL CLAIMS ONLY)  Patient's Last Name, First Name, M.I.  YOB: 1960  Zuly Wheeler    Provider s Name:  [facility]   Medical Record No.  [id number]   Start of Care Date:  12/15/22   Onset Date:   12/05/22 (mD orders)   Treatment Diagnosis:  multilevel stenosis and discu bulging(LBP with R sciaitca) Medical Diagnosis:     Spinal stenosis of lumbar region with neurogenic claudication  Bulging of lumbar intervertebral disc       Goals:     12/15/22 0500   Body Part   Goals listed below are for LBP   Goal #1   Goal #1 bending   Previous Functional Level No restrictions   Current Functional Level Can bend until hands reach knee   Performance level with PL 7/10   STG Target Performance Bend until hands reach midlower leg   Performance level with PL 4/10 or less   Rationale for dressing LE;for bathing LE;for household tasks such as bedmaking, emptying diswasher, washer and dryer, washing floors;for job requirements in their work place   Due date 01/26/23   LTG Target Performance Bend until hands reach ankle   Performance Level with 0-2/10 PL   Rationale for dressing LE;for bathing LE;for household tasks such as bedmaking, emptying diswasher, washer and dryer, washing floors;for job requirements in their work place   Due date 03/09/23       Therapy Frequency:  1x/week for 4 weeks, then 1x/week EOW for 8 weeks  Predicted Duration of Therapy Intervention:  12 weeks    Augustina Aguillon, PT                 I CERTIFY THE NEED FOR THESE SERVICES FURNISHED UNDER        THIS PLAN OF TREATMENT AND WHILE UNDER MY CARE     (Physician attestation of this document indicates review and certification of the therapy plan).                      Certification Date From:  12/15/22   Certification Date To:  03/09/23    Referring Provider:  Yanni Barrera    Initial Assessment        See Epic Evaluation SOC Date: 12/15/22

## 2022-12-19 DIAGNOSIS — I10 ESSENTIAL HYPERTENSION: ICD-10-CM

## 2022-12-19 DIAGNOSIS — F41.1 ANXIETY STATE: ICD-10-CM

## 2022-12-19 RX ORDER — PROPRANOLOL HYDROCHLORIDE 10 MG/1
TABLET ORAL
Qty: 60 TABLET | Refills: 0 | Status: SHIPPED | OUTPATIENT
Start: 2022-12-19

## 2022-12-21 DIAGNOSIS — F31.76 BIPOLAR DISORDER, IN FULL REMISSION, MOST RECENT EPISODE DEPRESSED (H): ICD-10-CM

## 2022-12-21 DIAGNOSIS — F41.1 ANXIETY STATE: ICD-10-CM

## 2022-12-21 RX ORDER — PAROXETINE 20 MG/1
TABLET, FILM COATED ORAL
Qty: 30 TABLET | Refills: 0 | Status: SHIPPED | OUTPATIENT
Start: 2022-12-21 | End: 2023-01-24

## 2022-12-21 NOTE — TELEPHONE ENCOUNTER
"Requested Prescriptions   Pending Prescriptions Disp Refills     PARoxetine (PAXIL) 20 MG tablet [Pharmacy Med Name: PAROXETINE HCL 20MG TABS] 30 tablet 0     Sig: TAKE ONE TABLET BY MOUTH AT BEDTIME       SSRIs Protocol Passed - 12/21/2022  4:04 PM        Passed - Recent (12 mo) or future (30 days) visit within the authorizing provider's specialty     Patient has had an office visit with the authorizing provider or a provider within the authorizing providers department within the previous 12 mos or has a future within next 30 days. See \"Patient Info\" tab in inbasket, or \"Choose Columns\" in Meds & Orders section of the refill encounter.              Passed - Medication is active on med list        Passed - Patient is age 18 or older        Passed - No active pregnancy on record        Passed - No positive pregnancy test in last 12 months         Prescription approved per [facility] Refill Protocol.    Kiki Choi RN  [facility]   "

## 2022-12-28 ENCOUNTER — TRANSFERRED RECORDS (OUTPATIENT)
Dept: HEALTH INFORMATION MANAGEMENT | Facility: CLINIC | Age: 62
End: 2022-12-28

## 2022-12-28 LAB — PHQ9 SCORE: 2

## 2022-12-29 ENCOUNTER — THERAPY VISIT (OUTPATIENT)
Dept: PHYSICAL THERAPY | Facility: CLINIC | Age: 62
End: 2022-12-29
Payer: COMMERCIAL

## 2022-12-29 DIAGNOSIS — M51.369 BULGING OF LUMBAR INTERVERTEBRAL DISC: Primary | ICD-10-CM

## 2022-12-29 PROCEDURE — 97110 THERAPEUTIC EXERCISES: CPT | Mod: GP | Performed by: PHYSICAL THERAPIST

## 2022-12-29 PROCEDURE — 97140 MANUAL THERAPY 1/> REGIONS: CPT | Mod: GP | Performed by: PHYSICAL THERAPIST

## 2022-12-29 PROCEDURE — 97112 NEUROMUSCULAR REEDUCATION: CPT | Mod: GP | Performed by: PHYSICAL THERAPIST

## 2023-01-05 ENCOUNTER — TELEPHONE (OUTPATIENT)
Dept: FAMILY MEDICINE | Facility: CLINIC | Age: 63
End: 2023-01-05

## 2023-01-05 ENCOUNTER — THERAPY VISIT (OUTPATIENT)
Dept: PHYSICAL THERAPY | Facility: CLINIC | Age: 63
End: 2023-01-05
Payer: COMMERCIAL

## 2023-01-05 DIAGNOSIS — M51.369 BULGING OF LUMBAR INTERVERTEBRAL DISC: Primary | ICD-10-CM

## 2023-01-05 PROCEDURE — 97110 THERAPEUTIC EXERCISES: CPT | Mod: GP | Performed by: PHYSICAL THERAPIST

## 2023-01-05 PROCEDURE — 97140 MANUAL THERAPY 1/> REGIONS: CPT | Mod: GP | Performed by: PHYSICAL THERAPIST

## 2023-01-06 NOTE — TELEPHONE ENCOUNTER
SPINE PATIENTS - NEW PROTOCOL PREVISIT    RECORDS RECEIVED FROM: Internal   REASON FOR VISIT: Lower back pain shooting down to R leg causing numbness    Date of Appt: 02/02/2023   NOTES (FOR ALL VISITS) STATUS DETAILS   OFFICE NOTE from referring provider Internal 11/09/2022 Dr Barrera [facility]    OFFICE NOTE from other specialist Internal 01/05/2023 PT [facility]    DISCHARGE SUMMARY from hospital N/A    DISCHARGE REPORT from ER Internal 11/06/2022 [facility] ED    EMG REPORT N/A    MEDICATION LIST N/A    IMAGING  (FOR ALL VISITS)     MRI (HEAD, NECK, SPINE) Internal 11/22/2022 lumbar spine   XRAY (SPINE) *NEUROSURGERY* N/A    CT (HEAD, NECK, SPINE) Internal 11/06/2022 lumbar spine

## 2023-01-10 ENCOUNTER — TELEPHONE (OUTPATIENT)
Dept: FAMILY MEDICINE | Facility: CLINIC | Age: 63
End: 2023-01-10

## 2023-01-10 ENCOUNTER — OFFICE VISIT (OUTPATIENT)
Dept: FAMILY MEDICINE | Facility: CLINIC | Age: 63
End: 2023-01-10
Payer: COMMERCIAL

## 2023-01-10 VITALS
BODY MASS INDEX: 23.98 KG/M2 | SYSTOLIC BLOOD PRESSURE: 138 MMHG | TEMPERATURE: 98.9 F | HEART RATE: 78 BPM | OXYGEN SATURATION: 100 % | DIASTOLIC BLOOD PRESSURE: 93 MMHG | WEIGHT: 162.4 LBS

## 2023-01-10 DIAGNOSIS — M51.369 BULGING OF LUMBAR INTERVERTEBRAL DISC: ICD-10-CM

## 2023-01-10 DIAGNOSIS — M48.062 SPINAL STENOSIS OF LUMBAR REGION WITH NEUROGENIC CLAUDICATION: Primary | ICD-10-CM

## 2023-01-10 PROCEDURE — 99214 OFFICE O/P EST MOD 30 MIN: CPT | Performed by: PHYSICIAN ASSISTANT

## 2023-01-10 RX ORDER — IBUPROFEN 800 MG/1
800 TABLET, FILM COATED ORAL EVERY 8 HOURS PRN
Qty: 60 TABLET | Refills: 1 | Status: SHIPPED | OUTPATIENT
Start: 2023-01-10 | End: 2024-07-19

## 2023-01-10 RX ORDER — LIDOCAINE 50 MG/G
1 PATCH TOPICAL EVERY 24 HOURS
Qty: 40 PATCH | Refills: 1 | Status: SHIPPED | OUTPATIENT
Start: 2023-01-10 | End: 2023-07-03

## 2023-01-10 RX ORDER — LIDOCAINE 4 G/G
3 PATCH TOPICAL EVERY 24 HOURS
Qty: 40 PATCH | Refills: 2 | Status: SHIPPED | OUTPATIENT
Start: 2023-01-10 | End: 2023-07-03

## 2023-01-10 ASSESSMENT — PAIN SCALES - GENERAL: PAINLEVEL: EXTREME PAIN (9)

## 2023-01-10 NOTE — PATIENT INSTRUCTIONS
Tylenol- 1000mg every 8 hours (3 times per day)  Ibuprofen 800mg (4 of over the counter) every 8 hours take with food.

## 2023-01-10 NOTE — TELEPHONE ENCOUNTER
[facility], [place] calling ([phone]) to notifiy provider, Ella Marquez that Lidocaine 4% patch is out of stock; 5% in stock; okay to change order to 5%?    Prashant'd below    Madai Stone, RN

## 2023-01-10 NOTE — TELEPHONE ENCOUNTER
Ok to change to Lidocaine 5% but this may need a PA and not be covered. 4% is over the counter.   Ella Marquez PA-C

## 2023-01-10 NOTE — TELEPHONE ENCOUNTER
Pharmacist InternElaina was given provider's message as written. If 5% is not covered, she will instruct pt to purchase over the counter 4%.    Edel Barnett RN  [facility]

## 2023-01-10 NOTE — PROGRESS NOTES
Assessment & Plan     1. Spinal stenosis of lumbar region with neurogenic claudication    2. Bulging of lumbar intervertebral disc      Discussed appropriate dosing on tylenol and ibuprofen. Ibuprofen must take with food and as needed for break through pain.   Use lidocaine patches. Continue physical therapy. Has spine appointment scheduled.                  No follow-ups on file.    KWESI Ramos [facility] KAJAL Caruso is a 62 year old, presenting for the following health issues:  Back Pain and Health Maintenance      HPI     Pain History:  When did you first notice your pain? - Chronic Pain   Have you seen this provider for your pain in the past?   Yes   Where in your body do you have pain? Back  Are you seeing anyone else for your pain? Yes - in February and on the waiting list to get in sooner    PHQ-9 SCORE 7/14/2021 3/2/2022 10/13/2022   PHQ-9 Total Score - - -   PHQ-9 Total Score MyChart - - 2 (Minimal depression)   PHQ-9 Total Score 18 3 2       GABRIELE-7 SCORE 6/24/2020 7/14/2021 3/2/2022   Total Score - - -   Total Score 9 18 5           PEG Score 1/10/2023   PEG Total Score 7.67       Chronic Pain Follow Up:    Location of pain: lower back and shoots down both legs   Analgesia/pain control:    - Recent changes:  none    - Overall control: Inadequate pain control    - Current treatments: tylenol and ibuprofen.    Adherence:     - Do you ever take more pain medicine than prescribed? No    - When did you take your last dose of pain medicine?     Adverse effects: No   PDMP Review     None        Last CSA Agreement:   CSA -- Patient Level:    CSA: None found at the patient level.       Last UDS: 6/19/2021            More pain after the therapy sessions that persists.   Lower back and shoots down the back of the legs.   Front right leg is numb- not new.     Taking 2 gabapentin 3 times per day. Not helping the nerve pain at all.   Tried medrol dose pack in Novemeber. Not  helpful.   Methocarbamol didn't help either.     No falls.   No bowel or bladder issues.   No problems sleeping.     Patient takes 2 over the counter tablets 2 times a day.   Patient takes 2 tablets 2 times per day.     Patient stands for her job. No accommodations.     Review of Systems         Objective    BP (!) 138/93 (BP Location: Right arm, Patient Position: Chair, Cuff Size: Adult Large)   Pulse 78   Temp 98.9  F (37.2  C) (Oral)   Wt 73.7 kg (162 lb 6.4 oz)   LMP 01/30/2014 (Exact Date)   SpO2 100%   BMI 23.98 kg/m    Body mass index is 23.98 kg/m .  Physical Exam   GENERAL: healthy, alert and no distress  MS: no gross musculoskeletal defects noted, no edema- Negative straight leg raise bilaterally. Patient with pain moving from sitting to standing. deep tendon reflexes intact. Pain with rotation. Pain with palpation to the left sided paraspinal muscles. No lumbar spinous process pain to palpation.   SKIN: no suspicious lesions or rashes  NEURO: Normal strength and tone, mentation intact and speech normal

## 2023-01-11 ENCOUNTER — TELEPHONE (OUTPATIENT)
Dept: FAMILY MEDICINE | Facility: CLINIC | Age: 63
End: 2023-01-11

## 2023-01-11 NOTE — TELEPHONE ENCOUNTER
PA approved. For Lidocaine (LIDOCARE) 4 % Patch  Effective date: 12/11/2022 - 1/10/2024  PA reference #: [id number]  Pt. notified:   Letter will be sent by insurance company    The Medication Prior Authorization was Denied. Please advise on next step, there is Appeal, change medication or Patient can pay out of pocket.    lidocaine (LIDODERM) 5 % patch  Case ID [id number]    [employer] coverage review department.   Phone: [phone]     Shanell Vu,

## 2023-01-11 NOTE — TELEPHONE ENCOUNTER
PA for Lidocaine 5% patch was denied on another encounter. Please see telephone encounter dated 1/11/23.

## 2023-01-11 NOTE — TELEPHONE ENCOUNTER
PRIOR AUTHORIZATION DENIED    Medication: lidocaine (LIDODERM) 5 % patch - EPA DENIED    Denial Date: 1/11/2023    Denial Rational:       Appeal Information:

## 2023-01-13 ENCOUNTER — TELEPHONE (OUTPATIENT)
Dept: FAMILY MEDICINE | Facility: CLINIC | Age: 63
End: 2023-01-13

## 2023-01-13 ENCOUNTER — VIRTUAL VISIT (OUTPATIENT)
Dept: FAMILY MEDICINE | Facility: CLINIC | Age: 63
End: 2023-01-13
Payer: COMMERCIAL

## 2023-01-13 DIAGNOSIS — M51.369 BULGING OF LUMBAR INTERVERTEBRAL DISC: ICD-10-CM

## 2023-01-13 DIAGNOSIS — M48.062 SPINAL STENOSIS OF LUMBAR REGION WITH NEUROGENIC CLAUDICATION: Primary | ICD-10-CM

## 2023-01-13 PROCEDURE — 99213 OFFICE O/P EST LOW 20 MIN: CPT | Mod: 93 | Performed by: PHYSICIAN ASSISTANT

## 2023-01-13 RX ORDER — METHYLPREDNISOLONE 4 MG
TABLET, DOSE PACK ORAL
Qty: 21 TABLET | Refills: 0 | Status: SHIPPED | OUTPATIENT
Start: 2023-01-13 | End: 2023-07-03

## 2023-01-13 ASSESSMENT — ENCOUNTER SYMPTOMS
WEAKNESS: 1
SHORTNESS OF BREATH: 0
BACK PAIN: 1
PARESTHESIAS: 1
NUMBNESS: 1
FEVER: 0
NERVOUS/ANXIOUS: 0
LIGHT-HEADEDNESS: 0
CHILLS: 0

## 2023-01-13 NOTE — TELEPHONE ENCOUNTER
Spoke with patient and scheduled appointment for virtual today    Ricarda Saunders RN  [facility]

## 2023-01-13 NOTE — PATIENT INSTRUCTIONS
Jono Caruso,     Your back pain and leg symptoms are likely due to nerve compression and the bulging disks in your back.  The symptoms are called lumbar radiculopathy.  Please see the attached handout for additional information.  For treatment you can use heat/ice, lidocaine patches, Tylenol, physical therapy, and the prescribed Medrol Dosepak which is a steroid.  You will be using the Medrol Dosepak instead of ibuprofen.  Please continue with your spine and pain clinic appointments as scheduled.  If you have any questions about your pregnancy can call [address] [place] [address] for additional details. If you develop any severe symptoms such as fevers, severe weakness of your legs, loss of bowel or bladder function, or numbness in the groin area, please go to the emergency department.  Reach out with questions or concerns.     Take Care,  Natasha Rossi PA-C

## 2023-01-13 NOTE — TELEPHONE ENCOUNTER
Patient should be seen in clinic or go to urgent care to be evaluated if pain is severe        Dr. Augustina Valdovinos

## 2023-01-13 NOTE — TELEPHONE ENCOUNTER
Patient and called stating the she is still having pain, completing all recommendations from Ella Marquez from appointment on 1/10/23 with no relief.    Other RN discussed with patient and scheduled virtual visit today.    LIAM Epps RN  [facility]

## 2023-01-13 NOTE — PROGRESS NOTES
Zuly is a 62 year old who is being evaluated via a billable telephone visit.      What phone number would you like to be contacted at? [phone]  How would you like to obtain your AVS? Lj  Distant Location (provider location):  On-site    Assessment & Plan     Spinal stenosis of lumbar region with neurogenic claudication  Bulging of lumbar intervertebral disc  Patient is a 62-year-old female who presents to telephone visit due to ongoing lumbar radiculopathy.  Patient has completed MRI showing spinal stenosis and multiple disc herniations.  She does have neurosurgery and pain clinic follow-up scheduled.  Patient has been using physical therapy, Tylenol, ibuprofen, and lidocaine patches for management without relief.  No signs of cauda equina at this time.  We will switch patient's management to methylprednisolone instead of ibuprofen for short period of time.  Once this is completed, patient may return to use of ibuprofen.  Discussed symptoms that warrant urgent/emergent follow-up and return precautions.  - methylPREDNISolone (MEDROL DOSEPAK) 4 MG tablet therapy pack; Follow Package Directions       See Patient Instructions    Return in about 3 weeks (around 2/3/2023) for Reevaluation.    Natasha Rossi PA-C  [facility] RUFUS Caruso is a 62 year old, presenting for the following health issues:  Musculoskeletal Problem      HPI     Lower back pain shooting down legs. lidocaine patch not working. Patient notes ongoing back pain. She is using Ibuprofen and Lidocaine patches without relief. Patient notes pain in the low back that radiates to bilateral legs with standing and walking. She notes intermittent numbness on the right in the front of the right leg. No saddle anesthesia or loss of bowel/bladder function. Mild leg weakness-intermittent.     Per chart review, patient last evaluated for spinal stenosis of lumbar region with neurogenic claudication and bulging lumbar  disc 1/10/2023.  She was prescribed ibuprofen and lidocaine patches.  Continued physical therapy recommended.  Patient has spine follow-up scheduled 2/2/23.    MR Lumbar spine 11/22/22:  IMPRESSION:    Multiple disc herniations and stenoses     Review of Systems   Constitutional: Negative for chills and fever.   Respiratory: Negative for shortness of breath.    Cardiovascular: Negative for chest pain.   Musculoskeletal: Positive for back pain.   Skin: Negative for rash.   Neurological: Positive for weakness, numbness and paresthesias. Negative for light-headedness.   Psychiatric/Behavioral: The patient is not nervous/anxious.             Objective           Vitals:  No vitals were obtained today due to virtual visit.    Physical Exam   healthy, alert and no distress  PSYCH: Alert and oriented times 3; coherent speech, normal   rate and volume, able to articulate logical thoughts, able   to abstract reason, no tangential thoughts, no hallucinations   or delusions  Her affect is normal  RESP: No cough, no audible wheezing, able to talk in full sentences  Remainder of exam unable to be completed due to telephone visits        Phone call duration: 10 minutes

## 2023-01-13 NOTE — TELEPHONE ENCOUNTER
Patient calling to report severe back pain - mentions that Tylenol and Ibuprofen aren't working and is hoping for something stronger if possible.     Xin Goldstein RN  [facility]

## 2023-01-17 ENCOUNTER — THERAPY VISIT (OUTPATIENT)
Dept: PHYSICAL THERAPY | Facility: CLINIC | Age: 63
End: 2023-01-17
Payer: COMMERCIAL

## 2023-01-17 DIAGNOSIS — M51.369 BULGING OF LUMBAR INTERVERTEBRAL DISC: Primary | ICD-10-CM

## 2023-01-17 PROCEDURE — 97140 MANUAL THERAPY 1/> REGIONS: CPT | Mod: GP | Performed by: PHYSICAL THERAPIST

## 2023-01-17 PROCEDURE — 97110 THERAPEUTIC EXERCISES: CPT | Mod: GP | Performed by: PHYSICAL THERAPIST

## 2023-01-24 DIAGNOSIS — F41.1 ANXIETY STATE: ICD-10-CM

## 2023-01-24 DIAGNOSIS — F31.76 BIPOLAR DISORDER, IN FULL REMISSION, MOST RECENT EPISODE DEPRESSED (H): ICD-10-CM

## 2023-01-24 RX ORDER — PAROXETINE 20 MG/1
TABLET, FILM COATED ORAL
Qty: 30 TABLET | Refills: 0 | OUTPATIENT
Start: 2023-01-24

## 2023-01-24 RX ORDER — PAROXETINE 20 MG/1
TABLET, FILM COATED ORAL
Qty: 30 TABLET | Refills: 0 | Status: SHIPPED | OUTPATIENT
Start: 2023-01-24

## 2023-01-24 NOTE — TELEPHONE ENCOUNTER
"Requested Prescriptions   Pending Prescriptions Disp Refills     PARoxetine (PAXIL) 20 MG tablet [Pharmacy Med Name: PAROXETINE HCL 20MG TABS] 30 tablet 0     Sig: TAKE ONE TABLET BY MOUTH AT BEDTIME       SSRIs Protocol Passed - 1/24/2023 10:42 AM        Passed - Recent (12 mo) or future (30 days) visit within the authorizing provider's specialty     Patient has had an office visit with the authorizing provider or a provider within the authorizing providers department within the previous 12 mos or has a future within next 30 days. See \"Patient Info\" tab in inbasket, or \"Choose Columns\" in Meds & Orders section of the refill encounter.              Passed - Medication is active on med list        Passed - Patient is age 18 or older        Passed - No active pregnancy on record        Passed - No positive pregnancy test in last 12 months           Routing refill request to provider for review/approval because of allergy warning.    Kiki Choi RN  [facility]       "
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
## 2023-01-31 ENCOUNTER — APPOINTMENT (OUTPATIENT)
Dept: URBAN - METROPOLITAN AREA CLINIC 254 | Age: 63
Setting detail: DERMATOLOGY
End: 2023-02-02

## 2023-01-31 VITALS — HEIGHT: 69 IN | WEIGHT: 160 LBS | RESPIRATION RATE: 14 BRPM

## 2023-01-31 DIAGNOSIS — L40.0 PSORIASIS VULGARIS: ICD-10-CM

## 2023-01-31 PROCEDURE — OTHER COUNSELING: OTHER

## 2023-01-31 PROCEDURE — OTHER VENIPUNCTURE: OTHER

## 2023-01-31 PROCEDURE — 99214 OFFICE O/P EST MOD 30 MIN: CPT

## 2023-01-31 PROCEDURE — OTHER PRESCRIPTION: OTHER

## 2023-01-31 PROCEDURE — 36415 COLL VENOUS BLD VENIPUNCTURE: CPT

## 2023-01-31 PROCEDURE — OTHER ORDER TESTS: OTHER

## 2023-01-31 PROCEDURE — OTHER MIPS QUALITY: OTHER

## 2023-01-31 RX ORDER — METHOTREXATE SODIUM 2.5 MG/1
2.5MG TABLET ORAL QWEEK
Qty: 24 | Refills: 0 | Status: ERX

## 2023-01-31 RX ORDER — FOLIC ACID 1 MG/1
TABLET ORAL QD
Qty: 90 | Refills: 0 | Status: ERX | COMMUNITY
Start: 2023-01-31

## 2023-01-31 RX ORDER — KETOCONAZOLE 20 MG/ML
2% SHAMPOO, SUSPENSION TOPICAL
Qty: 120 | Refills: 3 | Status: CANCELLED
Stop reason: CLARIF

## 2023-01-31 ASSESSMENT — LOCATION SIMPLE DESCRIPTION DERM
LOCATION SIMPLE: SCALP
LOCATION SIMPLE: RIGHT UPPER ARM
LOCATION SIMPLE: POSTERIOR SCALP
LOCATION SIMPLE: LEFT FOREHEAD

## 2023-01-31 ASSESSMENT — LOCATION ZONE DERM
LOCATION ZONE: ARM
LOCATION ZONE: FACE
LOCATION ZONE: SCALP

## 2023-01-31 ASSESSMENT — LOCATION DETAILED DESCRIPTION DERM
LOCATION DETAILED: LEFT CENTRAL FRONTAL SCALP
LOCATION DETAILED: RIGHT CENTRAL FRONTAL SCALP
LOCATION DETAILED: LEFT INFERIOR OCCIPITAL SCALP
LOCATION DETAILED: LEFT FOREHEAD
LOCATION DETAILED: RIGHT ANTECUBITAL SKIN

## 2023-01-31 NOTE — PROCEDURE: VENIPUNCTURE
Detail Level: None
Number Of Tubes Drawn: 2
Bill 47013 For Specimen Handling/Conveyance To Laboratory?: no
Venipuncture Paragraph: An alcohol pad was applied to the venipuncture site. Venipuncture was performed using a butterfly needle. Pressure and a bandaid was applied to the site. No complications were noted.

## 2023-02-02 ENCOUNTER — OFFICE VISIT (OUTPATIENT)
Dept: NEUROSURGERY | Facility: CLINIC | Age: 63
End: 2023-02-02
Payer: COMMERCIAL

## 2023-02-02 ENCOUNTER — PRE VISIT (OUTPATIENT)
Dept: NEUROSURGERY | Facility: CLINIC | Age: 63
End: 2023-02-02

## 2023-02-02 VITALS
SYSTOLIC BLOOD PRESSURE: 132 MMHG | DIASTOLIC BLOOD PRESSURE: 66 MMHG | WEIGHT: 163.9 LBS | HEART RATE: 70 BPM | BODY MASS INDEX: 24.2 KG/M2

## 2023-02-02 DIAGNOSIS — M54.16 LUMBAR RADICULOPATHY: Primary | ICD-10-CM

## 2023-02-02 PROCEDURE — 99204 OFFICE O/P NEW MOD 45 MIN: CPT | Performed by: PREVENTIVE MEDICINE

## 2023-02-02 NOTE — TELEPHONE ENCOUNTER
SPINE PATIENTS - NEW PROTOCOL PREVISIT    RECORDS RECEIVED FROM: Internal   REASON FOR VISIT: new consult Spine   Date of Appt: 03/02/2023   NOTES (FOR ALL VISITS) STATUS DETAILS   OFFICE NOTE from referring provider Internal 02/02/2023 Dr Jones [facility]    OFFICE NOTE from other specialist Internal 11/09/2022 Dr Barrera [facility]   01/05/2023 PT [facility]   DISCHARGE SUMMARY from hospital N/A    DISCHARGE REPORT from ER Internal 11/06/2022 [facility] ED   EMG REPORT N/A    MEDICATION LIST N/A    IMAGING  (FOR ALL VISITS)     MRI (HEAD, NECK, SPINE) Internal 11/22/2022 lumbar spine   XRAY (SPINE) *NEUROSURGERY* N/A    CT (HEAD, NECK, SPINE) Internal 11/06/2022 lumbar spine

## 2023-02-02 NOTE — PATIENT INSTRUCTIONS
It was very nice to meet you.  I am suspicious that you have a pinched nerve in your back which is causing your legs to hurt and your weakness and numbness.  I left a message for the pain doctor you are going to be seeing on February 17 about the injection we discussed and I will let that doctor decide which approach makes the most sense.  I also want you to follow-up with Dr. Sylvain Wu, our neurosurgeon in about a month so he can consider doing surgery if what were doing now does not work.  I also recommend discontinuing chiropractic at this time..  Acupuncture obviously made things worse so I would stop that.  See the assessment and plan below for further details of our discussion today.  No follow-up with me as planned at this time.    ASSESSMENT: Zuly Wheeler is a 62 year old female who presents  today for new patient evaluation of:    L4-5 severe central and bilateral lateral recess stenosis  Bilateral lumbar radiculopathies with evidence of a subtle right foot drop, sensory deficit that does not exactly correspond to L4 or L5, and questionable right anterior tibialis atrophy.  SLR is positive bilaterally for some leg symptoms but not an exact radicular pattern    PLAN:  Patient to follow-up with his PCP regarding the renal abnormalities incidentally noted on MRI-warranted.  She has an appointment on 2/17 with  at the [facility] pain service.  I sent her a staff message to look at my note and consider whether to do an L4-5 interlaminar versus bilateral or unilateral transforaminal epidural.  Consultation in about 1 month with our neurosurgeons.  I advised holding off on further chiropractic and acupuncture.  She is already on gabapentin and has not helped.

## 2023-02-02 NOTE — NURSING NOTE
Reason For Visit:   Chief Complaint   Patient presents with     New Patient     Low back pain         Occupation:   Currently working? Yes.  Work status?  Full time.    Sports: no  Activities: line dancing, go to concerts             /66 (BP Location: Right arm, Patient Position: Sitting, Cuff Size: Adult Regular)   Pulse 70   Wt 74.3 kg (163 lb 14.4 oz)   LMP 01/30/2014 (Exact Date)   BMI 24.20 kg/m        Allergies   Allergen Reactions     Depakote      groggy     Lamotrigine      Intolerance, numb (Lamictal)     Olanzapine Other (See Comments)     Only generic/ineffective  Ineffective (only generic)     Seroquel [Quetiapine Fumarate] Visual Disturbance     Blurred vision; jumpy     Sulfa Drugs Hives     Trileptal Visual Disturbance     Blurred vision     Zoloft Other (See Comments)     jumpy     Citalopram Anxiety     Intolerance (Celexa)       Current Outpatient Medications   Medication     clobetasol (TEMOVATE) 0.05 % external solution     gabapentin (NEURONTIN) 100 MG capsule     ibuprofen (ADVIL/MOTRIN) 800 MG tablet     Lidocaine (LIDOCARE) 4 % Patch     lithium (ESKALITH) 150 MG capsule     OLANZapine (ZYPREXA) 2.5 MG tablet     PARoxetine (PAXIL) 20 MG tablet     propranolol (INDERAL) 10 MG tablet     vitamin D3 (CHOLECALCIFEROL) 50 mcg (2000 units) tablet     lidocaine (LIDODERM) 5 % patch     methylPREDNISolone (MEDROL DOSEPAK) 4 MG tablet therapy pack     No current facility-administered medications for this visit.         Heidi Wilson LPN
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
## 2023-02-17 ENCOUNTER — OFFICE VISIT (OUTPATIENT)
Dept: ANESTHESIOLOGY | Facility: CLINIC | Age: 63
End: 2023-02-17
Payer: COMMERCIAL

## 2023-02-17 ENCOUNTER — TELEPHONE (OUTPATIENT)
Dept: PALLIATIVE MEDICINE | Facility: CLINIC | Age: 63
End: 2023-02-17

## 2023-02-17 VITALS
DIASTOLIC BLOOD PRESSURE: 83 MMHG | HEIGHT: 68 IN | BODY MASS INDEX: 24.71 KG/M2 | OXYGEN SATURATION: 99 % | WEIGHT: 163 LBS | SYSTOLIC BLOOD PRESSURE: 141 MMHG | HEART RATE: 78 BPM

## 2023-02-17 DIAGNOSIS — M48.062 SPINAL STENOSIS OF LUMBAR REGION WITH NEUROGENIC CLAUDICATION: ICD-10-CM

## 2023-02-17 PROCEDURE — 99204 OFFICE O/P NEW MOD 45 MIN: CPT | Mod: GC | Performed by: ANESTHESIOLOGY

## 2023-02-17 ASSESSMENT — PAIN SCALES - GENERAL: PAINLEVEL: EXTREME PAIN (9)

## 2023-02-17 NOTE — LETTER
2/17/2023       RE: Zuly Wheeler  [address]  [place] [address]     Dear Colleague,    Thank you for referring your patient, Zuly Wheeler, to the [facility] [place] at [facility]. Please see a copy of my visit note below.    [facility] [place] Consultation    Date of visit: 2/17/2023    Reason for consultation:    Zuly Wheeler is a 62 year old female who is seen in consultation today at the request of her provider, Yanni Barrera MD.    Primary Care Provider is Augustina Valdovinos.    Please see the [facility] health questionnaire which the patient completed and reviewed with me in detail.    Chief Complaint:    Chief Complaint   Patient presents with     Consult     Consult lower back, both leg pain       Patient Supplied Answers To the  Pain Questionnaire  No flowsheet data found.    Pain history:  Zuly Wheeler is a 62 year old female who reports pain in her low back radiates down her bilateral lower extremities to her feet. 6 months ago she developed right lower quadrant abdominal pain. That pain eventually started to travel down the front of her right leg. Her right anterior shin remained numb but the abdominal pain and right anterior leg pain went away. She then developed pain in her mid low back that radiates down the posterior lower extremities equally. On the right she has right toe numbness.    There was no associated accident or injury. It is worse when standing or walking, it is entirely gone when sitting or standing but the numbness remains.  She denies any new problems with falls or balance, any new numbness or weakness of the arms or legs, any new bowel or bladder incontinence, any night sweats or unexplained fevers, or any sudden or unexpected weight loss.     Current treatments include:  Ibuprofen  Tylenol  gabapentin    Previous medication  treatments included:  Medrol Dose Pack  Salon Pas    Other treatments have included:  Zuly Wheeler has not been seen at a pain clinic in the past.    PT: Y  Acupuncture: N  TENs Unit: N  Injections: N    Past Medical History:  Past Medical History:   Diagnosis Date     Bipolar 1 disorder (H)      Chronic maxillary sinusitis 09/22/2012 1995, due to tobacco abuse     Essential hypertension 08/04/2021     Herpes simplex virus infection 11/29/2017    No known outbreaks or sores, but positive serology. Discussed that this means she does have the herpes virus, and natural hx of this infection. Gave pt info.     Hyperlipidemia with target LDL less than 130 12/04/2013     Vitamin D deficiency disease 12/04/2013     Patient Active Problem List    Diagnosis Date Noted     Bulging of lumbar intervertebral disc 12/15/2022     Priority: Medium     Spinal stenosis of lumbar region with neurogenic claudication 11/09/2022     Priority: Medium     Cervical high risk HPV (human papillomavirus) test positive 10/20/2022     Priority: Medium     1994, 2012 NIL paps  1/30/14 supracervical hysterectomy  11/3/15 NIL pap, Neg HPV  7/21/16 ECC-neg  10/13/22 NIL pap, + HR HPV (not 16 or 18). Plan: cotest in 1 year/ pt advised       Essential hypertension 08/04/2021     Priority: Medium     ACP (advance care planning) 11/04/2015     Priority: Medium     Advance Care Planning 11/4/2015: ACP Review and Resources Provided:  Reviewed chart for advance care plan.  Zuly Guerrero has no plan or code status on file. Discussed available resources and provided with information. . Added by Verito Inman             Hyperlipidemia with target LDL less than 130 12/04/2013     Priority: Medium     Vitamin D deficiency disease 12/04/2013     Priority: Medium     Family history of diabetes mellitus 12/04/2013     Priority: Medium     Bipolar disorder, in full remission, most recent episode depressed (H) 09/22/2012     Priority: Medium     Diagnosed           Past Surgical History:  Past Surgical History:   Procedure Laterality Date     DILATION AND CURETTAGE  12/6/2013    Procedure: DILATION AND CURETTAGE;  Dilation And Curettage;  Surgeon: Edel Chew MD;  Location: [facility] OR     EYE SURGERY  8/2013    cataract surgery both eyes done     LAPAROSCOPIC HYSTERECTOMY SUPRACERVICAL, BILATERAL SALPINGO-OOPHORECTOMY, COMBINED  1/30/2014    Procedure: COMBINED LAPAROSCOPIC HYSTERECTOMY SUPRACERVICAL, SALPINGO-OOPHORECTOMY;  Laparoscopic Supracervical Hysterectomy With Bilateral Salingectomy;  Surgeon: Edel Chew MD;  Location: [facility] OR     TUBAL LIGATION  1990     Medications:  Current Outpatient Medications   Medication Sig Dispense Refill     clobetasol (TEMOVATE) 0.05 % external solution Apply topically 2 times daily 50 mL 2     gabapentin (NEURONTIN) 100 MG capsule Take 2 capsules (200 mg) by mouth 3 times daily 180 capsule 3     ibuprofen (ADVIL/MOTRIN) 800 MG tablet Take 1 tablet (800 mg) by mouth every 8 hours as needed for moderate pain (4-6) 60 tablet 1     lithium (ESKALITH) 150 MG capsule TAKE THREE CAPSULES BY MOUTH AT BEDTIME Strength: 150 mg 90 capsule 3     methylPREDNISolone (MEDROL DOSEPAK) 4 MG tablet therapy pack Follow Package Directions 21 tablet 0     OLANZapine (ZYPREXA) 2.5 MG tablet Take 1 tablet (2.5 mg) by mouth At Bedtime 30 tablet 0     PARoxetine (PAXIL) 20 MG tablet TAKE ONE TABLET BY MOUTH AT BEDTIME Strength: 20 mg 30 tablet 0     propranolol (INDERAL) 10 MG tablet TAKE ONE TABLET BY MOUTH TWICE A DAY 60 tablet 0     vitamin D3 (CHOLECALCIFEROL) 50 mcg (2000 units) tablet Take 1 tablet (50 mcg) by mouth daily 90 tablet 3     Lidocaine (LIDOCARE) 4 % Patch Place 3 patches onto the skin every 24 hours To prevent lidocaine toxicity, patient should be patch free for 12 hrs daily. (Patient not taking: Reported on 2/17/2023) 40 patch 2     lidocaine (LIDODERM) 5 % patch Place 1 patch onto the skin every 24 hours To prevent lidocaine  toxicity, patient should be patch free for 12 hrs daily. (Patient not taking: Reported on 2/17/2023) 40 patch 1     Allergies:     Allergies   Allergen Reactions     Depakote      groggy     Lamotrigine      Intolerance, numb (Lamictal)     Olanzapine Other (See Comments)     Only generic/ineffective  Ineffective (only generic)     Seroquel [Quetiapine Fumarate] Visual Disturbance     Blurred vision; jumpy     Sulfa Drugs Hives     Trileptal Visual Disturbance     Blurred vision     Zoloft Other (See Comments)     jumpy     Citalopram Anxiety     Intolerance (Celexa)     Social History:  Home situation: [facility] with brother in a house  Occupation/Schooling:   Tobacco use: N  Alcohol use: N  Drug use: N    Family history:  Family History   Problem Relation Age of Onset     Diabetes Mother      Hypertension Mother      Psychotic Disorder Mother      Depression Mother      Hearing Loss Father      Coronary Artery Disease No family hx of      Hyperlipidemia No family hx of      Cerebrovascular Disease No family hx of      Breast Cancer No family hx of      Colon Cancer No family hx of      Prostate Cancer No family hx of      Other Cancer No family hx of      Anxiety Disorder No family hx of      Mental Illness No family hx of      Substance Abuse No family hx of      Anesthesia Reaction No family hx of      Asthma No family hx of      Osteoporosis No family hx of      Genetic Disorder No family hx of      Thyroid Disease No family hx of      Obesity No family hx of      Unknown/Adopted No family hx of        Review of Systems:    POSTIVE IN BOLD  GENERAL: fever/chills, fatigue, general unwell feeling, weight gain/loss.  HEAD/EYES:  headache, dizziness, or vision changes.    EARS/NOSE/THROAT:  Nosebleeds, hearing loss, sinus infection, earache, tinnitus.  IMMUNE:  Allergies, cancer, immune deficiency, or infections.  SKIN:  Scalp cirrhosis  HEME/Lymphatic:   anemia, easy bruising, easy  "bleeding.  RESPIRATORY:  cough, wheezing, or shortness of breath  CARDIOVASCULAR/Circulation:  Extremity edema, syncope, hypertension, tachycardia, or angina.  GASTROINTESTINAL:  abdominal pain, nausea/emesis, diarrhea, constipation,  hematochezia, or melena.  ENDOCRINE:  Diabetes, steroid use,  thyroid disease or osteoporosis.  MUSCULOSKELETAL: neck pain, back pain, arthralgia, arthritis, or gout.  GENITOURINARY:  frequency, urgency, dysuria, difficulty voiding, hematuria or incontinence.  NEUROLOGIC:  weakness, numbness, paresthesias, seizure, tremor, stroke or memory loss.  PSYCHIATRIC:  depression, anxiety, stress, suicidal thoughts or mood swings.     Physical Exam:  Vitals:    02/17/23 0637   BP: (!) 141/83   BP Location: Right arm   Patient Position: Chair   Cuff Size: Adult Large   Pulse: 78   SpO2: 99%   Weight: 73.9 kg (163 lb)   Height: 1.727 m (5' 8\")     Exam:  Constitutional: healthy, alert and no distress  Head: normocephalic. Atraumatic.   Eyes: no redness or jaundice noted   Cardiovascular: Acyanotic  Respiratory: breathing comfortably  Skin: no suspicious lesions or rashes  Psychiatric: mentation appears normal, affect normal    Musculoskeletal exam:  Nontender to palpation at lumbosacral spine    Neurologic exam:  CN:  Cranial nerves 2-12 are grossly WNL  Motor:  5/5 LE strength  Sensory:  ( lower extremities):   Light touch: diminished at right great toe       I reviewed the following diagnostic tests with the patient:  MRI L spine 11/22/22    FINDINGS:  Alignment is significant for slight dextroconvex curvature and  multilevel grade 1 spondylolisthesis. Scattered Schmorl's nodes. Bone  marrow demonstrates scattered degenerative endplate change. Mild  chronic-appearing height loss involving the L4 and L5 vertebral  bodies. Conus medullaris unremarkable terminating at the level of the  L1-L2 disc. Cauda equina demonstrates slight bunching related to  spinal canal stenoses at L3-4 and L4-5. " Fibrolipoma involving the  filum terminalis. Mild bilateral sacroiliac joint degenerative change.  Areas of nonspecific T2 hyperintense renal signal intrinsically  characterize but statistically likely benign.     Segmental Analysis:      T12-L1:  Disc height maintained. Small central protrusion. Mild  bilateral facet arthropathy. No foraminal or spinal canal stenosis.  Mild disc height loss.      L1-L2:  Small central protrusion. Mild bilateral facet arthropathy. No  foraminal stenosis. Mild spinal canal stenosis.       L2-L3:  Mild disc height loss. Disc bulge with shallow left central  protrusion. Mild bilateral facet arthropathy. No foraminal stenosis.  Mild spinal canal stenosis.       L3-L4:  Mild disc height loss. Disc bulge with right central/foraminal  protrusion. Moderate bilateral facet arthropathy. Moderate right  foraminal stenosis. No left foraminal stenosis. Moderate spinal canal  stenosis.       L4-L5:  Mild disc height loss. Disc bulge with broad central  protrusion. Moderate bilateral facet arthropathy. Moderate bilateral  foraminal stenosis. Severe spinal canal stenosis with bilateral  lateral recess stenosis.     L5-S1:  Mild disc height loss. Disc bulge with shallow left central  protrusion. Mild bilateral facet arthropathy. Mild right foraminal  stenosis and moderate left foraminal stenosis. Mild spinal canal  stenosis.                                                                      IMPRESSION:    Multiple disc herniations and stenoses as detailed.         Assessment:  1. Low back pain  2. Lumbar Spondylosis  3. Lumbosacral Radiculopathy  4. Degenerative disc disease  5. Spinal Stenosis  6. Neurogenic Claudication    Zuly Wheeler is a 62 year old female who presents with the complaints of bilateral low back pain that radiates down both legs. The pain started 6 months ago and has not resolved with PT, chiro, or acupuncture. She takes gabapentin 200 mg TID for anxiety which does not  help her pain. Base on physical examination and imaging her pain is likely secondary to Spinal stenosis with neurogenic claudication.    Plan:  Diagnosis reviewed, treatment option addressed, and risk/benefits discussed.  Self-care instructions given.  I am recommending a multidisciplinary treatment plan to help this patient better manage her pain.      1. Physical Therapy: Continue  2. Pain Psychologist to address issues of relaxation, behavioral change, coping style, and other factors important to improvement: Na  3. Diagnostic Studies: MRI already performed  4. Medication Management: No changes today  5. Further procedures recommended: Lumbar Interlaminar Epidural Steroid Injection  6. Recommendations/follow-up for PCP:  Primary care can consider increasing gabapentin to see if it will provide relief of neuropathic pain.  7. Follow up: at scheduled procedure and PRN    I saw and examined the patient with the Pain Fellow/Resident. I have reviewed and agree with the resident's note and plan of care and made changes and corrections directly to the body of the note.    TIME SPENT:  BY FELLOW/RESIDENT ALONE 20 MIN  BY MYSELF AND FELLOW/RESIDENT TOGETHER 25 MIN    This includes time for chart review, preparation, and documentation.     Fabi Roberts MD  Pain Medicine, Department of Anesthesiology  , [facility]

## 2023-02-17 NOTE — NURSING NOTE
RN read through the instructions with the patient for the recommended procedure: Lumbar Epidural Steroid Injection  Patient verbalized understanding to holding appropriate medication per protocol and was agreeable to NPO policy and needing a .    Anticoagulant: Patient reports taking Ibuprofen daily, no hold per provider.    Recommended Follow Up: Follow up in 4-6 weeks after procedure.    Barby Brunson RNCC

## 2023-02-17 NOTE — NURSING NOTE
Patient presents with:  Consult: Consult lower back, both leg pain      Extreme Pain (9)         What medications are you using for pain? Tylenol, Ibuprofen    (New patients only) Have you been seen by another pain clinic/ provider? no    (Return Patients only) What refills are you needing today? No    Expectation want to get some relief from the pain

## 2023-02-17 NOTE — PATIENT INSTRUCTIONS
Procedures:    Call to schedule your procedure: [phone] option #2  Lumbar Epidural Steroid Injection    Your pre-procedure instructions are below, please call our clinic if you have any questions.      Recommended Follow up:      Follow up in 4-6 weeks after procedure.    To speak with a nurse, schedule/reschedule/cancel a clinic appointment, or request a medication refill call: [phone].    You can also reach us by [employer]: [email or web address]      Procedure Information related to COVID-19     Please call [phone] option #2 to schedule, reschedule, or cancel your procedure appointment.   Phones are answered Monday - Friday from 08:00 - 4:30pm.  Leave a voicemail with your name, birth date, and phone number if no one is available to take your call.        You no longer need to test for COVID- 19 prior to your procedure/surgery, unless your physician specifically requests that you test. If you experience COVID symptoms or have tested positive for COVID-19 within 14 days of your scheduled surgery or procedure, please update our office right away and your procedure may have to be postponed.       The procedure center staff will call you several days before the procedure to review important information that you will need to know for the day of the procedure.     Please contact the clinic if you have further questions about this information [phone].        Information related to Scheduling and Pre-Procedure Instructions:    If you must reschedule your procedure more than two times, you must follow up in clinic before rescheduling again.      Preparing for your procedure    CAUTION - FAILURE TO FOLLOW THESE PRE-PROCEDURE INSTRUCTIONS WILL RESULT IN YOUR PROCEDURE BEING RESCHEDULED.    Your Procedure: Lumbar Epidural Steroid Injection      Pregnancy  If you are pregnant, or think you may be pregnant, please notify our staff. This may or may not affect the ability to perform the  procedure.       You must have a  take you home after your procedure. Transportation by taxi or para-transit is okay as long as you have a responsible adult accompany you. You must provide your 's full name and contact number at time of check in.     Fasting Protocol Please have nothing to eat or drink 1 hour prior to arrival.     Medications If you take any medications, DO NOT STOP. Take your medications as usual the day of your procedure with a sip of water AT LEAST 2 HOURS PRIOR TO ARRIVAL.    Antibiotics If you are currently taking antibiotics, you must complete the entire dose 7 days prior to your scheduled procedure. You must be clear of any signs or symptoms of infection. If you begin antibiotics, please contact our clinic for instructions.     Fever, Chills, or Rash If you experience a fever of higher than 100 degrees, chills, rash, or open wounds during the one week before your procedure, please call the clinic to see if you may proceed with your procedure.      Medication Hold List  **Patients under Cardiology/Neurology care should consult their provider prior to the pain procedure to verify pre-procedure medication instructions. The information below contains general guidelines.**        Blood Thinners If you are taking daily ASPIRIN, PLAVIX, OR OTHER BLOOD THINNERS SUCH AS COUMADIN/WARFARIN, we will need your prescribing doctor to sign a release permitting you to stop these medications. Once approved by your prescribing doctor - STOP ALL BLOOD THINNERS BASED ON THE TIME TABLE BELOW PRIOR TO YOUR PROCEDURE. If you have been instructed to stop WARFARIN(COUMADIN), you must have an INR lab drawn the day before your procedure. . Your INR must be within normal limits before we can perform your injection. MEDICATIONS CAN BE RESTARTED AFTER YOUR PROCEDURE.    14 DAY HOLD  Ticlid (ticlopidine)    10 DAY HOLD  Effient (Prasugel)    3 DAY HOLD  Xarelto (rivaroxaban) 7 DAY HOLD  Anacin, Bufferin,  Ecotrin, Excedrin, Aggrenox (Aspirin)  Brilinta (ticagrelor)  Coumadin (Warfarin)  Pradexa (Dabigatran)  Elmiron (Pentosan)  Plavix (Clopidogrel Bisulfate)  Pletal (Cilostazol)    24 HOUR HOLD  Lovenox (enoxaparin)  Agrylin (Anagrelide)        Non-steroidal Anti-inflammatories (NSAIDs) DO NOT TAKE any non-steroidal anti-inflammatory medications (NSAIDs) listed on the table below. MEDICATIONS CAN BE RESTARTED AFTER YOUR PROCEDURE. Celebrex is OK to take and does not need to be discontinued.     Medications to stop:  3 DAY HOLD  Advil, Motrin (Ibuprofen)  Arthrotec (diciofenac sodium/misoprostol)  Clinoril (Sulindac)  Indocin (Indomethacin)  Lodine (Etodolac)  Toradol (Ketorolac)  Vicoprofen (Hydrocodone and Ibuprofen)  Voltaren (Diclofenac)    14 DAY HOLD  Daypro (Oxaprozin)  Feldene (Piroxicam)   7 DAY HOLD  Aleve (Naproxen sodium)  Darvon compound (contains aspirin)  Naprosyn (Naproxen)  Norgesic Forte (contains aspirin)  Mobic (Meloxicam)  Oruvall (Ketoprofen)  Percodan (contains aspirin)  Relafen (Nabumetone)  Salsalate  Trilisate  Vitamin E (more than 400 mg per day)  Any medication containing aspirin                To speak with a nurse, schedule/reschedule/cancel a clinic appointment, or request a medication refill call: [phone]    You can also reach us by [employer]: [email or web address]

## 2023-02-17 NOTE — TELEPHONE ENCOUNTER
Writer contacted patient to schedule injection and offered 03/02 and 03/03. Patient needs to figure out transportation and requested a call on Monday 02/20. Writer will follow up.    Oli Us on 2/17/2023 at 9:00 AM

## 2023-02-17 NOTE — PROGRESS NOTES
[facility] Consultation    Date of visit: 2/17/2023    Reason for consultation:    Zuly Wheeler is a 62 year old female who is seen in consultation today at the request of her provider, Yanni Barrera MD.    Primary Care Provider is Augustina Valdovinos.    Please see the [facility] health questionnaire which the patient completed and reviewed with me in detail.    Chief Complaint:    Chief Complaint   Patient presents with     Consult     Consult lower back, both leg pain       Patient Supplied Answers To the  Pain Questionnaire  No flowsheet data found.    Pain history:  Zuly Wheeler is a 62 year old female who reports pain in her low back radiates down her bilateral lower extremities to her feet. 6 months ago she developed right lower quadrant abdominal pain. That pain eventually started to travel down the front of her right leg. Her right anterior shin remained numb but the abdominal pain and right anterior leg pain went away. She then developed pain in her mid low back that radiates down the posterior lower extremities equally. On the right she has right toe numbness.    There was no associated accident or injury. It is worse when standing or walking, it is entirely gone when sitting or standing but the numbness remains.  She denies any new problems with falls or balance, any new numbness or weakness of the arms or legs, any new bowel or bladder incontinence, any night sweats or unexplained fevers, or any sudden or unexpected weight loss.     Current treatments include:  Ibuprofen  Tylenol  gabapentin    Previous medication treatments included:  Medrol Dose Pack  Salon Pas    Other treatments have included:  Zuly Wheeler has not been seen at a pain clinic in the past.    PT: Y  Acupuncture: N  TENs Unit: N  Injections: N    Past Medical History:  Past Medical History:   Diagnosis Date     Bipolar 1 disorder (H)      Chronic maxillary sinusitis 09/22/2012     1995, due to tobacco abuse     Essential hypertension 08/04/2021     Herpes simplex virus infection 11/29/2017    No known outbreaks or sores, but positive serology. Discussed that this means she does have the herpes virus, and natural hx of this infection. Gave pt info.     Hyperlipidemia with target LDL less than 130 12/04/2013     Vitamin D deficiency disease 12/04/2013     Patient Active Problem List    Diagnosis Date Noted     Bulging of lumbar intervertebral disc 12/15/2022     Priority: Medium     Spinal stenosis of lumbar region with neurogenic claudication 11/09/2022     Priority: Medium     Cervical high risk HPV (human papillomavirus) test positive 10/20/2022     Priority: Medium     1994, 2012 NIL paps  1/30/14 supracervical hysterectomy  11/3/15 NIL pap, Neg HPV  7/21/16 ECC-neg  10/13/22 NIL pap, + HR HPV (not 16 or 18). Plan: cotest in 1 year/ pt advised       Essential hypertension 08/04/2021     Priority: Medium     ACP (advance care planning) 11/04/2015     Priority: Medium     Advance Care Planning 11/4/2015: ACP Review and Resources Provided:  Reviewed chart for advance care plan.  Zuly Guerrero has no plan or code status on file. Discussed available resources and provided with information. . Added by Verito Inman             Hyperlipidemia with target LDL less than 130 12/04/2013     Priority: Medium     Vitamin D deficiency disease 12/04/2013     Priority: Medium     Family history of diabetes mellitus 12/04/2013     Priority: Medium     Bipolar disorder, in full remission, most recent episode depressed (H) 09/22/2012     Priority: Medium     Diagnosed          Past Surgical History:  Past Surgical History:   Procedure Laterality Date     DILATION AND CURETTAGE  12/6/2013    Procedure: DILATION AND CURETTAGE;  Dilation And Curettage;  Surgeon: Edel Chew MD;  Location: [facility] OR     EYE SURGERY  8/2013    cataract surgery both eyes done     LAPAROSCOPIC HYSTERECTOMY SUPRACERVICAL,  BILATERAL SALPINGO-OOPHORECTOMY, COMBINED  1/30/2014    Procedure: COMBINED LAPAROSCOPIC HYSTERECTOMY SUPRACERVICAL, SALPINGO-OOPHORECTOMY;  Laparoscopic Supracervical Hysterectomy With Bilateral Salingectomy;  Surgeon: Edel Chew MD;  Location: [facility] OR     TUBAL LIGATION  1990     Medications:  Current Outpatient Medications   Medication Sig Dispense Refill     clobetasol (TEMOVATE) 0.05 % external solution Apply topically 2 times daily 50 mL 2     gabapentin (NEURONTIN) 100 MG capsule Take 2 capsules (200 mg) by mouth 3 times daily 180 capsule 3     ibuprofen (ADVIL/MOTRIN) 800 MG tablet Take 1 tablet (800 mg) by mouth every 8 hours as needed for moderate pain (4-6) 60 tablet 1     lithium (ESKALITH) 150 MG capsule TAKE THREE CAPSULES BY MOUTH AT BEDTIME Strength: 150 mg 90 capsule 3     methylPREDNISolone (MEDROL DOSEPAK) 4 MG tablet therapy pack Follow Package Directions 21 tablet 0     OLANZapine (ZYPREXA) 2.5 MG tablet Take 1 tablet (2.5 mg) by mouth At Bedtime 30 tablet 0     PARoxetine (PAXIL) 20 MG tablet TAKE ONE TABLET BY MOUTH AT BEDTIME Strength: 20 mg 30 tablet 0     propranolol (INDERAL) 10 MG tablet TAKE ONE TABLET BY MOUTH TWICE A DAY 60 tablet 0     vitamin D3 (CHOLECALCIFEROL) 50 mcg (2000 units) tablet Take 1 tablet (50 mcg) by mouth daily 90 tablet 3     Lidocaine (LIDOCARE) 4 % Patch Place 3 patches onto the skin every 24 hours To prevent lidocaine toxicity, patient should be patch free for 12 hrs daily. (Patient not taking: Reported on 2/17/2023) 40 patch 2     lidocaine (LIDODERM) 5 % patch Place 1 patch onto the skin every 24 hours To prevent lidocaine toxicity, patient should be patch free for 12 hrs daily. (Patient not taking: Reported on 2/17/2023) 40 patch 1     Allergies:     Allergies   Allergen Reactions     Depakote      groggy     Lamotrigine      Intolerance, numb (Lamictal)     Olanzapine Other (See Comments)     Only generic/ineffective  Ineffective (only generic)      Seroquel [Quetiapine Fumarate] Visual Disturbance     Blurred vision; jumpy     Sulfa Drugs Hives     Trileptal Visual Disturbance     Blurred vision     Zoloft Other (See Comments)     jumpy     Citalopram Anxiety     Intolerance (Celexa)     Social History:  Home situation: [facility] with brother in a house  Occupation/Schooling:   Tobacco use: N  Alcohol use: N  Drug use: N    Family history:  Family History   Problem Relation Age of Onset     Diabetes Mother      Hypertension Mother      Psychotic Disorder Mother      Depression Mother      Hearing Loss Father      Coronary Artery Disease No family hx of      Hyperlipidemia No family hx of      Cerebrovascular Disease No family hx of      Breast Cancer No family hx of      Colon Cancer No family hx of      Prostate Cancer No family hx of      Other Cancer No family hx of      Anxiety Disorder No family hx of      Mental Illness No family hx of      Substance Abuse No family hx of      Anesthesia Reaction No family hx of      Asthma No family hx of      Osteoporosis No family hx of      Genetic Disorder No family hx of      Thyroid Disease No family hx of      Obesity No family hx of      Unknown/Adopted No family hx of        Review of Systems:    POSTIVE IN BOLD  GENERAL: fever/chills, fatigue, general unwell feeling, weight gain/loss.  HEAD/EYES:  headache, dizziness, or vision changes.    EARS/NOSE/THROAT:  Nosebleeds, hearing loss, sinus infection, earache, tinnitus.  IMMUNE:  Allergies, cancer, immune deficiency, or infections.  SKIN:  Scalp cirrhosis  HEME/Lymphatic:   anemia, easy bruising, easy bleeding.  RESPIRATORY:  cough, wheezing, or shortness of breath  CARDIOVASCULAR/Circulation:  Extremity edema, syncope, hypertension, tachycardia, or angina.  GASTROINTESTINAL:  abdominal pain, nausea/emesis, diarrhea, constipation,  hematochezia, or melena.  ENDOCRINE:  Diabetes, steroid use,  thyroid disease or osteoporosis.  MUSCULOSKELETAL:  "neck pain, back pain, arthralgia, arthritis, or gout.  GENITOURINARY:  frequency, urgency, dysuria, difficulty voiding, hematuria or incontinence.  NEUROLOGIC:  weakness, numbness, paresthesias, seizure, tremor, stroke or memory loss.  PSYCHIATRIC:  depression, anxiety, stress, suicidal thoughts or mood swings.     Physical Exam:  Vitals:    02/17/23 0637   BP: (!) 141/83   BP Location: Right arm   Patient Position: Chair   Cuff Size: Adult Large   Pulse: 78   SpO2: 99%   Weight: 73.9 kg (163 lb)   Height: 1.727 m (5' 8\")     Exam:  Constitutional: healthy, alert and no distress  Head: normocephalic. Atraumatic.   Eyes: no redness or jaundice noted   Cardiovascular: Acyanotic  Respiratory: breathing comfortably  Skin: no suspicious lesions or rashes  Psychiatric: mentation appears normal, affect normal    Musculoskeletal exam:  Nontender to palpation at lumbosacral spine    Neurologic exam:  CN:  Cranial nerves 2-12 are grossly WNL  Motor:  5/5 LE strength  Sensory:  ( lower extremities):   Light touch: diminished at right great toe       I reviewed the following diagnostic tests with the patient:  MRI L spine 11/22/22    FINDINGS:  Alignment is significant for slight dextroconvex curvature and  multilevel grade 1 spondylolisthesis. Scattered Schmorl's nodes. Bone  marrow demonstrates scattered degenerative endplate change. Mild  chronic-appearing height loss involving the L4 and L5 vertebral  bodies. Conus medullaris unremarkable terminating at the level of the  L1-L2 disc. Cauda equina demonstrates slight bunching related to  spinal canal stenoses at L3-4 and L4-5. Fibrolipoma involving the  filum terminalis. Mild bilateral sacroiliac joint degenerative change.  Areas of nonspecific T2 hyperintense renal signal intrinsically  characterize but statistically likely benign.     Segmental Analysis:      T12-L1:  Disc height maintained. Small central protrusion. Mild  bilateral facet arthropathy. No foraminal or spinal " canal stenosis.  Mild disc height loss.      L1-L2:  Small central protrusion. Mild bilateral facet arthropathy. No  foraminal stenosis. Mild spinal canal stenosis.       L2-L3:  Mild disc height loss. Disc bulge with shallow left central  protrusion. Mild bilateral facet arthropathy. No foraminal stenosis.  Mild spinal canal stenosis.       L3-L4:  Mild disc height loss. Disc bulge with right central/foraminal  protrusion. Moderate bilateral facet arthropathy. Moderate right  foraminal stenosis. No left foraminal stenosis. Moderate spinal canal  stenosis.       L4-L5:  Mild disc height loss. Disc bulge with broad central  protrusion. Moderate bilateral facet arthropathy. Moderate bilateral  foraminal stenosis. Severe spinal canal stenosis with bilateral  lateral recess stenosis.     L5-S1:  Mild disc height loss. Disc bulge with shallow left central  protrusion. Mild bilateral facet arthropathy. Mild right foraminal  stenosis and moderate left foraminal stenosis. Mild spinal canal  stenosis.                                                                      IMPRESSION:    Multiple disc herniations and stenoses as detailed.         Assessment:  1. Low back pain  2. Lumbar Spondylosis  3. Lumbosacral Radiculopathy  4. Degenerative disc disease  5. Spinal Stenosis  6. Neurogenic Claudication    Zuly Wheeler is a 62 year old female who presents with the complaints of bilateral low back pain that radiates down both legs. The pain started 6 months ago and has not resolved with PT, chiro, or acupuncture. She takes gabapentin 200 mg TID for anxiety which does not help her pain. Base on physical examination and imaging her pain is likely secondary to Spinal stenosis with neurogenic claudication.    Plan:  Diagnosis reviewed, treatment option addressed, and risk/benefits discussed.  Self-care instructions given.  I am recommending a multidisciplinary treatment plan to help this patient better manage her pain.       1. Physical Therapy: Continue  2. Pain Psychologist to address issues of relaxation, behavioral change, coping style, and other factors important to improvement: Na  3. Diagnostic Studies: MRI already performed  4. Medication Management: No changes today  5. Further procedures recommended: Lumbar Interlaminar Epidural Steroid Injection  6. Recommendations/follow-up for PCP:  Primary care can consider increasing gabapentin to see if it will provide relief of neuropathic pain.  7. Follow up: at scheduled procedure and PRN    I saw and examined the patient with the Pain Fellow/Resident. I have reviewed and agree with the resident's note and plan of care and made changes and corrections directly to the body of the note.    TIME SPENT:  BY FELLOW/RESIDENT ALONE 20 MIN  BY MYSELF AND FELLOW/RESIDENT TOGETHER 25 MIN    This includes time for chart review, preparation, and documentation.     Fabi Roberts MD  Pain Medicine, Department of Anesthesiology  , [facility]

## 2023-02-17 NOTE — TELEPHONE ENCOUNTER
Pt requesting a call back to schedule       Saw Matthew    [facility] [place]

## 2023-02-20 NOTE — TELEPHONE ENCOUNTER
Writer spoke with patient, confirmed injection appointment information. Nothing further needed at the time of the call.    Oli Us on 2/20/2023 at 3:30 PM

## 2023-03-02 ENCOUNTER — PRE VISIT (OUTPATIENT)
Dept: NEUROSURGERY | Facility: CLINIC | Age: 63
End: 2023-03-02

## 2023-03-10 ENCOUNTER — HOSPITAL ENCOUNTER (OUTPATIENT)
Facility: AMBULATORY SURGERY CENTER | Age: 63
Discharge: HOME | End: 2023-03-10
Attending: ANESTHESIOLOGY | Admitting: ANESTHESIOLOGY
Payer: COMMERCIAL

## 2023-03-10 ENCOUNTER — ANCILLARY PROCEDURE (OUTPATIENT)
Dept: RADIOLOGY | Facility: AMBULATORY SURGERY CENTER | Age: 63
End: 2023-03-10
Attending: ANESTHESIOLOGY
Payer: COMMERCIAL

## 2023-03-10 VITALS
BODY MASS INDEX: 24.14 KG/M2 | HEART RATE: 76 BPM | RESPIRATION RATE: 19 BRPM | WEIGHT: 163 LBS | DIASTOLIC BLOOD PRESSURE: 80 MMHG | OXYGEN SATURATION: 100 % | SYSTOLIC BLOOD PRESSURE: 159 MMHG | HEIGHT: 69 IN

## 2023-03-10 DIAGNOSIS — M48.062 SPINAL STENOSIS OF LUMBAR REGION WITH NEUROGENIC CLAUDICATION: ICD-10-CM

## 2023-03-10 PROCEDURE — 62323 NJX INTERLAMINAR LMBR/SAC: CPT | Performed by: ANESTHESIOLOGY

## 2023-03-10 PROCEDURE — 62323 NJX INTERLAMINAR LMBR/SAC: CPT

## 2023-03-10 RX ORDER — BUPIVACAINE HYDROCHLORIDE 2.5 MG/ML
INJECTION, SOLUTION EPIDURAL; INFILTRATION; INTRACAUDAL DAILY PRN
Status: DISCONTINUED | OUTPATIENT
Start: 2023-03-10 | End: 2023-03-10 | Stop reason: HOSPADM

## 2023-03-10 RX ORDER — METHYLPREDNISOLONE ACETATE 40 MG/ML
INJECTION, SUSPENSION INTRA-ARTICULAR; INTRALESIONAL; INTRAMUSCULAR; SOFT TISSUE DAILY PRN
Status: DISCONTINUED | OUTPATIENT
Start: 2023-03-10 | End: 2023-03-10 | Stop reason: HOSPADM

## 2023-03-10 RX ORDER — LIDOCAINE HYDROCHLORIDE 10 MG/ML
INJECTION, SOLUTION EPIDURAL; INFILTRATION; INTRACAUDAL; PERINEURAL DAILY PRN
Status: DISCONTINUED | OUTPATIENT
Start: 2023-03-10 | End: 2023-03-10 | Stop reason: HOSPADM

## 2023-03-10 RX ORDER — IOPAMIDOL 408 MG/ML
INJECTION, SOLUTION INTRATHECAL DAILY PRN
Status: DISCONTINUED | OUTPATIENT
Start: 2023-03-10 | End: 2023-03-10 | Stop reason: HOSPADM

## 2023-03-10 NOTE — DISCHARGE INSTRUCTIONS

## 2023-03-14 ENCOUNTER — APPOINTMENT (OUTPATIENT)
Dept: URBAN - METROPOLITAN AREA CLINIC 254 | Age: 63
Setting detail: DERMATOLOGY
End: 2023-03-15

## 2023-03-14 VITALS — WEIGHT: 163 LBS | HEIGHT: 69 IN

## 2023-03-14 DIAGNOSIS — L40.0 PSORIASIS VULGARIS: ICD-10-CM

## 2023-03-14 PROCEDURE — OTHER COUNSELING: OTHER

## 2023-03-14 PROCEDURE — OTHER ORDER TESTS: OTHER

## 2023-03-14 PROCEDURE — 36415 COLL VENOUS BLD VENIPUNCTURE: CPT

## 2023-03-14 PROCEDURE — OTHER ADDITIONAL NOTES: OTHER

## 2023-03-14 PROCEDURE — 99214 OFFICE O/P EST MOD 30 MIN: CPT

## 2023-03-14 PROCEDURE — OTHER MIPS QUALITY: OTHER

## 2023-03-14 PROCEDURE — OTHER VENIPUNCTURE: OTHER

## 2023-03-14 ASSESSMENT — LOCATION SIMPLE DESCRIPTION DERM: LOCATION SIMPLE: LEFT UPPER ARM

## 2023-03-14 ASSESSMENT — LOCATION ZONE DERM: LOCATION ZONE: ARM

## 2023-03-14 ASSESSMENT — LOCATION DETAILED DESCRIPTION DERM: LOCATION DETAILED: LEFT ANTECUBITAL SKIN

## 2023-03-14 NOTE — PROCEDURE: ADDITIONAL NOTES
Additional Notes: - Would like pt to begin Skyrizi.  Discussed risks and benefits of treatment.  Pt is needlephobic and tried/failed methotrexate.
Detail Level: Simple
Render Risk Assessment In Note?: no

## 2023-03-17 NOTE — H&P
ABBREVIATED H&P [facility]      Patient Name: Zuly Wheeler   MRN: [id number]   YOB: 1960     1. Reason for Procedure:  Procedure Summary     Date: 03/10/23 Room / Location: [facility] PROCEDURE ROOM 06 / [facility]    Anesthesia Start:  Anesthesia Stop:     Procedure: Lumbar Epidural Steroid Injection (Spine) Diagnosis:       Spinal stenosis of lumbar region with neurogenic claudication      (Spinal stenosis of lumbar region with neurogenic claudication [M48.062])    Providers: Fabi Roberts MD Responsible Provider:     Anesthesia Type: Not recorded ASA Status: Not recorded          2. History:   Past Medical History:   Diagnosis Date     Bipolar 1 disorder (H)      Chronic maxillary sinusitis 09/22/2012 1995, due to tobacco abuse     Essential hypertension 08/04/2021     Herpes simplex virus infection 11/29/2017    No known outbreaks or sores, but positive serology. Discussed that this means she does have the herpes virus, and natural hx of this infection. Gave pt info.     Hyperlipidemia with target LDL less than 130 12/04/2013     Vitamin D deficiency disease 12/04/2013       Comorbidities: None    Any history of sleep apnea? No    Any history of problems with sedation? No    3. Physical:    General: Normal  Skin:  Normal.  Respiratory: Clear to auscultation bilateral, no wheezing  Cardio:  Regular rate and rhythm  Abdomen: Soft, nontender, nondistended, no palpable masses.  Musculoskeletal: Normal  Neuro: Sensory exam normal, motor exam 5/5, bilateral upper and lower extremities       4. Current Medications (if not in Epic):   Current Outpatient Medications   Medication Sig Dispense Refill     clobetasol (TEMOVATE) 0.05 % external solution Apply topically 2 times daily 50 mL 2     gabapentin (NEURONTIN) 100 MG capsule Take 2 capsules (200 mg) by mouth 3 times daily 180 capsule 3     ibuprofen (ADVIL/MOTRIN) 800 MG  tablet Take 1 tablet (800 mg) by mouth every 8 hours as needed for moderate pain (4-6) 60 tablet 1     Lidocaine (LIDOCARE) 4 % Patch Place 3 patches onto the skin every 24 hours To prevent lidocaine toxicity, patient should be patch free for 12 hrs daily. 40 patch 2     lidocaine (LIDODERM) 5 % patch Place 1 patch onto the skin every 24 hours To prevent lidocaine toxicity, patient should be patch free for 12 hrs daily. 40 patch 1     lithium (ESKALITH) 150 MG capsule TAKE THREE CAPSULES BY MOUTH AT BEDTIME Strength: 150 mg 90 capsule 3     methylPREDNISolone (MEDROL DOSEPAK) 4 MG tablet therapy pack Follow Package Directions 21 tablet 0     OLANZapine (ZYPREXA) 2.5 MG tablet Take 1 tablet (2.5 mg) by mouth At Bedtime 30 tablet 0     PARoxetine (PAXIL) 20 MG tablet TAKE ONE TABLET BY MOUTH AT BEDTIME Strength: 20 mg 30 tablet 0     propranolol (INDERAL) 10 MG tablet TAKE ONE TABLET BY MOUTH TWICE A DAY 60 tablet 0     vitamin D3 (CHOLECALCIFEROL) 50 mcg (2000 units) tablet Take 1 tablet (50 mcg) by mouth daily 90 tablet 3        5. Allergies and Reactions:  is allergic to depakote, lamotrigine, olanzapine, seroquel [quetiapine fumarate], sulfa drugs, trileptal, zoloft, and citalopram.

## 2023-03-17 NOTE — OP NOTE
Patient: Zuly Wheeler Age: 62 year old   MRN: [id number] Attending: Dr. Roberts     Date of Visit: March 10, 2023      PAIN MEDICINE CLINIC PROCEDURE NOTE    ATTENDING CLINICIAN:    Fabi Roberts MD    ASSISTANT CLINICIAN:      PREPROCEDURE DIAGNOSES:  1.  Lumbar radiculopathy  2.  Chronic low back pain       PROCEDURE(S) PERFORMED:  1.  Interlaminar lumbar epidural steroid injection   2.  Fluoroscopic guidance for the above-named procedure(s)      ANESTHESIA:  Local.    INDICATIONS:  Zuly Wheeler is a 62 year old female with a history of  chronic low back pain secondary to bilateral lumbosacral radiculopathy .  The patient stated that the patient was in their usual state of health and denied recent anticoagulant use or recent infections.  Therefore, the plan is to perform above mentioned procedures.     Procedure Details:  The patient was met in the procedure room, where the patient was identified by name, medical record number and date of birth.  All of the patient s last minute questions were answered. Written informed consent was obtained and saved in the electronic medical record, after the risks, benefits, and alternatives were discussed with the patient.      A formal time-out procedure was performed, as per protocol, including patient name, title of procedure, and site of procedure, and all in the room concurred.  Routine monitors were applied.      The patient was placed in the prone position on the procedure room table.  All pressure points were checked and comfortably padded.  Routine monitors were placed.  Vital signs were stable.    A chlorhexidine prep was completed followed by sterile draping per standard procedure.     The AP fluoroscopic view was optimized for approach at  L5-S1 interspace.  The skin over the interspace was infiltrated with 4-5 mL of 1% Lidocaine using a 25 gauge, 1.5 inch needle.  A 20-gauge 3-1/2 inch Tuohy needle was advanced under fluoroscopic guidance with right  paramedial approach until it touched lamina. Mutiple AP and lateral fluoroscopic images at this time  are taken as Tuohy needle was advanced to the epidural space. The epidural space was identified, without evidence of blood, cerebrospinal fluid, or parasthesia throughout. Needle tip placement within the epidural space was further confirmed with 1-2 mL of nonionic contrast agent, with the epidural space visualized in the AP and lateral fluoroscopic view(s) with appropriate spread of the agent with fat vacuolization and no intravascular or intrathecal spread noted. Next, 6 mL of a treatment solution containing 2 mL of preservative free 0.25% bupivacaine, 1 mL of Depo-Medrol 40 mg/mL and 3 mL preservative free normal saline was administered slowly. The needle was withdrawn.      Light pressure was held at the puncture site(s) to prevent ecchymosis and oozing.  The patient's skin was cleansed, and hemostasis was confirmed.  Band-aids were applied to the needle injection site(s).      Condition:    The patient remained awake and alert throughout the procedure.  The patient tolerated the procedure well and was monitored for approximately 15 minutes afterward in the post procedure area.  There were no immediate post procedure complications noted.  The patient was then discharged to home as per protocol.      Pre-procedure pain score: 7/10  Post-procedure pain score: 9/10

## 2023-03-21 ENCOUNTER — TRANSFERRED RECORDS (OUTPATIENT)
Dept: HEALTH INFORMATION MANAGEMENT | Facility: CLINIC | Age: 63
End: 2023-03-21

## 2023-03-21 ENCOUNTER — RX ONLY (RX ONLY)
Age: 63
End: 2023-03-21

## 2023-03-21 RX ORDER — RISANKIZUMAB-RZAA 150 MG/ML
INJECTION SUBCUTANEOUS
Qty: 2 | Refills: 0 | Status: ERX

## 2023-03-21 RX ORDER — RISANKIZUMAB-RZAA 150 MG/ML
INJECTION SUBCUTANEOUS
Qty: 1 | Refills: 0 | Status: ERX | COMMUNITY
Start: 2023-03-21

## 2023-03-24 ENCOUNTER — TELEPHONE (OUTPATIENT)
Dept: FAMILY MEDICINE | Facility: CLINIC | Age: 63
End: 2023-03-24
Payer: COMMERCIAL

## 2023-03-24 NOTE — TELEPHONE ENCOUNTER
Patient Quality Outreach      Summary:    Patient has the following on her problem list/HM:   Patient was seen for a physical on 10/13/22 with Dr. Valdovinos. HTN, Hyperlipidemia, and Bipolar.     Patient is due/failing the following:   Breast Cancer Screening - Mammogram and FIT TESTING     Type of outreach:    Sent letter.    Questions for provider review:    None                                                                                                                                     Elif Costa MA        Chart routed to self.

## 2023-03-25 ENCOUNTER — HEALTH MAINTENANCE LETTER (OUTPATIENT)
Age: 63
End: 2023-03-25

## 2023-03-28 DIAGNOSIS — Z00.00 HEALTHCARE MAINTENANCE: Primary | ICD-10-CM

## 2023-03-28 DIAGNOSIS — M54.16 LUMBAR RADICULOPATHY: Primary | ICD-10-CM

## 2023-03-30 ENCOUNTER — OFFICE VISIT (OUTPATIENT)
Dept: NEUROSURGERY | Facility: CLINIC | Age: 63
End: 2023-03-30
Attending: PREVENTIVE MEDICINE
Payer: COMMERCIAL

## 2023-03-30 ENCOUNTER — ANCILLARY PROCEDURE (OUTPATIENT)
Dept: GENERAL RADIOLOGY | Facility: CLINIC | Age: 63
End: 2023-03-30
Attending: STUDENT IN AN ORGANIZED HEALTH CARE EDUCATION/TRAINING PROGRAM
Payer: COMMERCIAL

## 2023-03-30 VITALS
BODY MASS INDEX: 24.14 KG/M2 | SYSTOLIC BLOOD PRESSURE: 134 MMHG | HEIGHT: 69 IN | HEART RATE: 67 BPM | WEIGHT: 163 LBS | DIASTOLIC BLOOD PRESSURE: 82 MMHG

## 2023-03-30 DIAGNOSIS — M54.16 LUMBAR RADICULOPATHY: ICD-10-CM

## 2023-03-30 DIAGNOSIS — M48.062 LUMBAR STENOSIS WITH NEUROGENIC CLAUDICATION: Primary | ICD-10-CM

## 2023-03-30 PROCEDURE — 99204 OFFICE O/P NEW MOD 45 MIN: CPT | Performed by: STUDENT IN AN ORGANIZED HEALTH CARE EDUCATION/TRAINING PROGRAM

## 2023-03-30 PROCEDURE — 72082 X-RAY EXAM ENTIRE SPI 2/3 VW: CPT | Performed by: RADIOLOGY

## 2023-03-30 PROCEDURE — 72120 X-RAY BEND ONLY L-S SPINE: CPT | Mod: XS | Performed by: RADIOLOGY

## 2023-03-30 NOTE — NURSING NOTE
"Zuly Wheeler's goals for this visit include:   Chief Complaint   Patient presents with     New Patient     Low back pain/ bilateral leg pain/ has had steroid injection 3 weeks ago, and is starting to wear off        She requests these members of her care team be copied on today's visit information: yes    PCP: Augustina Valdovinos    Referring Provider:  Benjamin Jones MD  [facility]ICINE & REHABILITATION  [address] [place] [address]    /82   Pulse 67   Ht 1.753 m (5' 9\")   Wt 73.9 kg (163 lb)   LMP 01/30/2014 (Exact Date)   BMI 24.07 kg/m      Do you need any medication refills at today's visit? No  CONSTANCE Bee, REBECCA ([facility])      "

## 2023-03-30 NOTE — LETTER
"    3/30/2023         RE: Zuly Wheeler  [address] [facility] [address]        Dear Colleague,    Thank you for referring your patient, Zuly Wheeler, to the [facility] [place]. Please see a copy of my visit note below.    HPI:  62-year-old female with 3 months of low back pain radiating to the bilateral lower extremities.  The pain started on the right side and also progressed to having pain down the left leg.  Pain did radiate down the back of the thighs to the lateral aspect of the leg and top of the foot.  Pain improves with sitting down and is almost completely relieved.  Pain was worsened with standing.  She tried physical therapy for a few sessions without any benefit.  She then underwent an epidural steroid injection recently which significantly improved her pain and she has very mild pain occasionally at this time.  She denies any weakness.  She denies any bowel or bladder control problems.  She denies any history of spine surgery in the past.    Denies any other relevant medical history to her lumbar stenosis diagnosis.  Current Outpatient Medications   Medication     clobetasol (TEMOVATE) 0.05 % external solution     gabapentin (NEURONTIN) 100 MG capsule     ibuprofen (ADVIL/MOTRIN) 800 MG tablet     Lidocaine (LIDOCARE) 4 % Patch     lidocaine (LIDODERM) 5 % patch     lithium (ESKALITH) 150 MG capsule     methylPREDNISolone (MEDROL DOSEPAK) 4 MG tablet therapy pack     OLANZapine (ZYPREXA) 2.5 MG tablet     PARoxetine (PAXIL) 20 MG tablet     propranolol (INDERAL) 10 MG tablet     vitamin D3 (CHOLECALCIFEROL) 50 mcg (2000 units) tablet     No current facility-administered medications for this visit.      Physical Exam:  Vital signs:      BP: 134/82 Pulse: 67           Height: 175.3 cm (5' 9\") Weight: 73.9 kg (163 lb)  Estimated body mass index is 24.07 kg/m  as calculated from the following:    Height as of this encounter: 1.753 m (5' 9\").    Weight as of " this encounter: 73.9 kg (163 lb).  Results Reviewed:  I personally reviewed scoliosis films showing the following parameters:  LL 41* (L4-S1 17*)  PI 33* PT 9* C7-S1 SB 5.4cm   These numbers indicate maintained lumbar lordosis in relation to the pelvic incidence.  Low pelvic tilt also indicates no significant pelvic retroversion.  I personally viewed the images of an MRI of the lumbar spine showing central canal stenosis most severe at L3-4 and L4-5.  There does appear to be some lateral gutter stenosis at L5-S1 however this does not affect the traversing nerve roots.  Stenosis at L3-4 and L4-5 is likely due to a combination of disc bulging and facet and ligamentum hypertrophy.  Assessment:  62-year-old female with lumbar stenosis with neurogenic medication significantly improved after lumbar epidural steroid injection  Plan:  We discussed conservative and surgical management options going forward.  She has had significant improvement with an injection recently.  We discussed should symptoms come back another injection versus surgery would be reasonable options.  If symptoms are improved for quite some time more than 3 months from the prior injection another injection would be very reasonable option.  If symptoms recur prior to that time surgery would also be a reasonable option.  This would entail a lumbar laminectomy from L3-4 and L4-5.  Given the normal lumbar lordosis and low pelvic tilt along with no dynamic instability I do not believe a fusion is necessary.  We discussed the risks and the benefits of a lumbar laminectomy for treating this symptom.  We also discussed red flag signs including weakness or loss of bowel or bladder control that would change our plan to a surgical plan.  She can otherwise continue with injections as needed and can follow-up with me as needed going forward.    I spent 50 minutes reviewing the patient's chart, interviewing examining the patient as well as discussing diagnosis and  treatment options.    Sylvain Wu MD      Again, thank you for allowing me to participate in the care of your patient.        Sincerely,        Sylvain Wu MD

## 2023-03-30 NOTE — PROGRESS NOTES
"HPI:  62-year-old female with 3 months of low back pain radiating to the bilateral lower extremities.  The pain started on the right side and also progressed to having pain down the left leg.  Pain did radiate down the back of the thighs to the lateral aspect of the leg and top of the foot.  Pain improves with sitting down and is almost completely relieved.  Pain was worsened with standing.  She tried physical therapy for a few sessions without any benefit.  She then underwent an epidural steroid injection recently which significantly improved her pain and she has very mild pain occasionally at this time.  She denies any weakness.  She denies any bowel or bladder control problems.  She denies any history of spine surgery in the past.    Denies any other relevant medical history to her lumbar stenosis diagnosis.  Current Outpatient Medications   Medication     clobetasol (TEMOVATE) 0.05 % external solution     gabapentin (NEURONTIN) 100 MG capsule     ibuprofen (ADVIL/MOTRIN) 800 MG tablet     Lidocaine (LIDOCARE) 4 % Patch     lidocaine (LIDODERM) 5 % patch     lithium (ESKALITH) 150 MG capsule     methylPREDNISolone (MEDROL DOSEPAK) 4 MG tablet therapy pack     OLANZapine (ZYPREXA) 2.5 MG tablet     PARoxetine (PAXIL) 20 MG tablet     propranolol (INDERAL) 10 MG tablet     vitamin D3 (CHOLECALCIFEROL) 50 mcg (2000 units) tablet     No current facility-administered medications for this visit.      Physical Exam:  Vital signs:      BP: 134/82 Pulse: 67           Height: 175.3 cm (5' 9\") Weight: 73.9 kg (163 lb)  Estimated body mass index is 24.07 kg/m  as calculated from the following:    Height as of this encounter: 1.753 m (5' 9\").    Weight as of this encounter: 73.9 kg (163 lb).  Results Reviewed:  I personally reviewed scoliosis films showing the following parameters:  LL 41* (L4-S1 17*)  PI 33* PT 9* C7-S1 SB 5.4cm   These numbers indicate maintained lumbar lordosis in relation to the pelvic incidence.  Low " pelvic tilt also indicates no significant pelvic retroversion.  I personally viewed the images of an MRI of the lumbar spine showing central canal stenosis most severe at L3-4 and L4-5.  There does appear to be some lateral gutter stenosis at L5-S1 however this does not affect the traversing nerve roots.  Stenosis at L3-4 and L4-5 is likely due to a combination of disc bulging and facet and ligamentum hypertrophy.  Assessment:  62-year-old female with lumbar stenosis with neurogenic medication significantly improved after lumbar epidural steroid injection  Plan:  We discussed conservative and surgical management options going forward.  She has had significant improvement with an injection recently.  We discussed should symptoms come back another injection versus surgery would be reasonable options.  If symptoms are improved for quite some time more than 3 months from the prior injection another injection would be very reasonable option.  If symptoms recur prior to that time surgery would also be a reasonable option.  This would entail a lumbar laminectomy from L3-4 and L4-5.  Given the normal lumbar lordosis and low pelvic tilt along with no dynamic instability I do not believe a fusion is necessary.  We discussed the risks and the benefits of a lumbar laminectomy for treating this symptom.  We also discussed red flag signs including weakness or loss of bowel or bladder control that would change our plan to a surgical plan.  She can otherwise continue with injections as needed and can follow-up with me as needed going forward.    I spent 50 minutes reviewing the patient's chart, interviewing examining the patient as well as discussing diagnosis and treatment options.    Sylvain Wu MD

## 2023-04-11 ENCOUNTER — RX ONLY (RX ONLY)
Age: 63
End: 2023-04-11

## 2023-04-11 RX ORDER — RISANKIZUMAB-RZAA 150 MG/ML
INJECTION SUBCUTANEOUS
Qty: 1 | Refills: 0 | Status: ERX

## 2023-04-11 RX ORDER — RISANKIZUMAB-RZAA 150 MG/ML
INJECTION SUBCUTANEOUS
Qty: 2 | Refills: 0 | Status: ERX

## 2023-04-19 ENCOUNTER — OFFICE VISIT (OUTPATIENT)
Dept: FAMILY MEDICINE | Facility: CLINIC | Age: 63
End: 2023-04-19
Payer: COMMERCIAL

## 2023-04-19 ENCOUNTER — LAB (OUTPATIENT)
Dept: LAB | Facility: CLINIC | Age: 63
End: 2023-04-19
Payer: COMMERCIAL

## 2023-04-19 VITALS
HEIGHT: 69 IN | SYSTOLIC BLOOD PRESSURE: 126 MMHG | BODY MASS INDEX: 24.23 KG/M2 | HEART RATE: 71 BPM | WEIGHT: 163.6 LBS | TEMPERATURE: 98 F | OXYGEN SATURATION: 99 % | DIASTOLIC BLOOD PRESSURE: 62 MMHG | RESPIRATION RATE: 18 BRPM

## 2023-04-19 DIAGNOSIS — Z00.00 HEALTHCARE MAINTENANCE: ICD-10-CM

## 2023-04-19 DIAGNOSIS — K76.9 LIVER LESION: ICD-10-CM

## 2023-04-19 DIAGNOSIS — E78.5 HYPERLIPIDEMIA WITH TARGET LDL LESS THAN 130: Primary | ICD-10-CM

## 2023-04-19 DIAGNOSIS — K76.9 LIVER LESION: Primary | ICD-10-CM

## 2023-04-19 LAB
ALBUMIN SERPL-MCNC: 3.7 G/DL (ref 3.4–5)
ALP SERPL-CCNC: 64 U/L (ref 40–150)
ALT SERPL W P-5'-P-CCNC: 26 U/L (ref 0–50)
ANION GAP SERPL CALCULATED.3IONS-SCNC: <1 MMOL/L (ref 3–14)
AST SERPL W P-5'-P-CCNC: 18 U/L (ref 0–45)
BILIRUB SERPL-MCNC: 0.4 MG/DL (ref 0.2–1.3)
BUN SERPL-MCNC: 12 MG/DL (ref 7–30)
CALCIUM SERPL-MCNC: 9.4 MG/DL (ref 8.5–10.1)
CHLORIDE BLD-SCNC: 112 MMOL/L (ref 94–109)
CO2 SERPL-SCNC: 30 MMOL/L (ref 20–32)
CREAT SERPL-MCNC: 0.81 MG/DL (ref 0.52–1.04)
GFR SERPL CREATININE-BSD FRML MDRD: 81 ML/MIN/1.73M2
GLUCOSE BLD-MCNC: 97 MG/DL (ref 70–99)
POTASSIUM BLD-SCNC: 3.6 MMOL/L (ref 3.4–5.3)
PROT SERPL-MCNC: 7.5 G/DL (ref 6.8–8.8)
SODIUM SERPL-SCNC: 142 MMOL/L (ref 133–144)
TSH SERPL DL<=0.005 MIU/L-ACNC: 1.84 MU/L (ref 0.4–4)

## 2023-04-19 PROCEDURE — 80053 COMPREHEN METABOLIC PANEL: CPT

## 2023-04-19 PROCEDURE — 36415 COLL VENOUS BLD VENIPUNCTURE: CPT

## 2023-04-19 PROCEDURE — 80178 ASSAY OF LITHIUM: CPT

## 2023-04-19 PROCEDURE — 99213 OFFICE O/P EST LOW 20 MIN: CPT | Performed by: STUDENT IN AN ORGANIZED HEALTH CARE EDUCATION/TRAINING PROGRAM

## 2023-04-19 PROCEDURE — 84443 ASSAY THYROID STIM HORMONE: CPT

## 2023-04-19 NOTE — PROGRESS NOTES
Assessment & Plan     (K76.9) Liver lesion  (primary encounter diagnosis)  Comment: Stable.  Per chart review patient noted to have CT abdomen and on findings a right posterior subsegmental low-attenuation liver lesion.  Per CT report likely benign in nature however will order CMP to assess liver enzymes as last labs were obtained in 2019.  Per patient request patient would like a GI referral for greater clarity of the liver lesion and did discuss with patient likely benign nature however further work-up will be completed.  Plan: Comprehensive metabolic panel (BMP + Alb, Alk         Phos, ALT, AST, Total. Bili, TP), Adult GI          Referral - Consult Only        Pending lab and pending evaluation                   EUNICE GIRALDO MD  [facility] KAJAL Caruso is a 63 year old, presenting for the following health issues:      History of Present Illness       Reason for visit:  Spot on my liver    She eats 0-1 servings of fruits and vegetables daily.She consumes 3 sweetened beverage(s) daily.She exercises with enough effort to increase her heart rate 9 or less minutes per day.  She exercises with enough effort to increase her heart rate 3 or less days per week.   She is taking medications regularly.     Patient reports that she was evaluated by a neurosurgeon due to her chronic low back pain and pain that  radiates down her legs.  She reports receiving a lumbar injection which has alleviated most of her pain.  Patient states that she was informed by her neurosurgeon for a possible liver lesion and recommended being seen in office today.  Patient denies any abdominal pain to date.  Patient reports wanting to make sure that her labs as well as a referral to a specialist is given in order to rule out any concerns for this lesion.            Review of Systems   Constitutional, HEENT, cardiovascular, pulmonary, gi and gu systems are negative, except as otherwise noted.       Objective    LMP 01/30/2014 (Exact Date)   There is no height or weight on file to calculate BMI.  Physical Exam   GENERAL: healthy, alert and no distress  EYES: Eyes grossly normal to inspection, PERRL and conjunctivae and sclerae normal  MS: no gross musculoskeletal defects noted, no edema  SKIN: no suspicious lesions or rashes    No results found for any visits on 04/19/23.

## 2023-04-20 LAB — LITHIUM SERPL-SCNC: 0.4 MMOL/L (ref 0.6–1.2)

## 2023-04-21 PROBLEM — M51.369 BULGING OF LUMBAR INTERVERTEBRAL DISC: Status: RESOLVED | Noted: 2022-12-15 | Resolved: 2023-04-21

## 2023-04-21 NOTE — PROGRESS NOTES
Discharge Note    Progress reporting period is from last SOAP note on  Dec 29, 2022.    Zuly failed to follow up and current status is unknown.  Please see information below for last relevant information on current status.  Patient seen for 2 visits.    SUBJECTIVE  Subjective changes noted by patient:  Pain returns to clinic reporting that she did not perfrom HEP very much as it hurt too mcu, causing the center of her L to hurt too much. Now the pain has moved to the L sided  L-S area, but intermittently continues to be felt on the right side and into R LE. R anterior shin numbness is reduced in intensity overall.  .  Current pain level is 7/10.     Previous pain level was  7/10.   Changes in function:  Yes (See Goal flowsheet attached for changes in current functional level)  Adverse reaction to treatment or activity: None    OBJECTIVE  Changes noted in objective findings: Improved AROM of lumbar spine: flexion min loss(reaches to mid lower leg), extension max loss. Signficant B TL paraspinal muscle tension and reduced P/A mobility of lumbar spinal segements. Spent itme educating pt about centralization of sx vs perpheralization and/or sx changing from R/L being possible with disc bulging(derangement).     ASSESSMENT/PLAN  Diagnosis: multilevel stenosis and discu bulging(LBP with R sciaitca)   Updated problem list and treatment plan:   Pain - HEP  Decreased ROM/flexibility - HEP  Decreased function - HEP  STG/LTGs have been met or progress has been made towards goals:  Yes, please see goal flowsheet for most current information  Assessment of Progress: current status is unknown.    Last current status: Pt is progressing as expected   Self Management Plans:  HEP  I have re-evaluated this patient and find that the nature, scope, duration and intensity of the therapy is appropriate for the medical condition of the patient.  Zuly continues to require the following intervention to meet STG and LTG's:   HEP.    Recommendations:  Discharge with current home program.  Patient to follow up with MD as needed.    Please refer to the daily flowsheet for treatment today, total treatment time and time spent performing 1:1 timed codes.

## 2023-04-25 ENCOUNTER — APPOINTMENT (OUTPATIENT)
Dept: URBAN - METROPOLITAN AREA CLINIC 254 | Age: 63
Setting detail: DERMATOLOGY
End: 2023-04-26

## 2023-04-25 DIAGNOSIS — L40.0 PSORIASIS VULGARIS: ICD-10-CM

## 2023-04-25 PROCEDURE — OTHER SKYRIZI INJECTION: OTHER

## 2023-04-25 PROCEDURE — 96372 THER/PROPH/DIAG INJ SC/IM: CPT

## 2023-04-25 PROCEDURE — OTHER MIPS QUALITY: OTHER

## 2023-04-25 ASSESSMENT — LOCATION ZONE DERM: LOCATION ZONE: LEG

## 2023-04-25 ASSESSMENT — LOCATION SIMPLE DESCRIPTION DERM: LOCATION SIMPLE: RIGHT THIGH

## 2023-04-25 ASSESSMENT — LOCATION DETAILED DESCRIPTION DERM: LOCATION DETAILED: RIGHT ANTERIOR PROXIMAL THIGH

## 2023-04-27 ENCOUNTER — NURSE TRIAGE (OUTPATIENT)
Dept: NURSING | Facility: CLINIC | Age: 63
End: 2023-04-27
Payer: COMMERCIAL

## 2023-04-27 NOTE — TELEPHONE ENCOUNTER
Pt calling for results from lab work last week.    Informed of Dr. Valdovinos's message on 4/19:        Pt had no further questions. Has appointment scheduled with GI and will follow up with Dr. Valdovinos afterward.      Coco Hong, RN, BSN  [facility]   Triage Nurse Advisor      Reason for Disposition    Information only question and nurse able to answer    Additional Information    Negative: Nursing judgment    Negative: Nursing judgment    Negative: Nursing judgment    Negative: Nursing judgment    Protocols used: INFORMATION ONLY CALL - NO TRIAGE-A-OH

## 2023-05-04 ENCOUNTER — TELEPHONE (OUTPATIENT)
Dept: FAMILY MEDICINE | Facility: CLINIC | Age: 63
End: 2023-05-04
Payer: COMMERCIAL

## 2023-05-04 NOTE — TELEPHONE ENCOUNTER
Patient dropped off paperwork today and the paperwork was put in the provider's box.          Dr. Valdovinos,    Patient called and stated she needs a form signed by provider for CSL plasma.   Form needs diagnostic and any treatment with signature from provider.     Per pt will drop this form off tomorrow 5/4/23.     [phone], please let pt know when faxed. Ok detailed voice mails.     Thanks,  ELI Felipe  [facility]

## 2023-05-10 NOTE — TELEPHONE ENCOUNTER
Form for CSL Plasma completed and signed by Dr. Valdovinos.  Form faxed back to them (f: [phone]).  Sunitha Rodriguez,

## 2023-05-12 NOTE — CONFIDENTIAL NOTE
DIAGNOSIS: Liver lesion   Appt Date: 07.13.2023   NOTES STATUS DETAILS   OFFICE NOTE from referring provider Internal 04.19.2023 Augustina Valdovinos MD   OFFICE NOTES from other specialists     DISCHARGE SUMMARY from hospital     MEDICATION LIST Internal    LIVER BIOSPY (IF APPLICABLE)      PATHOLOGY REPORTS      IMAGING     ENDOSCOPY (IF AVAILABLE)     COLONOSCOPY (IF AVAILABLE)     ULTRASOUND LIVER     CT OF ABDOMEN     MRI OF LIVER     FIBROSCAN, US ELASTOGRAPHY, FIBROSIS SCAN, MR ELASTOGRAPHY     LABS     HEPATIC PANEL (LIVER PANEL)     BASIC METABOLIC PANEL Internal 11.17.2022   COMPLETE METABOLIC PANEL Internal 11.06.2022   COMPLETE BLOOD COUNT (CBC) Internal 11.06.2022   INTERNATIONAL NORMALIZED RATIO (INR)     HEPATITIS C ANTIBODY     HEPATITIS C VIRAL LOAD/PCR     HEPATITIS C GENOTYPE     HEPATITIS B SURFACE ANTIGEN     HEPATITIS B SURFACE ANTIBODY     HEPATITIS B DNA QUANT LEVEL     HEPATITIS B CORE ANTIBODY

## 2023-05-23 ENCOUNTER — APPOINTMENT (OUTPATIENT)
Dept: URBAN - METROPOLITAN AREA CLINIC 254 | Age: 63
Setting detail: DERMATOLOGY
End: 2023-05-27

## 2023-05-23 VITALS — WEIGHT: 163 LBS | HEIGHT: 69 IN

## 2023-05-23 DIAGNOSIS — L40.0 PSORIASIS VULGARIS: ICD-10-CM

## 2023-05-23 PROCEDURE — OTHER MIPS QUALITY: OTHER

## 2023-05-23 PROCEDURE — OTHER COUNSELING: OTHER

## 2023-05-23 PROCEDURE — OTHER PRESCRIPTION: OTHER

## 2023-05-23 PROCEDURE — OTHER SKYRIZI INJECTION: OTHER

## 2023-05-23 PROCEDURE — 99213 OFFICE O/P EST LOW 20 MIN: CPT | Mod: 25

## 2023-05-23 PROCEDURE — OTHER SKYRIZI MONITORING: OTHER

## 2023-05-23 PROCEDURE — 96372 THER/PROPH/DIAG INJ SC/IM: CPT

## 2023-05-23 RX ORDER — FLUOCINOLONE ACETONIDE 0.11 MG/ML
0.01% OIL TOPICAL QD
Qty: 118.28 | Refills: 3 | Status: ERX | COMMUNITY
Start: 2023-05-23

## 2023-05-23 RX ORDER — RISANKIZUMAB-RZAA 150 MG/ML
150MG INJECTION SUBCUTANEOUS
Qty: 1 | Refills: 1 | Status: ERX

## 2023-05-23 ASSESSMENT — LOCATION DETAILED DESCRIPTION DERM
LOCATION DETAILED: RIGHT CENTRAL PARIETAL SCALP
LOCATION DETAILED: LEFT MEDIAL FRONTAL SCALP
LOCATION DETAILED: RIGHT ANTERIOR DISTAL THIGH
LOCATION DETAILED: LEFT MEDIAL FRONTAL SCALP

## 2023-05-23 ASSESSMENT — LOCATION SIMPLE DESCRIPTION DERM
LOCATION SIMPLE: LEFT SCALP
LOCATION SIMPLE: LEFT SCALP
LOCATION SIMPLE: RIGHT THIGH
LOCATION SIMPLE: SCALP

## 2023-05-23 ASSESSMENT — PGA PSORIASIS: PGA PSORIASIS 2020: MILD

## 2023-05-23 ASSESSMENT — LOCATION ZONE DERM
LOCATION ZONE: SCALP
LOCATION ZONE: SCALP
LOCATION ZONE: LEG

## 2023-05-23 NOTE — PROCEDURE: MIPS QUALITY
Quality 47: Advance Care Plan: Advance Care Planning discussed and documented in the medical record; patient did not wish or was not able to name a surrogate decision maker or provide an advance care plan.
Quality 130: Documentation Of Current Medications In The Medical Record: Current Medications Documented
Quality 226: Preventive Care And Screening: Tobacco Use: Screening And Cessation Intervention: Patient screened for tobacco use and is an ex/non-smoker
Detail Level: Detailed
Quality 431: Preventive Care And Screening: Unhealthy Alcohol Use - Screening: Patient not identified as an unhealthy alcohol user when screened for unhealthy alcohol use using a systematic screening method
Quality 110: Preventive Care And Screening: Influenza Immunization: Influenza Immunization not Administered for Documented Reasons.

## 2023-06-30 ENCOUNTER — TELEPHONE (OUTPATIENT)
Dept: FAMILY MEDICINE | Facility: CLINIC | Age: 63
End: 2023-06-30
Payer: COMMERCIAL

## 2023-06-30 NOTE — TELEPHONE ENCOUNTER
Patient Quality Outreach    Patient is due for the following:   Colon Cancer Screening  Breast Cancer Screening - Mammogram   LDL    Next Steps:   No follow up needed at this time.    Type of outreach:    Sent letter.      Questions for provider review:    None           Elif Costa  Chart routed to Care Team.

## 2023-06-30 NOTE — LETTER
August 31, 2023      Zuly Wheeler  [address]  [place] [address]    Your team at [facility] cares about your health. We have reviewed your chart and based on our findings; we are making the following recommendations to better manage your health.     You are in particular need of attention regarding the following:     Schedule Annual MAMMOGRAPHY. [facility] scheduling number is [phone] or schedule in [facility] (self referral).  1 in 8 women will develop invasive breast cancer during her lifetime and it is the most common non-skin cancer in [place]. EARLY detection, new treatments, and a better understanding of the disease have increased survival rates- the 5 year survival rate in the 1960's was 63% and today it is close to 90%.  If you are under/uninsured, we recommend you contact the [facility]. They offer mammograms at no charge or on a sliding fee charge. You can schedule with them at [phone]. Please have them send us the results.   Call or [facility] message your clinic to schedule a colonoscopy, schedule/ a FIT Test, or order a Cologuard test. If you are unsure what type of test you need, please call your clinic and speak to clinic staff.   Colon cancer is now the second leading cause of cancer-related deaths in the [place] for both men and women and there are over 130,000 new cases and 50,000 deaths per year from colon cancer. Colonoscopies can prevent 90-95% of these deaths. Problem lesions can be removed before they ever become cancer. This test is not only looking for cancer, but also getting rid of precancerous lesions.   If you are under/uninsured, we recommend you contact the [employer] [facility].[employer] [facility] is a free colorectal cancer screening program that provides colonoscopies for eligible under/uninsured [place] men and women. If you are interested in receiving a free colonoscopy, please call [employer] at t [phone] (mention  code [employer]) to see if you're eligible. Please have them send us the results.   Please call [phone] to schedule a colonoscopy.  Contact your clinic to schedule/ your FIT Test.     If you have already completed these items, please contact the clinic via phone or   Mobile Messengerhart so your care team can review and update your records. Thank you for   choosing [facility] for your healthcare needs. For any questions,   concerns, or to schedule an appointment please contact our clinic.    Healthy Regards,      Your [facility] Care Team

## 2023-06-30 NOTE — LETTER
June 30, 2023      Zuly Wheeler  [address]  [facility] [address]    Your team at [facility] cares about your health. We have reviewed your chart and based on our findings; we are making the following recommendations to better manage your health.     You are in particular need of attention regarding the following:     Schedule Annual MAMMOGRAPHY. [facility] scheduling number is [phone] or schedule in [facility] (self referral).  1 in 8 women will develop invasive breast cancer during her lifetime and it is the most common non-skin cancer in American Women. EARLY detection, new treatments, and a better understanding of the disease have increased survival rates- the 5 year survival rate in the 1960's was 63% and today it is close to 90%.  If you are under/uninsured, we recommend you contact the [facility]. They offer mammograms at no charge or on a sliding fee charge. You can schedule with them at [phone]. Please have them send us the results.   Call or [facility] [employer] [employer] message your clinic to schedule a colonoscopy, schedule/ a FIT Test, or order a Cologuard test. If you are unsure what type of test you need, please call your clinic and speak to clinic staff.   Colon cancer is now the second leading cause of cancer-related deaths in the [place] for both men and women and there are over 130,000 new cases and 50,000 deaths per year from colon cancer. Colonoscopies can prevent 90-95% of these deaths. Problem lesions can be removed before they ever become cancer. This test is not only looking for cancer, but also getting rid of precancerous lesions.   If you are under/uninsured, we recommend you contact the [facility].[employer] [facility] is a free colorectal cancer screening program that provides colonoscopies for eligible under/uninsured [place] men and women. If you are interested in receiving a free colonoscopy, please call [employer] at t [phone] (mention code  [employer]) to see if you're eligible. Please have them send us the results.   Please call [phone] to schedule a colonoscopy.  Contact your clinic to schedule/ your FIT Test.   Schedule an office visit with your provider if you are interested in completing your colon cancer screening with a Cologuard test    If you have already completed these items, please contact the clinic via phone or   [facility] so your care team can review and update your records. Thank you for   choosing [facility] [place] [facility] for your healthcare needs. For any questions,   concerns, or to schedule an appointment please contact our clinic.    Healthy Regards,      Your [facility] [place] Care Team

## 2023-07-03 ENCOUNTER — OFFICE VISIT (OUTPATIENT)
Dept: GASTROENTEROLOGY | Facility: CLINIC | Age: 63
End: 2023-07-03
Attending: STUDENT IN AN ORGANIZED HEALTH CARE EDUCATION/TRAINING PROGRAM
Payer: COMMERCIAL

## 2023-07-03 VITALS
WEIGHT: 164 LBS | OXYGEN SATURATION: 95 % | SYSTOLIC BLOOD PRESSURE: 137 MMHG | DIASTOLIC BLOOD PRESSURE: 61 MMHG | BODY MASS INDEX: 24.22 KG/M2 | HEART RATE: 67 BPM

## 2023-07-03 DIAGNOSIS — K76.9 LIVER LESION: ICD-10-CM

## 2023-07-03 PROCEDURE — 99204 OFFICE O/P NEW MOD 45 MIN: CPT | Performed by: INTERNAL MEDICINE

## 2023-07-03 PROCEDURE — G0463 HOSPITAL OUTPT CLINIC VISIT: HCPCS | Performed by: INTERNAL MEDICINE

## 2023-07-03 RX ORDER — RISANKIZUMAB-RZAA 150 MG/ML
INJECTION SUBCUTANEOUS
COMMUNITY
Start: 2023-05-17

## 2023-07-03 ASSESSMENT — PAIN SCALES - GENERAL: PAINLEVEL: MODERATE PAIN (5)

## 2023-07-03 NOTE — PROGRESS NOTES
[facility] Hepatology    New Patient Visit    Referring provider:  Augustina Valdovinos    63 year old female    Chief complaint:  Abnormal liver imaging    HPI:  Patient comes to clinic for further evaluation and management of abnormal liver imaging.  The patient was noted to have a liver lesion on CT obtained for RLQ pain.  CT abdomen showed a subcentimeter lesion in the right lobe of the liver.  Patient has no history of chronic liver disease.    Patient is well today.  She has no symptoms related to liver disease.    Patient denies jaundice, abdominal distension, lower extremity edema, lethargy or confusion.    No history of melena, hematemesis or hematochezia.    Patient denies fevers, sweats, chills or weight loss.    Does not drink alcohol.  She has no history of alcohol use disorder or DUIs.    Patient is an ex-smoker of 33 years.  She previously smoked for 15 years at 1.5 packs of cigarettes per day.    Patient denies any recreational or illicit drug use including marijuana intranasal or intravenous drugs.    Medical hx Surgical hx   Past Medical History:   Diagnosis Date     Bipolar 1 disorder (H)      Chronic maxillary sinusitis 09/22/2012 1995, due to tobacco abuse     Essential hypertension 08/04/2021     Herpes simplex virus infection 11/29/2017    No known outbreaks or sores, but positive serology. Discussed that this means she does have the herpes virus, and natural hx of this infection. Gave pt info.     Hyperlipidemia with target LDL less than 130 12/04/2013     Vitamin D deficiency disease 12/04/2013      Past Surgical History:   Procedure Laterality Date     DILATION AND CURETTAGE  12/6/2013    Procedure: DILATION AND CURETTAGE;  Dilation And Curettage;  Surgeon: Edel Chew MD;  [facility]     EYE SURGERY  8/2013    cataract surgery both eyes done     INJECT EPIDURAL LUMBAR N/A 3/10/2023    Procedure: Lumbar Epidural Steroid Injection;  Surgeon: Fabi Roberts MD;  Location:  [facility] OR     LAPAROSCOPIC HYSTERECTOMY SUPRACERVICAL, BILATERAL SALPINGO-OOPHORECTOMY, COMBINED  1/30/2014    Procedure: COMBINED LAPAROSCOPIC HYSTERECTOMY SUPRACERVICAL, SALPINGO-OOPHORECTOMY;  Laparoscopic Supracervical Hysterectomy With Bilateral Salingectomy;  Surgeon: Edel Chew MD;  [facility]: [facility] OR     TUBAL LIGATION  1990          Medications  Current Outpatient Medications   Medication Sig Dispense Refill     clobetasol (TEMOVATE) 0.05 % external solution Apply topically 2 times daily 50 mL 2     gabapentin (NEURONTIN) 100 MG capsule Take 2 capsules (200 mg) by mouth 3 times daily 180 capsule 3     ibuprofen (ADVIL/MOTRIN) 800 MG tablet Take 1 tablet (800 mg) by mouth every 8 hours as needed for moderate pain (4-6) 60 tablet 1     lithium (ESKALITH) 150 MG capsule TAKE THREE CAPSULES BY MOUTH AT BEDTIME Strength: 150 mg 90 capsule 3     OLANZapine (ZYPREXA) 2.5 MG tablet Take 1 tablet (2.5 mg) by mouth At Bedtime 30 tablet 0     PARoxetine (PAXIL) 20 MG tablet TAKE ONE TABLET BY MOUTH AT BEDTIME Strength: 20 mg 30 tablet 0     propranolol (INDERAL) 10 MG tablet TAKE ONE TABLET BY MOUTH TWICE A DAY 60 tablet 0     SKYRIZI  MG/ML subcutaneous Inject Subcutaneous every 3 months         Allergies  Allergies   Allergen Reactions     Divalproex Sodium      groggy     Lamotrigine      Intolerance, numb (Lamictal)     Olanzapine Other (See Comments)     Only generic/ineffective  Ineffective (only generic)     Oxcarbazepine Visual Disturbance     Blurred vision     Seroquel [Quetiapine Fumarate] Visual Disturbance     Blurred vision; jumpy     Sulfa Antibiotics Hives     Zoloft Other (See Comments)     jumpy     Citalopram Anxiety     Intolerance (Celexa)       Family hx Social hx   Family History   Problem Relation Age of Onset     Diabetes Mother      Hypertension Mother      Psychotic Disorder Mother      Depression Mother      Hearing Loss Father      Coronary Artery Disease Father       Hyperlipidemia No family hx of      Cerebrovascular Disease No family hx of      Breast Cancer No family hx of      Colon Cancer No family hx of      Prostate Cancer No family hx of      Other Cancer No family hx of      Anxiety Disorder No family hx of      Mental Illness No family hx of      Substance Abuse No family hx of      Anesthesia Reaction No family hx of      Asthma No family hx of      Osteoporosis No family hx of      Genetic Disorder No family hx of      Thyroid Disease No family hx of      Obesity No family hx of      Unknown/Adopted No family hx of       Social History     Tobacco Use     Smoking status: Former     Types: Cigarettes     Quit date: 1990     Years since quittin.6     Smokeless tobacco: Never   Vaping Use     Vaping Use: Never used   Substance Use Topics     Alcohol use: Never     Drug use: No     Patient lives in [place] with her brother.  She is .  She has 2 children.  She works as a  for vulnerable adults.     Review of systems  A 10-point review of systems was negative.    Examination  /61 (BP Location: Right arm, Patient Position: Sitting, Cuff Size: Adult Regular)   Pulse 67   Wt 74.4 kg (164 lb)   LMP 2014 (Exact Date)   SpO2 95%   BMI 24.22 kg/m    Body mass index is 24.22 kg/m .    Gen- well, NAD, A+Ox3, normal color  Eye- EOMI  ENT- MMM, normal oropharynx  Lym- no palpable lymphadenopathy  CVS- S1, S2 normal, no added sounds, RRR  RS- CTA  Abd- soft, non-tender, no ascites or organomegaly on palpation or percussion, BS+  Extr- pulses good, no MAUREEN  MS- hands normal- no clubbing  Neuro- A+Ox3, no asterixis  Skin- no rash or jaundice  Psych- normal mood    Laboratory  Lab Results   Component Value Date     2023     2021    POTASSIUM 3.6 2023    POTASSIUM 3.6 2021    CHLORIDE 112 2023    CHLORIDE 105 2021    CO2 30 2023    CO2 28 2021    BUN 12 2023    BUN 12  06/19/2021    CR 0.81 04/19/2023    CR 0.76 06/19/2021       Lab Results   Component Value Date    BILITOTAL 0.4 04/19/2023    BILITOTAL 0.4 06/19/2021    ALT 26 04/19/2023    ALT 29 06/19/2021    AST 18 04/19/2023    AST 21 06/19/2021    ALKPHOS 64 04/19/2023    ALKPHOS 73 06/19/2021       Lab Results   Component Value Date    ALBUMIN 3.7 04/19/2023    ALBUMIN 4.3 06/19/2021    PROTTOTAL 7.5 04/19/2023    PROTTOTAL 8.6 06/19/2021        Lab Results   Component Value Date    WBC 4.4 11/06/2022    WBC 5.6 06/19/2021    HGB 12.9 11/06/2022    HGB 14.7 06/19/2021    MCV 90 11/06/2022    MCV 89 06/19/2021     11/06/2022     06/19/2021       No results found for: INR      Radiology  CT A/P Nov 2022 personally reviewed- no cirrhosis or ascites    Assessment  63-year-old female who presents for further evaluation management of abnormal liver imaging.  No clinical, biochemical or radiographic evidence of cirrhosis.  Subcentimeter liver lesion is difficult to characterize due to size.  Differential diagnosis includes simple cyst or hemangioma.  Will obtain a abdominal ultrasound for to rule and simple cyst or hepatic hemangioma.  If liver lesions are remain indeterminate on ultrasound imaging, no additional evaluation is required in the absence of chronic liver disease.    Plan  1.  Abd U/S  2.  Follow-up TBD    Greyson Hart MD  Hepatology  [facility]

## 2023-07-03 NOTE — LETTER
7/3/2023         RE: Zuly Wheeler  [address]  [facility] [address]        Dear Colleague,    Thank you for referring your patient, Zuly Wheeler, to the [facility] [place]. Please see a copy of my visit note below.    [facility] Hepatology    New Patient Visit    Referring provider:  Augustnia Valdovinos    63 year old female    Chief complaint:  Abnormal liver imaging    HPI:  Patient comes to clinic for further evaluation and management of abnormal liver imaging.  The patient was noted to have a liver lesion on CT obtained for RLQ pain.  CT abdomen showed a subcentimeter lesion in the right lobe of the liver.  Patient has no history of chronic liver disease.    Patient is well today.  She has no symptoms related to liver disease.    Patient denies jaundice, abdominal distension, lower extremity edema, lethargy or confusion.    No history of melena, hematemesis or hematochezia.    Patient denies fevers, sweats, chills or weight loss.    Does not drink alcohol.  She has no history of alcohol use disorder or DUIs.    Patient is an ex-smoker of 33 years.  She previously smoked for 15 years at 1.5 packs of cigarettes per day.    Patient denies any recreational or illicit drug use including marijuana intranasal or intravenous drugs.    Medical hx Surgical hx   Past Medical History:   Diagnosis Date    Bipolar 1 disorder (H)     Chronic maxillary sinusitis 09/22/2012 1995, due to tobacco abuse    Essential hypertension 08/04/2021    Herpes simplex virus infection 11/29/2017    No known outbreaks or sores, but positive serology. Discussed that this means she does have the herpes virus, and natural hx of this infection. Gave pt info.    Hyperlipidemia with target LDL less than 130 12/04/2013    Vitamin D deficiency disease 12/04/2013      Past Surgical History:   Procedure Laterality Date    DILATION AND CURETTAGE  12/6/2013    Procedure: DILATION AND CURETTAGE;   Dilation And Curettage;  Surgeon: Edel Chew MD;  [facility]    EYE SURGERY  8/2013    cataract surgery both eyes done    INJECT EPIDURAL LUMBAR N/A 3/10/2023    Procedure: Lumbar Epidural Steroid Injection;  Surgeon: Fabi Roberts MD;  Location: [facility] OR    LAPAROSCOPIC HYSTERECTOMY SUPRACERVICAL, BILATERAL SALPINGO-OOPHORECTOMY, COMBINED  1/30/2014    Procedure: COMBINED LAPAROSCOPIC HYSTERECTOMY SUPRACERVICAL, SALPINGO-OOPHORECTOMY;  Laparoscopic Supracervical Hysterectomy With Bilateral Salingectomy;  Surgeon: Edel Chew MD;  [facility] OR    TUBAL LIGATION  1990          Medications  Current Outpatient Medications   Medication Sig Dispense Refill    clobetasol (TEMOVATE) 0.05 % external solution Apply topically 2 times daily 50 mL 2    gabapentin (NEURONTIN) 100 MG capsule Take 2 capsules (200 mg) by mouth 3 times daily 180 capsule 3    ibuprofen (ADVIL/MOTRIN) 800 MG tablet Take 1 tablet (800 mg) by mouth every 8 hours as needed for moderate pain (4-6) 60 tablet 1    lithium (ESKALITH) 150 MG capsule TAKE THREE CAPSULES BY MOUTH AT BEDTIME Strength: 150 mg 90 capsule 3    OLANZapine (ZYPREXA) 2.5 MG tablet Take 1 tablet (2.5 mg) by mouth At Bedtime 30 tablet 0    PARoxetine (PAXIL) 20 MG tablet TAKE ONE TABLET BY MOUTH AT BEDTIME Strength: 20 mg 30 tablet 0    propranolol (INDERAL) 10 MG tablet TAKE ONE TABLET BY MOUTH TWICE A DAY 60 tablet 0    SKYRIZI  MG/ML subcutaneous Inject Subcutaneous every 3 months         Allergies  Allergies   Allergen Reactions    Divalproex Sodium      groggy    Lamotrigine      Intolerance, numb (Lamictal)    Olanzapine Other (See Comments)     Only generic/ineffective  Ineffective (only generic)    Oxcarbazepine Visual Disturbance     Blurred vision    Seroquel [Quetiapine Fumarate] Visual Disturbance     Blurred vision; jumpy    Sulfa Antibiotics Hives    Zoloft Other (See Comments)     jumpy    Citalopram Anxiety     Intolerance (Celexa)        Family hx Social hx   Family History   Problem Relation Age of Onset    Diabetes Mother     Hypertension Mother     Psychotic Disorder Mother     Depression Mother     Hearing Loss Father     Coronary Artery Disease Father     Hyperlipidemia No family hx of     Cerebrovascular Disease No family hx of     Breast Cancer No family hx of     Colon Cancer No family hx of     Prostate Cancer No family hx of     Other Cancer No family hx of     Anxiety Disorder No family hx of     Mental Illness No family hx of     Substance Abuse No family hx of     Anesthesia Reaction No family hx of     Asthma No family hx of     Osteoporosis No family hx of     Genetic Disorder No family hx of     Thyroid Disease No family hx of     Obesity No family hx of     Unknown/Adopted No family hx of       Social History     Tobacco Use    Smoking status: Former     Types: Cigarettes     Quit date: 1990     Years since quittin.6    Smokeless tobacco: Never   Vaping Use    Vaping Use: Never used   Substance Use Topics    Alcohol use: Never    Drug use: No     Patient lives in [place] with her brother.  She is .  She has 2 children.  She works as a  for vulnerable adults.     Review of systems  A 10-point review of systems was negative.    Examination  /61 (BP Location: Right arm, Patient Position: Sitting, Cuff Size: Adult Regular)   Pulse 67   Wt 74.4 kg (164 lb)   LMP 2014 (Exact Date)   SpO2 95%   BMI 24.22 kg/m    Body mass index is 24.22 kg/m .    Gen- well, NAD, A+Ox3, normal color  Eye- EOMI  ENT- MMM, normal oropharynx  Lym- no palpable lymphadenopathy  CVS- S1, S2 normal, no added sounds, RRR  RS- CTA  Abd- soft, non-tender, no ascites or organomegaly on palpation or percussion, BS+  Extr- pulses good, no MAUREEN  MS- hands normal- no clubbing  Neuro- A+Ox3, no asterixis  Skin- no rash or jaundice  Psych- normal mood    Laboratory  Lab Results   Component Value Date     2023      06/19/2021    POTASSIUM 3.6 04/19/2023    POTASSIUM 3.6 06/19/2021    CHLORIDE 112 04/19/2023    CHLORIDE 105 06/19/2021    CO2 30 04/19/2023    CO2 28 06/19/2021    BUN 12 04/19/2023    BUN 12 06/19/2021    CR 0.81 04/19/2023    CR 0.76 06/19/2021       Lab Results   Component Value Date    BILITOTAL 0.4 04/19/2023    BILITOTAL 0.4 06/19/2021    ALT 26 04/19/2023    ALT 29 06/19/2021    AST 18 04/19/2023    AST 21 06/19/2021    ALKPHOS 64 04/19/2023    ALKPHOS 73 06/19/2021       Lab Results   Component Value Date    ALBUMIN 3.7 04/19/2023    ALBUMIN 4.3 06/19/2021    PROTTOTAL 7.5 04/19/2023    PROTTOTAL 8.6 06/19/2021        Lab Results   Component Value Date    WBC 4.4 11/06/2022    WBC 5.6 06/19/2021    HGB 12.9 11/06/2022    HGB 14.7 06/19/2021    MCV 90 11/06/2022    MCV 89 06/19/2021     11/06/2022     06/19/2021       No results found for: INR      Radiology  CT A/P Nov 2022 personally reviewed- no cirrhosis or ascites    Assessment  63-year-old female who presents for further evaluation management of abnormal liver imaging.  No clinical, biochemical or radiographic evidence of cirrhosis.  Subcentimeter liver lesion is difficult to characterize due to size.  Differential diagnosis includes simple cyst or hemangioma.  Will obtain a abdominal ultrasound for to rule and simple cyst or hepatic hemangioma.  If liver lesions are remain indeterminate on ultrasound imaging, no additional evaluation is required in the absence of chronic liver disease.    Plan  1.  Abd U/S  2.  Follow-up TBROMARIO Hart MD  Hepatology  [facility]

## 2023-07-07 ENCOUNTER — ANCILLARY PROCEDURE (OUTPATIENT)
Dept: ULTRASOUND IMAGING | Facility: CLINIC | Age: 63
End: 2023-07-07
Attending: INTERNAL MEDICINE
Payer: COMMERCIAL

## 2023-07-07 DIAGNOSIS — K76.9 LIVER LESION: ICD-10-CM

## 2023-07-07 PROCEDURE — 76700 US EXAM ABDOM COMPLETE: CPT | Mod: TC | Performed by: RADIOLOGY

## 2023-07-11 ENCOUNTER — TRANSFERRED RECORDS (OUTPATIENT)
Dept: HEALTH INFORMATION MANAGEMENT | Facility: CLINIC | Age: 63
End: 2023-07-11
Payer: COMMERCIAL

## 2023-07-13 ENCOUNTER — PRE VISIT (OUTPATIENT)
Dept: GASTROENTEROLOGY | Facility: CLINIC | Age: 63
End: 2023-07-13

## 2023-07-18 ENCOUNTER — LAB (OUTPATIENT)
Dept: LAB | Facility: CLINIC | Age: 63
End: 2023-07-18
Payer: COMMERCIAL

## 2023-07-18 DIAGNOSIS — Z79.899 ENCOUNTER FOR LONG-TERM (CURRENT) USE OF MEDICATIONS: ICD-10-CM

## 2023-07-18 DIAGNOSIS — Z79.899 ENCOUNTER FOR LONG-TERM (CURRENT) USE OF MEDICATIONS: Primary | ICD-10-CM

## 2023-07-18 LAB
ANION GAP SERPL CALCULATED.3IONS-SCNC: 10 MMOL/L (ref 7–15)
BUN SERPL-MCNC: 10.1 MG/DL (ref 8–23)
CALCIUM SERPL-MCNC: 9.6 MG/DL (ref 8.8–10.2)
CHLORIDE SERPL-SCNC: 108 MMOL/L (ref 98–107)
CREAT SERPL-MCNC: 0.83 MG/DL (ref 0.51–0.95)
DEPRECATED HCO3 PLAS-SCNC: 26 MMOL/L (ref 22–29)
GFR SERPL CREATININE-BSD FRML MDRD: 79 ML/MIN/1.73M2
GLUCOSE SERPL-MCNC: 84 MG/DL (ref 70–99)
POTASSIUM SERPL-SCNC: 3.9 MMOL/L (ref 3.4–5.3)
SODIUM SERPL-SCNC: 144 MMOL/L (ref 136–145)
T4 FREE SERPL-MCNC: 1.02 NG/DL (ref 0.9–1.7)
TSH SERPL DL<=0.005 MIU/L-ACNC: 2.81 UIU/ML (ref 0.3–4.2)

## 2023-07-18 PROCEDURE — 84443 ASSAY THYROID STIM HORMONE: CPT

## 2023-07-18 PROCEDURE — 80178 ASSAY OF LITHIUM: CPT

## 2023-07-18 PROCEDURE — 36415 COLL VENOUS BLD VENIPUNCTURE: CPT

## 2023-07-18 PROCEDURE — 84439 ASSAY OF FREE THYROXINE: CPT

## 2023-07-18 PROCEDURE — 80048 BASIC METABOLIC PNL TOTAL CA: CPT

## 2023-07-19 LAB — LITHIUM SERPL-SCNC: 0.3 MMOL/L (ref 0.6–1.2)

## 2023-08-15 ENCOUNTER — APPOINTMENT (OUTPATIENT)
Dept: URBAN - METROPOLITAN AREA CLINIC 254 | Age: 63
Setting detail: DERMATOLOGY
End: 2023-08-16

## 2023-08-15 VITALS — WEIGHT: 160 LBS | HEIGHT: 69 IN

## 2023-08-15 DIAGNOSIS — L40.0 PSORIASIS VULGARIS: ICD-10-CM

## 2023-08-15 PROCEDURE — 99213 OFFICE O/P EST LOW 20 MIN: CPT | Mod: 25

## 2023-08-15 PROCEDURE — 96372 THER/PROPH/DIAG INJ SC/IM: CPT

## 2023-08-15 PROCEDURE — OTHER MIPS QUALITY: OTHER

## 2023-08-15 PROCEDURE — OTHER PRESCRIPTION: OTHER

## 2023-08-15 PROCEDURE — OTHER SKYRIZI INJECTION: OTHER

## 2023-08-15 PROCEDURE — OTHER SKYRIZI MONITORING: OTHER

## 2023-08-15 RX ORDER — RISANKIZUMAB-RZAA 150 MG/ML
150MG INJECTION SUBCUTANEOUS
Qty: 1 | Refills: 1 | Status: ERX | COMMUNITY
Start: 2023-08-15

## 2023-08-15 ASSESSMENT — LOCATION SIMPLE DESCRIPTION DERM: LOCATION SIMPLE: RIGHT THIGH

## 2023-08-15 ASSESSMENT — PGA PSORIASIS: PGA PSORIASIS 2020: CLEAR

## 2023-08-15 ASSESSMENT — LOCATION DETAILED DESCRIPTION DERM: LOCATION DETAILED: RIGHT ANTERIOR PROXIMAL THIGH

## 2023-08-15 ASSESSMENT — LOCATION ZONE DERM: LOCATION ZONE: LEG

## 2023-08-15 NOTE — PROCEDURE: MIPS QUALITY
Quality 176: Tuberculosis Screening Prior To First Course Of Biologic And/Or Immune Response Modifier Therapy: Patient receiving first-time biologic and/or immune response modifier therapy, TB Screening Performed and Results Interpreted within 12 months
Quality 226: Preventive Care And Screening: Tobacco Use: Screening And Cessation Intervention: Patient screened for tobacco use and is an ex/non-smoker
Quality 431: Preventive Care And Screening: Unhealthy Alcohol Use - Screening: Patient not identified as an unhealthy alcohol user when screened for unhealthy alcohol use using a systematic screening method
Detail Level: Detailed
Quality 410: Psoriasis Clinical Response To Oral Systemic Or Biologic Mediations: Psoriasis Assessment Tool Documented, Met Specified Benchmark
Quality 130: Documentation Of Current Medications In The Medical Record: Current Medications Documented

## 2023-08-31 NOTE — TELEPHONE ENCOUNTER
Patient Quality Outreach    Patient is due for the following:   Colon Cancer Screening  Breast Cancer Screening - Mammogram  Cervical Cancer Screening - PAP Needed    Next Steps:   Schedule a Adult Preventative    Type of outreach:    Sent letter.      Questions for provider review:    None           Elif Costa  Chart routed to Care Team.

## 2023-09-13 ENCOUNTER — PATIENT OUTREACH (OUTPATIENT)
Dept: CARE COORDINATION | Facility: CLINIC | Age: 63
End: 2023-09-13
Payer: COMMERCIAL

## 2023-09-21 ENCOUNTER — PATIENT OUTREACH (OUTPATIENT)
Dept: FAMILY MEDICINE | Facility: CLINIC | Age: 63
End: 2023-09-21
Payer: COMMERCIAL

## 2023-09-21 DIAGNOSIS — R87.810 CERVICAL HIGH RISK HPV (HUMAN PAPILLOMAVIRUS) TEST POSITIVE: Primary | ICD-10-CM

## 2023-09-27 ENCOUNTER — PATIENT OUTREACH (OUTPATIENT)
Dept: CARE COORDINATION | Facility: CLINIC | Age: 63
End: 2023-09-27
Payer: COMMERCIAL

## 2023-10-03 ENCOUNTER — TRANSFERRED RECORDS (OUTPATIENT)
Dept: HEALTH INFORMATION MANAGEMENT | Facility: CLINIC | Age: 63
End: 2023-10-03
Payer: COMMERCIAL

## 2023-11-06 ENCOUNTER — APPOINTMENT (OUTPATIENT)
Dept: URBAN - METROPOLITAN AREA CLINIC 254 | Age: 63
Setting detail: DERMATOLOGY
End: 2023-11-06

## 2023-11-06 DIAGNOSIS — L40.0 PSORIASIS VULGARIS: ICD-10-CM

## 2023-11-06 PROCEDURE — OTHER SKYRIZI INJECTION: OTHER

## 2023-11-06 PROCEDURE — OTHER MIPS QUALITY: OTHER

## 2023-11-06 PROCEDURE — OTHER ADDITIONAL NOTES: OTHER

## 2023-11-06 PROCEDURE — 96372 THER/PROPH/DIAG INJ SC/IM: CPT

## 2023-11-06 ASSESSMENT — LOCATION SIMPLE DESCRIPTION DERM: LOCATION SIMPLE: RIGHT THIGH

## 2023-11-06 ASSESSMENT — LOCATION DETAILED DESCRIPTION DERM: LOCATION DETAILED: RIGHT ANTERIOR PROXIMAL THIGH

## 2023-11-06 ASSESSMENT — LOCATION ZONE DERM: LOCATION ZONE: LEG

## 2023-11-06 NOTE — PROCEDURE: ADDITIONAL NOTES
Detail Level: Simple
Additional Notes: - Injection training today.
Render Risk Assessment In Note?: no

## 2023-11-07 ENCOUNTER — TRANSFERRED RECORDS (OUTPATIENT)
Dept: HEALTH INFORMATION MANAGEMENT | Facility: CLINIC | Age: 63
End: 2023-11-07
Payer: COMMERCIAL

## 2023-11-07 LAB — PHQ9 SCORE: 2

## 2023-11-28 NOTE — TELEPHONE ENCOUNTER
FYI to provider - Patient is lost to pap tracking follow-up. Attempts to contact pt have been made per reminder process and there has been no reply and/or no appt scheduled. Contact hx listed below.     1994, 2012 NIL paps  1/30/14 supracervical hysterectomy  11/3/15 NIL pap, Neg HPV  7/21/16 ECC-neg  10/13/22 NIL pap, + HR HPV (not 16 or 18). Plan: cotest in 1 year/ pt advised  9/21/23 Reminder mychart  10/23/23 Reminder call -- Pt notified  11/28/23 Lost to follow-up for pap tracking     Ryann Barillas RN BSN, Pap Tracking

## 2023-11-29 NOTE — TELEPHONE ENCOUNTER
Called patient to schedule. She stated that she needs to look at her calendar and she will call back later this afternoon to schedule. If patient returns calls please assist in scheduling a pap only appointment     Radha-

## 2023-12-15 ENCOUNTER — TRANSFERRED RECORDS (OUTPATIENT)
Dept: HEALTH INFORMATION MANAGEMENT | Facility: CLINIC | Age: 63
End: 2023-12-15
Payer: COMMERCIAL

## 2024-01-03 ENCOUNTER — RX ONLY (RX ONLY)
Age: 64
End: 2024-01-03

## 2024-01-03 RX ORDER — RISANKIZUMAB-RZAA 150 MG/ML
INJECTION SUBCUTANEOUS
Qty: 1 | Refills: 1 | Status: ERX

## 2024-01-03 RX ORDER — RISANKIZUMAB-RZAA 150 MG/ML
INJECTION SUBCUTANEOUS
Qty: 1 | Refills: 1 | Status: CANCELLED

## 2024-01-04 NOTE — RESULT ENCOUNTER NOTE
Zuly,    Your MRI shows multiple disc herniations and narrowings called stenosis. Call or return to clinic as needed if these symptoms worsen or fail to improve as anticipated to consider referral to a spine specialist.     Yanni Barrera MD   oriented to person, place and time , normal sensation , short and long term memory intact

## 2024-01-06 ENCOUNTER — HEALTH MAINTENANCE LETTER (OUTPATIENT)
Age: 64
End: 2024-01-06

## 2024-02-20 ENCOUNTER — APPOINTMENT (OUTPATIENT)
Dept: URBAN - METROPOLITAN AREA CLINIC 254 | Age: 64
Setting detail: DERMATOLOGY
End: 2024-02-22

## 2024-02-20 DIAGNOSIS — L40.0 PSORIASIS VULGARIS: ICD-10-CM

## 2024-02-20 PROCEDURE — OTHER SKYRIZI INJECTION: OTHER

## 2024-02-20 PROCEDURE — 96372 THER/PROPH/DIAG INJ SC/IM: CPT

## 2024-02-20 ASSESSMENT — LOCATION SIMPLE DESCRIPTION DERM: LOCATION SIMPLE: RIGHT THIGH

## 2024-02-20 ASSESSMENT — LOCATION ZONE DERM: LOCATION ZONE: LEG

## 2024-02-20 ASSESSMENT — LOCATION DETAILED DESCRIPTION DERM: LOCATION DETAILED: RIGHT ANTERIOR DISTAL THIGH

## 2024-02-27 ENCOUNTER — APPOINTMENT (OUTPATIENT)
Dept: URBAN - METROPOLITAN AREA CLINIC 254 | Age: 64
Setting detail: DERMATOLOGY
End: 2024-02-28

## 2024-02-27 VITALS — WEIGHT: 160 LBS | HEIGHT: 69 IN

## 2024-02-27 DIAGNOSIS — L40.0 PSORIASIS VULGARIS: ICD-10-CM

## 2024-02-27 PROCEDURE — 99214 OFFICE O/P EST MOD 30 MIN: CPT

## 2024-02-27 PROCEDURE — OTHER VENIPUNCTURE: OTHER

## 2024-02-27 PROCEDURE — OTHER PRESCRIPTION: OTHER

## 2024-02-27 PROCEDURE — OTHER MIPS QUALITY: OTHER

## 2024-02-27 PROCEDURE — 36415 COLL VENOUS BLD VENIPUNCTURE: CPT

## 2024-02-27 PROCEDURE — OTHER SKYRIZI MONITORING: OTHER

## 2024-02-27 PROCEDURE — OTHER ORDER TESTS: OTHER

## 2024-02-27 RX ORDER — RISANKIZUMAB-RZAA 150 MG/ML
150MG INJECTION SUBCUTANEOUS
Qty: 1 | Refills: 2 | Status: ERX

## 2024-02-27 ASSESSMENT — PGA PSORIASIS: PGA PSORIASIS 2020: CLEAR

## 2024-02-27 ASSESSMENT — ITCH NUMERIC RATING SCALE: HOW SEVERE IS YOUR ITCHING?: 0

## 2024-02-27 ASSESSMENT — LOCATION DETAILED DESCRIPTION DERM: LOCATION DETAILED: LEFT ANTECUBITAL SKIN

## 2024-02-27 ASSESSMENT — LOCATION SIMPLE DESCRIPTION DERM: LOCATION SIMPLE: LEFT UPPER ARM

## 2024-02-27 ASSESSMENT — LOCATION ZONE DERM: LOCATION ZONE: ARM

## 2024-02-27 NOTE — PROCEDURE: ORDER TESTS
Billing Type: Third-Party Bill
Performing Laboratory: [id number]
Bill For Surgical Tray: no
Expected Date Of Service: 02/27/2024

## 2024-02-27 NOTE — HPI: RASH (PSORIASIS)
Is This A New Presentation, Or A Follow-Up?: Follow Up Psoriasis
Additional History: She last had Skyrizi 7 days ago.

## 2024-02-27 NOTE — PROCEDURE: VENIPUNCTURE
Bill 53304 For Specimen Handling/Conveyance To Laboratory?: no
Detail Level: None
Number Of Tubes Drawn: 3
Venipuncture Paragraph: An alcohol pad was applied to the venipuncture site. Venipuncture was performed using a butterfly needle. Pressure and a bandaid was applied to the site. No complications were noted.

## 2024-03-19 ENCOUNTER — TRANSFERRED RECORDS (OUTPATIENT)
Dept: HEALTH INFORMATION MANAGEMENT | Facility: CLINIC | Age: 64
End: 2024-03-19
Payer: COMMERCIAL

## 2024-04-03 ENCOUNTER — RX ONLY (RX ONLY)
Age: 64
End: 2024-04-03

## 2024-04-03 RX ORDER — RISANKIZUMAB-RZAA 150 MG/ML
INJECTION SUBCUTANEOUS
Qty: 1 | Refills: 1 | Status: ERX

## 2024-06-04 ENCOUNTER — APPOINTMENT (OUTPATIENT)
Dept: URBAN - METROPOLITAN AREA CLINIC 254 | Age: 64
Setting detail: DERMATOLOGY
End: 2024-06-05

## 2024-06-04 DIAGNOSIS — L40.0 PSORIASIS VULGARIS: ICD-10-CM

## 2024-06-04 PROCEDURE — 96372 THER/PROPH/DIAG INJ SC/IM: CPT

## 2024-06-04 PROCEDURE — OTHER SKYRIZI INJECTION: OTHER

## 2024-06-04 ASSESSMENT — LOCATION DETAILED DESCRIPTION DERM: LOCATION DETAILED: RIGHT ANTERIOR PROXIMAL THIGH

## 2024-06-04 ASSESSMENT — LOCATION ZONE DERM: LOCATION ZONE: LEG

## 2024-06-04 ASSESSMENT — LOCATION SIMPLE DESCRIPTION DERM: LOCATION SIMPLE: RIGHT THIGH

## 2024-06-04 NOTE — PROCEDURE: SKYRIZI INJECTION
Use Enhanced Ndc?: Yes
Was The Medication Purchased By The Clinic?: No
Lot # (Optional): [id number]
J-Code: 
Ndc (150mg/Ml Auto-Injector): 1310-7704-03
Ndc (75mg/0.83ml Syringe): [address]-87
Syringe Size Used (Required For Enhanced Ndc): 150 mg/ml Prefilled Syringe
Expiration Date (Optional): January 2025
Skyrizi Amount: 150 mg
[address]871 Billing Preferences: By Number of Body Touches
Consent: The risks of pain and injection site reactions were reviewed with the patient prior to the injection.
Administered By (Optional): Favio Lynne MA.
Detail Level: None

## 2024-06-14 DIAGNOSIS — F33.1 MAJOR DEPRESSIVE DISORDER, RECURRENT EPISODE, MODERATE (H): ICD-10-CM

## 2024-06-14 DIAGNOSIS — F41.1 GENERALIZED ANXIETY DISORDER: ICD-10-CM

## 2024-06-14 DIAGNOSIS — F31.81 BIPOLAR 2 DISORDER (H): Primary | ICD-10-CM

## 2024-07-01 ENCOUNTER — NURSE TRIAGE (OUTPATIENT)
Dept: FAMILY MEDICINE | Facility: CLINIC | Age: 64
End: 2024-07-01
Payer: COMMERCIAL

## 2024-07-01 NOTE — TELEPHONE ENCOUNTER
Nurse Triage SBAR    Is this a 2nd Level Triage? YES, LICENSED PRACTITIONER REVIEW IS REQUIRED    Situation: Pt calling to report intermittent headaches on the left side that have been ongoing for a few weeks now.     Background: no blood thinners.     Assessment: Pt does not have a headache while on the phone with RN. Pain 8/10 when episodes occur. Pt stated that headache can last from 1-5mins. When headache lasts around 5mins, she takes tylenol with relief. Pt experiences blurred vision sometimes when episodes occur. No nausea/vomiting, no weakness as she has experienced these while working. No head injury, no fever.     Protocol Recommended Disposition:   See in Office Today or Tomorrow: RN offered available appointments for 7/2/24. Pt declined and prefers to see female provider before 7:30am or after 2:30pm later this week due to work. Pcp is NEELIMA    Recommendation: OK for later appointment this week per pt's request as pcp neelima?     Routed to provider    Does the patient meet one of the following criteria for ADS visit consideration? 16+ years old, with an [facility] PCP     TIP  Providers, please consider if this condition is appropriate for management at one of our Acute and Diagnostic Services sites.     If patient is a good candidate, please use dotphrase <dot>triageresponse and select Refer to ADS to document.    Emelia Powell RN on 7/1/2024 at 12:49 PM     Reason for Disposition   New headache and age > 50    Additional Information   Negative: Difficult to awaken or acting confused (e.g., disoriented, slurred speech)   Negative: Weakness of the face, arm or leg on one side of the body and new-onset   Negative: Numbness of the face, arm or leg on one side of the body and new-onset   Negative: Loss of speech or garbled speech and new-onset   Negative: Passed out (i.e., fainted, collapsed and was not responding)   Negative: Sounds like a life-threatening emergency to the triager   Negative: Followed a head injury  "within last 3 days   Negative: Traumatic Brain Injury (TBI) is suspected   Negative: Sinus pain of forehead and yellow or green nasal discharge   Negative: Pregnant   Negative: Unable to walk without falling   Negative: Stiff neck (can't touch chin to chest)   Negative: Possibility of carbon monoxide exposure   Negative: SEVERE headache, states 'worst headache' of life   Negative: SEVERE headache, sudden-onset (i.e., reaching maximum intensity within seconds to 1 hour)   Negative: Severe pain in one eye   Negative: Loss of vision or double vision  (Exception: Same as prior migraines.)   Negative: Patient sounds very sick or weak to the triager   Negative: Fever > 103 F (39.4 C)   Negative: Fever > 100.0 F (37.8 C) and has diabetes mellitus or a weak immune system (e.g., HIV positive, cancer chemotherapy, organ transplant, splenectomy, chronic steroids)   Negative: SEVERE headache (e.g., excruciating) and has had severe headaches before   Negative: SEVERE headache and not relieved by pain meds   Negative: SEVERE headache and vomiting   Negative: SEVERE headache and fever   Negative: New headache and weak immune system (e.g., HIV positive, cancer chemo, splenectomy, organ transplant, chronic steroids)   Negative: Fever present > 3 days (72 hours)   Negative: Patient wants to be seen   Negative: Unexplained headache that is present > 24 hours    Answer Assessment - Initial Assessment Questions  1. LOCATION: \"Where does it hurt?\"       Left side  2. ONSET: \"When did the headache start?\" (Minutes, hours or days)       A few weeks now   3. PATTERN: \"Does the pain come and go, or has it been constant since it started?\"      Come and goes  4. SEVERITY: \"How bad is the pain?\" and \"What does it keep you from doing?\"  (e.g., Scale 1-10; mild, moderate, or severe)    - MILD (1-3): doesn't interfere with normal activities     - MODERATE (4-7): interferes with normal activities or awakens from sleep     - SEVERE (8-10): " "excruciating pain, unable to do any normal activities         8, able to work   5. RECURRENT SYMPTOM: \"Have you ever had headaches before?\" If Yes, ask: \"When was the last time?\" and \"What happened that time?\"       yes  6. CAUSE: \"What do you think is causing the headache?\"      Unknown   7. MIGRAINE: \"Have you been diagnosed with migraine headaches?\" If Yes, ask: \"Is this headache similar?\"       no  8. HEAD INJURY: \"Has there been any recent injury to the head?\"       no  9. OTHER SYMPTOMS: \"Do you have any other symptoms?\" (fever, stiff neck, eye pain, sore throat, cold symptoms)      Nasal congestion, runny nose    Protocols used: Headache-A-OH    "

## 2024-07-02 ENCOUNTER — TELEPHONE (OUTPATIENT)
Dept: FAMILY MEDICINE | Facility: CLINIC | Age: 64
End: 2024-07-02

## 2024-07-02 NOTE — TELEPHONE ENCOUNTER
Reason for Call:  Appointment Request    Patient requesting this type of appt:  Lab     Requested provider:  Lab visit    Reason patient unable to be scheduled:  Same day lab request    When does patient want to be seen/preferred time: Same day    Comments: Patient is requesting to schedule a lab appointment for after her primary care visit today at 3pm. Lab orders placed from psychiatry provider [place].    Could we send this information to you in [facility] or would you prefer to receive a phone call?:   Patient would prefer a phone call   Okay to leave a detailed message?: Yes at Home number on file [phone] (home)    Call taken on 7/2/2024 at 9:44 AM by Lindsay Vicente

## 2024-07-02 NOTE — TELEPHONE ENCOUNTER
Patient can get labs done after her appointment with Dr. Vogel today.  No separate appointment is needed.   Sunitha Rodriguez,

## 2024-07-11 ENCOUNTER — TRANSFERRED RECORDS (OUTPATIENT)
Dept: HEALTH INFORMATION MANAGEMENT | Facility: CLINIC | Age: 64
End: 2024-07-11
Payer: COMMERCIAL

## 2024-07-19 ENCOUNTER — OFFICE VISIT (OUTPATIENT)
Dept: INTERNAL MEDICINE | Facility: CLINIC | Age: 64
End: 2024-07-19
Payer: COMMERCIAL

## 2024-07-19 VITALS
HEIGHT: 69 IN | DIASTOLIC BLOOD PRESSURE: 78 MMHG | HEART RATE: 64 BPM | TEMPERATURE: 97.4 F | BODY MASS INDEX: 24.14 KG/M2 | OXYGEN SATURATION: 99 % | RESPIRATION RATE: 18 BRPM | SYSTOLIC BLOOD PRESSURE: 138 MMHG | WEIGHT: 163 LBS

## 2024-07-19 DIAGNOSIS — N39.0 URINARY TRACT INFECTION WITHOUT HEMATURIA, SITE UNSPECIFIED: Primary | ICD-10-CM

## 2024-07-19 DIAGNOSIS — G43.709 CHRONIC MIGRAINE WITHOUT AURA WITHOUT STATUS MIGRAINOSUS, NOT INTRACTABLE: ICD-10-CM

## 2024-07-19 LAB
ALBUMIN UR-MCNC: NEGATIVE MG/DL
APPEARANCE UR: CLEAR
BACTERIA #/AREA URNS HPF: ABNORMAL /HPF
BILIRUB UR QL STRIP: NEGATIVE
COLOR UR AUTO: YELLOW
GLUCOSE UR STRIP-MCNC: NEGATIVE MG/DL
HGB UR QL STRIP: NEGATIVE
KETONES UR STRIP-MCNC: NEGATIVE MG/DL
LEUKOCYTE ESTERASE UR QL STRIP: ABNORMAL
MUCOUS THREADS #/AREA URNS LPF: PRESENT /LPF
NITRATE UR QL: NEGATIVE
PH UR STRIP: 6 [PH] (ref 5–7)
RBC #/AREA URNS AUTO: ABNORMAL /HPF
SP GR UR STRIP: >=1.03 (ref 1–1.03)
SQUAMOUS #/AREA URNS AUTO: ABNORMAL /LPF
UROBILINOGEN UR STRIP-ACNC: 0.2 E.U./DL
WBC #/AREA URNS AUTO: ABNORMAL /HPF

## 2024-07-19 PROCEDURE — 99214 OFFICE O/P EST MOD 30 MIN: CPT

## 2024-07-19 PROCEDURE — 81001 URINALYSIS AUTO W/SCOPE: CPT

## 2024-07-19 RX ORDER — NITROFURANTOIN 25; 75 MG/1; MG/1
100 CAPSULE ORAL 2 TIMES DAILY
Qty: 10 CAPSULE | Refills: 0 | Status: SHIPPED | OUTPATIENT
Start: 2024-07-19 | End: 2024-07-24

## 2024-07-19 RX ORDER — SUMATRIPTAN 25 MG/1
25 TABLET, FILM COATED ORAL
Qty: 10 TABLET | Refills: 1 | Status: SHIPPED | OUTPATIENT
Start: 2024-07-19

## 2024-07-19 ASSESSMENT — ENCOUNTER SYMPTOMS: HEADACHES: 1

## 2024-07-19 ASSESSMENT — PATIENT HEALTH QUESTIONNAIRE - PHQ9
10. IF YOU CHECKED OFF ANY PROBLEMS, HOW DIFFICULT HAVE THESE PROBLEMS MADE IT FOR YOU TO DO YOUR WORK, TAKE CARE OF THINGS AT HOME, OR GET ALONG WITH OTHER PEOPLE: NOT DIFFICULT AT ALL
SUM OF ALL RESPONSES TO PHQ QUESTIONS 1-9: 1
SUM OF ALL RESPONSES TO PHQ QUESTIONS 1-9: 1

## 2024-07-19 NOTE — PROGRESS NOTES
Assessment & Plan     (N39.0) Urinary tract infection without hematuria, site unspecified  (primary encounter diagnosis)  Comment: Patient seen in clinic for UTI symptoms. Discussed treatment options. Patient acknowledged and agreed to plan of care.   Plan: UA Macroscopic with reflex to Microscopic and         Culture - Clinic Collect, UA Microscopic with         Reflex to Culture, nitroFURantoin         macrocrystal-monohydrate (MACROBID) 100 MG         capsule        Prescription sent to pharmacy     (G45.737) Chronic migraine without aura without status migrainosus, not intractable  Comment: Chronic. Discussed medication options and referral to neurology patient declined referral. Patient acknowledged and agreed to plan of care.   Plan: REVIEW OF HEALTH MAINTENANCE PROTOCOL ORDERS,         SUMAtriptan (IMITREX) 25 MG tablet        Prescription sent to pharmacy           Missy Caruso is a 64 year old, presenting for the following health issues:  Headache      7/19/2024     3:07 PM   Additional Questions   Roomed by Latanya   Accompanied by self     Headache     History of Present Illness       Reason for visit:  Headaches    She eats 0-1 servings of fruits and vegetables daily.She consumes 1 sweetened beverage(s) daily.She exercises with enough effort to increase her heart rate 9 or less minutes per day.  She exercises with enough effort to increase her heart rate 3 or less days per week.   She is taking medications regularly.         Genitourinary - Female  Onset/Duration: 1 month   Description:   Painful urination (Dysuria): No           Frequency: YES  Blood in urine (Hematuria): No  Delay in urine (Hesitency): No  Intensity: mild  Progression of Symptoms:  same  Accompanying Signs & Symptoms:  Fever/chills: No  Flank pain: No  Nausea and vomiting: No  Vaginal symptoms: none  Abdominal/Pelvic Pain: No  History:   History of frequent UTI s: No  History of kidney stones: No  Sexually Active:  "YES  Possibility of pregnancy: No  Precipitating or alleviating factors: None  Therapies tried and outcome:  none   Pain History:  When did you first notice your pain? Over 1 month    Have you seen anyone else for your pain? No  How has your pain affected your ability to work? Pain does not limit ability to work   What type of work do you or did you do?    Where in your body do you have pain? Headache  Onset/Duration: over 1 month   Description  Location: L front side    Character: sharp pain  Frequency:  daily   Duration:  5-15 min   Wake with headaches: No  Able to do daily activities when headache present: YES  Intensity:  7/10  Progression of Symptoms:  same  Accompanying signs and symptoms:  Stiff neck: No  Neck or upper back pain: No  Sinus or URI symptoms no   Fever: No  Nausea or vomiting: No  Dizziness: No  Numbness/tingling: No  Weakness: No  Visual changes: sometimes  History  Head trauma: No  Family history of migraines: No  Daily pain medication use: No  Previous tests for headaches: No  Neurologist evaluation: No  Precipitating or Alleviating factors (light/sound/sleep/caffeine): none  Therapies tried and outcome: Tylenol              Outcome - usually effective  Frequent/daily pain medication use: N/A          Review of Systems  Constitutional, HEENT, cardiovascular, pulmonary, gi and gu systems are negative, except as otherwise noted.      Objective    /78   Pulse 64   Temp 97.4  F (36.3  C) (Temporal)   Resp 18   Ht 1.753 m (5' 9\")   Wt 73.9 kg (163 lb)   LMP 01/30/2014 (Exact Date)   SpO2 99%   BMI 24.07 kg/m    Body mass index is 24.07 kg/m .  Physical Exam  Constitutional:       Appearance: Normal appearance.   HENT:      Nose: Nose normal. No congestion or rhinorrhea.   Eyes:      General:         Right eye: No discharge.         Left eye: No discharge.      Conjunctiva/sclera: Conjunctivae normal.      Pupils: Pupils are equal, round, and reactive to light.   Pulmonary: "      Effort: Pulmonary effort is normal. No respiratory distress.      Breath sounds: Normal breath sounds. No stridor. No wheezing or rhonchi.   Musculoskeletal:         General: No swelling, tenderness, deformity or signs of injury. Normal range of motion.   Skin:     General: Skin is warm.      Coloration: Skin is not jaundiced or pale.      Findings: No bruising or erythema.   Neurological:      General: No focal deficit present.      Mental Status: She is alert and oriented to person, place, and time.   Psychiatric:         Mood and Affect: Mood normal.         Behavior: Behavior normal.         Thought Content: Thought content normal.         Judgment: Judgment normal.            Results for orders placed or performed in visit on 07/19/24   UA Macroscopic with reflex to Microscopic and Culture - Clinic Collect     Status: Abnormal    Specimen: Urine, NOS   Result Value Ref Range    Color Urine Yellow Colorless, Straw, Light Yellow, Yellow    Appearance Urine Clear Clear    Glucose Urine Negative Negative mg/dL    Bilirubin Urine Negative Negative    Ketones Urine Negative Negative mg/dL    Specific Gravity Urine >=1.030 1.003 - 1.035    Blood Urine Negative Negative    pH Urine 6.0 5.0 - 7.0    Protein Albumin Urine Negative Negative mg/dL    Urobilinogen Urine 0.2 0.2, 1.0 E.U./dL    Nitrite Urine Negative Negative    Leukocyte Esterase Urine Trace (A) Negative   UA Microscopic with Reflex to Culture     Status: Abnormal   Result Value Ref Range    Bacteria Urine Few (A) None Seen /HPF    RBC Urine 0-2 0-2 /HPF /HPF    WBC Urine 5-10 (A) 0-5 /HPF /HPF    Squamous Epithelials Urine Few (A) None Seen /LPF    Mucus Urine Present (A) None Seen /LPF    Narrative    Urine Culture not indicated           Signed Electronically by: ANDREA Londono CNP

## 2024-07-25 ENCOUNTER — LAB (OUTPATIENT)
Dept: LAB | Facility: CLINIC | Age: 64
End: 2024-07-25
Payer: COMMERCIAL

## 2024-07-25 DIAGNOSIS — F31.81 BIPOLAR 2 DISORDER (H): ICD-10-CM

## 2024-07-25 DIAGNOSIS — F41.1 GENERALIZED ANXIETY DISORDER: ICD-10-CM

## 2024-07-25 DIAGNOSIS — F33.1 MAJOR DEPRESSIVE DISORDER, RECURRENT EPISODE, MODERATE (H): ICD-10-CM

## 2024-07-25 LAB
AMPHETAMINES UR QL SCN: NORMAL
BARBITURATES UR QL SCN: NORMAL
BASOPHILS # BLD AUTO: 0 10E3/UL (ref 0–0.2)
BASOPHILS NFR BLD AUTO: 0 %
BENZODIAZ UR QL SCN: NORMAL
BZE UR QL SCN: NORMAL
CANNABINOIDS UR QL SCN: NORMAL
EOSINOPHIL # BLD AUTO: 0.2 10E3/UL (ref 0–0.7)
EOSINOPHIL NFR BLD AUTO: 5 %
ERYTHROCYTE [DISTWIDTH] IN BLOOD BY AUTOMATED COUNT: 13.2 % (ref 10–15)
FENTANYL UR QL: NORMAL
HBA1C MFR BLD: 5.8 % (ref 0–5.6)
HCT VFR BLD AUTO: 35.7 % (ref 35–47)
HGB BLD-MCNC: 11.4 G/DL (ref 11.7–15.7)
IMM GRANULOCYTES # BLD: 0 10E3/UL
IMM GRANULOCYTES NFR BLD: 0 %
LYMPHOCYTES # BLD AUTO: 1.6 10E3/UL (ref 0.8–5.3)
LYMPHOCYTES NFR BLD AUTO: 38 %
MCH RBC QN AUTO: 28.9 PG (ref 26.5–33)
MCHC RBC AUTO-ENTMCNC: 31.9 G/DL (ref 31.5–36.5)
MCV RBC AUTO: 90 FL (ref 78–100)
MONOCYTES # BLD AUTO: 0.3 10E3/UL (ref 0–1.3)
MONOCYTES NFR BLD AUTO: 8 %
NEUTROPHILS # BLD AUTO: 2.1 10E3/UL (ref 1.6–8.3)
NEUTROPHILS NFR BLD AUTO: 49 %
OPIATES UR QL SCN: NORMAL
PCP QUAL URINE (ROCHE): NORMAL
PLATELET # BLD AUTO: 245 10E3/UL (ref 150–450)
RBC # BLD AUTO: 3.95 10E6/UL (ref 3.8–5.2)
WBC # BLD AUTO: 4.3 10E3/UL (ref 4–11)

## 2024-07-25 PROCEDURE — 80178 ASSAY OF LITHIUM: CPT

## 2024-07-25 PROCEDURE — 84443 ASSAY THYROID STIM HORMONE: CPT

## 2024-07-25 PROCEDURE — 80061 LIPID PANEL: CPT

## 2024-07-25 PROCEDURE — 84480 ASSAY TRIIODOTHYRONINE (T3): CPT

## 2024-07-25 PROCEDURE — 84439 ASSAY OF FREE THYROXINE: CPT

## 2024-07-25 PROCEDURE — 80307 DRUG TEST PRSMV CHEM ANLYZR: CPT

## 2024-07-25 PROCEDURE — 84630 ASSAY OF ZINC: CPT | Mod: 90

## 2024-07-25 PROCEDURE — 36415 COLL VENOUS BLD VENIPUNCTURE: CPT

## 2024-07-25 PROCEDURE — 82728 ASSAY OF FERRITIN: CPT

## 2024-07-25 PROCEDURE — 82607 VITAMIN B-12: CPT

## 2024-07-25 PROCEDURE — 83540 ASSAY OF IRON: CPT

## 2024-07-25 PROCEDURE — 99000 SPECIMEN HANDLING OFFICE-LAB: CPT

## 2024-07-25 PROCEDURE — 82306 VITAMIN D 25 HYDROXY: CPT

## 2024-07-25 PROCEDURE — 82746 ASSAY OF FOLIC ACID SERUM: CPT

## 2024-07-25 PROCEDURE — 83036 HEMOGLOBIN GLYCOSYLATED A1C: CPT

## 2024-07-25 PROCEDURE — 85025 COMPLETE CBC W/AUTO DIFF WBC: CPT

## 2024-07-25 PROCEDURE — 80048 BASIC METABOLIC PNL TOTAL CA: CPT

## 2024-07-26 LAB
ANION GAP SERPL CALCULATED.3IONS-SCNC: 11 MMOL/L (ref 7–15)
BUN SERPL-MCNC: 13.8 MG/DL (ref 8–23)
CALCIUM SERPL-MCNC: 9.4 MG/DL (ref 8.8–10.4)
CHLORIDE SERPL-SCNC: 107 MMOL/L (ref 98–107)
CHOLEST SERPL-MCNC: 201 MG/DL
CREAT SERPL-MCNC: 0.89 MG/DL (ref 0.51–0.95)
EGFRCR SERPLBLD CKD-EPI 2021: 72 ML/MIN/1.73M2
FASTING STATUS PATIENT QL REPORTED: NO
FASTING STATUS PATIENT QL REPORTED: NO
FERRITIN SERPL-MCNC: 84 NG/ML (ref 11–328)
FOLATE SERPL-MCNC: 13.8 NG/ML (ref 4.6–34.8)
GLUCOSE SERPL-MCNC: 109 MG/DL (ref 70–99)
HCO3 SERPL-SCNC: 25 MMOL/L (ref 22–29)
HDLC SERPL-MCNC: 51 MG/DL
IRON SERPL-MCNC: 50 UG/DL (ref 37–145)
LDLC SERPL CALC-MCNC: 111 MG/DL
LITHIUM SERPL-SCNC: 0.41 MMOL/L (ref 0.6–1.2)
NONHDLC SERPL-MCNC: 150 MG/DL
POTASSIUM SERPL-SCNC: 4.1 MMOL/L (ref 3.4–5.3)
SODIUM SERPL-SCNC: 143 MMOL/L (ref 135–145)
T3 SERPL-MCNC: 98 NG/DL (ref 85–202)
T4 FREE SERPL-MCNC: 0.87 NG/DL (ref 0.9–1.7)
TRIGL SERPL-MCNC: 194 MG/DL
TSH SERPL DL<=0.005 MIU/L-ACNC: 1.96 UIU/ML (ref 0.3–4.2)
VIT B12 SERPL-MCNC: 473 PG/ML (ref 232–1245)
VIT D+METAB SERPL-MCNC: 12 NG/ML (ref 20–50)

## 2024-07-27 LAB — ZINC SERPL-MCNC: 73 UG/DL

## 2024-08-13 ENCOUNTER — APPOINTMENT (OUTPATIENT)
Dept: URBAN - METROPOLITAN AREA CLINIC 254 | Age: 64
Setting detail: DERMATOLOGY
End: 2024-08-14

## 2024-08-13 ENCOUNTER — TELEPHONE (OUTPATIENT)
Dept: FAMILY MEDICINE | Facility: CLINIC | Age: 64
End: 2024-08-13
Payer: COMMERCIAL

## 2024-08-13 VITALS — WEIGHT: 163 LBS | HEIGHT: 69 IN

## 2024-08-13 DIAGNOSIS — L40.0 PSORIASIS VULGARIS: ICD-10-CM

## 2024-08-13 PROCEDURE — 99213 OFFICE O/P EST LOW 20 MIN: CPT

## 2024-08-13 PROCEDURE — OTHER COUNSELING: OTHER

## 2024-08-13 PROCEDURE — OTHER MIPS QUALITY: OTHER

## 2024-08-13 ASSESSMENT — BSA PSORIASIS: % BODY COVERED IN PSORIASIS: 0

## 2024-08-13 ASSESSMENT — ITCH NUMERIC RATING SCALE: HOW SEVERE IS YOUR ITCHING?: 0

## 2024-08-13 NOTE — PROCEDURE: COUNSELING
Detail Level: Detailed
Patient Specific Counseling (Will Not Stick From Patient To Patient): Fantastic results with Skyrizi.

## 2024-08-13 NOTE — TELEPHONE ENCOUNTER
Patient Quality Outreach    Patient is due for the following:   Colon Cancer Screening  Breast Cancer Screening - Mammogram  Cervical Cancer Screening - PAP Needed  Physical Preventive Adult Physical      Topic Date Due    Zoster (Shingles) Vaccine (1 of 2) Never done    COVID-19 Vaccine (3 - 2023-24 season) 09/01/2023    Diptheria Tetanus Pertussis (DTAP/TDAP/TD) Vaccine (4 - Td or Tdap) 05/22/2024       Next Steps:   Schedule a Adult Preventative    Type of outreach:    Sent [employer] message.    Next Steps:  Reach out within 90 days via Letter.    Max number of attempts reached: No. Will try again in 90 days if patient still on fail list.    Questions for provider review:    None           Zita Rankin MA

## 2024-10-17 ENCOUNTER — TRANSFERRED RECORDS (OUTPATIENT)
Dept: HEALTH INFORMATION MANAGEMENT | Facility: CLINIC | Age: 64
End: 2024-10-17
Payer: COMMERCIAL

## 2024-12-04 ENCOUNTER — TELEPHONE (OUTPATIENT)
Dept: FAMILY MEDICINE | Facility: CLINIC | Age: 64
End: 2024-12-04
Payer: COMMERCIAL

## 2024-12-04 NOTE — TELEPHONE ENCOUNTER
Patient Quality Outreach    Patient is due for the following:   Colon Cancer Screening  Breast Cancer Screening - Mammogram  Cervical Cancer Screening - PAP Needed  Physical Annual Wellness Visit    Action(s) Taken:   Schedule a Adult Preventative    Type of outreach:    Sent [employer] message.    Questions for provider review:    None           Tawanna Kellogg, REBECCA  Chart routed to closing chart  .

## 2025-01-25 ENCOUNTER — HEALTH MAINTENANCE LETTER (OUTPATIENT)
Age: 65
End: 2025-01-25

## 2025-01-30 ENCOUNTER — APPOINTMENT (OUTPATIENT)
Dept: URBAN - METROPOLITAN AREA CLINIC 254 | Age: 65
Setting detail: DERMATOLOGY
End: 2025-01-31

## 2025-01-30 VITALS — HEIGHT: 69 IN | WEIGHT: 163 LBS

## 2025-01-30 DIAGNOSIS — L40.0 PSORIASIS VULGARIS: ICD-10-CM

## 2025-01-30 PROCEDURE — OTHER PRESCRIPTION MEDICATION MANAGEMENT: OTHER

## 2025-01-30 PROCEDURE — OTHER SEPARATE AND IDENTIFIABLE DOCUMENTATION: OTHER

## 2025-01-30 PROCEDURE — OTHER PRESCRIPTION: OTHER

## 2025-01-30 PROCEDURE — OTHER SKYRIZI INJECTION: OTHER

## 2025-01-30 PROCEDURE — 96372 THER/PROPH/DIAG INJ SC/IM: CPT

## 2025-01-30 PROCEDURE — 99214 OFFICE O/P EST MOD 30 MIN: CPT | Mod: 25

## 2025-01-30 PROCEDURE — OTHER MIPS QUALITY: OTHER

## 2025-01-30 PROCEDURE — 36415 COLL VENOUS BLD VENIPUNCTURE: CPT

## 2025-01-30 PROCEDURE — OTHER ORDER TESTS: OTHER

## 2025-01-30 PROCEDURE — OTHER VENIPUNCTURE: OTHER

## 2025-01-30 PROCEDURE — OTHER COUNSELING: OTHER

## 2025-01-30 RX ORDER — RISANKIZUMAB-RZAA 150 MG/ML
150 INJECTION SUBCUTANEOUS
Qty: 1 | Refills: 3 | Status: ERX

## 2025-01-30 ASSESSMENT — LOCATION SIMPLE DESCRIPTION DERM
LOCATION SIMPLE: RIGHT THIGH
LOCATION SIMPLE: RIGHT UPPER ARM

## 2025-01-30 ASSESSMENT — PGA PSORIASIS: PGA PSORIASIS 2020: CLEAR

## 2025-01-30 ASSESSMENT — LOCATION DETAILED DESCRIPTION DERM
LOCATION DETAILED: RIGHT ANTERIOR DISTAL THIGH
LOCATION DETAILED: RIGHT ANTECUBITAL SKIN

## 2025-01-30 ASSESSMENT — LOCATION ZONE DERM
LOCATION ZONE: ARM
LOCATION ZONE: LEG

## 2025-01-30 ASSESSMENT — BSA PSORIASIS: % BODY COVERED IN PSORIASIS: 3

## 2025-01-30 NOTE — PROCEDURE: VENIPUNCTURE
Bill 57729 For Specimen Handling/Conveyance To Laboratory?: no
Venipuncture Paragraph: An alcohol pad was applied to the venipuncture site. Venipuncture was performed using a butterfly needle. Pressure and a bandaid was applied to the site. No complications were noted.
Detail Level: None

## 2025-01-30 NOTE — PROCEDURE: SKYRIZI INJECTION
Administered By (Optional): Thu Ramos MA
[id number] Billing Preferences: By Number of Body Touches
Render If Medication Purchased By Clinic In Visit Note?: Yes
Skyrizi Amount: 150 mg
Was The Medication Purchased By The Clinic?: No
Syringe Size Used (Required For Enhanced Ndc): 150 mg/ml Prefilled Syringe
Consent: The risks of pain and injection site reactions were reviewed with the patient prior to the injection.
Ndc (150mg/Ml Syringe): 5409-0516-44
Ndc (75mg/0.83ml Syringe): [address]-35
Expiration Date (Optional): 04/2026
J-Code: 
Lot # (Optional): [id number]
Detail Level: None

## 2025-01-30 NOTE — PROCEDURE: ORDER TESTS
Billing Type: Third-Party Bill
Expected Date Of Service: 01/30/2025
Bill For Surgical Tray: no
Performing Laboratory: [id number]

## 2025-01-30 NOTE — PROCEDURE: PRESCRIPTION MEDICATION MANAGEMENT
Continue Regimen: Skyrizi 150 mg/mL subcutaneous pen injector every 12 weeks
Detail Level: Zone
Plan: -patient will RTC in 6 months for psoriasis recheck \\n-Skyrizi approval through July 2025
Render In Strict Bullet Format?: No

## 2025-02-17 ENCOUNTER — APPOINTMENT (OUTPATIENT)
Dept: URBAN - METROPOLITAN AREA CLINIC 254 | Age: 65
Setting detail: DERMATOLOGY
End: 2025-02-17

## 2025-02-17 DIAGNOSIS — L40.0 PSORIASIS VULGARIS: ICD-10-CM

## 2025-02-17 PROCEDURE — OTHER VENIPUNCTURE: OTHER

## 2025-02-17 PROCEDURE — OTHER ORDER TESTS: OTHER

## 2025-02-17 PROCEDURE — OTHER ADDITIONAL NOTES: OTHER

## 2025-02-17 PROCEDURE — OTHER MIPS QUALITY: OTHER

## 2025-02-17 ASSESSMENT — LOCATION SIMPLE DESCRIPTION DERM: LOCATION SIMPLE: RIGHT UPPER ARM

## 2025-02-17 ASSESSMENT — LOCATION ZONE DERM: LOCATION ZONE: ARM

## 2025-02-17 ASSESSMENT — LOCATION DETAILED DESCRIPTION DERM: LOCATION DETAILED: RIGHT ANTECUBITAL SKIN

## 2025-02-17 NOTE — PROCEDURE: ADDITIONAL NOTES
Detail Level: Simple
Render Risk Assessment In Note?: no
Additional Notes: Patient presented for redraw of QFG-TB as previous draw was denoted by [facility] as insufficient quantity to run diagnostic. No charge for this repeat lab draw.

## 2025-02-17 NOTE — PROCEDURE: VENIPUNCTURE
Venipuncture Paragraph: An alcohol pad was applied to the venipuncture site. Venipuncture was performed using a butterfly needle. Pressure and a bandaid was applied to the site. No complications were noted.
Detail Level: None
Bill 67318 For Specimen Handling/Conveyance To Laboratory?: no
Number Of Tubes Drawn: 1

## 2025-02-17 NOTE — HPI: TESTING (BLOOD WORK)
Are You Fasting?: No
previous_labs_has_had_previous_labs
When Was Your Last Blood Work Done?: 01/30/2025

## 2025-03-06 ENCOUNTER — TELEPHONE (OUTPATIENT)
Dept: INTERNAL MEDICINE | Facility: CLINIC | Age: 65
End: 2025-03-06
Payer: COMMERCIAL

## 2025-03-06 NOTE — TELEPHONE ENCOUNTER
Patient Quality Outreach    Patient is due for the following:   Colon Cancer Screening  Breast Cancer Screening - Mammogram  Cervical Cancer Screening - PAP Needed  Physical Preventive Adult Physical    Action(s) Taken:   Schedule a Adult Preventative    Type of outreach:    Sent [employer] message.    Questions for provider review:    None           Tawanna Kellogg, REBECCA  Chart routed to closing chart.

## 2025-04-12 ENCOUNTER — HEALTH MAINTENANCE LETTER (OUTPATIENT)
Age: 65
End: 2025-04-12

## 2025-05-14 ENCOUNTER — RX ONLY (RX ONLY)
Age: 65
End: 2025-05-14

## 2025-05-14 RX ORDER — RISANKIZUMAB-RZAA 150 MG/ML
INJECTION SUBCUTANEOUS
Qty: 1 | Refills: 1 | Status: ERX

## 2025-05-24 ENCOUNTER — HEALTH MAINTENANCE LETTER (OUTPATIENT)
Age: 65
End: 2025-05-24

## 2025-05-29 ENCOUNTER — RX ONLY (RX ONLY)
Age: 65
End: 2025-05-29

## 2025-05-29 RX ORDER — RISANKIZUMAB-RZAA 150 MG/ML
INJECTION SUBCUTANEOUS
Qty: 1 | Refills: 1 | Status: ERX

## 2025-06-17 ENCOUNTER — APPOINTMENT (OUTPATIENT)
Dept: URBAN - METROPOLITAN AREA CLINIC 254 | Age: 65
Setting detail: DERMATOLOGY
End: 2025-06-19

## 2025-06-17 ENCOUNTER — TRANSFERRED RECORDS (OUTPATIENT)
Dept: HEALTH INFORMATION MANAGEMENT | Facility: CLINIC | Age: 65
End: 2025-06-17
Payer: COMMERCIAL

## 2025-06-17 DIAGNOSIS — L40.0 PSORIASIS VULGARIS: ICD-10-CM

## 2025-06-17 PROCEDURE — 96372 THER/PROPH/DIAG INJ SC/IM: CPT

## 2025-06-17 PROCEDURE — OTHER SKYRIZI INJECTION: OTHER

## 2025-06-17 ASSESSMENT — LOCATION ZONE DERM: LOCATION ZONE: LEG

## 2025-06-17 ASSESSMENT — LOCATION DETAILED DESCRIPTION DERM: LOCATION DETAILED: RIGHT ANTERIOR DISTAL THIGH

## 2025-06-17 ASSESSMENT — LOCATION SIMPLE DESCRIPTION DERM: LOCATION SIMPLE: RIGHT THIGH

## 2025-06-18 DIAGNOSIS — F31.81 BIPOLAR II DISORDER (H): Primary | ICD-10-CM

## 2025-06-18 DIAGNOSIS — F41.1 GENERALIZED ANXIETY DISORDER: ICD-10-CM

## 2025-06-18 DIAGNOSIS — F33.0 MAJOR DEPRESSIVE DISORDER, RECURRENT EPISODE, MILD: ICD-10-CM

## 2025-06-18 DIAGNOSIS — Z79.899 HIGH RISK MEDICATION USE: ICD-10-CM

## 2025-07-08 ENCOUNTER — TELEPHONE (OUTPATIENT)
Dept: FAMILY MEDICINE | Facility: CLINIC | Age: 65
End: 2025-07-08
Payer: COMMERCIAL

## 2025-07-08 NOTE — TELEPHONE ENCOUNTER
Patient Quality Outreach    Patient is due for the following:   Colon Cancer Screening  Breast Cancer Screening - Mammogram  Physical Annual Wellness Visit    Action(s) Taken:   Patient has upcoming appointment, these items will be addressed at that time.    Type of outreach:    Chart review performed, no outreach needed.    Questions for provider review:    None         Shani Rehman MA  Chart routed to None.

## 2025-07-29 ENCOUNTER — APPOINTMENT (OUTPATIENT)
Dept: URBAN - METROPOLITAN AREA CLINIC 254 | Age: 65
Setting detail: DERMATOLOGY
End: 2025-07-31

## 2025-07-29 VITALS — WEIGHT: 160 LBS | HEIGHT: 55 IN

## 2025-07-29 DIAGNOSIS — L40.0 PSORIASIS VULGARIS: ICD-10-CM

## 2025-07-29 PROCEDURE — OTHER SKYRIZI MONITORING: OTHER

## 2025-07-29 PROCEDURE — OTHER PRESCRIPTION: OTHER

## 2025-07-29 PROCEDURE — 99213 OFFICE O/P EST LOW 20 MIN: CPT

## 2025-07-29 PROCEDURE — OTHER BIOLOGIC INJECTION TRAINING: OTHER

## 2025-07-29 PROCEDURE — OTHER ADDITIONAL NOTES: OTHER

## 2025-07-29 PROCEDURE — OTHER MIPS QUALITY: OTHER

## 2025-07-29 RX ORDER — RISANKIZUMAB-RZAA 150 MG/ML
150MG INJECTION SUBCUTANEOUS
Qty: 1 | Refills: 2 | Status: ERX

## 2025-07-29 ASSESSMENT — PGA PSORIASIS: PGA PSORIASIS 2020: CLEAR

## 2025-07-29 ASSESSMENT — LOCATION SIMPLE DESCRIPTION DERM: LOCATION SIMPLE: POSTERIOR SCALP

## 2025-07-29 ASSESSMENT — LOCATION DETAILED DESCRIPTION DERM
LOCATION DETAILED: MID-OCCIPITAL SCALP
LOCATION DETAILED: RIGHT SUPERIOR OCCIPITAL SCALP

## 2025-07-29 ASSESSMENT — ITCH NUMERIC RATING SCALE: HOW SEVERE IS YOUR ITCHING?: 0

## 2025-07-29 ASSESSMENT — LOCATION ZONE DERM: LOCATION ZONE: SCALP

## 2025-08-29 ENCOUNTER — APPOINTMENT (OUTPATIENT)
Dept: GENERAL RADIOLOGY | Facility: CLINIC | Age: 65
End: 2025-08-29
Attending: INTERNAL MEDICINE
Payer: COMMERCIAL

## 2025-08-29 ENCOUNTER — HOSPITAL ENCOUNTER (EMERGENCY)
Facility: CLINIC | Age: 65
Discharge: HOME | End: 2025-08-29
Attending: INTERNAL MEDICINE | Admitting: INTERNAL MEDICINE
Payer: COMMERCIAL

## 2025-08-29 VITALS
OXYGEN SATURATION: 99 % | SYSTOLIC BLOOD PRESSURE: 182 MMHG | HEART RATE: 62 BPM | TEMPERATURE: 98 F | HEIGHT: 69 IN | RESPIRATION RATE: 18 BRPM | DIASTOLIC BLOOD PRESSURE: 83 MMHG | WEIGHT: 161.9 LBS | BODY MASS INDEX: 23.98 KG/M2

## 2025-08-29 DIAGNOSIS — S93.401A SPRAIN OF RIGHT ANKLE, UNSPECIFIED LIGAMENT, INITIAL ENCOUNTER: Primary | ICD-10-CM

## 2025-08-29 PROCEDURE — 73610 X-RAY EXAM OF ANKLE: CPT | Mod: RT

## 2025-08-29 PROCEDURE — 99283 EMERGENCY DEPT VISIT LOW MDM: CPT | Performed by: INTERNAL MEDICINE

## 2025-08-29 PROCEDURE — 99284 EMERGENCY DEPT VISIT MOD MDM: CPT | Performed by: INTERNAL MEDICINE

## 2025-08-29 ASSESSMENT — ACTIVITIES OF DAILY LIVING (ADL)
ADLS_ACUITY_SCORE: 41

## 2025-08-29 ASSESSMENT — COLUMBIA-SUICIDE SEVERITY RATING SCALE - C-SSRS
2. HAVE YOU ACTUALLY HAD ANY THOUGHTS OF KILLING YOURSELF IN THE PAST MONTH?: NO
1. IN THE PAST MONTH, HAVE YOU WISHED YOU WERE DEAD OR WISHED YOU COULD GO TO SLEEP AND NOT WAKE UP?: NO
6. HAVE YOU EVER DONE ANYTHING, STARTED TO DO ANYTHING, OR PREPARED TO DO ANYTHING TO END YOUR LIFE?: NO

## (undated) DEVICE — NDL EPIDURAL TUOHY 20GA 3.5" 405028

## (undated) DEVICE — SYR 10ML PERFIX LL 332152

## (undated) DEVICE — TRAY PAIN INJECTION 97A 640

## (undated) DEVICE — GLOVE PROTEXIS MICRO 6.0  2D73PM60

## (undated) DEVICE — PREP CHLORAPREP W/ORANGE TINT 10.5ML 260715